# Patient Record
Sex: MALE | Race: WHITE | NOT HISPANIC OR LATINO | Employment: OTHER | URBAN - METROPOLITAN AREA
[De-identification: names, ages, dates, MRNs, and addresses within clinical notes are randomized per-mention and may not be internally consistent; named-entity substitution may affect disease eponyms.]

---

## 2018-01-03 ENCOUNTER — TELEPHONE (OUTPATIENT)
Dept: INTERNAL MEDICINE | Facility: CLINIC | Age: 58
End: 2018-01-03

## 2018-01-03 NOTE — TELEPHONE ENCOUNTER
Patient calls states he was a patient of yours back in 2013.  Then moved out of the area.  Has since returned and is asking if you will take him back as a patient.  Please advise.      TRC,  That we will see the patient.  Advised we need the records from his last provider prior to his visit.  Advised need a 60 minute New patient appointment.

## 2018-03-18 ENCOUNTER — OFFICE VISIT (OUTPATIENT)
Dept: URGENT CARE | Facility: URGENT CARE | Age: 58
End: 2018-03-18
Payer: COMMERCIAL

## 2018-03-18 VITALS
DIASTOLIC BLOOD PRESSURE: 92 MMHG | HEIGHT: 72 IN | BODY MASS INDEX: 34.4 KG/M2 | OXYGEN SATURATION: 97 % | SYSTOLIC BLOOD PRESSURE: 122 MMHG | HEART RATE: 97 BPM | WEIGHT: 254 LBS | RESPIRATION RATE: 20 BRPM | TEMPERATURE: 97.3 F

## 2018-03-18 DIAGNOSIS — M54.10 BACK PAIN WITH RIGHT-SIDED RADICULOPATHY: Primary | ICD-10-CM

## 2018-03-18 PROCEDURE — 96372 THER/PROPH/DIAG INJ SC/IM: CPT | Performed by: FAMILY MEDICINE

## 2018-03-18 PROCEDURE — 99213 OFFICE O/P EST LOW 20 MIN: CPT | Mod: 25 | Performed by: FAMILY MEDICINE

## 2018-03-18 RX ORDER — ETODOLAC 500 MG/1
500 TABLET, FILM COATED ORAL 2 TIMES DAILY
Qty: 20 TABLET | Refills: 0 | Status: SHIPPED | OUTPATIENT
Start: 2018-03-18 | End: 2018-04-20 | Stop reason: ALTCHOICE

## 2018-03-18 RX ORDER — SERTRALINE HYDROCHLORIDE 100 MG/1
1 TABLET, FILM COATED ORAL DAILY
Status: ON HOLD | COMMUNITY
Start: 2018-02-02 | End: 2020-05-21 | Stop reason: WASHOUT

## 2018-03-18 RX ORDER — INSULIN DETEMIR 100 [IU]/ML
55 INJECTION, SOLUTION SUBCUTANEOUS DAILY
Status: ON HOLD | COMMUNITY
Start: 2018-02-14 | End: 2020-05-21 | Stop reason: ALTCHOICE

## 2018-03-18 RX ORDER — IMIQUIMOD 12.5 MG/.25G
CREAM TOPICAL
Status: ON HOLD | COMMUNITY
Start: 2018-02-05 | End: 2020-05-21 | Stop reason: WASHOUT

## 2018-03-18 RX ORDER — ATORVASTATIN CALCIUM 40 MG/1
40 TABLET, FILM COATED ORAL DAILY
Status: ON HOLD | COMMUNITY
Start: 2017-12-27 | End: 2020-05-21 | Stop reason: WASHOUT

## 2018-03-18 RX ORDER — LOSARTAN POTASSIUM 50 MG/1
50 TABLET ORAL 2 TIMES DAILY
COMMUNITY
Start: 2018-02-26 | End: 2020-06-10 | Stop reason: SDUPTHER

## 2018-03-18 RX ORDER — KETOROLAC TROMETHAMINE 30 MG/ML
60 INJECTION, SOLUTION INTRAMUSCULAR; INTRAVENOUS ONCE
Status: COMPLETED | OUTPATIENT
Start: 2018-03-18 | End: 2018-03-18

## 2018-03-18 RX ADMIN — KETOROLAC TROMETHAMINE 60 MG: 30 INJECTION, SOLUTION INTRAMUSCULAR; INTRAVENOUS at 16:44

## 2018-03-18 ASSESSMENT — PAIN SCALES - GENERAL: PAINLEVEL: 3

## 2018-03-18 NOTE — PROGRESS NOTES
"Subjective         Del Mckeon is a 57 y.o. male who presents for back sprain.      HPI    Details related to the patient's current pain:    - Pain details: No trauma   - Location: Lumbar spine with radiation down right hip and leg   - Onset: 7 days   - Exacerbating symptoms: Movement    - Ameliorating symptoms: OTC medications. Rest.   - Severity: Moderate    The following have been reviewed and updated as appropriate in this visit:  Allergies   Allergen Reactions   • Penicillins      Current Outpatient Prescriptions   Medication Sig Dispense Refill   • atorvastatin (LIPITOR) 40 mg tablet Take 1 tablet by mouth daily.     • cholecalciferol, vitamin D3, (VITAMIN D3) 1,000 unit capsule Take 1 capsule every day by oral route for 30 days. 30 6   • diphenhydramine-acetaminophen (TYLENOL PM EXTRA STRENGTH)  mg tablet Take 2 tablets every day by oral route at bedtime.     • ibuprofen (ADVIL,MOTRIN) 200 mg tablet Take 2 tablets every 6 hours by oral route as needed.     • imiquimod (ALDARA) 5 % cream      • insulin syringe-needle U-100 1 mL 29 gauge x 1/2\" syringe  90    • LEVEMIR U-100 INSULIN 100 unit/mL injection Inject 40 Units under the skin daily.     • losartan (COZAAR) 50 mg tablet Take 1 tablet by mouth daily.     • metFORMIN (GLUCOPHAGE) 1,000 mg tablet  180 0   • sertraline (ZOLOFT) 100 mg tablet Take 1 tablet by mouth daily.       No current facility-administered medications for this visit.      Past Medical History:   Diagnosis Date   • Anemia    • Asthma    • Depressive disorder    • Heart murmur    • Hyperlipidemia    • Hypertension    • Obesity    • Obstructive sleep apnea    • Type 2 diabetes mellitus (CMS/HCC)      Past Surgical History:   Procedure Laterality Date   • CRYOTHERAPY      dermatology     Family History   Problem Relation Age of Onset   • Alzheimer's disease Father    • Diabetes type II Father    • Diabetes Sister      Social History     Social History   • Marital status:  " "    Spouse name: N/A   • Number of children: N/A   • Years of education: N/A     Social History Main Topics   • Smoking status: Never Smoker   • Smokeless tobacco: Never Used   • Alcohol use No   • Drug use: No   • Sexual activity: Defer     Other Topics Concern   • None     Social History Narrative   • None       Review of Systems  Constitutional: Negative for activity change, appetite change, chills, fatigue and fever.   Eyes: Negative for photophobia.   Respiratory: Negative for shortness of breath.    Cardiovascular: Negative for chest pain.   Gastrointestinal: Negative for abdominal pain.   Musculoskeletal: Positive for arthralgias and back pain  Skin: negative  Psychiatric/Behavioral: Negative for agitation.       Objective   /92 (BP Location: Left arm, Patient Position: Sitting, Cuff Size: Reg)   Pulse 97   Temp 36.3 °C (97.3 °F) (Temporal)   Resp 20   Ht 1.816 m (5' 11.5\")   Wt 115 kg (254 lb)   SpO2 97%   BMI 34.93 kg/m²     Physical Exam    Constitutional: Patient is oriented to person, place, and time. Patient appears well-developed and well-nourished.   HENT:   Head: Normocephalic and atraumatic.   Eyes: Conjunctivae and lids are normal.   Musculoskeletal: Normal alignment upon inspection without scoliosis or kyphosis. Non-tender to palpation. Flexion and extension both elicit pain. ROM is normal. Straight leg raise = positive at 30° on right. Peripheral pulses 2 +.   Neurological: He is alert and oriented to person, place, and time.   Skin: Skin is warm and dry.   Psychiatric: He has a normal mood and affect.      Assessment/Plan     Back pain   - Medical decision making:    - Advised to return to regular activities as able    - NSAIDS/Tylenol   - Will consider further imaging if symptoms are not self limiting    ELOISE OTTO MD         "

## 2018-03-21 ENCOUNTER — TELEPHONE (OUTPATIENT)
Dept: INTERNAL MEDICINE | Facility: CLINIC | Age: 58
End: 2018-03-21

## 2018-03-21 NOTE — TELEPHONE ENCOUNTER
Patient called stating that the etodolac and tylenol are not helping his pain and unsure how to proceed. Spoke with Dr. Goncalves and this is an urgent care patient not established. He is to return to his PCP or Urgent care for further evaluation. Patient notified, verbalized understanding

## 2018-04-05 ENCOUNTER — HOSPITAL ENCOUNTER (EMERGENCY)
Facility: HOSPITAL | Age: 58
Discharge: HOME | End: 2018-04-05
Payer: COMMERCIAL

## 2018-04-05 VITALS
RESPIRATION RATE: 20 BRPM | SYSTOLIC BLOOD PRESSURE: 188 MMHG | HEART RATE: 103 BPM | BODY MASS INDEX: 35 KG/M2 | DIASTOLIC BLOOD PRESSURE: 114 MMHG | WEIGHT: 250 LBS | HEIGHT: 71 IN | TEMPERATURE: 98.1 F | OXYGEN SATURATION: 94 %

## 2018-04-05 DIAGNOSIS — M54.16 LUMBAR RADICULOPATHY: Primary | ICD-10-CM

## 2018-04-05 PROCEDURE — 99283 EMERGENCY DEPT VISIT LOW MDM: CPT

## 2018-04-05 PROCEDURE — 6360000200 HC RX 636 W HCPCS (ALT 250 FOR IP): Performed by: PHYSICIAN ASSISTANT

## 2018-04-05 PROCEDURE — 96372 THER/PROPH/DIAG INJ SC/IM: CPT

## 2018-04-05 RX ORDER — TRAMADOL HYDROCHLORIDE 50 MG/1
50 TABLET ORAL EVERY 6 HOURS PRN
Qty: 20 TABLET | Refills: 0 | Status: SHIPPED | OUTPATIENT
Start: 2018-04-05 | End: 2018-04-15

## 2018-04-05 RX ORDER — TRAMADOL HYDROCHLORIDE 50 MG/1
50 TABLET ORAL EVERY 6 HOURS PRN
COMMUNITY
End: 2018-04-20

## 2018-04-05 RX ORDER — KETOROLAC TROMETHAMINE 30 MG/ML
60 INJECTION, SOLUTION INTRAMUSCULAR; INTRAVENOUS ONCE
Status: COMPLETED | OUTPATIENT
Start: 2018-04-05 | End: 2018-04-05

## 2018-04-05 RX ADMIN — KETOROLAC TROMETHAMINE 60 MG: 30 INJECTION, SOLUTION INTRAMUSCULAR at 10:45

## 2018-04-05 ASSESSMENT — PAIN DESCRIPTION - DESCRIPTORS: DESCRIPTORS: BURNING

## 2018-04-05 ASSESSMENT — ENCOUNTER SYMPTOMS
SORE THROAT: 0
ABDOMINAL PAIN: 0
BACK PAIN: 1
DYSURIA: 0
SHORTNESS OF BREATH: 0
HEADACHES: 0
FEVER: 0

## 2018-04-05 NOTE — ED PROVIDER NOTES
HPI:  Chief Complaint   Patient presents with   • Leg Pain     SIATIC PAIN THAT HAS BEEN INCREASING FOR 2 WEEKS,        This is a 57-year-old male who presents with low back pain and right leg pain that has been increasing for the last 2 weeks.  He is seeing 2 separate primary care providers at least 2 separate urgent care providers.  He says that he has been told by primary care that he may need an MRI but says he is having trouble reaching them regarding getting an appointment.  He has been taking Tylenol, ibuprofen, and tramadol.  The tramadol does help some but he still unable to sleep due to the pain.  He has not had any leg weakness.  No loss of bladder or bowel control.  He is scheduled to begin physical therapy on 11 April.            HISTORY:  Past Medical History:   Diagnosis Date   • Anemia    • Asthma    • Depressive disorder    • Heart murmur    • Hyperlipidemia    • Hypertension    • Obesity    • Obstructive sleep apnea    • Type 2 diabetes mellitus (CMS/HCC)        Past Surgical History:   Procedure Laterality Date   • CRYOTHERAPY      dermatology       Family History   Problem Relation Age of Onset   • Alzheimer's disease Father    • Diabetes type II Father    • Diabetes Sister        Social History   Substance Use Topics   • Smoking status: Never Smoker   • Smokeless tobacco: Never Used   • Alcohol use No       ROS:  Review of Systems   Constitutional: Negative for fever.   HENT: Negative for sore throat.    Eyes: Negative for visual disturbance.   Respiratory: Negative for shortness of breath.    Cardiovascular: Negative for chest pain.   Gastrointestinal: Negative for abdominal pain.   Genitourinary: Negative for dysuria.   Musculoskeletal: Positive for back pain.   Neurological: Negative for headaches.       PE:  ED Triage Vitals [04/05/18 1003]   Temp Heart Rate Resp BP SpO2   36.7 °C (98.1 °F) 103 20 (!) 188/114 94 %      Temp Source Heart Rate Source Patient Position BP Location FiO2 (%)    Oral -- -- -- --       Physical Exam   Constitutional: He is oriented to person, place, and time. He appears well-developed and well-nourished.   HENT:   Head: Normocephalic and atraumatic.   Eyes: EOM are normal.   Neck: Normal range of motion. Neck supple.   Cardiovascular: Normal rate and regular rhythm.    Pulmonary/Chest: Effort normal and breath sounds normal.   Musculoskeletal: Normal range of motion.   Neurological: He is alert and oriented to person, place, and time.   He is ambulating normally.  His leg strength is 5/5 bilaterally without any neurologic deficit.   Psychiatric: He has a normal mood and affect. His behavior is normal.   Nursing note and vitals reviewed.      ED LABS:  Labs Reviewed - No data to display      ED IMAGES:  No orders to display       ED PROCEDURES:  Procedures    ED COURSE:  ED Course        MDM:  MDM  Number of Diagnoses or Management Options  Lumbar radiculopathy:   Diagnosis management comments: This is a 57-year-old male who presents with a two-week history of increasing right leg pain which starts to his back and extends down in the L4 distribution.  He does not have any neurologic deficit in his leg strength is 5 out of 5 bilaterally.  He has not had any loss of bladder or bowel control.  Unfortunately he seen to urgent care providers into primary care providers for this and unfortunately the work up that he needs has not continued as he has discussed with his providers.  I would like him to call his true primary care provider today to set up an appointment for further evaluation and potential MRI.  He does have physical appointment therapy April 11 that he needs to attend.  I would like him to walk as much as he can as this will help keep the back muscles from spasming.  He may continue to take Tylenol and ibuprofen and I also did give him a refill of his tramadol.  He was treated with a shot of Toradol today and was not given anything stronger as he was driving himself.   Again I stressed to him that he needs to see one provider only for this so things do not get further confused and he needs to contact his primary care provider today regarding this.      Final diagnoses:   [M54.16] Lumbar radiculopathy        NASIMA Ledezma  04/05/18 1051

## 2018-04-05 NOTE — DISCHARGE INSTRUCTIONS
Continue to take Tylenol 650 mg 4 times daily  Take ibuprofen 800 mg 3 times daily  You may take your tramadol as needed up to 4 times daily  Keep your physical therapy appointment  Walk as much as you are able to keep your back muscles from spasming  Contact your primary care provider today for a follow-up appointment

## 2018-04-20 ENCOUNTER — OFFICE VISIT (OUTPATIENT)
Dept: CARDIOLOGY | Facility: CLINIC | Age: 58
End: 2018-04-20
Payer: COMMERCIAL

## 2018-04-20 VITALS
OXYGEN SATURATION: 97 % | WEIGHT: 250 LBS | HEART RATE: 86 BPM | BODY MASS INDEX: 35 KG/M2 | RESPIRATION RATE: 20 BRPM | DIASTOLIC BLOOD PRESSURE: 98 MMHG | SYSTOLIC BLOOD PRESSURE: 162 MMHG | HEIGHT: 71 IN

## 2018-04-20 DIAGNOSIS — E78.00 PURE HYPERCHOLESTEROLEMIA: ICD-10-CM

## 2018-04-20 DIAGNOSIS — I35.8 SYSTOLIC MURMUR OF AORTA: ICD-10-CM

## 2018-04-20 DIAGNOSIS — I10 BENIGN ESSENTIAL HYPERTENSION: ICD-10-CM

## 2018-04-20 DIAGNOSIS — E11.9 CONTROLLED TYPE 2 DIABETES MELLITUS WITHOUT COMPLICATION, WITHOUT LONG-TERM CURRENT USE OF INSULIN (CMS/HCC): Primary | ICD-10-CM

## 2018-04-20 PROBLEM — G47.33 OBSTRUCTIVE SLEEP APNEA SYNDROME: Status: ACTIVE | Noted: 2018-04-20

## 2018-04-20 PROBLEM — E78.5 HYPERLIPIDEMIA: Status: ACTIVE | Noted: 2018-04-20

## 2018-04-20 PROCEDURE — 99214 OFFICE O/P EST MOD 30 MIN: CPT | Performed by: NURSE PRACTITIONER

## 2018-04-20 RX ORDER — AMLODIPINE BESYLATE 5 MG/1
5 TABLET ORAL DAILY
Qty: 90 TABLET | Refills: 11 | Status: SHIPPED | OUTPATIENT
Start: 2018-04-20 | End: 2019-04-20 | Stop reason: WASHOUT

## 2018-04-20 RX ORDER — HYDROCODONE BITARTRATE AND ACETAMINOPHEN 5; 325 MG/1; MG/1
TABLET ORAL
COMMUNITY
Start: 2018-04-17 | End: 2020-05-21 | Stop reason: WASHOUT

## 2018-04-20 RX ORDER — LOSARTAN POTASSIUM 100 MG/1
100 TABLET ORAL DAILY
Qty: 30 TABLET | Refills: 11 | Status: SHIPPED | OUTPATIENT
Start: 2018-04-20 | End: 2019-04-26 | Stop reason: SDUPTHER

## 2018-04-20 RX ORDER — TIZANIDINE HYDROCHLORIDE 4 MG/1
CAPSULE, GELATIN COATED ORAL
Status: ON HOLD | COMMUNITY
Start: 2018-04-12 | End: 2020-05-21 | Stop reason: WASHOUT

## 2018-04-20 ASSESSMENT — PAIN SCALES - GENERAL: PAINLEVEL: 5

## 2018-04-20 ASSESSMENT — ENCOUNTER SYMPTOMS
SNORING: 1
DEPRESSION: 1
DIZZINESS: 1
DIAPHORESIS: 1
BACK PAIN: 1
NIGHT SWEATS: 1
HEMATURIA: 1

## 2018-04-20 NOTE — PATIENT INSTRUCTIONS
1. Increase losartan to 100 mg a day, take in am.  2.  Add amlodipine 5 mg a day, in am  3.  Take blood pressure one day mid morning, next day after lunch, next day after supper. Write down values and heart rate- send to clinic after about 3-4 weeks.  4.  Recommend going back on atorvastatin for elevated cholesterol and triglycerides  5.  Recommend going back on aspirin to every other day.   Patient Education     Hypertension  Hypertension is another name for high blood pressure. High blood pressure forces your heart to work harder to pump blood. This can cause problems over time.  There are two numbers in a blood pressure reading. There is a top number (systolic) over a bottom number (diastolic). It is best to have a blood pressure below 120/80. Healthy choices can help lower your blood pressure. You may need medicine to help lower your blood pressure if:  · Your blood pressure cannot be lowered with healthy choices.  · Your blood pressure is higher than 130/80.  Follow these instructions at home:  Eating and drinking  · If directed, follow the DASH eating plan. This diet includes:  ¨ Filling half of your plate at each meal with fruits and vegetables.  ¨ Filling one quarter of your plate at each meal with whole grains. Whole grains include whole wheat pasta, brown rice, and whole grain bread.  ¨ Eating or drinking low-fat dairy products, such as skim milk or low-fat yogurt.  ¨ Filling one quarter of your plate at each meal with low-fat (lean) proteins. Low-fat proteins include fish, skinless chicken, eggs, beans, and tofu.  ¨ Avoiding fatty meat, cured and processed meat, or chicken with skin.  ¨ Avoiding premade or processed food.  · Eat less than 1,500 mg of salt (sodium) a day.  · Limit alcohol use to no more than 1 drink a day for nonpregnant women and 2 drinks a day for men. One drink equals 12 oz of beer, 5 oz of wine, or 1½ oz of hard liquor.  Lifestyle  · Work with your doctor to stay at a healthy weight or  to lose weight. Ask your doctor what the best weight is for you.  · Get at least 30 minutes of exercise that causes your heart to beat faster (aerobic exercise) most days of the week. This may include walking, swimming, or biking.  · Get at least 30 minutes of exercise that strengthens your muscles (resistance exercise) at least 3 days a week. This may include lifting weights or pilates.  · Do not use any products that contain nicotine or tobacco. This includes cigarettes and e-cigarettes. If you need help quitting, ask your doctor.  · Check your blood pressure at home as told by your doctor.  · Keep all follow-up visits as told by your doctor. This is important.  Medicines  · Take over-the-counter and prescription medicines only as told by your doctor. Follow directions carefully.  · Do not skip doses of blood pressure medicine. The medicine does not work as well if you skip doses. Skipping doses also puts you at risk for problems.  · Ask your doctor about side effects or reactions to medicines that you should watch for.  Contact a doctor if:  · You think you are having a reaction to the medicine you are taking.  · You have headaches that keep coming back (recurring).  · You feel dizzy.  · You have swelling in your ankles.  · You have trouble with your vision.  Get help right away if:  · You get a very bad headache.  · You start to feel confused.  · You feel weak or numb.  · You feel faint.  · You get very bad pain in your:  ¨ Chest.  ¨ Belly (abdomen).  · You throw up (vomit) more than once.  · You have trouble breathing.  Summary  · Hypertension is another name for high blood pressure.  · Making healthy choices can help lower blood pressure. If your blood pressure cannot be controlled with healthy choices, you may need to take medicine.  This information is not intended to replace advice given to you by your health care provider. Make sure you discuss any questions you have with your health care  "provider.  Document Released: 06/05/2009 Document Revised: 11/15/2017 Document Reviewed: 11/15/2017  [employer] Interactive Patient Education © 2018 [employer]       Patient Education     DASH Eating Plan  DASH stands for \"Dietary Approaches to Stop Hypertension.\" The DASH eating plan is a healthy eating plan that has been shown to reduce high blood pressure (hypertension). It may also reduce your risk for type 2 diabetes, heart disease, and stroke. The DASH eating plan may also help with weight loss.  What are tips for following this plan?  General guidelines  · Avoid eating more than 2,300 mg (milligrams) of salt (sodium) a day. If you have hypertension, you may need to reduce your sodium intake to 1,500 mg a day.  · Limit alcohol intake to no more than 1 drink a day for nonpregnant women and 2 drinks a day for men. One drink equals 12 oz of beer, 5 oz of wine, or 1½ oz of hard liquor.  · Work with your health care provider to maintain a healthy body weight or to lose weight. Ask what an ideal weight is for you.  · Get at least 30 minutes of exercise that causes your heart to beat faster (aerobic exercise) most days of the week. Activities may include walking, swimming, or biking.  · Work with your health care provider or diet and nutrition specialist (dietitian) to adjust your eating plan to your individual calorie needs.  Reading food labels  · Check food labels for the amount of sodium per serving. Choose foods with less than 5 percent of the Daily Value of sodium. Generally, foods with less than 300 mg of sodium per serving fit into this eating plan.  · To find whole grains, look for the word \"whole\" as the first word in the ingredient list.  Shopping  · Buy products labeled as \"low-sodium\" or \"no salt added.\"  · Buy fresh foods. Avoid canned foods and premade or frozen meals.  Cooking  · Avoid adding salt when cooking. Use salt-free seasonings or herbs instead of table salt or sea salt. Check with your " health care provider or pharmacist before using salt substitutes.  · Do not duval foods. Cook foods using healthy methods such as baking, boiling, grilling, and broiling instead.  · Cook with heart-healthy oils, such as olive, canola, soybean, or sunflower oil.  Meal planning    · Eat a balanced diet that includes:  ¨ 5 or more servings of fruits and vegetables each day. At each meal, try to fill half of your plate with fruits and vegetables.  ¨ Up to 6-8 servings of whole grains each day.  ¨ Less than 6 oz of lean meat, poultry, or fish each day. A 3-oz serving of meat is about the same size as a deck of cards. One egg equals 1 oz.  ¨ 2 servings of low-fat dairy each day.  ¨ A serving of nuts, seeds, or beans 5 times each week.  ¨ Heart-healthy fats. Healthy fats called Omega-3 fatty acids are found in foods such as flaxseeds and coldwater fish, like sardines, salmon, and mackerel.  · Limit how much you eat of the following:  ¨ Canned or prepackaged foods.  ¨ Food that is high in trans fat, such as fried foods.  ¨ Food that is high in saturated fat, such as fatty meat.  ¨ Sweets, desserts, sugary drinks, and other foods with added sugar.  ¨ Full-fat dairy products.  · Do not salt foods before eating.  · Try to eat at least 2 vegetarian meals each week.  · Eat more home-cooked food and less restaurant, buffet, and fast food.  · When eating at a restaurant, ask that your food be prepared with less salt or no salt, if possible.  What foods are recommended?  The items listed may not be a complete list. Talk with your dietitian about what dietary choices are best for you.  Grains  Whole-grain or whole-wheat bread. Whole-grain or whole-wheat pasta. Brown rice. Oatmeal. Quinoa. Bulgur. Whole-grain and low-sodium cereals. Christy bread. Low-fat, low-sodium crackers. Whole-wheat flour tortillas.  Vegetables  Fresh or frozen vegetables (raw, steamed, roasted, or grilled). Low-sodium or reduced-sodium tomato and vegetable juice.  Low-sodium or reduced-sodium tomato sauce and tomato paste. Low-sodium or reduced-sodium canned vegetables.  Fruits  All fresh, dried, or frozen fruit. Canned fruit in natural juice (without added sugar).  Meat and other protein foods  Skinless chicken or turkey. Ground chicken or turkey. Pork with fat trimmed off. Fish and seafood. Egg whites. Dried beans, peas, or lentils. Unsalted nuts, nut butters, and seeds. Unsalted canned beans. Lean cuts of beef with fat trimmed off. Low-sodium, lean deli meat.  Dairy  Low-fat (1%) or fat-free (skim) milk. Fat-free, low-fat, or reduced-fat cheeses. Nonfat, low-sodium ricotta or cottage cheese. Low-fat or nonfat yogurt. Low-fat, low-sodium cheese.  Fats and oils  Soft margarine without trans fats. Vegetable oil. Low-fat, reduced-fat, or light mayonnaise and salad dressings (reduced-sodium). Canola, safflower, olive, soybean, and sunflower oils. Avocado.  Seasoning and other foods  Herbs. Spices. Seasoning mixes without salt. Unsalted popcorn and pretzels. Fat-free sweets.  What foods are not recommended?  The items listed may not be a complete list. Talk with your dietitian about what dietary choices are best for you.  Grains  Baked goods made with fat, such as croissants, muffins, or some breads. Dry pasta or rice meal packs.  Vegetables  Creamed or fried vegetables. Vegetables in a cheese sauce. Regular canned vegetables (not low-sodium or reduced-sodium). Regular canned tomato sauce and paste (not low-sodium or reduced-sodium). Regular tomato and vegetable juice (not low-sodium or reduced-sodium). Pickles. Olives.  Fruits  Canned fruit in a light or heavy syrup. Fried fruit. Fruit in cream or butter sauce.  Meat and other protein foods  Fatty cuts of meat. Ribs. Fried meat. [place]. Sausage. Bologna and other processed lunch meats. Salami. Fatback. Hotdogs. Bratwurst. Salted nuts and seeds. Canned beans with added salt. Canned or smoked fish. Whole eggs or egg yolks. Chicken  or turkey with skin.  Dairy  Whole or 2% milk, cream, and half-and-half. Whole or full-fat cream cheese. Whole-fat or sweetened yogurt. Full-fat cheese. Nondairy creamers. Whipped toppings. Processed cheese and cheese spreads.  Fats and oils  Butter. Stick margarine. Lard. Shortening. Ghee. [place] fat. Tropical oils, such as coconut, palm kernel, or palm oil.  Seasoning and other foods  Salted popcorn and pretzels. Onion salt, garlic salt, seasoned salt, table salt, and sea salt. Worcestershire sauce. Tartar sauce. Barbecue sauce. Teriyaki sauce. Soy sauce, including reduced-sodium. Steak sauce. Canned and packaged gravies. Fish sauce. Oyster sauce. Cocktail sauce. Horseradish that you find on the shelf. Ketchup. Mustard. Meat flavorings and tenderizers. Bouillon cubes. Hot sauce and Tabasco sauce. Premade or packaged marinades. Premade or packaged taco seasonings. Relishes. Regular salad dressings.  Where to find more information:  · National Heart, Lung, and Blood [place]: www.nhlbi.nih.gov  · American Heart Association: www.heart.org  Summary  · The DASH eating plan is a healthy eating plan that has been shown to reduce high blood pressure (hypertension). It may also reduce your risk for type 2 diabetes, heart disease, and stroke.  · With the DASH eating plan, you should limit salt (sodium) intake to 2,300 mg a day. If you have hypertension, you may need to reduce your sodium intake to 1,500 mg a day.  · When on the DASH eating plan, aim to eat more fresh fruits and vegetables, whole grains, lean proteins, low-fat dairy, and heart-healthy fats.  · Work with your health care provider or diet and nutrition specialist (dietitian) to adjust your eating plan to your individual calorie needs.  This information is not intended to replace advice given to you by your health care provider. Make sure you discuss any questions you have with your health care provider.  Document Released: 12/06/2012 Document Revised:  12/11/2017 Document Reviewed: 12/11/2017  [employer] Interactive Patient Education © 2017 [employer]

## 2018-04-20 NOTE — PROGRESS NOTES
"[address] [facility] [place] [address]                                         Cardiology Outpatient Follow-up Note    Subjective    Patient ID: Del Mckeon is a 57 y.o. male.    Chief Complaint: Valve Disorder (murmur); Hyperlipidemia; and Hypertension       .  This is a 57-year-old male who was last evaluated by Dr. Beckett in February 2017.  He is being followed for a heart murmur.  Review of the dictation shows that this is an aortic valve sclerotic murmur, recommending echocardiogram every 2-3 years.  He also has hypertension, diabetes, hypertriglyceridemia.    Patient comes in today for routine evaluation.  His blood pressure is uncontrolled at 162/98.  He states he checks his blood pressure may be 2-3 times a month.  And it is usually elevated.  His diabetes is uncontrolled, he struggles with a low carb diabetic diet.  He is not down about 20 pounds from his last appointment.  Patient stopped his atorvastatin proximally 4 weeks ago, he also stopped his baby aspirin.  He feels that he is taking too many medications.    He has been tested for sleep apnea in the past, back in 2013, he states the results were borderline.        Specialty Comments/Past Cardiac Hx:  CARDIAC HISTORY:  VALVULAR:  Murmur    RISK FACTORS:  Type 2 Diabetes  Hypertension  Dyslipidemia [Type:  Hyperlipidemia]      CARDIOVASCULAR PROCEDURES:    EKG (Sinus Rhythm, NONDIAGNOSTIC Q WAVES INFERIOR LEADS, NPT.) - 1/26/2016      Current Outpatient Prescriptions:   •  cholecalciferol, vitamin D3, (VITAMIN D3) 1,000 unit capsule, Take 1 capsule every day by oral route for 30 days., Disp: 30, Rfl: 6  •  HYDROcodone-acetaminophen (NORCO) 5-325 mg per tablet, , Disp: , Rfl:   •  ibuprofen (ADVIL,MOTRIN) 200 mg tablet, Take 2 tablets every 6 hours by oral route as needed., Disp: , Rfl:   •  insulin syringe-needle U-100 1 mL 29 gauge x 1/2\" syringe, , Disp: 90, Rfl:   •  LEVEMIR U-100 INSULIN 100 unit/mL injection, Inject 55 Units under the skin " daily.  , Disp: , Rfl:   •  losartan (COZAAR) 50 mg tablet, Take 1 tablet by mouth daily., Disp: , Rfl:   •  metFORMIN (GLUCOPHAGE) 1,000 mg tablet, Take twice daily, Disp: 180, Rfl: 0  •  sertraline (ZOLOFT) 100 mg tablet, Take 1 tablet by mouth daily., Disp: , Rfl:   •  TiZANidine (ZANAFLEX) 4 mg capsule, , Disp: , Rfl:   •  amLODIPine (NORVASC) 5 mg tablet, Take 1 tablet (5 mg total) by mouth daily., Disp: 90 tablet, Rfl: 11  •  atorvastatin (LIPITOR) 40 mg tablet, Take 1 tablet by mouth daily., Disp: , Rfl:   •  diphenhydramine-acetaminophen (TYLENOL PM EXTRA STRENGTH)  mg tablet, Take 2 tablets every day by oral route at bedtime., Disp: , Rfl:   •  imiquimod (ALDARA) 5 % cream, , Disp: , Rfl:   •  losartan (COZAAR) 100 mg tablet, Take 1 tablet (100 mg total) by mouth daily., Disp: 30 tablet, Rfl: 11    Review of Systems  Review of Systems   Constitution: Positive for diaphoresis and night sweats.   Cardiovascular: Positive for dyspnea on exertion. Negative for chest pain, irregular heartbeat, leg swelling, orthopnea, palpitations and paroxysmal nocturnal dyspnea.   Respiratory: Positive for snoring.    Musculoskeletal: Positive for back pain and joint pain.        Disc issues; sciatica, Right leg pain   Genitourinary: Positive for hematuria.   Neurological: Positive for dizziness.   Psychiatric/Behavioral: Positive for depression.       Objective     Vitals:    04/20/18 1252   BP: 162/98   Pulse: 86   Resp: 20   SpO2: 97%     Weight: 113 kg (250 lb)  Physical Exam   Constitutional: He is oriented to person, place, and time. He appears well-developed and well-nourished.   obese   HENT:   Head: Normocephalic.   Eyes: Conjunctivae are normal.   Neck: No JVD present.   Cardiovascular: Regular rhythm, S1 normal, S2 normal and normal heart sounds.    Pulmonary/Chest: Effort normal and breath sounds normal.   Abdominal: Soft. Bowel sounds are normal.   Musculoskeletal: Normal range of motion. He exhibits no  edema.   Neurological: He is alert and oriented to person, place, and time.   Skin: Skin is warm and dry.   Vitals reviewed.      Data Review:   Lab Results   Component Value Date     11/02/2017     01/20/2016    K 4.5 11/02/2017    K 4.1 01/20/2016    CL 99 11/02/2017     01/20/2016    CO2 22 11/02/2017    CO2 25 01/20/2016    BUN 16 11/02/2017    BUN 11 01/20/2016    CREATININE 0.7 11/02/2017    CREATININE 1.1 01/20/2016    GLUCOSE 238 11/02/2017    GLUCOSE 299 (H) 01/20/2016    CALCIUM 9.8 11/02/2017    CALCIUM 9.0 01/20/2016     Lab Results   Component Value Date    CHOL 205 11/02/2017    CHOL 241 (H) 01/20/2016    TRIG  11/02/2017      Comment:      Result >525    TRIG 677 (H) 01/20/2016    TRIG 677 (H) 01/20/2016    HDL 30 11/02/2017    HDL 30 (L) 01/20/2016    LDLDIRECT TNP 01/20/2016    LDLDIRECT TNP 01/20/2016       Lipid:   Lab Results   Component Value Date    CHOL 205 11/02/2017    CHOL 241 (H) 01/20/2016    HDL 30 11/02/2017    HDL 30 (L) 01/20/2016    TRIG  11/02/2017      Comment:      Result >525    TRIG 677 (H) 01/20/2016    TRIG 677 (H) 01/20/2016    LDLCALC  11/02/2017      Comment:      NR       Assessment/Plan   Diagnoses and all orders for this visit:    type 2 diabetes mellitus without complication, without long-term current use of insulin (CMS/HCC)    Benign essential hypertension  -     losartan (COZAAR) 100 mg tablet; Take 1 tablet (100 mg total) by mouth daily.  -     amLODIPine (NORVASC) 5 mg tablet; Take 1 tablet (5 mg total) by mouth daily.    Pure hypercholesterolemia    Systolic murmur of aorta  -     US Echo complete; Future    Mild aortic sclerosis (CMS/HCC)      Plan /Discussion  #1 hypertension-after discussion patient is in agreement to attempt medical  additional medical therapy to lower his blood pressure, we also talked about weight loss, exercise, and risk factor modification.  Mainly improved control of his diabetes.  We will increase his losartan to 100 mg  a day, and will add amlodipine.  He had been on a thiazide diuretic in the past, which may have been hydrochlorothiazide is concerned about frequent urination.  We did educate him on the most common side effect with amlodipine which is a lower extremity swelling.  He is to check his blood pressures routinely, send these values to the clinic, and I will review these for him.  Once his blood pressures are controlled, I told the patient would defer additional management to his family provider.  2.  Hypertriglyceridemia.  Reviewing triglycerides from 2016 they were 677, lipids from 2017 do not listed triglyceride.  His blood sugar at that time was 299.  We educated him on hypertriglyceridemia, and poor diabetic control.  Would recommend he resume his atorvastatin, and to follow-up with a lipid panel in 8-12 weeks.  Educated him on what normal triglycerides should be.  And is can follow-up with his family physician or if he so chooses he can be enrolled in the lipid clinic.  3.  Plan for echocardiogram next year.    Patient Instructions   1. Increase losartan to 100 mg a day, take in am.  2.  Add amlodipine 5 mg a day, in am  3.  Take blood pressure one day mid morning, next day after lunch, next day after supper. Write down values and heart rate- send to clinic after about 3-4 weeks.  4.  Recommend going back on atorvastatin for elevated cholesterol and triglycerides  5.  Recommend going back on aspirin to every other day.   Patient Education     Hypertension  Hypertension is another name for high blood pressure. High blood pressure forces your heart to work harder to pump blood. This can cause problems over time.  There are two numbers in a blood pressure reading. There is a top number (systolic) over a bottom number (diastolic). It is best to have a blood pressure below 120/80. Healthy choices can help lower your blood pressure. You may need medicine to help lower your blood pressure if:  · Your blood pressure cannot  be lowered with healthy choices.  · Your blood pressure is higher than 130/80.  Follow these instructions at home:  Eating and drinking  · If directed, follow the DASH eating plan. This diet includes:  ¨ Filling half of your plate at each meal with fruits and vegetables.  ¨ Filling one quarter of your plate at each meal with whole grains. Whole grains include whole wheat pasta, brown rice, and whole grain bread.  ¨ Eating or drinking low-fat dairy products, such as skim milk or low-fat yogurt.  ¨ Filling one quarter of your plate at each meal with low-fat (lean) proteins. Low-fat proteins include fish, skinless chicken, eggs, beans, and tofu.  ¨ Avoiding fatty meat, cured and processed meat, or chicken with skin.  ¨ Avoiding premade or processed food.  · Eat less than 1,500 mg of salt (sodium) a day.  · Limit alcohol use to no more than 1 drink a day for nonpregnant women and 2 drinks a day for men. One drink equals 12 oz of beer, 5 oz of wine, or 1½ oz of hard liquor.  Lifestyle  · Work with your doctor to stay at a healthy weight or to lose weight. Ask your doctor what the best weight is for you.  · Get at least 30 minutes of exercise that causes your heart to beat faster (aerobic exercise) most days of the week. This may include walking, swimming, or biking.  · Get at least 30 minutes of exercise that strengthens your muscles (resistance exercise) at least 3 days a week. This may include lifting weights or pilates.  · Do not use any products that contain nicotine or tobacco. This includes cigarettes and e-cigarettes. If you need help quitting, ask your doctor.  · Check your blood pressure at home as told by your doctor.  · Keep all follow-up visits as told by your doctor. This is important.  Medicines  · Take over-the-counter and prescription medicines only as told by your doctor. Follow directions carefully.  · Do not skip doses of blood pressure medicine. The medicine does not work as well if you skip doses.  "Skipping doses also puts you at risk for problems.  · Ask your doctor about side effects or reactions to medicines that you should watch for.  Contact a doctor if:  · You think you are having a reaction to the medicine you are taking.  · You have headaches that keep coming back (recurring).  · You feel dizzy.  · You have swelling in your ankles.  · You have trouble with your vision.  Get help right away if:  · You get a very bad headache.  · You start to feel confused.  · You feel weak or numb.  · You feel faint.  · You get very bad pain in your:  ¨ Chest.  ¨ Belly (abdomen).  · You throw up (vomit) more than once.  · You have trouble breathing.  Summary  · Hypertension is another name for high blood pressure.  · Making healthy choices can help lower blood pressure. If your blood pressure cannot be controlled with healthy choices, you may need to take medicine.  This information is not intended to replace advice given to you by your health care provider. Make sure you discuss any questions you have with your health care provider.  Document Released: 06/05/2009 Document Revised: 11/15/2017 Document Reviewed: 11/15/2017  [employer] Interactive Patient Education © 2018 [employer]vier Inc.       Patient Education     DASH Eating Plan  DASH stands for \"Dietary Approaches to Stop Hypertension.\" The DASH eating plan is a healthy eating plan that has been shown to reduce high blood pressure (hypertension). It may also reduce your risk for type 2 diabetes, heart disease, and stroke. The DASH eating plan may also help with weight loss.  What are tips for following this plan?  General guidelines  · Avoid eating more than 2,300 mg (milligrams) of salt (sodium) a day. If you have hypertension, you may need to reduce your sodium intake to 1,500 mg a day.  · Limit alcohol intake to no more than 1 drink a day for nonpregnant women and 2 drinks a day for men. One drink equals 12 oz of beer, 5 oz of wine, or 1½ oz of hard liquor.  · Work " "with your health care provider to maintain a healthy body weight or to lose weight. Ask what an ideal weight is for you.  · Get at least 30 minutes of exercise that causes your heart to beat faster (aerobic exercise) most days of the week. Activities may include walking, swimming, or biking.  · Work with your health care provider or diet and nutrition specialist (dietitian) to adjust your eating plan to your individual calorie needs.  Reading food labels  · Check food labels for the amount of sodium per serving. Choose foods with less than 5 percent of the Daily Value of sodium. Generally, foods with less than 300 mg of sodium per serving fit into this eating plan.  · To find whole grains, look for the word \"whole\" as the first word in the ingredient list.  Shopping  · Buy products labeled as \"low-sodium\" or \"no salt added.\"  · Buy fresh foods. Avoid canned foods and premade or frozen meals.  Cooking  · Avoid adding salt when cooking. Use salt-free seasonings or herbs instead of table salt or sea salt. Check with your health care provider or pharmacist before using salt substitutes.  · Do not duval foods. Cook foods using healthy methods such as baking, boiling, grilling, and broiling instead.  · Cook with heart-healthy oils, such as olive, canola, soybean, or sunflower oil.  Meal planning    · Eat a balanced diet that includes:  ¨ 5 or more servings of fruits and vegetables each day. At each meal, try to fill half of your plate with fruits and vegetables.  ¨ Up to 6-8 servings of whole grains each day.  ¨ Less than 6 oz of lean meat, poultry, or fish each day. A 3-oz serving of meat is about the same size as a deck of cards. One egg equals 1 oz.  ¨ 2 servings of low-fat dairy each day.  ¨ A serving of nuts, seeds, or beans 5 times each week.  ¨ Heart-healthy fats. Healthy fats called Omega-3 fatty acids are found in foods such as flaxseeds and coldwater fish, like sardines, salmon, and mackerel.  · Limit how much you " eat of the following:  ¨ Canned or prepackaged foods.  ¨ Food that is high in trans fat, such as fried foods.  ¨ Food that is high in saturated fat, such as fatty meat.  ¨ Sweets, desserts, sugary drinks, and other foods with added sugar.  ¨ Full-fat dairy products.  · Do not salt foods before eating.  · Try to eat at least 2 vegetarian meals each week.  · Eat more home-cooked food and less restaurant, buffet, and fast food.  · When eating at a restaurant, ask that your food be prepared with less salt or no salt, if possible.  What foods are recommended?  The items listed may not be a complete list. Talk with your dietitian about what dietary choices are best for you.  Grains  Whole-grain or whole-wheat bread. Whole-grain or whole-wheat pasta. Brown rice. Oatmeal. Quinoa. Bulgur. Whole-grain and low-sodium cereals. Christy bread. Low-fat, low-sodium crackers. Whole-wheat flour tortillas.  Vegetables  Fresh or frozen vegetables (raw, steamed, roasted, or grilled). Low-sodium or reduced-sodium tomato and vegetable juice. Low-sodium or reduced-sodium tomato sauce and tomato paste. Low-sodium or reduced-sodium canned vegetables.  Fruits  All fresh, dried, or frozen fruit. Canned fruit in natural juice (without added sugar).  Meat and other protein foods  Skinless chicken or turkey. Ground chicken or turkey. Pork with fat trimmed off. Fish and seafood. Egg whites. Dried beans, peas, or lentils. Unsalted nuts, nut butters, and seeds. Unsalted canned beans. Lean cuts of beef with fat trimmed off. Low-sodium, lean deli meat.  Dairy  Low-fat (1%) or fat-free (skim) milk. Fat-free, low-fat, or reduced-fat cheeses. Nonfat, low-sodium ricotta or cottage cheese. Low-fat or nonfat yogurt. Low-fat, low-sodium cheese.  Fats and oils  Soft margarine without trans fats. Vegetable oil. Low-fat, reduced-fat, or light mayonnaise and salad dressings (reduced-sodium). Canola, safflower, olive, soybean, and sunflower oils. Avocado.  Seasoning  and other foods  Herbs. Spices. Seasoning mixes without salt. Unsalted popcorn and pretzels. Fat-free sweets.  What foods are not recommended?  The items listed may not be a complete list. Talk with your dietitian about what dietary choices are best for you.  Grains  Baked goods made with fat, such as croissants, muffins, or some breads. Dry pasta or rice meal packs.  Vegetables  Creamed or fried vegetables. Vegetables in a cheese sauce. Regular canned vegetables (not low-sodium or reduced-sodium). Regular canned tomato sauce and paste (not low-sodium or reduced-sodium). Regular tomato and vegetable juice (not low-sodium or reduced-sodium). Pickles. Olives.  Fruits  Canned fruit in a light or heavy syrup. Fried fruit. Fruit in cream or butter sauce.  Meat and other protein foods  Fatty cuts of meat. Ribs. Fried meat. Santiago. Sausage. Bologna and other processed lunch meats. Salami. Fatback. Hotdogs. Bratwurst. Salted nuts and seeds. Canned beans with added salt. Canned or smoked fish. Whole eggs or egg yolks. Chicken or turkey with skin.  Dairy  Whole or 2% milk, cream, and half-and-half. Whole or full-fat cream cheese. Whole-fat or sweetened yogurt. Full-fat cheese. Nondairy creamers. Whipped toppings. Processed cheese and cheese spreads.  Fats and oils  Butter. Stick margarine. Lard. Shortening. Ghee. Santiago fat. Tropical oils, such as coconut, palm kernel, or palm oil.  Seasoning and other foods  Salted popcorn and pretzels. Onion salt, garlic salt, seasoned salt, table salt, and sea salt. Worcestershire sauce. Tartar sauce. Barbecue sauce. Teriyaki sauce. Soy sauce, including reduced-sodium. Steak sauce. Canned and packaged gravies. Fish sauce. Oyster sauce. Cocktail sauce. Horseradish that you find on the shelf. Ketchup. Mustard. Meat flavorings and tenderizers. Bouillon cubes. Hot sauce and Tabasco sauce. Premade or packaged marinades. Premade or packaged taco seasonings. Relishes. Regular salad  dressings.  Where to find more information:  · National Heart, Lung, and [employer] [place]: www.nhlbi.nih.gov  · American Heart Association: www.heart.org  Summary  · The DASH eating plan is a healthy eating plan that has been shown to reduce high blood pressure (hypertension). It may also reduce your risk for type 2 diabetes, heart disease, and stroke.  · With the DASH eating plan, you should limit salt (sodium) intake to 2,300 mg a day. If you have hypertension, you may need to reduce your sodium intake to 1,500 mg a day.  · When on the DASH eating plan, aim to eat more fresh fruits and vegetables, whole grains, lean proteins, low-fat dairy, and heart-healthy fats.  · Work with your health care provider or diet and nutrition specialist (dietitian) to adjust your eating plan to your individual calorie needs.  This information is not intended to replace advice given to you by your health care provider. Make sure you discuss any questions you have with your health care provider.  Document Released: 12/06/2012 Document Revised: 12/11/2017 Document Reviewed: 12/11/2017  [employer]vier Interactive Patient Education © 2017 Elsevier Inc.           No Follow-up on file.    The patient verbalized correct understanding and agreement to today's instructuctions and plan.  Patient voiced that questions have been addressed.    Electronically signed by: Gerri Perez CNP  4/21/2018  2:31 PM

## 2018-04-21 ASSESSMENT — ENCOUNTER SYMPTOMS
PND: 0
DYSPNEA ON EXERTION: 1
PALPITATIONS: 0
IRREGULAR HEARTBEAT: 0
ORTHOPNEA: 0

## 2018-04-26 ENCOUNTER — TELEPHONE (OUTPATIENT)
Dept: NEUROSURGERY | Facility: CLINIC | Age: 58
End: 2018-04-26

## 2018-05-07 NOTE — TELEPHONE ENCOUNTER
Referral records received with imaging and have been scanned to system.  Referral to NS Provider for review and scheduling recommendations.

## 2018-05-09 ENCOUNTER — TELEPHONE (OUTPATIENT)
Dept: NEUROSURGERY | Facility: CLINIC | Age: 58
End: 2018-05-09

## 2018-05-09 NOTE — TELEPHONE ENCOUNTER
We had a referral for this patient to be seen in our clinic.  I was able to open an appointment with Dr. Norris on 5/25/18 for this patient but when I called him, he stated that he already saw Dr. Haas and no longer needs to be seen.  I let him know to call us in the future if he needs anything further to which he stated appreciation.

## 2019-04-26 DIAGNOSIS — I10 BENIGN ESSENTIAL HYPERTENSION: ICD-10-CM

## 2019-04-30 DIAGNOSIS — I10 ESSENTIAL HYPERTENSION: Primary | ICD-10-CM

## 2019-04-30 DIAGNOSIS — E78.2 MIXED HYPERLIPIDEMIA: ICD-10-CM

## 2019-04-30 RX ORDER — LOSARTAN POTASSIUM 100 MG/1
TABLET ORAL
Qty: 30 TABLET | Refills: 0 | Status: ON HOLD | OUTPATIENT
Start: 2019-04-30 | End: 2020-05-21

## 2019-05-20 ENCOUNTER — LAB REQUISITION (OUTPATIENT)
Dept: LAB | Facility: HOSPITAL | Age: 59
End: 2019-05-20
Payer: COMMERCIAL

## 2019-05-20 DIAGNOSIS — R10.9 ABDOMINAL PAIN: ICD-10-CM

## 2019-05-20 LAB
ALBUMIN SERPL-MCNC: 4.4 G/DL (ref 3.5–5.3)
ALP SERPL-CCNC: 71 U/L (ref 45–115)
ALT SERPL-CCNC: <3 U/L (ref 0–52)
AMYLASE SERPL-CCNC: 31 U/L (ref 27–103)
ANION GAP SERPL CALC-SCNC: 16 MMOL/L (ref 3–11)
AST SERPL-CCNC: 40 U/L (ref 0–39)
BILIRUB SERPL-MCNC: 0.48 MG/DL (ref 0–1.4)
BUN SERPL-MCNC: 24 MG/DL (ref 7–25)
CALCIUM ALBUM COR SERPL-MCNC: 8.9 MG/DL (ref 8.6–10.3)
CALCIUM SERPL-MCNC: 9.2 MG/DL (ref 8.6–10.3)
CHLORIDE SERPL-SCNC: 96 MMOL/L (ref 98–107)
CO2 SERPL-SCNC: 15 MMOL/L (ref 21–32)
CREAT SERPL-MCNC: 1.24 MG/DL (ref 0.7–1.3)
CRP SERPL-MCNC: 5.1 MG/L
GFR SERPL CREATININE-BSD FRML MDRD: 64 ML/MIN/1.73M*2
GLUCOSE SERPL-MCNC: 281 MG/DL (ref 70–105)
LIPASE SERPL-CCNC: 95 U/L (ref 11–82)
POTASSIUM SERPL-SCNC: 4.5 MMOL/L (ref 3.5–5.1)
PROT SERPL-MCNC: 7.3 G/DL (ref 6–8.3)
SODIUM SERPL-SCNC: 127 MMOL/L (ref 135–145)

## 2019-05-20 PROCEDURE — 82150 ASSAY OF AMYLASE: CPT | Performed by: PHYSICIAN ASSISTANT

## 2019-05-20 PROCEDURE — 83690 ASSAY OF LIPASE: CPT | Performed by: PHYSICIAN ASSISTANT

## 2019-05-20 PROCEDURE — 80053 COMPREHEN METABOLIC PANEL: CPT | Performed by: PHYSICIAN ASSISTANT

## 2019-05-20 PROCEDURE — 86140 C-REACTIVE PROTEIN: CPT | Performed by: PHYSICIAN ASSISTANT

## 2019-08-15 ENCOUNTER — TELEPHONE (OUTPATIENT)
Dept: CARDIOLOGY | Facility: CLINIC | Age: 59
End: 2019-08-15

## 2019-08-15 NOTE — TELEPHONE ENCOUNTER
Patient called because he tried to donate blood the other day and they would not let him because he has a murmur documented on his history. He called inquiring about what his murmur and what the cause is from. Reviewed this with the patient and informed him that should not prevent him from giving blood but would review with Wesley. He states that if he could get it in writing so he can take it to the blood blank so he can donate he would appreciate that.      Will you please review to see if there is any reason to not donate? Thanks!

## 2019-08-21 NOTE — TELEPHONE ENCOUNTER
Pt was last seen in 4/2018.  Recommended echocardiogram in one year but has not been done. Order was placed.  In the past pt has an aortic sclerotic murmur and not stenosis.  Based on aortic sclerosis murmur without stenosis there would not be contraindication from cardiac standpoint.

## 2020-03-31 ENCOUNTER — TELEPHONE (OUTPATIENT)
Dept: CARDIOLOGY | Facility: CLINIC | Age: 60
End: 2020-03-31

## 2020-03-31 NOTE — TELEPHONE ENCOUNTER
Patient is called to reschedule appointment due to Corona virus pandemic.  He is offered telephone-billable or telemedicine.  He asks if it can just be postponed.  Will notify scheduling.

## 2020-05-12 ENCOUNTER — TELEPHONE (OUTPATIENT)
Dept: CARDIOLOGY | Facility: CLINIC | Age: 60
End: 2020-05-12

## 2020-05-12 NOTE — TELEPHONE ENCOUNTER
D[facility] - PT please call re: his BP he was feeling fine when he called me but wants Nurse to call him -[phone]

## 2020-05-12 NOTE — TELEPHONE ENCOUNTER
Patient is called.  He reports his blood pressure has been elevated recently.  208/104 and 188/101. Currently BP/HR = 201/104-79.  I have instructed him go to Urgent Care as this is too high.  Patient is notified, verbalizes understanding and agrees.

## 2020-05-21 ENCOUNTER — APPOINTMENT (OUTPATIENT)
Dept: CT IMAGING | Facility: HOSPITAL | Age: 60
End: 2020-05-21
Payer: COMMERCIAL

## 2020-05-21 ENCOUNTER — HOSPITAL ENCOUNTER (INPATIENT)
Facility: HOSPITAL | Age: 60
LOS: 6 days | Discharge: NURSING, REHAB OR LONG-TERM CARE | End: 2020-05-27
Attending: EMERGENCY MEDICINE | Admitting: INTERNAL MEDICINE
Payer: COMMERCIAL

## 2020-05-21 ENCOUNTER — APPOINTMENT (OUTPATIENT)
Dept: MRI IMAGING | Facility: HOSPITAL | Age: 60
End: 2020-05-21
Payer: COMMERCIAL

## 2020-05-21 DIAGNOSIS — I63.9 ISCHEMIC CEREBROVASCULAR ACCIDENT (CVA) (CMS/HCC): ICD-10-CM

## 2020-05-21 DIAGNOSIS — K59.00 CONSTIPATION, UNSPECIFIED CONSTIPATION TYPE: ICD-10-CM

## 2020-05-21 DIAGNOSIS — I10 BENIGN ESSENTIAL HYPERTENSION: ICD-10-CM

## 2020-05-21 DIAGNOSIS — Z79.4 TYPE 2 DIABETES MELLITUS WITHOUT COMPLICATION, WITH LONG-TERM CURRENT USE OF INSULIN (CMS/HCC): ICD-10-CM

## 2020-05-21 DIAGNOSIS — E11.9 TYPE 2 DIABETES MELLITUS WITHOUT COMPLICATION, WITH LONG-TERM CURRENT USE OF INSULIN (CMS/HCC): ICD-10-CM

## 2020-05-21 DIAGNOSIS — R53.1 ACUTE LEFT-SIDED WEAKNESS: Primary | ICD-10-CM

## 2020-05-21 PROBLEM — G47.33 OSA (OBSTRUCTIVE SLEEP APNEA): Status: ACTIVE | Noted: 2020-05-21

## 2020-05-21 PROBLEM — R31.29 MICROSCOPIC HEMATURIA: Status: ACTIVE | Noted: 2020-05-21

## 2020-05-21 PROBLEM — E83.42 HYPOMAGNESEMIA: Status: ACTIVE | Noted: 2020-05-21

## 2020-05-21 LAB
ALBUMIN SERPL-MCNC: 3.9 G/DL (ref 3.5–5.3)
ALP SERPL-CCNC: 55 U/L (ref 45–115)
ALT SERPL-CCNC: 17 U/L (ref 7–52)
ANION GAP SERPL CALC-SCNC: 12 MMOL/L (ref 3–11)
AST SERPL-CCNC: 18 U/L
BACTERIA #/AREA URNS AUTO: ABNORMAL /HPF
BASOPHILS # BLD AUTO: 0.1 10*3/UL
BASOPHILS NFR BLD AUTO: 1 % (ref 0–2)
BILIRUB SERPL-MCNC: 0.77 MG/DL (ref 0.2–1.4)
BILIRUB UR QL STRIP.AUTO: NEGATIVE
BUN SERPL-MCNC: 13 MG/DL (ref 7–25)
CALCIUM ALBUM COR SERPL-MCNC: 9 MG/DL (ref 8.6–10.3)
CALCIUM SERPL-MCNC: 8.9 MG/DL (ref 8.6–10.3)
CAOX CRY #/AREA URNS HPF: PRESENT /HPF
CHLORIDE SERPL-SCNC: 105 MMOL/L (ref 98–107)
CLARITY UR: ABNORMAL
CO2 SERPL-SCNC: 24 MMOL/L (ref 21–32)
COLOR UR: YELLOW
CREAT SERPL-MCNC: 0.7 MG/DL (ref 0.7–1.3)
EOSINOPHIL # BLD AUTO: 0.1 10*3/UL
EOSINOPHIL NFR BLD AUTO: 2 % (ref 0–3)
ERYTHROCYTE [DISTWIDTH] IN BLOOD BY AUTOMATED COUNT: 14.2 % (ref 11.5–15)
GFR SERPL CREATININE-BSD FRML MDRD: 103 ML/MIN/1.73M*2
GLUCOSE BLDC GLUCOMTR-MCNC: 95 MG/DL (ref 70–105)
GLUCOSE BLDC GLUCOMTR-MCNC: 99 MG/DL (ref 70–105)
GLUCOSE SERPL-MCNC: 117 MG/DL (ref 70–105)
GLUCOSE UR STRIP.AUTO-MCNC: NEGATIVE MG/DL
HCT VFR BLD AUTO: 42.1 % (ref 38–50)
HGB BLD-MCNC: 14.6 G/DL (ref 13.2–17.2)
HGB UR QL STRIP.AUTO: ABNORMAL
HYALINE CASTS #/AREA URNS AUTO: ABNORMAL /LPF
KETONES UR STRIP.AUTO-MCNC: 80 MG/DL
LEUKOCYTE ESTERASE UR QL STRIP: NEGATIVE
LYMPHOCYTES # BLD AUTO: 2.3 10*3/UL
LYMPHOCYTES NFR BLD AUTO: 29 % (ref 15–47)
MAGNESIUM SERPL-MCNC: 1.6 MG/DL (ref 1.8–2.4)
MCH RBC QN AUTO: 31.4 PG (ref 29–34)
MCHC RBC AUTO-ENTMCNC: 34.8 G/DL (ref 32–36)
MCV RBC AUTO: 90.4 FL (ref 82–97)
MONOCYTES # BLD AUTO: 0.5 10*3/UL
MONOCYTES NFR BLD AUTO: 6 % (ref 5–13)
NEUTROPHILS # BLD AUTO: 5.1 10*3/UL
NEUTROPHILS NFR BLD AUTO: 63 % (ref 46–70)
NITRITE UR QL STRIP.AUTO: NEGATIVE
PH UR STRIP.AUTO: 5 PH
PHOSPHATE SERPL-MCNC: 3.2 MG/DL (ref 2.5–4.9)
PLATELET # BLD AUTO: 205 10*3/UL (ref 130–350)
PMV BLD AUTO: 7.9 FL (ref 6.9–10.8)
POTASSIUM SERPL-SCNC: 3.5 MMOL/L (ref 3.5–5.1)
PROT SERPL-MCNC: 6.4 G/DL (ref 6–8.3)
PROT UR STRIP.AUTO-MCNC: >=500 MG/DL
RBC # BLD AUTO: 4.65 10*6/ΜL (ref 4.1–5.8)
RBC #/AREA URNS AUTO: ABNORMAL /HPF
SODIUM SERPL-SCNC: 141 MMOL/L (ref 135–145)
SP GR UR STRIP.AUTO: 1.02 (ref 1–1.03)
SQUAMOUS #/AREA URNS AUTO: NEGATIVE /HPF
TROPONIN I SERPL-MCNC: 15.5 PG/ML
UROBILINOGEN UR STRIP.AUTO-MCNC: 2 E.U./DL
WBC # BLD AUTO: 8.1 10*3/UL (ref 3.7–9.6)
WBC #/AREA URNS AUTO: ABNORMAL /HPF

## 2020-05-21 PROCEDURE — 70553 MRI BRAIN STEM W/O & W/DYE: CPT | Mod: MG

## 2020-05-21 PROCEDURE — 1110000100 HC ROOM PRIVATE W TELE

## 2020-05-21 PROCEDURE — 99285 EMERGENCY DEPT VISIT HI MDM: CPT

## 2020-05-21 PROCEDURE — 2580000300 HC RX 258: Performed by: EMERGENCY MEDICINE

## 2020-05-21 PROCEDURE — 82947 ASSAY GLUCOSE BLOOD QUANT: CPT | Mod: QW

## 2020-05-21 PROCEDURE — 70450 CT HEAD/BRAIN W/O DYE: CPT | Mod: MG

## 2020-05-21 PROCEDURE — 6370000100 HC RX 637 (ALT 250 FOR IP): Performed by: PSYCHIATRY & NEUROLOGY

## 2020-05-21 PROCEDURE — 99223 1ST HOSP IP/OBS HIGH 75: CPT | Mod: AI | Performed by: INTERNAL MEDICINE

## 2020-05-21 PROCEDURE — 99285 EMERGENCY DEPT VISIT HI MDM: CPT | Performed by: EMERGENCY MEDICINE

## 2020-05-21 PROCEDURE — 83735 ASSAY OF MAGNESIUM: CPT | Performed by: EMERGENCY MEDICINE

## 2020-05-21 PROCEDURE — 6360000200 HC RX 636 W HCPCS (ALT 250 FOR IP): Performed by: EMERGENCY MEDICINE

## 2020-05-21 PROCEDURE — 85025 COMPLETE CBC W/AUTO DIFF WBC: CPT | Performed by: EMERGENCY MEDICINE

## 2020-05-21 PROCEDURE — 36415 COLL VENOUS BLD VENIPUNCTURE: CPT | Performed by: EMERGENCY MEDICINE

## 2020-05-21 PROCEDURE — 84484 ASSAY OF TROPONIN QUANT: CPT | Performed by: EMERGENCY MEDICINE

## 2020-05-21 PROCEDURE — 6370000100 HC RX 637 (ALT 250 FOR IP): Performed by: INTERNAL MEDICINE

## 2020-05-21 PROCEDURE — 6360000200 HC RX 636 W HCPCS (ALT 250 FOR IP): Performed by: INTERNAL MEDICINE

## 2020-05-21 PROCEDURE — 2550000100 HC RX 255: Performed by: EMERGENCY MEDICINE

## 2020-05-21 PROCEDURE — 80053 COMPREHEN METABOLIC PANEL: CPT | Performed by: EMERGENCY MEDICINE

## 2020-05-21 PROCEDURE — 81001 URINALYSIS AUTO W/SCOPE: CPT | Performed by: EMERGENCY MEDICINE

## 2020-05-21 PROCEDURE — A9579 GAD-BASE MR CONTRAST NOS,1ML: HCPCS | Performed by: EMERGENCY MEDICINE

## 2020-05-21 PROCEDURE — (BLANK) HC ROOM PRIVATE

## 2020-05-21 PROCEDURE — 96374 THER/PROPH/DIAG INJ IV PUSH: CPT

## 2020-05-21 PROCEDURE — 93005 ELECTROCARDIOGRAM TRACING: CPT

## 2020-05-21 PROCEDURE — 2580000300 HC RX 258: Performed by: INTERNAL MEDICINE

## 2020-05-21 PROCEDURE — 84100 ASSAY OF PHOSPHORUS: CPT | Performed by: EMERGENCY MEDICINE

## 2020-05-21 RX ORDER — INSULIN ASPART 100 [IU]/ML
0-15 INJECTION, SOLUTION INTRAVENOUS; SUBCUTANEOUS
Status: DISCONTINUED | OUTPATIENT
Start: 2020-05-21 | End: 2020-05-27 | Stop reason: HOSPADM

## 2020-05-21 RX ORDER — MAGNESIUM SULFATE HEPTAHYDRATE 40 MG/ML
2 INJECTION, SOLUTION INTRAVENOUS ONCE
Status: COMPLETED | OUTPATIENT
Start: 2020-05-21 | End: 2020-05-21

## 2020-05-21 RX ORDER — LABETALOL HYDROCHLORIDE 5 MG/ML
20 INJECTION, SOLUTION INTRAVENOUS ONCE
Status: COMPLETED | OUTPATIENT
Start: 2020-05-21 | End: 2020-05-21

## 2020-05-21 RX ORDER — HUMAN INSULIN 100 [IU]/ML
36 INJECTION, SUSPENSION SUBCUTANEOUS DAILY
COMMUNITY
End: 2020-06-11 | Stop reason: SDUPTHER

## 2020-05-21 RX ORDER — LOSARTAN POTASSIUM 50 MG/1
50 TABLET ORAL DAILY
Status: DISCONTINUED | OUTPATIENT
Start: 2020-05-22 | End: 2020-05-23

## 2020-05-21 RX ORDER — OMEGA-3 FATTY ACIDS/FISH OIL 340-1000MG
1 CAPSULE ORAL DAILY
COMMUNITY
End: 2020-06-10 | Stop reason: ALTCHOICE

## 2020-05-21 RX ORDER — GLUCOSAMINE/CHONDR SU A SOD 167-133 MG
1000 CAPSULE ORAL
COMMUNITY
End: 2020-06-05 | Stop reason: HOSPADM

## 2020-05-21 RX ORDER — NAPROXEN SODIUM 220 MG/1
324 TABLET, FILM COATED ORAL ONCE
Status: COMPLETED | OUTPATIENT
Start: 2020-05-21 | End: 2020-05-21

## 2020-05-21 RX ORDER — CLOPIDOGREL BISULFATE 300 MG/1
300 TABLET, FILM COATED ORAL DAILY
Status: DISCONTINUED | OUTPATIENT
Start: 2020-05-21 | End: 2020-05-21

## 2020-05-21 RX ORDER — SODIUM CHLORIDE 9 MG/ML
100 INJECTION, SOLUTION INTRAVENOUS CONTINUOUS
Status: DISCONTINUED | OUTPATIENT
Start: 2020-05-21 | End: 2020-05-21

## 2020-05-21 RX ORDER — ROSUVASTATIN CALCIUM 10 MG/1
40 TABLET, COATED ORAL NIGHTLY
Status: DISCONTINUED | OUTPATIENT
Start: 2020-05-21 | End: 2020-05-27

## 2020-05-21 RX ORDER — CLOPIDOGREL BISULFATE 75 MG/1
75 TABLET ORAL DAILY
Status: DISCONTINUED | OUTPATIENT
Start: 2020-05-22 | End: 2020-05-27

## 2020-05-21 RX ORDER — ASPIRIN 81 MG/1
81 TABLET ORAL DAILY
Status: DISCONTINUED | OUTPATIENT
Start: 2020-05-22 | End: 2020-05-27

## 2020-05-21 RX ORDER — CLOPIDOGREL BISULFATE 300 MG/1
300 TABLET, FILM COATED ORAL ONCE
Status: COMPLETED | OUTPATIENT
Start: 2020-05-21 | End: 2020-05-21

## 2020-05-21 RX ORDER — NAPROXEN SODIUM 220 MG
220 TABLET ORAL EVERY EVENING
COMMUNITY
End: 2020-06-05 | Stop reason: HOSPADM

## 2020-05-21 RX ORDER — ADHESIVE BANDAGE
30 BANDAGE TOPICAL DAILY PRN
Status: DISCONTINUED | OUTPATIENT
Start: 2020-05-21 | End: 2020-05-27 | Stop reason: HOSPADM

## 2020-05-21 RX ORDER — HYDRALAZINE HYDROCHLORIDE 20 MG/ML
5 INJECTION INTRAMUSCULAR; INTRAVENOUS ONCE
Status: DISCONTINUED | OUTPATIENT
Start: 2020-05-21 | End: 2020-05-27 | Stop reason: HOSPADM

## 2020-05-21 RX ORDER — CHOLECALCIFEROL (VITAMIN D3) 25 MCG
1000 TABLET ORAL DAILY
Status: DISCONTINUED | OUTPATIENT
Start: 2020-05-21 | End: 2020-05-27

## 2020-05-21 RX ORDER — SODIUM CHLORIDE 9 MG/ML
50 INJECTION, SOLUTION INTRAVENOUS CONTINUOUS
Status: DISCONTINUED | OUTPATIENT
Start: 2020-05-21 | End: 2020-05-22

## 2020-05-21 RX ORDER — ONDANSETRON 4 MG/1
4 TABLET, FILM COATED ORAL EVERY 6 HOURS PRN
Status: DISCONTINUED | OUTPATIENT
Start: 2020-05-21 | End: 2020-05-27

## 2020-05-21 RX ORDER — ENOXAPARIN SODIUM 100 MG/ML
40 INJECTION SUBCUTANEOUS
Status: DISCONTINUED | OUTPATIENT
Start: 2020-05-21 | End: 2020-05-27

## 2020-05-21 RX ORDER — SERTRALINE HYDROCHLORIDE 100 MG/1
100 TABLET, FILM COATED ORAL DAILY
Status: DISCONTINUED | OUTPATIENT
Start: 2020-05-21 | End: 2020-05-21 | Stop reason: ALTCHOICE

## 2020-05-21 RX ADMIN — ENOXAPARIN SODIUM 40 MG: 40 INJECTION SUBCUTANEOUS at 20:02

## 2020-05-21 RX ADMIN — ROSUVASTATIN CALCIUM 40 MG: 10 TABLET, FILM COATED ORAL at 18:24

## 2020-05-21 RX ADMIN — LABETALOL HYDROCHLORIDE 20 MG: 5 INJECTION, SOLUTION INTRAVENOUS at 12:15

## 2020-05-21 RX ADMIN — MAGNESIUM SULFATE HEPTAHYDRATE 2 G: 40 INJECTION, SOLUTION INTRAVENOUS at 18:26

## 2020-05-21 RX ADMIN — GADOTERIDOL 15 ML: 279.3 INJECTION, SOLUTION INTRAVENOUS at 15:55

## 2020-05-21 RX ADMIN — SODIUM CHLORIDE 100 ML/HR: 9 INJECTION, SOLUTION INTRAVENOUS at 10:50

## 2020-05-21 RX ADMIN — CLOPIDOGREL BISULFATE 300 MG: 300 TABLET, FILM COATED ORAL at 15:25

## 2020-05-21 RX ADMIN — NAPROXEN SODIUM 324 MG: 220 TABLET, FILM COATED ORAL at 13:40

## 2020-05-21 RX ADMIN — SODIUM CHLORIDE 50 ML/HR: 9 INJECTION, SOLUTION INTRAVENOUS at 16:45

## 2020-05-21 NOTE — ED PROVIDER NOTES
"HPI:  Chief Complaint   Patient presents with   • Extremity Weakness     pt c/o 4 day history of generalized weakness along with \"worse\" weakness to his left side x4 days, states he woke up 4 days ago with the weakness     HPI  Patient is a 59 year old male presenting to the emergency department via EMS with complaints of generalized weakness for the past 4 days. During those 4 days he began to noticed that his weakness is most markedly on his left side. He has difficulty walking and doing daily activities such as changing clothes. Patient has a hard time lifting his left leg up to get it into his pant leg. His left arm is weaker than his right as well. Patient claims that he is \"in a mental fog.\" He has diabetes type II but has not been taking his glucose levels because he has cataracts and cannot seen well enough to use his monitor. Patient lives alone with no assistance.     HISTORY:  Past Medical History:   Diagnosis Date   • Anemia    • Asthma    • Depressive disorder    • Heart murmur    • Hyperlipidemia    • Hypertension    • Obesity    • Obstructive sleep apnea    • Type 2 diabetes mellitus (CMS/HCC) ([facility])        Past Surgical History:   Procedure Laterality Date   • CRYOTHERAPY      dermatology       Family History   Problem Relation Age of Onset   • Alzheimer's disease Father    • Diabetes type II Father    • Diabetes Sister        Social History     Tobacco Use   • Smoking status: Never Smoker   • Smokeless tobacco: Never Used   Substance Use Topics   • Alcohol use: No   • Drug use: Yes     Types: Hydrocodone     Comment: 3 times daily, low back/sciatica pain       ROS:  Constitutional: negative for fever, positive for weakness  Eyes: negative for eye pain, negative for vision changes.  ENT: negative for sore throat, negative for congestion, negative for ear pain.  Cardiovascular: negative for chest pain  Respiratory: negative for cough, negative for shortness of breath.  GI: negative for abdominal pain, " negative for nausea, negative for vomiting.  : negative for hematuria, negative for urine changes.  Musculoskeletal: negative for back pain.  Neuro: negative for headache, negative for weakness, positive for left sided weakness.  Hematology: negative for bleeding  Skin: negative for rash    PHYSICAL EXAM:  ED Triage Vitals   Temp Heart Rate Resp BP SpO2   05/21/20 0936 05/21/20 0936 05/21/20 0936 05/21/20 0938 05/21/20 0936   36.9 °C (98.4 °F) 81 16 (!) 185/104 99 %      Temp Source Heart Rate Source Patient Position BP Location FiO2 (%)   05/21/20 0936 -- 05/21/20 1542 -- --   Oral  Head of bed 30 degrees or higher       Nursing note and vitals reviewed.  Constitutional: appears well-developed.   HENT: Moist mucous membranes. TMs normal.  Head: Normocephalic and atraumatic.   Eyes: Conjunctiva normal. Pupils are equal, round, and reactive. EOMI.  Neck: Supple. No JVD.  Cardiovascular: Regular rate and rhythm. 2/6 systolic ejection murmur best heard in left lateral chest.   Pulmonary/Chest: No respiratory distress.  Clear to auscultation bilaterally.  Abdominal: Soft and nontender.  Normal bowel sounds. No rebound or guarding.  Back: Non-tender.  Musculoskeletal: No edema  Neurological: Alert. Cranial nerves 2-12 intact. Gross neuro intact with strength of 5/5 bilaterally to upper and lower extremities. Heal to shin test was normal. Left finger to nose showed slightly more ataxia on the left than the right. Slight left sided facial droop. Slight drift in left arm when held up compared to left.   Skin: Skin is warm and dry. No rash or lesions noted.  Psychiatric: Normal mood and affect.    MDM:     Patient is a 59 year old diabetic with a 4 day history of generalized weakness most markedly on the left side, and found to have a pontine infarct on MRI.  Patient with symptoms x4 days and was out of the window for lytics. Urine is clean with some hematuria but no signs of infection. Patient has no urinary symptoms.  Labetalol was given to control his elevated blood pressure. Head CT demonstrated a low density involving the right temporal and left posterior parietal lobes that could represent an acute infarct. Radiologist recommended an MRI. Neurologist Dr. Acuna was consulted and an MRI was ordered which demonstrated a pontine infarct.  His vital signs remained stable.  Patient symptoms very subtle, and patient with no pain and no problems breathing.  Further discussion with neurologist who reviewed the films and said that typically blood pressure should be kept high with these types of strokes however giving the Labetalol was fine. Neurologist agreed to follow along with patient's admission. His case was discussed with hospitalist who agreed to hospitalize patient for further work up.  Patient admitted without problems, with normal vital signs except for continued hypertension  Labs Reviewed   COMPREHENSIVE METABOLIC PANEL - Abnormal       Result Value    Sodium 141      Potassium 3.5      Chloride 105      CO2 24      Anion Gap 12 (*)     BUN 13      Creatinine 0.70      Glucose 117 (*)     Calcium 8.9      AST 18      ALT (SGPT) 17      Alkaline Phosphatase 55      Total Protein 6.4      Albumin 3.9      Total Bilirubin 0.77      eGFR 103      Corrected Calcium 9.0      Narrative:     Estimated GFR calculated using the 2009 CKD-EPI creatinine equation.   MAGNESIUM - Abnormal    Magnesium 1.6 (*)    URINALYSIS, MICROSCOPIC ONLY - Abnormal    RBC, Urine 50-75 (*)     WBC, Urine 5-9 (*)     Squamous Epithelial, Urine Negative      Bacteria, Urine None seen      Calcium Oxalate Crystals, Urine Present (*)     Hyaline Casts, Urine 30-49 (*)    URINALYSIS, DIPSTICK ONLY, FOR USE WITH MICROSCOPIC PANEL - Abnormal    Color, Urine Yellow      Clarity, Urine Slightly Cloudy (*)     Specific Gravity, Urine 1.016      Leukocytes, Urine Negative      Nitrite, Urine Negative      Protein, Urine >=500 (*)     Ketones, Urine 80  (*)      Urobilinogen, Urine 2.0 (*)     Bilirubin, Urine Negative      Blood, Urine Moderate (*)     Glucose, Urine Negative      pH, Urine 5.0     PHOSPHORUS - Normal    Phosphorus 3.2     HIGH SENSITIVE TROPONIN I - Normal    hsTnI 0 Hour 15.5     POCT GLUCOSE RESULTS ONLY - Normal    POC Glucose 99     CBC WITH AUTO DIFFERENTIAL    WBC 8.1      RBC 4.65      Hemoglobin 14.6      Hematocrit 42.1      MCV 90.4      MCH 31.4      MCHC 34.8      RDW 14.2      Platelets 205      MPV 7.9      Neutrophils% 63      Lymphocytes% 29      Monocytes% 6      Eosinophils% 2      Basophils% 1      Neutrophils Absolute 5.10      Lymphocytes Absolute 2.30      Monocytes Absolute 0.50      Eosinophils Absolute 0.10      Basophils Absolute 0.10     URINALYSIS WITH MICROSCOPIC    Narrative:     The following orders were created for panel order Urinalysis w/microscopic Urine, Clean Catch.  Procedure                               Abnormality         Status                     ---------                               -----------         ------                     Urinalysis, microscopic U...[[id number]]  Abnormal            Final result               Urinalysis, dipstick Urin...[[id number]]  Abnormal            Final result                 Please view results for these tests on the individual orders.       MR brain with and without contrast   Final Result   IMPRESSION:   1.   Small acute right inferior anterior pontine infarct.   2.  There are Small areas have abnormal signal intensity involving the left posterior periventricular white matter in the left posterior parietal white matter. Probably chronic ischemic change rather than acute acute infarcts.   3.  Areas of low density on the CT involving the right temporal lobe and left parietal lobe are normal and appear to have represented artifacts on the CT.            CT head without IV contrast   Final Result   IMPRESSION:   1.   Some subtle low density involving the right temporal and left  posterior parietal lobes. Could represent subtle infarct. Tumor considered unlikely but not excluded. Recommend MR for further evaluation.             ED Medication Administration from 05/21/2020 0922 to 05/21/2020 1633       Date/Time Order Dose Route Action Action by     05/21/2020 1050 normal saline infusion 100 mL/hr intravenous New Bag/New Syringe REGINA Drake     05/21/2020 1215 labetaloL injection 20 mg 20 mg intravenous Given Rojas, A     05/21/2020 1340 aspirin chewable tablet 324 mg 324 mg oral Given Rojas, A     05/21/2020 1515 clopidogreL (PLAVIX) tablet 300 mg 300 mg oral Not Given Rojas, A     05/21/2020 1525 clopidogreL (PLAVIX) tablet 300 mg 300 mg oral Given Rojas, A     05/21/2020 1555 gadoteridoL (PROHANCE) injection 15 mL 15 mL intravenous Given DANIEL Major          PROCEDURES:  ECG 12 lead    Date/Time: 5/21/2020 10:27 AM  Performed by: Wilmer Solano MD  Authorized by: Wilmer Solano MD     ECG reviewed by ED Physician in the absence of a cardiologist: yes    Comments:      Sinus rhythm, rate 78, normal intervals, axis upright and normal, Q waves in inferior lead III, flattened T waves in lateral V4-V6 and inferior leads of II, III, and AVF, no ST elevation or depression.         ED COURSE:       CLINICAL IMPRESSION:  Final diagnoses:   [R53.1] Acute left-sided weakness   [I10] Benign essential hypertension     By signing my name, I, Bushra Francisco, attest that this documentation has been prepared under the direction and in the presence of Dr. Solano, 5/21/2020, 10:36 AM.     I, Dr. Solano, personally performed the services described in this documentation as described by scribe in my presence and it is both accurate and complete.     A voice recognition program was used to aid in documentation of this record.  Sometimes words are not printed exactly as they were spoken.  While efforts were made to carefully edit and correct any inaccuracies, some areas may be present; please take these  into context.  Please contact the provider if areas are identified.       Wilmer Solano MD  05/21/20 0215

## 2020-05-21 NOTE — PLAN OF CARE

## 2020-05-21 NOTE — CONSULTATION
"Neurological consultation is at the request of: Dr. Solano    The history is obtained from the patient, who is a good historian, from Dr. Solano, and from review of the electronic chart.    Del Mckeon is an obese, diabetic, hypertensive, hypercholesterolemic 59 y.o. man with obstructive sleep apnea and heart murmur who presents with a 4-day history of  generalized weakness, left worse than right, that became somewhat worse today.  He is reported to have complained of associated \"mental fog,\" but to me he denies having had any thinking trouble or difficulty maintaining attention.  He reports poor sleep for the last 3 nights.  He denies headache.    Past Medical History:   Diagnosis Date   • Anemia    • Asthma    • Depressive disorder    • Heart murmur    • Hyperlipidemia    • Hypertension    • Obesity    • Obstructive sleep apnea    • Type 2 diabetes mellitus (CMS/[facility]) ([facility])       Past Surgical History:   Procedure Laterality Date   • CRYOTHERAPY      dermatology     Allergies   Allergen Reactions   • Penicillins      Tested as a child; showed positive      Social History     Tobacco Use   • Smoking status: Never Smoker   • Smokeless tobacco: Never Used   Substance Use Topics   • Alcohol use: No      Family History   Problem Relation Age of Onset   • Alzheimer's disease Father    • Diabetes type II Father    • Diabetes Sister       Review of Systems  Negative for: language dysfunction, memory impairment   Negative for: anosmia, change in vision, diplopia, facial numbness, hearing loss, dysarthria, or dysphagia  Negative for: numbness or tingling in the limbs or trunk  Negative for: Gait ataxia  Negative for fevers chills or unexplained weight change  The remainder of the complete review of systems is negative except as described above and in the HPI.     Objective:     Physical Exam:    Temp:  [36.9 °C (98.4 °F)] 36.9 °C (98.4 °F)  Heart Rate:  [67-81] 68  Resp:  [9-20] 20  BP: (168-199)/() " 192/102    Gen: This is a well developed, well nourished, middle-aged white man who appears older than his stated age.  He appears mildly anxious but is otherwise resting comfortably in bed.    Head: NCAT  Eyes: No proptosis   Neck: No meningismus  CV: regular pulses  Musculoskeletal: No joint swelling or deformity  Skin: No noticeable lesions or rashes  Neurologic Exam:  Mental Status: Alert, fully oriented, normal attention and concentration. Speech is articulate and clear. Language is fluent   CN:  2nd: Visual fields are full to confrontation testing. The pupils are 5 mm, equal, and reactive to light.  3rd, 4th, 6th: No ptosis.  The extraocular movements are without restriction or pathologic nystagmus.  5th: There is normal facial sensation to pin  7th: There is mild weakness of the left lower demi-face.  8th: Hearing is intact.   9th, 10th: No hoarseness. The palate rises symmetrically.  11th: shoulder shrug 5/5  12th: The tongue protrudes in the midline and is free of atrophy.   Motor: There is trace weakness of left triceps and finger extensors.  Otherwise strength is (5/5) in the proximal and distal arms and legs.  Reflexes: 2+ and symmetrical at the biceps, brachioradialis, patellae and achilles.   Sensation: Symmetrically intact to pin and vibration sense in the arms and legs.  Coordination: No dysmetria on finger nose finger testing.  Gait/station: Deferred    Data Review    Results from last 4 days   Lab Units 05/21/20  1054   WBC AUTO 10*3/uL 8.1   HEMOGLOBIN g/dL 14.6   HEMATOCRIT % 42.1   PLATELETS AUTO 10*3/uL 205     Results from last 4 days   Lab Units 05/21/20  1054   SODIUM mmol/L 141   POTASSIUM mmol/L 3.5   CHLORIDE mmol/L 105   CO2 mmol/L 24   BUN mg/dL 13   CREATININE mg/dL 0.70   CALCIUM mg/dL 8.9   TOTAL PROTEIN g/dL 6.4   BILIRUBIN TOTAL mg/dL 0.77   ALK PHOS U/L 55   ALT U/L 17   AST U/L 18   GLUCOSE mg/dL 117*     Lab Results   Component Value Date    CALCIUM 8.9 05/21/2020    PHOS 3.2  2020     Lab Results   Component Value Date    CHOL 205 2017     Lab Results   Component Value Date    HDL 30 2017     Lab Results   Component Value Date    LDLCALC  2017      Comment:      NR     Lab Results   Component Value Date    TRIG  2017      Comment:      Result >525     Imagin2020 head CT without contrast images reviewed.  Normal brain.  In retrospect, there is hypodensity in the central danial, but this is heavily obscured by artifact from the clinoid processes.    2020 MRI of brain with and without contrast images reviewed.  Subacute infarction involving basilar pontine perforators, right of midline.  Mild generalized atrophy is more than expected for age.  Very mild chronic white matter ischemic changes in the white matter of the centrum semiovale bilaterally.    Assessment:   Acute pontine stroke involving basilar artery (pontine (s), R paramidline).  ?  Plan:   1.  This patient is not a candidate for TPA or intravascular thrombectomy because he presents well outside of the therapeutic time window for either treatment.  The affected (small) arteries would be poor candidate for thrombectomy in any case.     2.  Plavix 300 mg x 1 now.  Agree with aspirin 324 mg  3.  Permissive hypertension.  Hold regular antihypertensives.  Treat SBP greater than 200 or DBP greater than 110 with as needed labetalol or hydralazine.  4.  CPAP ordered  5.  N.P.O. Basilar strokes confer high risk of aspiration.  Recommend SLP evaluation.  6.  Recommend PT, OT evaluations in am.    These findings and recommendations were discussed with the patient and with his daughter Saskia by telephone.

## 2020-05-21 NOTE — H&P
HOSPITALIST HISTORY AND PHYSICAL NOTE      Subjective   Chief complaint acute episode of left upper extremity weakness that began early Monday morning.    HPI:  59-year-old white male with hypertension, type 2 diabetes, hyperlipidemia, aortic sclerosis, cataracts with diminished visual acuity presents to the ER with acute episode of left upper extremity weakness and early Monday morning this week.  Patient awakened with acute left upper extremity weakness at approximately 2 AM on early Monday morning.  Patient complains of persistent weakness in the left upper extremity this week.  Patient has had difficulty walking and complains of poor balance with walking.  Patient denies any acute vision loss.  He denies any acute fall or syncopal episodes this week.    Patient has no previous history for TIA or CVA events.    Patient has hypertension and takes Cozaar daily.  Patient does not routinely monitor his blood pressure readings.  Patient denies any acute chest pain or chest pressure episodes.  He has no documented coronary artery disease.  He has no history for atrial fibrillation or atrial flutter.    Patient has type 2 diabetes takes Levemir insulin 55 units daily.  Patient does not monitor his glucometer readings due to decrease in visual acuity due to bilateral cataracts.  He denies any recent hypoglycemic episodes.      Prior to Admission medications    Medication Sig Start Date End Date Taking? Authorizing Provider   meclizine (ANTIVERT) 25 mg tablet Take 25 mg by mouth 3 (three) times a day as needed 2/12/20   Historical Provider, MD   losartan (COZAAR) 100 mg tablet TAKE 1 TABLET(100 MG) BY MOUTH DAILY 4/30/19   Greri Perez CNP   TiZANidine (ZANAFLEX) 4 mg capsule  4/12/18   Historical Provider, MD   atorvastatin (LIPITOR) 40 mg tablet Take 40 mg by mouth daily   12/27/17   Historical Provider, MD   imiquimod (ALDARA) 5 % cream  2/5/18   Historical Provider, MD   LEVEMIR U-100 INSULIN 100 unit/mL  "injection Inject 55 Units under the skin daily.   2/14/18   Historical Provider, MD   losartan (COZAAR) 50 mg tablet Take 1 tablet by mouth daily. 2/26/18   Historical Provider, MD   sertraline (ZOLOFT) 100 mg tablet Take 1 tablet by mouth daily. 2/2/18   Historical Provider, MD   metFORMIN (GLUCOPHAGE) 1,000 mg tablet Take twice daily 6/19/16   Conversion Provider   diphenhydramine-acetaminophen (TYLENOL PM EXTRA STRENGTH)  mg tablet Take 2 tablets every day by oral route at bedtime. 10/25/13   Conversion Provider   ibuprofen (ADVIL,MOTRIN) 200 mg tablet Take 2 tablets every 6 hours by oral route as needed. 6/21/13   Conversion Provider   cholecalciferol, vitamin D3, (VITAMIN D3) 1,000 unit capsule Take 1,000 Units by mouth daily   6/11/13   Jean Piña MD   insulin syringe-needle U-100 1 mL 29 gauge x 1/2\" syringe  3/23/12   Conversion Provider       Allergies   Allergen Reactions   • Penicillins      Tested as a child; showed positive       Surgical history reviewed   Past Surgical History:   Procedure Laterality Date   • CRYOTHERAPY      dermatology     Family history reviewed    One sister has diabetes.  One sister in good health.    Family History   Problem Relation Age of Onset   • Alzheimer's disease Father    • Diabetes type II Father    • Diabetes Sister        Past medical history reviewed    Patient has  aortic sclerosis.  Past Medical History:   Diagnosis Date   • Anemia    • Asthma    • Depressive disorder    • Heart murmur    • Hyperlipidemia    • Hypertension    • Obesity    • Obstructive sleep apnea    • Type 2 diabetes mellitus (CMS/HCC) (HCC)      Social history reviewed as below.    Patient lives alone in [place].    Patient worked as an .    Patient has 1 son who lives in [facility].    No tobacco use.  No alcohol use since 2010.    Social History     Socioeconomic History   • Marital status:      Spouse name: Not on file   • Number of children: Not " on file   • Years of education: Not on file   • Highest education level: Not on file   Occupational History   • Not on file   Social Needs   • Financial resource strain: Not on file   • Food insecurity     Worry: Not on file     Inability: Not on file   • Transportation needs     Medical: Not on file     Non-medical: Not on file   Tobacco Use   • Smoking status: Never Smoker   • Smokeless tobacco: Never Used   Substance and Sexual Activity   • Alcohol use: No   • Drug use: Yes     Types: Hydrocodone     Comment: 3 times daily, low back/sciatica pain   • Sexual activity: Defer   Lifestyle   • Physical activity     Days per week: Not on file     Minutes per session: Not on file   • Stress: Not on file   Relationships   • Social connections     Talks on phone: Not on file     Gets together: Not on file     Attends Latter day service: Not on file     Active member of club or organization: Not on file     Attends meetings of clubs or organizations: Not on file     Relationship status: Not on file   • Intimate partner violence     Fear of current or ex partner: Not on file     Emotionally abused: Not on file     Physically abused: Not on file     Forced sexual activity: Not on file   Other Topics Concern   • Not on file   Social History Narrative   • Not on file     Review of Systems  Constitutional no fever or chills  HEENT patient has decreased visual acuity due to bilateral cataracts.  Patient denies any acute focal vision loss this week.  Pulmonary history for COPD.  No acute or chronic purulent sputum production.  No acute dyspnea with daily activities  Cardiac patient denies any exertional chest pain or chest pressure episodes.  No history for MI.  GI no acute abdominal pain no acute GE reflux symptoms  Genitourinary no acute dysuria or flank pain  Musculoskeletal no history for inflammatory arthritis  Endocrine type 2 diabetes as listed in HPI  Vascular no acute claudication pain.  No history for PAD.  Oncological  no history for cancer.  Neuro no history for seizures.      Objective       BP (!) 192/95   Pulse 70   Temp 36.9 °C (98.4 °F) (Oral)   Resp 11   Wt 88.4 kg (194 lb 14.2 oz)   SpO2 98%   BMI 27.18 kg/m²       Physical Exam   White male    HEENT pupils are equal round and reactive light.  Extraocular movements are intact.  Throat is clear.    Neck supple with no acute adenopathy or bruits noted.  Lungs clear breath sounds in all lung fields.  No rales or wheezes noted.  Heart regular rate and rhythm systolic murmur is noted in the left sternal border.  No acute gallops present.  Abdomen bowel sounds present.  No distention.  Soft and nontender to palpation.  No palpable masses or bruits noted.   no flank tenderness noted.  Musculoskeletal no joint erythema or effusions noted.  Extremities peripheral pulses are present in the upper extremities and mildly decreased in lower extremities.  No calf tenderness is noted.  Skin no leg ulcerations noted  Neuro patient is oriented person, place, and time.  Patient has a left facial droop.  No acute slurred speech is noted.  Patient has mild decrease in motor function left upper extremity and left lower extremity.  No acute tremors are present.      I reviewed lab results as listed below  Labs Reviewed   COMPREHENSIVE METABOLIC PANEL - Abnormal       Result Value    Sodium 141      Potassium 3.5      Chloride 105      CO2 24      Anion Gap 12 (*)     BUN 13      Creatinine 0.70      Glucose 117 (*)     Calcium 8.9      AST 18      ALT (SGPT) 17      Alkaline Phosphatase 55      Total Protein 6.4      Albumin 3.9      Total Bilirubin 0.77      eGFR 103      Corrected Calcium 9.0      Narrative:     Estimated GFR calculated using the 2009 CKD-EPI creatinine equation.   MAGNESIUM - Abnormal    Magnesium 1.6 (*)    URINALYSIS, MICROSCOPIC ONLY - Abnormal    RBC, Urine 50-75 (*)     WBC, Urine 5-9 (*)     Squamous Epithelial, Urine Negative      Bacteria, Urine None seen       Calcium Oxalate Crystals, Urine Present (*)     Hyaline Casts, Urine 30-49 (*)    URINALYSIS, DIPSTICK ONLY, FOR USE WITH MICROSCOPIC PANEL - Abnormal    Color, Urine Yellow      Clarity, Urine Slightly Cloudy (*)     Specific Gravity, Urine 1.016      Leukocytes, Urine Negative      Nitrite, Urine Negative      Protein, Urine >=500 (*)     Ketones, Urine 80  (*)     Urobilinogen, Urine 2.0 (*)     Bilirubin, Urine Negative      Blood, Urine Moderate (*)     Glucose, Urine Negative      pH, Urine 5.0     PHOSPHORUS - Normal    Phosphorus 3.2     HIGH SENSITIVE TROPONIN I - Normal    hsTnI 0 Hour 15.5     POCT GLUCOSE RESULTS ONLY - Normal    POC Glucose 99     CBC WITH AUTO DIFFERENTIAL    WBC 8.1      RBC 4.65      Hemoglobin 14.6      Hematocrit 42.1      MCV 90.4      MCH 31.4      MCHC 34.8      RDW 14.2      Platelets 205      MPV 7.9      Neutrophils% 63      Lymphocytes% 29      Monocytes% 6      Eosinophils% 2      Basophils% 1      Neutrophils Absolute 5.10      Lymphocytes Absolute 2.30      Monocytes Absolute 0.50      Eosinophils Absolute 0.10      Basophils Absolute 0.10     URINALYSIS WITH MICROSCOPIC    Narrative:     The following orders were created for panel order Urinalysis w/microscopic Urine, Clean Catch.                  Procedure                               Abnormality         Status                                     ---------                               -----------         ------                                     Urinalysis, microscopic U...[[id number]]  Abnormal            Final result                               Urinalysis, dipstick Urin...[[id number]]  Abnormal            Final result                                                 Please view results for these tests on the individual orders.     CT brain scan shows low density areas in the right temporal and left posterior parietal lobes.  No acute hemorrhage noted.    MRI brain scan shows an acute right inferior anterior pontine  infarct.  There is white matter changes left posterior periventricular and left posterior parietal areas.  No acute mass or stroke is noted in the right temporal lobe/left parietal lobe.    Twelve-lead EKG shows a sinus rhythm with Q waves in lead III.  No acute ST elevation or depression noted.    I discussed the patient with Dr. Wilmer Ramos, attending ER physician.    I discussed the patient's diagnosis and treatment plan with Dr. Acuna, on-call neurologist.    ASSESSMENT and PLAN  1.  Acute ischemic stroke involving the right inferior anterior pontine area.  Patient has persistent left facial droop and left upper extremity/left lower extremity motor deficit.  No acute mass or hemorrhage noted on the MRI scan  2.  Essential hypertension.  Patient takes losartan at home.  3.  Hyperlipidemia.  Patient takes atorvastatin daily.  4.  Type 2 diabetes.  Patient has difficulty monitoring his glucometers due to decreased visual acuity secondary to bilateral cataracts.  5.  Microscopic hematuria.  Patient has 50-75 RBCs per high-powered field.  Calcium oxalate crystals identified.  No acute infection noted.  6.  Hypomagnesemia.  7.  Obstructive sleep apnea    Plan    Patient to be hospitalized to the neurological floor with telemetry monitoring.    I completed order set and insulin order set.    Patient received aspirin 324 mg x 1 in the emergency room.  Patient started on Plavix 300 mg p.o. x1 dose now followed by Plavix 75 mg p.o. daily starting tomorrow.    Start rosuvastatin 40 mg p.o. now/daily.    Losartan for hypertension.    Neuro checks every 4 hours.    Physical therapy and Occupational Therapy to evaluate and treat the patient.    Echocardiogram with bubble study tomorrow morning.    Fall precautions.    Care management consultation for Discharge planning assistance.    Check renal ultrasound in a.m. for further evaluation of microscopic hematuria.    Trial of CPAP at night.    I explained diagnoses,  treatment plan, and hospitalist service to the patient.        DVT PROPHYLAXIS: SCDs and Lovenox subcu daily    CODE STATUS :   Full code    AIMEE BELTRAN MD  4:31 PM, 5/21/2020.

## 2020-05-22 ENCOUNTER — APPOINTMENT (OUTPATIENT)
Dept: FLUOROSCOPY | Facility: HOSPITAL | Age: 60
End: 2020-05-22
Payer: COMMERCIAL

## 2020-05-22 ENCOUNTER — APPOINTMENT (OUTPATIENT)
Dept: CARDIOLOGY | Facility: HOSPITAL | Age: 60
End: 2020-05-22
Payer: COMMERCIAL

## 2020-05-22 ENCOUNTER — APPOINTMENT (OUTPATIENT)
Dept: ULTRASOUND IMAGING | Facility: HOSPITAL | Age: 60
End: 2020-05-22
Payer: COMMERCIAL

## 2020-05-22 LAB
BSA FOR ECHO PROCEDURE: 2.12 M2
CHOLEST SERPL-MCNC: 217 MG/DL (ref 0–199)
ECHO EF ESTIMATED: 52 %
EJECTION FRACTION: 51 %
EST. AVERAGE GLUCOSE BLD GHB EST-MCNC: 119.8 MG/DL
FASTING STATUS PATIENT QL REPORTED: YES
GLUCOSE BLDC GLUCOMTR-MCNC: 106 MG/DL (ref 70–105)
GLUCOSE BLDC GLUCOMTR-MCNC: 116 MG/DL (ref 70–105)
GLUCOSE BLDC GLUCOMTR-MCNC: 162 MG/DL (ref 70–105)
GLUCOSE BLDC GLUCOMTR-MCNC: 169 MG/DL (ref 70–105)
GLUCOSE BLDC GLUCOMTR-MCNC: 174 MG/DL (ref 70–105)
HBA1C MFR BLD: 5.8 % (ref 4–6)
HDLC SERPL-MCNC: 30 MG/DL
INTERVENTRICULAR SEPTUM: 0.9 CM (ref 0.6–1.1)
LDLC SERPL CALC-MCNC: 155 MG/DL (ref 20–99)
LEFT ATRIUM SIZE: 4.74 CM
LEFT INTERNAL DIMENSION IN SYSTOLE: 3.8 CM (ref 3.24–4.91)
LEFT VENTRICLE DIASTOLIC VOLUME: 149 CM3
LEFT VENTRICLE SYSTOLIC VOLUME: 73 CM3
LEFT VENTRICULAR INTERNAL DIMENSION IN DIASTOLE: 5.2 CM (ref 5.54–7.69)
LVAD-AP2: 42.8 CM2
POSTERIOR WALL: 1 CM (ref 0.6–1.1)
RA AREA: 20.1 CM2
RIGHT VENTRICULAR INTERNAL DIMENSION IN DIASTOLE: 5.1 CM
RV AP4 BASE: 4 CM
TRICUSPID ANNULAR PLANE SYSTOLIC EXCURSION: 3 CM
TRIGL SERPL-MCNC: 161 MG/DL
Z-SCORE OF LEFT VENTRICULAR DIMENSION IN END DIASTOLE: -2.27
Z-SCORE OF LEFT VENTRICULAR DIMENSION IN END SYSTOLE: -0.38

## 2020-05-22 PROCEDURE — 76770 US EXAM ABDO BACK WALL COMP: CPT

## 2020-05-22 PROCEDURE — 80061 LIPID PANEL: CPT | Performed by: INTERNAL MEDICINE

## 2020-05-22 PROCEDURE — 83036 HEMOGLOBIN GLYCOSYLATED A1C: CPT | Performed by: INTERNAL MEDICINE

## 2020-05-22 PROCEDURE — 97535 SELF CARE MNGMENT TRAINING: CPT

## 2020-05-22 PROCEDURE — 92610 EVALUATE SWALLOWING FUNCTION: CPT | Mod: GN

## 2020-05-22 PROCEDURE — 82947 ASSAY GLUCOSE BLOOD QUANT: CPT | Mod: QW

## 2020-05-22 PROCEDURE — 93321 DOPPLER ECHO F-UP/LMTD STD: CPT | Mod: 26 | Performed by: INTERNAL MEDICINE

## 2020-05-22 PROCEDURE — 1110000100 HC ROOM PRIVATE W TELE

## 2020-05-22 PROCEDURE — 97162 PT EVAL MOD COMPLEX 30 MIN: CPT | Mod: GP | Performed by: PHYSICAL THERAPIST

## 2020-05-22 PROCEDURE — 36415 COLL VENOUS BLD VENIPUNCTURE: CPT | Performed by: INTERNAL MEDICINE

## 2020-05-22 PROCEDURE — 6360000200 HC RX 636 W HCPCS (ALT 250 FOR IP): Performed by: INTERNAL MEDICINE

## 2020-05-22 PROCEDURE — 93308 TTE F-UP OR LMTD: CPT | Mod: 26 | Performed by: INTERNAL MEDICINE

## 2020-05-22 PROCEDURE — 2500000200 HC RX 250 WO HCPCS: Performed by: INTERNAL MEDICINE

## 2020-05-22 PROCEDURE — 99232 SBSQ HOSP IP/OBS MODERATE 35: CPT | Performed by: HOSPITALIST

## 2020-05-22 PROCEDURE — 97165 OT EVAL LOW COMPLEX 30 MIN: CPT

## 2020-05-22 PROCEDURE — 92611 MOTION FLUOROSCOPY/SWALLOW: CPT | Mod: GN | Performed by: SPEECH-LANGUAGE PATHOLOGIST

## 2020-05-22 PROCEDURE — 93325 DOPPLER ECHO COLOR FLOW MAPG: CPT | Mod: 26 | Performed by: INTERNAL MEDICINE

## 2020-05-22 PROCEDURE — 74230 X-RAY XM SWLNG FUNCJ C+: CPT

## 2020-05-22 PROCEDURE — 97112 NEUROMUSCULAR REEDUCATION: CPT | Mod: GP | Performed by: PHYSICAL THERAPIST

## 2020-05-22 PROCEDURE — 93308 TTE F-UP OR LMTD: CPT

## 2020-05-22 PROCEDURE — 2580000300 HC RX 258: Performed by: INTERNAL MEDICINE

## 2020-05-22 PROCEDURE — 6370000100 HC RX 637 (ALT 250 FOR IP): Performed by: INTERNAL MEDICINE

## 2020-05-22 RX ORDER — HYDRALAZINE HYDROCHLORIDE 20 MG/ML
10 INJECTION INTRAMUSCULAR; INTRAVENOUS EVERY 6 HOURS PRN
Status: DISCONTINUED | OUTPATIENT
Start: 2020-05-22 | End: 2020-05-27 | Stop reason: HOSPADM

## 2020-05-22 RX ORDER — SODIUM CHLORIDE 9 MG/ML
20 INJECTION INTRAMUSCULAR; INTRAVENOUS; SUBCUTANEOUS ONCE
Status: COMPLETED | OUTPATIENT
Start: 2020-05-22 | End: 2020-05-22

## 2020-05-22 RX ADMIN — SODIUM CHLORIDE 50 ML/HR: 9 INJECTION, SOLUTION INTRAVENOUS at 04:39

## 2020-05-22 RX ADMIN — ROSUVASTATIN CALCIUM 40 MG: 10 TABLET, FILM COATED ORAL at 20:33

## 2020-05-22 RX ADMIN — SODIUM CHLORIDE 9 ML: 9 INJECTION, SOLUTION INTRAMUSCULAR; INTRAVENOUS; SUBCUTANEOUS at 11:02

## 2020-05-22 RX ADMIN — INSULIN DETEMIR 25 UNITS: 100 INJECTION, SOLUTION SUBCUTANEOUS at 22:46

## 2020-05-22 RX ADMIN — ENOXAPARIN SODIUM 40 MG: 40 INJECTION SUBCUTANEOUS at 14:55

## 2020-05-22 NOTE — INTERDISCIPLINARY/THERAPY
Case Management Admission Note  [phone]    Living Situation: Alone in [place]  ADLs: IADL  Stairs: N  DME: N  Oxygen: N  -  Liters: N  -  Company: N  CPAP: N  Home Health: N  PCP: Dr. Esteves  Funding: [facility]  Support Person: Friend  Transportation at DC: Friend or [employer]  Discharge Needs/Barriers: DOP    Narrative: Pt admitted for CVA - MRI showed a right inferior anterior pontine infarct. Pt had video swallow study today which was negative for aspiration and pt was started on a regular diet. Pt experiencing some left sided weakness. Pt able to ambulate 6m w/min assist x 1 with OT and a FWW. CM following for PT/OT recommendations for discharge needs.    Dispo: DOP

## 2020-05-22 NOTE — INTERDISCIPLINARY/THERAPY
"  [address] [facility] [address]  [phone]    Speech Therapy Video Swallow Study  Patient Name: Del Mckeon  Referring Doctor: Dr. Derick Berry  Date of Service: 5/22/2020  Medicare Onset Date: Approximately 6 days ago  Medicare SOC Date: 5/22/2020  Medicare Prior Hospitalizations: N/A    This 59 y.o. male was referred for video swallowing evaluation.  Patient was seen at 1138 for 23 minutes.    Primary Medical Diagnosis: Acute left-sided weakness [R53.1]     Treatment Diagnosis: Mild oral and mild pharyngeal stage dysphagia    Reason for Referral: Patient was referred for an initial VFSS to assess the efficiency of his swallow function, rule out aspiration and make recommendations regarding safe dietary consistencies, effective compensatory strategies, and safe eating environment.    Subjective:  Patient is a 59-year-old male inpatient who was referred for VFSS to further evaluate swallow function after demonstrating inconsistent cough on thin liquids during clinical evaluation of swallow and failing 3 ounce water challenge.  Speech was 100% intelligible, although mild to moderately slow.  Patient reported that familiar listeners report that his speech sounds \"slurred.\"  Patient did note that he is unable to drink sequential drinks since his stroke.  When asked what happens when he tries, patient stated, \"I have to stop.\"    Past Medical History:   Diagnosis Date   • Anemia    • Asthma    • Depressive disorder    • Heart murmur    • Hyperlipidemia    • Hypertension    • Obesity    • Obstructive sleep apnea    • Type 2 diabetes mellitus (CMS/HCC) ([facility])      Pain:  Pain Assessment  Pain Score: 0 - No pain    Diet Prior to this Study: NPO    Objective:  General Information  Ordering Physician: [id number]  Radiologist: Dr. Jostin Figueroa  Date of Evaluation: 05/22/20  Type of Study: Initial VFSS  Diet Prior to this Study: NPO  Dysphagia Diagnosis: Mild oral stage dysphagia, Mild pharyngeal stage " dysphagia   Dentition: Per patient, missing molar x1 and missing wisdom tooth x1  Oral Mechanism Examination: Mild asymmetry of left side of mouth observed during smile.  Mild decreased ROM of tongue on left.  No change in sensation, per patient.     Patient was viewed laterally while seated upright at 90 degrees in examination chair.  Patient was administered thin barium, barium paste, cookie with barium paste, and barium tablet with water.  Trials were administered via spoon, cup, or straw in varying quantities.    When administered thin barium via spoon, pharyngeal swallow was initiated at the level of the valleculae.  Trace lingual and trace vallecular residue remained.  No penetration or aspiration was observed.    When administered thin barium via cup, trace premature spillage to the level of the valleculae was observed.  Pharyngeal swallow was initiated at the level of the posterior surface of the epiglottis.  Trace lingual, mild vallecular, and trace pyriform sinus residue remained.  No penetration or aspiration was observed.     When administered thin barium via straw (sequential drinks x2), pharyngeal swallow was initiated at the level of the pyriform sinuses.  Trace lingual, trace vallecular, and trace pyriform sinus residue remained.   No penetration or aspiration was observed.    When administered thin barium via straw (single drink), pharyngeal swallow was initiated at the level of the pyriform sinuses.  Mild oral, mild vallecular, and mild pyriform sinus residue remained.  Penetration or aspiration was observed.    When administered barium paste, pharyngeal swallow was initiated at the level of the valleculae.  Mild-to-moderate oral, trace vallecular, and trace pyriform sinus residue remained.  No penetration or aspiration was observed.    When administered cookie with barium paste, premature spillage to the level of the valleculae during mastication was observed.  Pharyngeal swallow was initiated at  the level of the posterior surface of the epiglottis.  Mild oral, mild vallecular, and trace pyriform sinus residue remained.  No penetration or aspiration was observed.    When administered barium tablet, patient demonstrated difficulty transitioning tablet from oral cavity to pharynx, necessitating sequential drinks of water.  Patient then demonstrated immediate cough.  Unable to comment further as fluoro was off when patient was eventually able to swallow tablet.    Assessment:  Patient presents with mild oral stage dysphagia characterized by mild premature spillage and mild decreased bolus formation.  Rare, trace premature spillage of thin consistency occurred with large bolus.  Premature spillage of cookie to the valleculae during mastication may be considered WFL.  Oral stage deficits contributed to oral residue.    Patient presents with mild pharyngeal stage dysphagia characterized by delayed initiation of the pharyngeal swallow and mild decreased base of tongue retraction.  Pharyngeal deficits contributed to pharyngeal residue.  Possible osteophyte at level of base of tongue may have contributed to vallecular residue with cookie.  Pseudodiverticulum appeared to contribute to vallecular and pyriform sinus residue with thin consistency when it drained after the swallow.    No penetration or aspiration was observed during this study, including sequential trials of thin via straw.  Cough was observed when patient took multiple drinks in a row in attempt to clear barium tablet from mouth.     Recommendations/Treatment  Recommendations: Continue dysphagia treatment  Solid Consistency: Regular  Liquid Consistency: Thin liquids  Liquid Administration Via: Cup  Medication Administration: Whole in liquid  Medication Administration Comment: Whole in liquid if tolerated, otherwise whole in puree.  Supervision: Independent  Compensatory Strategies: Slow rate, Small sips/bites, Double swallow, Out of bed, Upright 90  degrees, Aspiration precautions    Prognosis  General Prognosis: Good  Individuals Consulted  Consulted and Agree with Results and Recommendations: Patient    Plan and Education:   1. Regular - Small, single bites, intermittent extra swallow to clear residue  2. Thin liquids - Straws okay, single drinks, intermittent extra swallow to clear residue  3. Pills whole with liquid unless not tolerating (i.e., coughs), otherwise whole in puree  4. General safe intake behaviors listed under compensatory strategies above    Please refer to Dr. Jostin Figueroa's report for their radiological findings.     Thank you for allowing us to share in the care of this patient. If you have any questions, recommendations, or further concerns regarding this patient, please feel free to contact us at [phone].    Signed by: Emma Phelps CCC-SLP  5/22/2020  1:41 PM      * I have reviewed the plan of care and certify a continuing need for medically necessary services

## 2020-05-22 NOTE — PLAN OF CARE

## 2020-05-22 NOTE — MEDICATION HISTORY SPECIALIST NOTES
[facility] [id number]     CSN: [id number]  : [id number]    Information sources:  EPIC  Complete Dispense Report  Pharmacy    Allergies verified.    Patient provided all history over the phone. Patient verified medications and provided last doses. Verified with Complete Dispense Report.    New medications added:  All OTC added today  Novolin N    Discontinued medications:  Tizanidine  Atorvastatin  Norco  Aldara cream  Sertraline  levemir insulin  All of these were from 2018    [facility] will follow up with pharmacy to verify the dose on the insulin    Profile was checked for  medications.

## 2020-05-22 NOTE — NURSING END OF SHIFT
Nursing End of Shift Summary:    Patient: Del Mckeon  MRN: [id number]  : 1960, Age: 59 y.o.    Location: [address] [place] [address]    Nursing Goals  Clinical Goals for the Shift: monitor neuro status, telemetry, maintain safety    Narrative Summary of Progress Toward Clinical Goals:  Patient alert and oriented  Neuro status remains unchanged  No acute events overnight  SR on telemetry  Blood pressures are elevated, allowing for permissive hypertension  Patient refused to wear the CPAP during the night, said he tried it some yeas ago but they didn't work, only made him restless  Safety maintained     Barriers to Goals/Nursing Concerns:  No     New Patient or Family Concerns/Issues:  No     Shift Summary:      Significant Events & Communications to Providers (last 12 hours)      Last 5 Values     Row Name 20 0000                   Provider Notification    Reason for Communication  Evaluate patient has levemir 25units, BS 95 and patient is npo  -NO        Provider Name  AUGUSTO Chaparro  -NO          User Key  (r) = Recorded By, (t) = Taken By, (c) = Cosigned By    Initials Name    NO Claudia Garcia RN               Oxygen Usage (last 12 hours)      Last 5 Values     Row Name 20 2100 20 0500                Oxygen Weaning Trial by Nursing    Is Patient on Room Air OR on the Same Amount of O2 as at Home?  Yes  Yes                  Mobility (last 12 hours)      Last 5 Values     Row Name 20 2100                   Mobility    Activity  Bathroom privileges        Level of Assistance  Minimal assist, patient does 75% or more        Anti-Embolism Devices  Bilateral;AE calf pump        Anti-Embolism Intervention  On            Urethral Catheter    Active Urethral Catheter     None            Active Lines    Active Central venous catheter / Peripherally inserted central catheter / Implantable Port / Hemodialysis catheter / Midline Catheter     None              Infusing Medications   Medication Dose Last Rate   •  normal saline  50 mL/hr 50 mL/hr (05/22/20 0518)     PRN Medications   Medication Dose Last Dose   • ondansetron  4 mg     • magnesium hydroxide  30 mL       _________________________  Claudia Garcia RN  05/22/20 6:36 AM

## 2020-05-22 NOTE — INTERDISCIPLINARY/THERAPY
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 No   Additional Pertinent History HTN, DM2, HLD, aortic sclerosis, cataracts w/ diminished visual acuity   Family/Caregiver Present No   Precautions   Reinforced Precautions Yes   Other Precautions Fall Risk   Home Living   Type of Home Apartment   Home Layout One level   Home Access Elevator   Prior Function   Level of [place] Independent with homemaking with ambulation   Lived With Alone   Prior Function Comments Indep. no AD, reports history of several falls within the last 6 months.   Subjective Comments   Subjective Comments RN ok'd PT, pt agreeable. Gait belt donned for all mobility. Pt found in the bathroom amb. w/o staff assist. Pt left in recliner w/ call light in reach, needs met, and alarm on.    Cognition   Arousal/Alertness WFL   Cognition Comment Follows commands, one instance of emotional lability   Bed Mobility   Bed Mobility Not assessed   Transfers   Transfer Assessed   Transfer 1   Transfer From 1 Bed;Chair with arms   Transfer Type 1 To and from   Transfer to 1 Stand   Technique 1 Sit to stand;Stand to sit   Transfer Device 1 Front wheeled walker;Transfer belt   Level of Assistance 1 Contact guard assist x 1   Trials/Comments 1 Steady upon rise w/ FWW   Ambulation   Ambulation Assessed   Ambulation 1   Surface 1 Level surface;Smooth   Device 1 Front wheeled walker;Gait belt   Assistance 1 Min assist x 1   Quality of Gait 1 Slightly unsteady, no LOB. Some impulsivity when returning to the chair as pt neglects FWW.   Comments/Distance 1 60m   Stairs   Stairs No   Balance   Balance Impaired   Static Standing Balance   Static Standing-Balance Surface Firm   Static Standing-Balance Support No upper  extremity supported   Static Standing-Level of Assistance Mod assist x 1;Contact guard x 1   Static Standing-Comment/# of Minutes Narrow KYLE EC x20s w/ slightly increased sway, attempted tandem stance w/ LOB requiring ModAx1 for recovery.   Dynamic Standing Balance   Dynamic Standing-Balance Surface Firm   Dynamic Standing-Balance Support No upper extremity supported   Dynamic Standing-Level of Assistance Contact guard x 1   Dynamic Standing-Comments Marching in place, 360 turns B   RLE Assessment   RLE Assessment WFL   LLE Assessment   LLE Assessment WFL  (4/5 hip flxn, knee ext, knee flxn strength)   Light Touch   RLE Light Touch Grossly intact   LLE Light Touch Grossly intact   Other Activities   Other Activities Comments Pt educated on importance of having staff assist w/ use of FWW for safe mobility due to pt's high fall risk.    Activity Tolerance   Activity Tolerance Comments Tolerating fair   Assessment   Rehab Potential Good   Problem List Decreased strength;Decreased endurance;Impaired balance;Decreased mobility;Decreased safety awareness   Barriers to Discharge Decreased caregiver support   Assessment Comment Del requires assist of one person and use of FWW for safety. He demos decreased safety awareness as well as decreased balance w/ assist needed for balance recovery when assessing higher level balance. He will continue to benefit from skilled PT services to address the above impairments.   Recommendation   Recommendations for Therapy Continue skilled therapy;Able to tolerate 3 hours therapy   Equipment Recommended Front wheeled walker   DME Justification (Front Wheel Walker) Patient has a mobility limitation that considerably impairs the ability to participate in [facility]ADL in the home. The patient is able to safely use and the mobility deficit is resolved by the use of a front wheeled walker.   Plan   Treatment Interventions Bed mobility;Functional transfer training;Gait training;Balance  training;Endurance training;Therapeutic activities;Therapeutic exercises   PT Frequency 5-7x/wk   Plan Comment 1 assist: bed mobility, safety w/ transfers, 60m+ amb. w/ FWW, higher level balance, strengthening   Date POC Due 05/29/20

## 2020-05-22 NOTE — INTERDISCIPLINARY/THERAPY
05/22/20 0703   OT Last Visit   OT Received On 05/22/20   Visit Number 1   General   Chart Reviewed Yes   Therapy Treatment Diagnosis CVA   OT Treatment Duration (Min) 21 Minutes   Is this a Co-Treatment? No   Additional Pertinent History See chart   Family/Caregiver Present No   Precautions   Reinforced Precautions Yes   Other Precautions L demi, lines, fall   Home Living   Type of Home Apartment   Home Layout One level   Home Access Elevator   Bathroom Shower/Tub Tub/shower unit   Bathroom Toilet Standard   Bathroom Equipment None   Home Adaptive Equipment None   Home Living Comments Pt. lives in [place] alone. (I) mobility PTA   Prior Function   Level of [place] Independent with ADLs and functional transfers;Independent with homemaking with ambulation   Lived With Alone   Receives Help From Family   ADL Assistance Independent   IADL Assistance Independent   Driving No   Current License Yes   Mode of Transportation   (Dial A Ride)   Vocational Status Retired   Vocational Type    Prior Function Comments Pt. (I) ADL/IADL's PTA   Subjective Comments   Subjective Comments RN ok'd tx. Pt. agreeable   Pain Assessment   Pain Assessment No/denies pain   Cognition   Arousal/Alertness WFL   Cognition Comment Pt. able to follow commands, conversation appropriate. Emotionally labile throughout tx- multi. episodes throughout tx   Bed Mobility   Bed Mobility Assessed   Bed Mobility 1   Bed Mobility From 1 Supine   Bed Mobility Type 1 To and from   Bed Mobility to 1 Short sit   Level of Assistance 1 Standby assistance   Bed Mobility Comments 1 SBA, increased time   Transfers   Transfer Assessed   Transfer 1   Transfer From 1 Bed   Transfer Type 1 To and from   Transfer to 1 Stand   Technique 1 Sit to stand;Stand to sit   Transfer Device 1 Front wheeled walker;Transfer belt   Level of Assistance 1 Contact guard assist x 1   Trials/Comments 1 CGA, multi. attempts before able to complete on own    Ambulation   Ambulation Assessed   Ambulation 1   Surface 1 Level surface;Smooth   Device 1 Front wheeled walker;Gait belt   Assistance 1 Min assist x 1   Quality of Gait 1 slightly unsteady- no LOB   Comments/Distance 1 6m forward/backward   Balance   Balance Impaired   Dynamic Standing Balance   Dynamic Standing-Balance Surface Firm   Dynamic Standing-Balance Support Bilateral upper extremity supported   Dynamic Standing-Balance Lateral lean;Forward lean;Reaching for objects;Reaching across midline   Dynamic Standing-Level of Assistance Contact guard x 1   Dynamic Standing-Comments marching in place, heel-to-toe, mini squats   LE Dressing   LE Dressing Yes   Sock Level of Assistance Setup   LE Dressing Where Assessed Edge of bed   LE Dressing Comments (2/2)   RUE Assessment   RUE Assessment WFL   LUE Assessment   LUE Assessment WFL   Hand Function   Gross Grasp R Functional;L Functional   Coordination R Functional;L Functional   Light Touch   RUE Light Touch Grossly intact   LUE Light Touch Grossly intact   Perception   Inattention/Neglect Appears intact   Initiation Appears intact   Motor Planning Appears intact   Perseveration Not present   Vision-Basic Assessment   Visual History Cataracts   Assessment   Rehab Potential Good   Progress Progressing toward goals   Problem List Decreased ADL status;Decreased upper extremity strength;Decreased endurance;Decreased functional mobility;Decreased IADLs   Recommendations for Therapy Continue skilled therapy   Assessment Comment Pt. demo'd w/ deficits in both ADL's and fxl. mobility w/ slightly impaired strength and coordination throughout LUE s/p CVA. Decreased dyn. standing balance and overall fxl. strength- would cont. to benefit from skilled OT to maximize (I) and ensure Pt. safety at discharge   Recommendation   Equipment Issued Hand exercise ball   Plan   Plan Comment Dyn. stand. bal/mob, ADL's at sink   Treatment Interventions ADL retraining;Therapeutic  activity;Therapeutic exercise;Neuromuscular reeducation;Sensory integration;Visual perceptual retraining;UE strengthening/ROM;Endurance training;Cognitive skills development;Patient/family training;Equipment evaluation/education;Fine motor coordination activities;Compensatory technique education   OT Frequency 3-5x/wk   Date POC Due 05/29/20

## 2020-05-22 NOTE — INTERDISCIPLINARY/THERAPY
05/22/20 0926   Subjective Comments   Subjective Comments RN approved evaluation; reports pt failed bedside screen d/t slight facial droop.   General   Chart Reviewed Yes   Additional Pertinent History Ischemic cerebrovascular accident; hx of anemia, asthma, depressive disorder, heart murmur, HLD, HTN, obesity, DM2, obstructive sleep apnea   SLP Treatment Duration (Min) 16 Minutes   Family/Caregiver Present No   Prior Function Comments Pt reports no known hx of dysphagia, x2 hx of PNA (most recent in the 1980s), and baseline diet of regular/thins   Baseline Assessment   Temperature Spikes w/in 72h No   Respiratory Status Room air   Behavior/Cognition Alert;Cooperative;Pleasant mood   Dentition Adequate   Vision Functional for self-feeding   Patient Positioning Upright on EOB   Baseline Vocal Quality Normal   Oral/Motor   Labial ROM Moderate reduced left   Labial Symmetry WFL   Labial Strength WFL   Lingual ROM WFL   Lingual Symmetry WFL   Lingual Strength WFL   Velum WFL   Facial ROM Moderate reduced left   Facial Symmetry WFL   Facial Sensation WFL   Consistencies Assessed   Consistencies Assessed Yes   Ice Chips   Presentation Feeding assistance;Spoon   Oral WFL   Suction Required? No   Swab/Finger Swipe Required? No   Pharyngeal No signs or symptoms of aspiration   Thin Liquids   Presentation Self-fed;Cup;Straw   Oral WFL   Suction Required? No   Swab/Finger Swipe Required? No   Pharyngeal Cough - immediate  (2/7 (both were liquid washes with hard solids))   NDD1 Diet Texture   Presentation Self-fed;Spoon   Oral WFL   Suction Required? No   Swab/Finger Swipe Required? No   Pharyngeal No signs or symptoms of aspiration   NDD2 Diet Texture   Presentation Self-fed;Spoon   Oral WFL   Suction Required? No   Swab/Finger Swipe Required? No   Pharyngeal No signs or symptoms or aspiration   Regular Diet Texture   Presentation Self-fed   Oral WFL   Suction Required? No   Swab/Finger Swipe Required? No   Pharyngeal No  "signs or symptoms of aspiration   Assessment Comments   Assessment Comments Pt was alert and oriented x4, upright at the EOB, and agreeable to swallow evaluation. Pt reports no known hx of dysphagia, x2 hx of PNA (most recent \"some time in the 1980s\"), and baseline diet of regular/thins. Pt noted to have moderately decreased labial and facial ROM; all other components [facility]. Pt was presented trials of ice chips, thins via cup edge and straw, NDD1, NDD2-3, and hard solids. Immediate cough noted with 2/7 trials of thins via cup edge (those given as liquid wash) and 1/7 via straw. All other trials/consistencies were observed with no s/s of penetration/aspiration. Adequate mastication and oral clearance noted with solid trials. Pt was administered 3 ounce water test, a sensitive screener for aspiration requiring three ounces of water be consumed without stopping with no immediate or delayed s/s. Pt stopped midway through completion of test, demonstrating strong coughing episode. D/t location of infarct and high incidence of silent aspiration among these patients, recommend VFSS to further assess swallow function.   Patient Education   Patient Education Pt was educated regarding purpose of evaluation and corresponding results/recommendations (i.e., VFSS); pt provided verbal understanding and agreement.   Evaluation Tolerance   Evaluation Tolerance Patient tolerated evaluation well   Caregiver Made Aware Nurse;Provider   Plan   Plan Comments VFSS   Treatment Interventions Dysphagia Therapy   SLP Frequency 1-2x/wk   Date POC Due 05/29/20   Aspiration Risk   Risk for Aspiration Moderate   Follow Up Recommendations Dysphagia treatment   Diet Solids Recommendations NPO   Diet Liquids Recommendations NPO   Speech Therapy Prognosis   Prognosis Good   Prognosis Considerations Age;Previous level of function;Participation level     "

## 2020-05-22 NOTE — PROGRESS NOTES
[address] [address]  Daily Progress Note  Patient name: Del Mckeon  MRN: [id number]   LOS: 1 day     Subjective   Patient with no headache.  No visual changes.  He feels some improvement in left upper extremity strength.  Objective   Vitals:Temp:  [36.3 °C (97.4 °F)-36.9 °C (98.5 °F)] 36.9 °C (98.4 °F)  Heart Rate:  [67-79] 70  Resp:  [11-20] 18  BP: (162-197)/() 180/105  Physical Exam:  HEENT: Pupils reactive to light and accommodation, extraocular muscle movements intact, no scleral icterus, conjunctivae within normal limits  Neck: No nuchal rigidity  Lungs: Clear to auscultation bilaterally, no rales or rhonchi, good respiratory effort  Heart: Regular, non-tachycardic, normal S1 and S2  Abdomen: Soft with positive bowel sounds, nontender, no rebound or guarding  Genitourinary: Deferred  Extremities: No edema, no active signs of synovitis, 4/5 strength left upper extremity  Skin: No rash, no jaundice, moist  Neurologic: Alert, oriented x3, deep tendon reflexes intact, no sensory deficit    Review of Systems:  Per subjective  Admission on 05/21/2020   Component Date Value Ref Range Status   • WBC 05/21/2020 8.1  3.7 - 9.6 10*3/uL Final   • RBC 05/21/2020 4.65  4.10 - 5.80 10*6/µL Final   • Hemoglobin 05/21/2020 14.6  13.2 - 17.2 g/dL Final   • Hematocrit 05/21/2020 42.1  38.0 - 50.0 % Final   • MCV 05/21/2020 90.4  82.0 - 97.0 fL Final   • MCH 05/21/2020 31.4  29.0 - 34.0 pg Final   • MCHC 05/21/2020 34.8  32.0 - 36.0 g/dL Final   • RDW 05/21/2020 14.2  11.5 - 15.0 % Final   • Platelets 05/21/2020 205  130 - 350 10*3/uL Final   • MPV 05/21/2020 7.9  6.9 - 10.8 fL Final   • Neutrophils% 05/21/2020 63  46 - 70 % Final   • Lymphocytes% 05/21/2020 29  15 - 47 % Final   • Monocytes% 05/21/2020 6  5 - 13 % Final   • Eosinophils% 05/21/2020 2  0 - 3 % Final   • Basophils% 05/21/2020 1  0 - 2 % Final   • Neutrophils Absolute 05/21/2020 5.10  10*3/uL Final   •  Lymphocytes Absolute 05/21/2020 2.30  10*3/uL Final   • Monocytes Absolute 05/21/2020 0.50  10*3/uL Final   • Eosinophils Absolute 05/21/2020 0.10  10*3/uL Final   • Basophils Absolute 05/21/2020 0.10  10*3/uL Final   • Sodium 05/21/2020 141  135 - 145 mmol/L Final   • Potassium 05/21/2020 3.5  3.5 - 5.1 mmol/L Final   • Chloride 05/21/2020 105  98 - 107 mmol/L Final   • CO2 05/21/2020 24  21 - 32 mmol/L Final   • Anion Gap 05/21/2020 12* 3 - 11 mmol/L Final   • BUN 05/21/2020 13  7 - 25 mg/dL Final   • Creatinine 05/21/2020 0.70  0.70 - 1.30 mg/dL Final   • Glucose 05/21/2020 117* 70 - 105 mg/dL Final   • Calcium 05/21/2020 8.9  8.6 - 10.3 mg/dL Final   • AST 05/21/2020 18  <40 U/L Final   • ALT (SGPT) 05/21/2020 17  7 - 52 U/L Final   • Alkaline Phosphatase 05/21/2020 55  45 - 115 U/L Final   • Total Protein 05/21/2020 6.4  6.0 - 8.3 g/dL Final   • Albumin 05/21/2020 3.9  3.5 - 5.3 g/dL Final   • Total Bilirubin 05/21/2020 0.77  0.20 - 1.40 mg/dL Final   • eGFR 05/21/2020 103  >60 mL/min/1.73m*2 Final   • Corrected Calcium 05/21/2020 9.0  8.6 - 10.3 mg/dL Final   • Magnesium 05/21/2020 1.6* 1.8 - 2.4 mg/dL Final   • Phosphorus 05/21/2020 3.2  2.5 - 4.9 mg/dL Final   • hsTnI 0 Hour 05/21/2020 15.5  <=19.8 pg/mL Final   • RBC, Urine 05/21/2020 50-75* None seen, 0-2, Negative /HPF Final   • WBC, Urine 05/21/2020 5-9* 0 - 4 /HPF Final   • Squamous Epithelial, Urine 05/21/2020 Negative  None Seen-9 /HPF Final   • Bacteria, Urine 05/21/2020 None seen  None seen, Few /HPF Final   • Calcium Oxalate Crystals, Urine 05/21/2020 Present* None Seen /HPF Final   • Hyaline Casts, Urine 05/21/2020 30-49* 0 - 4 /LPF Final   • Color, Urine 05/21/2020 Yellow  Yellow Final   • Clarity, Urine 05/21/2020 Slightly Cloudy* Clear Final   • Specific Gravity, Urine 05/21/2020 1.016  1.003 - 1.030 Final   • Leukocytes, Urine 05/21/2020 Negative  Negative Final   • Nitrite, Urine 05/21/2020 Negative  Negative Final   • Protein, Urine  05/21/2020 >=500* Negative mg/dL Final   • Ketones, Urine 05/21/2020 80 * Negative mg/dL Final   • Urobilinogen, Urine 05/21/2020 2.0* <2.0 E.U./dL Final   • Bilirubin, Urine 05/21/2020 Negative  Negative Final   • Blood, Urine 05/21/2020 Moderate* Negative Final   • Glucose, Urine 05/21/2020 Negative  Negative mg/dL Final   • pH, Urine 05/21/2020 5.0  5.0 - 8.0 PH Final   • POC Glucose 05/21/2020 99  70 - 105 mg/dL Final   • POC Glucose 05/21/2020 95  70 - 105 mg/dL Final   • Hemoglobin A1C 05/22/2020 5.8  4.0 - 6.0 % Final   • Estimated Average Glucose 05/22/2020 119.8  mg/dL Final   • Triglycerides 05/22/2020 161* <=149 mg/dL Final   • Cholesterol 05/22/2020 217* 0 - 199 mg/dL Final   • HDL 05/22/2020 30* >=40 mg/dL Final   • LDL Calculated 05/22/2020 155* 20 - 99 mg/dL Final   • Fasting? 05/22/2020 Yes   Final   • POC Glucose 05/21/2020 106* 70 - 105 mg/dL Final   • POC Glucose 05/22/2020 116* 70 - 105 mg/dL Final   • LV Area-diastolic Apical Two Chamb* 05/22/2020 42.8  cm2 In process   • LV Diastolic Volume 05/22/2020 149  cm3 In process   • LV Systolic Volume 05/22/2020 73  cm3 In process   • IVS 05/22/2020 0.9  0.6 - 1.1 cm In process   • LVIDD 05/22/2020 5.2  5.54 - 7.69 cm In process   • LVIDS 05/22/2020 3.8  3.24 - 4.91 cm In process   • PW 05/22/2020 1.0  0.6 - 1.1 cm In process   • Biplane EF 05/22/2020 51  % In process   • LA size 05/22/2020 4.74  cm In process   • RVIDD 05/22/2020 5.1  cm In process   • RV base 05/22/2020 4.0  cm In process   • Tricuspid annular plane systolic e* 05/22/2020 3.0  cm In process   • RA area 05/22/2020 20.1  cm2 In process   • BSA 05/22/2020 2.12  m2 In process   • Echo EF Estimated 05/22/2020 50  % In process   • ZLVIDD 05/22/2020 -2.27   In process   • ZLVIDS 05/22/2020 -0.38   In process        Assessment/Plan   Acute pontine stroke, inferior/anterior  Hypertension, essential  Type 2 diabetes mellitus   Blood sugars reviewed  Obstructive sleep apnea history    Patient  doing well.  Patient will undergo speech assessment/video.  Continue with physical therapy, Occupational Therapy and speech therapy.  Patient tolerating antiplatelet therapy.  Based on therapy notes will determine if patient will require further rehab assistance versus home health with therapies.    Plan  Physical therapy  Occupational Therapy  Speech therapy  Disposition planning    Electronically signed by: Derick Berry MD  5/22/2020  12:53 PM

## 2020-05-23 LAB
GLUCOSE BLDC GLUCOMTR-MCNC: 189 MG/DL (ref 70–105)
GLUCOSE BLDC GLUCOMTR-MCNC: 199 MG/DL (ref 70–105)

## 2020-05-23 PROCEDURE — 99232 SBSQ HOSP IP/OBS MODERATE 35: CPT | Performed by: HOSPITALIST

## 2020-05-23 PROCEDURE — 82947 ASSAY GLUCOSE BLOOD QUANT: CPT | Mod: QW

## 2020-05-23 PROCEDURE — 6360000200 HC RX 636 W HCPCS (ALT 250 FOR IP): Performed by: INTERNAL MEDICINE

## 2020-05-23 PROCEDURE — (BLANK) HC ROOM PRIVATE

## 2020-05-23 PROCEDURE — 6370000100 HC RX 637 (ALT 250 FOR IP): Performed by: PSYCHIATRY & NEUROLOGY

## 2020-05-23 PROCEDURE — 6370000100 HC RX 637 (ALT 250 FOR IP): Performed by: INTERNAL MEDICINE

## 2020-05-23 PROCEDURE — 1110000100 HC ROOM PRIVATE W TELE

## 2020-05-23 PROCEDURE — 6360000200 HC RX 636 W HCPCS (ALT 250 FOR IP): Performed by: HOSPITALIST

## 2020-05-23 RX ORDER — LISINOPRIL 20 MG/1
20 TABLET ORAL DAILY
Status: DISCONTINUED | OUTPATIENT
Start: 2020-05-24 | End: 2020-05-27

## 2020-05-23 RX ADMIN — INSULIN DETEMIR 25 UNITS: 100 INJECTION, SOLUTION SUBCUTANEOUS at 21:24

## 2020-05-23 RX ADMIN — MELATONIN 1000 UNITS: at 08:41

## 2020-05-23 RX ADMIN — ENOXAPARIN SODIUM 40 MG: 40 INJECTION SUBCUTANEOUS at 15:22

## 2020-05-23 RX ADMIN — ROSUVASTATIN CALCIUM 40 MG: 10 TABLET, FILM COATED ORAL at 21:12

## 2020-05-23 RX ADMIN — INSULIN ASPART 1 UNITS: 100 INJECTION, SOLUTION INTRAVENOUS; SUBCUTANEOUS at 21:18

## 2020-05-23 RX ADMIN — ASPIRIN 81 MG: 81 TABLET ORAL at 08:41

## 2020-05-23 RX ADMIN — HYDRALAZINE HYDROCHLORIDE 10 MG: 20 INJECTION INTRAMUSCULAR; INTRAVENOUS at 04:33

## 2020-05-23 RX ADMIN — LOSARTAN POTASSIUM 50 MG: 50 TABLET ORAL at 08:41

## 2020-05-23 RX ADMIN — CLOPIDOGREL BISULFATE 75 MG: 75 TABLET ORAL at 08:41

## 2020-05-23 NOTE — PROGRESS NOTES
[address] [address]  Daily Progress Note  Patient name: Del Mckeon  MRN: [id number]   LOS: 2 days     Subjective   Patient resting comfortably.  No headache.  No new neurologic change.  Objective   Vitals:Temp:  [36.6 °C (97.8 °F)-37 °C (98.6 °F)] 36.8 °C (98.2 °F)  Heart Rate:  [] 103  Resp:  [15-18] 18  BP: (148-190)/() 148/80  Physical Exam:  HEENT: Pupils reactive to light and accommodation, extraocular muscle movements intact, no nystagmus  Neck: No nuchal rigidity  Lungs: Clear to auscultation bilaterally, no rales or rhonchi  Heart: Regular, non-tachycardic, normal S1 and S2  Abdomen: Soft with positive bowel sounds, nontender  Genitourinary: Deferred  Extremities: 4/5 strength left upper extremity, no edema, no active signs of synovitis, no joint effusion  Skin: No rash, no jaundice, moist  Neurologic: Alert, oriented x3, deep tendon reflexes intact, no sensory deficit    Review of Systems:  Negative  Admission on 05/21/2020   Component Date Value Ref Range Status   • WBC 05/21/2020 8.1  3.7 - 9.6 10*3/uL Final   • RBC 05/21/2020 4.65  4.10 - 5.80 10*6/µL Final   • Hemoglobin 05/21/2020 14.6  13.2 - 17.2 g/dL Final   • Hematocrit 05/21/2020 42.1  38.0 - 50.0 % Final   • MCV 05/21/2020 90.4  82.0 - 97.0 fL Final   • MCH 05/21/2020 31.4  29.0 - 34.0 pg Final   • MCHC 05/21/2020 34.8  32.0 - 36.0 g/dL Final   • RDW 05/21/2020 14.2  11.5 - 15.0 % Final   • Platelets 05/21/2020 205  130 - 350 10*3/uL Final   • MPV 05/21/2020 7.9  6.9 - 10.8 fL Final   • Neutrophils% 05/21/2020 63  46 - 70 % Final   • Lymphocytes% 05/21/2020 29  15 - 47 % Final   • Monocytes% 05/21/2020 6  5 - 13 % Final   • Eosinophils% 05/21/2020 2  0 - 3 % Final   • Basophils% 05/21/2020 1  0 - 2 % Final   • Neutrophils Absolute 05/21/2020 5.10  10*3/uL Final   • Lymphocytes Absolute 05/21/2020 2.30  10*3/uL Final   • Monocytes Absolute 05/21/2020 0.50  10*3/uL Final   •  Eosinophils Absolute 05/21/2020 0.10  10*3/uL Final   • Basophils Absolute 05/21/2020 0.10  10*3/uL Final   • Sodium 05/21/2020 141  135 - 145 mmol/L Final   • Potassium 05/21/2020 3.5  3.5 - 5.1 mmol/L Final   • Chloride 05/21/2020 105  98 - 107 mmol/L Final   • CO2 05/21/2020 24  21 - 32 mmol/L Final   • Anion Gap 05/21/2020 12* 3 - 11 mmol/L Final   • BUN 05/21/2020 13  7 - 25 mg/dL Final   • Creatinine 05/21/2020 0.70  0.70 - 1.30 mg/dL Final   • Glucose 05/21/2020 117* 70 - 105 mg/dL Final   • Calcium 05/21/2020 8.9  8.6 - 10.3 mg/dL Final   • AST 05/21/2020 18  <40 U/L Final   • ALT (SGPT) 05/21/2020 17  7 - 52 U/L Final   • Alkaline Phosphatase 05/21/2020 55  45 - 115 U/L Final   • Total Protein 05/21/2020 6.4  6.0 - 8.3 g/dL Final   • Albumin 05/21/2020 3.9  3.5 - 5.3 g/dL Final   • Total Bilirubin 05/21/2020 0.77  0.20 - 1.40 mg/dL Final   • eGFR 05/21/2020 103  >60 mL/min/1.73m*2 Final   • Corrected Calcium 05/21/2020 9.0  8.6 - 10.3 mg/dL Final   • Magnesium 05/21/2020 1.6* 1.8 - 2.4 mg/dL Final   • Phosphorus 05/21/2020 3.2  2.5 - 4.9 mg/dL Final   • hsTnI 0 Hour 05/21/2020 15.5  <=19.8 pg/mL Final   • RBC, Urine 05/21/2020 50-75* None seen, 0-2, Negative /HPF Final   • WBC, Urine 05/21/2020 5-9* 0 - 4 /HPF Final   • Squamous Epithelial, Urine 05/21/2020 Negative  None Seen-9 /HPF Final   • Bacteria, Urine 05/21/2020 None seen  None seen, Few /HPF Final   • Calcium Oxalate Crystals, Urine 05/21/2020 Present* None Seen /HPF Final   • Hyaline Casts, Urine 05/21/2020 30-49* 0 - 4 /LPF Final   • Color, Urine 05/21/2020 Yellow  Yellow Final   • Clarity, Urine 05/21/2020 Slightly Cloudy* Clear Final   • Specific Gravity, Urine 05/21/2020 1.016  1.003 - 1.030 Final   • Leukocytes, Urine 05/21/2020 Negative  Negative Final   • Nitrite, Urine 05/21/2020 Negative  Negative Final   • Protein, Urine 05/21/2020 >=500* Negative mg/dL Final   • Ketones, Urine 05/21/2020 80 * Negative mg/dL Final   • Urobilinogen, Urine  05/21/2020 2.0* <2.0 E.U./dL Final   • Bilirubin, Urine 05/21/2020 Negative  Negative Final   • Blood, Urine 05/21/2020 Moderate* Negative Final   • Glucose, Urine 05/21/2020 Negative  Negative mg/dL Final   • pH, Urine 05/21/2020 5.0  5.0 - 8.0 PH Final   • POC Glucose 05/21/2020 99  70 - 105 mg/dL Final   • POC Glucose 05/21/2020 95  70 - 105 mg/dL Final   • Hemoglobin A1C 05/22/2020 5.8  4.0 - 6.0 % Final   • Estimated Average Glucose 05/22/2020 119.8  mg/dL Final   • Triglycerides 05/22/2020 161* <=149 mg/dL Final   • Cholesterol 05/22/2020 217* 0 - 199 mg/dL Final   • HDL 05/22/2020 30* >=40 mg/dL Final   • LDL Calculated 05/22/2020 155* 20 - 99 mg/dL Final   • Fasting? 05/22/2020 Yes   Final   • POC Glucose 05/21/2020 106* 70 - 105 mg/dL Final   • POC Glucose 05/22/2020 116* 70 - 105 mg/dL Final   • LV Area-diastolic Apical Two Chamb* 05/22/2020 42.8  cm2 Final   • LV Diastolic Volume 05/22/2020 149  cm3 Final   • LV Systolic Volume 05/22/2020 73  cm3 Final   • IVS 05/22/2020 0.9  0.6 - 1.1 cm Final   • LVIDD 05/22/2020 5.2  5.54 - 7.69 cm Final   • LVIDS 05/22/2020 3.8  3.24 - 4.91 cm Final   • PW 05/22/2020 1.0  0.6 - 1.1 cm Final   • Biplane EF 05/22/2020 51  % Final   • LA size 05/22/2020 4.74  cm Final   • RVIDD 05/22/2020 5.1  cm Final   • RV base 05/22/2020 4.0  cm Final   • Tricuspid annular plane systolic e* 05/22/2020 3.0  cm Final   • RA area 05/22/2020 20.1  cm2 Final   • BSA 05/22/2020 2.12  m2 Final   • Echo EF Estimated 05/22/2020 52  % Final   • ZLVIDD 05/22/2020 -2.27   Final   • ZLVIDS 05/22/2020 -0.38   Final   • POC Glucose 05/22/2020 169* 70 - 105 mg/dL Final   • POC Glucose 05/22/2020 162* 70 - 105 mg/dL Final   • POC Glucose 05/22/2020 174* 70 - 105 mg/dL Final        Assessment/Plan   Acute pontine stroke, inferior/anterior  Hypertension, essential  Type 2 diabetes mellitus              Blood sugars reviewed  Obstructive sleep apnea history     (Patient doing well.  Patient will undergo  speech assessment/video.  Continue with physical therapy, Occupational Therapy and speech therapy.  Patient tolerating antiplatelet therapy.  Based on therapy notes will determine if patient will require further rehab assistance versus home health with therapies.) 5/22/20    Therapy notes reviewed.  Patient would be a good candidate for rehab assistance.  Discussed with him and also discussed with care management.     Plan  Physical therapy  Occupational Therapy  Speech therapy  Disposition planning, rehab assessment       Electronically signed by: Derick Berry MD  5/23/2020  10:15 AM

## 2020-05-23 NOTE — NURSING END OF SHIFT
Nursing End of Shift Summary:    Patient: Del Mckeon  MRN: [id number]  : 1960, Age: 59 y.o.    Location: [address] [place] [address]    Nursing Goals  Clinical Goals for the Shift: Monitor neuro status, maintain safety    Narrative Summary of Progress Toward Clinical Goals:  No significant changes in neuro status throughout day. Patient continues to be safe on unit.    Barriers to Goals/Nursing Concerns:  None at this time    New Patient or Family Concerns/Issues:  None at this time.    Shift Summary:      Significant Events & Communications to Providers (last 12 hours)      Last 5 Values     Row Name 20 0823                   Provider Notification    Reason for Communication  Critical result Hypertension  -CG        Provider Name  Dr. Berry  -ABDIRIZAK          User Key  (r) = Recorded By, (t) = Taken By, (c) = Cosigned By    Initials Name    ABDIRIZAK Sanderson RN               Oxygen Usage (last 12 hours)      Last 5 Values     Row Name 20 0900                   Oxygen Weaning Trial by Nursing    Is Patient on Room Air OR on the Same Amount of O2 as at Home?  Yes                   Mobility (last 12 hours)      Last 5 Values     Row Name 20 0900 20 1830                Mobility    Activity  Ambulate in room;Bathroom privileges  Ambulate in room;Bathroom privileges       Level of Assistance  Standby assist, set-up cues, supervision of patient - no hands on  Standby assist, set-up cues, supervision of patient - no hands on       Anti-Embolism Devices  Bilateral;AE calf pump  --       Anti-Embolism Intervention  Refused  --           Urethral Catheter    Active Urethral Catheter     None            Active Lines    Active Central venous catheter / Peripherally inserted central catheter / Implantable Port / Hemodialysis catheter / Midline Catheter     None              Infusing Medications   Medication Dose Last Rate     PRN Medications   Medication Dose Last Dose   • hydrALAZINE  10 mg     • ondansetron  4  mg     • magnesium hydroxide  30 mL       _________________________  Elo Sanderson RN  05/22/20 8:00 PM

## 2020-05-24 LAB
GLUCOSE BLDC GLUCOMTR-MCNC: 144 MG/DL (ref 70–105)
GLUCOSE BLDC GLUCOMTR-MCNC: 151 MG/DL (ref 70–105)
GLUCOSE BLDC GLUCOMTR-MCNC: 153 MG/DL (ref 70–105)
GLUCOSE BLDC GLUCOMTR-MCNC: 161 MG/DL (ref 70–105)
GLUCOSE BLDC GLUCOMTR-MCNC: 165 MG/DL (ref 70–105)
GLUCOSE BLDC GLUCOMTR-MCNC: 168 MG/DL (ref 70–105)

## 2020-05-24 PROCEDURE — 99232 SBSQ HOSP IP/OBS MODERATE 35: CPT | Performed by: HOSPITALIST

## 2020-05-24 PROCEDURE — 6360000200 HC RX 636 W HCPCS (ALT 250 FOR IP): Performed by: INTERNAL MEDICINE

## 2020-05-24 PROCEDURE — 6370000100 HC RX 637 (ALT 250 FOR IP): Performed by: INTERNAL MEDICINE

## 2020-05-24 PROCEDURE — 6370000100 HC RX 637 (ALT 250 FOR IP): Performed by: PSYCHIATRY & NEUROLOGY

## 2020-05-24 PROCEDURE — (BLANK) HC ROOM PRIVATE

## 2020-05-24 PROCEDURE — 82947 ASSAY GLUCOSE BLOOD QUANT: CPT | Mod: QW

## 2020-05-24 PROCEDURE — 1110000100 HC ROOM PRIVATE W TELE

## 2020-05-24 PROCEDURE — 97530 THERAPEUTIC ACTIVITIES: CPT | Mod: GP | Performed by: PHYSICAL THERAPIST

## 2020-05-24 PROCEDURE — 6370000100 HC RX 637 (ALT 250 FOR IP): Performed by: HOSPITALIST

## 2020-05-24 PROCEDURE — 97530 THERAPEUTIC ACTIVITIES: CPT | Mod: GO,CO

## 2020-05-24 RX ORDER — ACETAMINOPHEN 325 MG/1
650 TABLET ORAL ONCE
Status: COMPLETED | OUTPATIENT
Start: 2020-05-24 | End: 2020-05-24

## 2020-05-24 RX ADMIN — ROSUVASTATIN CALCIUM 40 MG: 10 TABLET, FILM COATED ORAL at 22:21

## 2020-05-24 RX ADMIN — ASPIRIN 81 MG: 81 TABLET ORAL at 09:22

## 2020-05-24 RX ADMIN — CLOPIDOGREL BISULFATE 75 MG: 75 TABLET ORAL at 09:22

## 2020-05-24 RX ADMIN — ACETAMINOPHEN 650 MG: 325 TABLET ORAL at 02:01

## 2020-05-24 RX ADMIN — LISINOPRIL 20 MG: 20 TABLET ORAL at 09:22

## 2020-05-24 RX ADMIN — MELATONIN 1000 UNITS: at 09:22

## 2020-05-24 RX ADMIN — INSULIN ASPART 1 UNITS: 100 INJECTION, SOLUTION INTRAVENOUS; SUBCUTANEOUS at 18:13

## 2020-05-24 RX ADMIN — INSULIN DETEMIR 25 UNITS: 100 INJECTION, SOLUTION SUBCUTANEOUS at 22:21

## 2020-05-24 RX ADMIN — ENOXAPARIN SODIUM 40 MG: 40 INJECTION SUBCUTANEOUS at 13:09

## 2020-05-24 RX ADMIN — INSULIN ASPART 1 UNITS: 100 INJECTION, SOLUTION INTRAVENOUS; SUBCUTANEOUS at 13:05

## 2020-05-24 NOTE — NURSING END OF SHIFT
Nursing End of Shift Summary:    Patient: Del Mckeon  MRN: [id number]  : 1960, Age: 59 y.o.    Location: [address] [place] [address]    Nursing Goals  Clinical Goals for the Shift: neurochecks, VS, safety    Narrative Summary of Progress Toward Clinical Goals:  Pt neuro status remains unchanged from start of shift. Pt is now c/o sinus pressure. PRN saline spray given for congestion. Pt ambulated several times today around the pod with assistance.     Barriers to Goals/Nursing Concerns:  Yes- pending rehab placement    New Patient or Family Concerns/Issues:  no    Shift Summary:      Significant Events & Communications to Providers (last 12 hours)      Last 5 Values    No documentation.              Oxygen Usage (last 12 hours)      Last 5 Values    No documentation.              Mobility (last 12 hours)      Last 5 Values     Row Name 20 0930 20 1234 20 1549             Mobility    Activity  --  Ambulate in carias  Ambulate in carias;Ambulate in room      Level of Assistance  --  Standby assist, set-up cues, supervision of patient - no hands on  Contact guard assist, steadying assist      Distance Ambulated (meters)  --  --  -- around '10s pod, down carias, around '00s pod & back.      Anti-Embolism Intervention  Refused  --  --          Urethral Catheter    Active Urethral Catheter     None            Active Lines    Active Central venous catheter / Peripherally inserted central catheter / Implantable Port / Hemodialysis catheter / Midline Catheter     None              Infusing Medications   Medication Dose Last Rate     PRN Medications   Medication Dose Last Dose   • sodium chloride  2 spray     • hydrALAZINE  10 mg 10 mg at 20 0433   • ondansetron  4 mg     • magnesium hydroxide  30 mL       _________________________  Juliette Bennett RN  20 6:11 PM

## 2020-05-24 NOTE — NURSING END OF SHIFT
"Nursing End of Shift Summary:    Patient: Del Mckeon  MRN: [id number]  : 1960, Age: 59 y.o.    Location: [address] [place] [address]    Nursing Goals  Clinical Goals for the Shift: monitor neuros and signs of stroke, safety and comfort    Narrative Summary of Progress Toward Clinical Goals:  Patient maintained a calm shift. Said he is having headache around 0130 but denies chest pain. On call CNP notified. Tylenol ordered and given with good effect as patient states \"my headache is gone\" in the morning. Neuro status= no change from baseline.    Barriers to Goals/Nursing Concerns:  No    New Patient or Family Concerns/Issues:  No    Shift Summary:      Significant Events & Communications to Providers (last 12 hours)      Last 5 Values     Row Name 20 014                   Provider Notification    Reason for Communication  Evaluate headache  -UO        Provider Name  Krysta BERNARD          User Key  (r) = Recorded By, (t) = Taken By, (c) = Cosigned By    Initials Name    BLAKE Cheng RN               Oxygen Usage (last 12 hours)      Last 5 Values     Row Name 20 2100                   Oxygen Weaning Trial by Nursing    Is Patient on Room Air OR on the Same Amount of O2 as at Home?  Yes                   Mobility (last 12 hours)      Last 5 Values     Row Name 20 2100                   Mobility    Activity  Bathroom privileges        Level of Assistance  Contact guard assist, steadying assist        Distance Ambulated (meters)  6 Meters        Anti-Embolism Devices  Bilateral;AE calf pump        Anti-Embolism Intervention  Refused            Urethral Catheter    Active Urethral Catheter     None            Active Lines    Active Central venous catheter / Peripherally inserted central catheter / Implantable Port / Hemodialysis catheter / Midline Catheter     None              Infusing Medications   Medication Dose Last Rate     PRN Medications   Medication Dose Last Dose   • sodium chloride  2 spray   "   • hydrALAZINE  10 mg 10 mg at 05/23/20 0433   • ondansetron  4 mg     • magnesium hydroxide  30 mL       _________________________  Jose Cheng RN  05/24/20 6:45 AM

## 2020-05-24 NOTE — PROGRESS NOTES
[address] [address]  Daily Progress Note  Patient name: Del Mckeon  MRN: [id number]   LOS: 3 days     Subjective   Patient resting comfortably.  No change in vision.  No headache.  No neurologic symptoms.  No cardiopulmonary complaint.  Objective   Vitals:Temp:  [36.4 °C (97.6 °F)-37.1 °C (98.7 °F)] 36.4 °C (97.6 °F)  Heart Rate:  [] 76  Resp:  [16-18] 18  BP: (148-194)/() 158/109  Physical Exam:  HEENT: Pupils reactive to light and accommodation, extraocular muscle movements intact, no nystagmus, slight left facial droop  Neck: No neck vein distention, no nuchal rigidity  Lungs: Clear to auscultation bilaterally, good respiratory effort  Heart: Regular, non-tachycardic, no S3  Abdomen: Soft with positive bowel sounds, nontender  Genitourinary: Deferred  Extremities: 4/5 strength left upper extremity, no edema, no joint effusion  Skin: No rash, no jaundice, moist  Neurologic: Alert, oriented x3, deep tendon reflexes intact, no sensory deficit    Review of Systems:  Negative  Admission on 05/21/2020   Component Date Value Ref Range Status   • WBC 05/21/2020 8.1  3.7 - 9.6 10*3/uL Final   • RBC 05/21/2020 4.65  4.10 - 5.80 10*6/µL Final   • Hemoglobin 05/21/2020 14.6  13.2 - 17.2 g/dL Final   • Hematocrit 05/21/2020 42.1  38.0 - 50.0 % Final   • MCV 05/21/2020 90.4  82.0 - 97.0 fL Final   • MCH 05/21/2020 31.4  29.0 - 34.0 pg Final   • MCHC 05/21/2020 34.8  32.0 - 36.0 g/dL Final   • RDW 05/21/2020 14.2  11.5 - 15.0 % Final   • Platelets 05/21/2020 205  130 - 350 10*3/uL Final   • MPV 05/21/2020 7.9  6.9 - 10.8 fL Final   • Neutrophils% 05/21/2020 63  46 - 70 % Final   • Lymphocytes% 05/21/2020 29  15 - 47 % Final   • Monocytes% 05/21/2020 6  5 - 13 % Final   • Eosinophils% 05/21/2020 2  0 - 3 % Final   • Basophils% 05/21/2020 1  0 - 2 % Final   • Neutrophils Absolute 05/21/2020 5.10  10*3/uL Final   • Lymphocytes Absolute 05/21/2020 2.30  10*3/uL Final    • Monocytes Absolute 05/21/2020 0.50  10*3/uL Final   • Eosinophils Absolute 05/21/2020 0.10  10*3/uL Final   • Basophils Absolute 05/21/2020 0.10  10*3/uL Final   • Sodium 05/21/2020 141  135 - 145 mmol/L Final   • Potassium 05/21/2020 3.5  3.5 - 5.1 mmol/L Final   • Chloride 05/21/2020 105  98 - 107 mmol/L Final   • CO2 05/21/2020 24  21 - 32 mmol/L Final   • Anion Gap 05/21/2020 12* 3 - 11 mmol/L Final   • BUN 05/21/2020 13  7 - 25 mg/dL Final   • Creatinine 05/21/2020 0.70  0.70 - 1.30 mg/dL Final   • Glucose 05/21/2020 117* 70 - 105 mg/dL Final   • Calcium 05/21/2020 8.9  8.6 - 10.3 mg/dL Final   • AST 05/21/2020 18  <40 U/L Final   • ALT (SGPT) 05/21/2020 17  7 - 52 U/L Final   • Alkaline Phosphatase 05/21/2020 55  45 - 115 U/L Final   • Total Protein 05/21/2020 6.4  6.0 - 8.3 g/dL Final   • Albumin 05/21/2020 3.9  3.5 - 5.3 g/dL Final   • Total Bilirubin 05/21/2020 0.77  0.20 - 1.40 mg/dL Final   • eGFR 05/21/2020 103  >60 mL/min/1.73m*2 Final   • Corrected Calcium 05/21/2020 9.0  8.6 - 10.3 mg/dL Final   • Magnesium 05/21/2020 1.6* 1.8 - 2.4 mg/dL Final   • Phosphorus 05/21/2020 3.2  2.5 - 4.9 mg/dL Final   • hsTnI 0 Hour 05/21/2020 15.5  <=19.8 pg/mL Final   • RBC, Urine 05/21/2020 50-75* None seen, 0-2, Negative /HPF Final   • WBC, Urine 05/21/2020 5-9* 0 - 4 /HPF Final   • Squamous Epithelial, Urine 05/21/2020 Negative  None Seen-9 /HPF Final   • Bacteria, Urine 05/21/2020 None seen  None seen, Few /HPF Final   • Calcium Oxalate Crystals, Urine 05/21/2020 Present* None Seen /HPF Final   • Hyaline Casts, Urine 05/21/2020 30-49* 0 - 4 /LPF Final   • Color, Urine 05/21/2020 Yellow  Yellow Final   • Clarity, Urine 05/21/2020 Slightly Cloudy* Clear Final   • Specific Gravity, Urine 05/21/2020 1.016  1.003 - 1.030 Final   • Leukocytes, Urine 05/21/2020 Negative  Negative Final   • Nitrite, Urine 05/21/2020 Negative  Negative Final   • Protein, Urine 05/21/2020 >=500* Negative mg/dL Final   • Ketones, Urine  05/21/2020 80 * Negative mg/dL Final   • Urobilinogen, Urine 05/21/2020 2.0* <2.0 E.U./dL Final   • Bilirubin, Urine 05/21/2020 Negative  Negative Final   • Blood, Urine 05/21/2020 Moderate* Negative Final   • Glucose, Urine 05/21/2020 Negative  Negative mg/dL Final   • pH, Urine 05/21/2020 5.0  5.0 - 8.0 PH Final   • POC Glucose 05/21/2020 99  70 - 105 mg/dL Final   • POC Glucose 05/21/2020 95  70 - 105 mg/dL Final   • Hemoglobin A1C 05/22/2020 5.8  4.0 - 6.0 % Final   • Estimated Average Glucose 05/22/2020 119.8  mg/dL Final   • Triglycerides 05/22/2020 161* <=149 mg/dL Final   • Cholesterol 05/22/2020 217* 0 - 199 mg/dL Final   • HDL 05/22/2020 30* >=40 mg/dL Final   • LDL Calculated 05/22/2020 155* 20 - 99 mg/dL Final   • Fasting? 05/22/2020 Yes   Final   • POC Glucose 05/21/2020 106* 70 - 105 mg/dL Final   • POC Glucose 05/22/2020 116* 70 - 105 mg/dL Final   • LV Area-diastolic Apical Two Chamb* 05/22/2020 42.8  cm2 Final   • LV Diastolic Volume 05/22/2020 149  cm3 Final   • LV Systolic Volume 05/22/2020 73  cm3 Final   • IVS 05/22/2020 0.9  0.6 - 1.1 cm Final   • LVIDD 05/22/2020 5.2  5.54 - 7.69 cm Final   • LVIDS 05/22/2020 3.8  3.24 - 4.91 cm Final   • PW 05/22/2020 1.0  0.6 - 1.1 cm Final   • Biplane EF 05/22/2020 51  % Final   • LA size 05/22/2020 4.74  cm Final   • RVIDD 05/22/2020 5.1  cm Final   • RV base 05/22/2020 4.0  cm Final   • Tricuspid annular plane systolic e* 05/22/2020 3.0  cm Final   • RA area 05/22/2020 20.1  cm2 Final   • BSA 05/22/2020 2.12  m2 Final   • Echo EF Estimated 05/22/2020 52  % Final   • ZLVIDD 05/22/2020 -2.27   Final   • ZLVIDS 05/22/2020 -0.38   Final   • POC Glucose 05/22/2020 169* 70 - 105 mg/dL Final   • POC Glucose 05/22/2020 162* 70 - 105 mg/dL Final   • POC Glucose 05/22/2020 174* 70 - 105 mg/dL Final   • POC Glucose 05/23/2020 199* 70 - 105 mg/dL Final   • POC Glucose 05/23/2020 189* 70 - 105 mg/dL Final   • POC Glucose 05/23/2020 168* 70 - 105 mg/dL Final   • POC  Glucose 05/24/2020 165* 70 - 105 mg/dL Final   • POC Glucose 05/24/2020 161* 70 - 105 mg/dL Final        Assessment/Plan   Acute pontine stroke, inferior/anterior  Hypertension, essential  Type 2 diabetes mellitus              Blood sugars reviewed  Obstructive sleep apnea history     (Patient doing well.  Patient will undergo speech assessment/video.  Continue with physical therapy, Occupational Therapy and speech therapy.  Patient tolerating antiplatelet therapy.  Based on therapy notes will determine if patient will require further rehab assistance versus home health with therapies.) 5/22/20     (Therapy notes reviewed.  Patient would be a good candidate for rehab assistance.  Discussed with him and also discussed with care management.) 5/23/20    Continue with current treatment plan.  Rehab evaluation.  Diovan discontinued yesterday secondary to patient's intolerance and lisinopril will be initiated today.  Continue to monitor blood pressure.     Plan  Physical therapy  Occupational Therapy  Speech therapy  Disposition planning, rehab assessment    Electronically signed by: Derick Berry MD  5/24/2020  8:49 AM

## 2020-05-24 NOTE — INTERDISCIPLINARY/THERAPY
05/24/20 1323   PT Last Visit   PT Received On 05/24/20   Pain Assessment   Pain Assessment No/denies pain   General   Chart Reviewed Yes   Therapy Treatment Diagnosis CVA w/ L weakness   PT Treatment Duration (Min) 16 Minutes   Is this a Co-Treatment? No   Additional Pertinent History HTN, DM2, cataracts   Family/Caregiver Present No   Precautions   Reinforced Precautions Yes   Other Precautions Fall risk   Subjective Comments   Subjective Comments RN ok'd PT,pt agreeable. Gait belt donned for all mobility. Pt left resting at EOB w/ call light in reach, needs met, alarm on   Bed Mobility   Bed Mobility Not assessed   Transfers   Transfer Assessed   Transfer 1   Transfer From 1 Bed;Sit   Transfer Type 1 To and from   Transfer to 1 Stand   Technique 1 Sit to stand;Stand to sit   Transfer Device 1 Transfer belt;Front wheeled walker   Level of Assistance 1 Standby assistance   Trials/Comments 1 VCs for safe UE placement   Ambulation   Ambulation Assessed   Ambulation 1   Surface 1 Level surface;Smooth   Device 1 Front wheeled walker;Gait belt   Assistance 1 Contact guard assist x 1   Quality of Gait 1 Slowed pace, mildly unsteady but no LOB.    Comments/Distance 1 100m   Balance   Balance Impaired   Static Standing Balance   Static Standing-Balance Surface Firm   Static Standing-Balance Support Right upper extremity supported   Static Standing-Level of Assistance Contact guard x 1   Static Standing-Comment/# of Minutes Mod tandem stance B x10s   Dynamic Standing Balance   Dynamic Standing-Balance Surface Firm   Dynamic Standing-Balance Support No upper extremity supported   Dynamic Standing-Level of Assistance Contact guard x 1   Dynamic Standing-Comments Marching in place x10 [place]   Neuromuscular Re-education/Vestibular Rehabilitation   Neuromuscular Re-education 20m amb w/ M/L and V/H head turns, CGA. Increased veering noted w/ M/L turns.   Activity Tolerance   Activity Tolerance Comments Tolerating fair    Assessment   Rehab Potential Good   Progress Progressing toward goals   Problem List Decreased endurance;Impaired balance;Decreased mobility;Decreased safety awareness   Barriers to Discharge Decreased caregiver support   Assessment Comment Del able to progress amb. distance today. He continues to demonstrate some decreased safety awareness w/ neglect of FWW. Higher level balance challenges continue to be difficult.   Recommendation   Recommendations for Therapy Continue skilled therapy   Equipment Recommended Front wheeled walker   DME Justification (Front Wheel Walker) Patient has a mobility limitation that considerably impairs the ability to participate in [facility] in the home. The patient is able to safely use and the mobility deficit is resolved by the use of a front wheeled walker.   Plan   Treatment Interventions Bed mobility;Functional transfer training;Gait training;Balance training;Endurance training;Therapeutic activities;Therapeutic exercises   PT Frequency 5-7x/wk   Plan Comment 1 assist: bed mobility, safety w/ transfers, 100m+ amb, higher level balance, LLE strengthening   Date POC Due 05/29/20

## 2020-05-24 NOTE — PLAN OF CARE
Problem: Knowledge Deficit  Goal: Patient/family/caregiver demonstrates understanding of disease process, treatment plan, medications, and discharge instructions  Description: INTERVENTIONS:   1. Complete learning assessment and assess knowledge base  2. Provide teaching at level of understanding   3. Provide teaching via preferred learning methods  Outcome: Progressing     Problem: Potential for Compromised Skin Integrity  Goal: Skin Integrity is Maintained or Improved  Description: INTERVENTIONS:  1. Assess and monitor skin integrity  2. Collaborate with interdisciplinary team and initiate plans and interventions as needed  3. Alternate a full bath with partial baths for elderly   4. Monitor patient's hygiene practices   5. Collaborate with wound, ostomy, and continence nurse  Outcome: Progressing  Goal: Nutritional status is improving  Description: INTERVENTIONS:  1. Monitor and assess patient for malnutrition (ex- brittle hair, bruises, dry skin, pale skin and conjunctiva, muscle wasting, smooth red tongue, and disorientation)  2. Monitor patient's weight and dietary intake as ordered or per policy  3. Determine patient's food preferences and provide high-protein, high-caloric foods as appropriate  4. Assist patient with eating   5. Allow adequate time for meals   6. Encourage patient to take dietary supplement as ordered   7. Collaborate with dietitian  8. Include patient/family/caregiver in decisions related to nutrition  Outcome: Progressing  Goal: MOBILITY IS MAINTAINED OR IMPROVED  Description: INTERVENTIONS  1. Collaborate with interdisciplinary team and initiate plan and interventions as ordered (PT/OT)  2. Encourage ambulation  3. Up to chair for meals  4. Monitor for signs of deconditioning  Outcome: Progressing     Problem: Urinary Incontinence  Goal: Perineal skin integrity is maintained or improved  Description: INTERVENTIONS:  1. Assess genitourinary system, perineal skin, labs (urinalysis), and  history of incontinence to include past management, aggravating, and alleviating factors  2. Collaborate with interdisciplinary team including wound, ostomy, and continence nurse and initiate plans and interventions as needed  4. Consider urine containment device  5. Apply skin protectant   6. Develop skin care regimen  7. Provide privacy when changing patient's incontinence device to maintain their dignity  Outcome: Progressing     Problem: Safety Adult - Fall  Goal: Free from fall injury  Description: INTERVENTIONS:    Inpatient - Please reference Cares/Safety Flowsheet under [place] Fall Risk for interventions.  Pediatrics - Please reference Peds Daily Cares/Safety Flowsheet under [place] Pediatric Fall Assessment Fall Bundle for interventions  LD/OB - Please reference OB Shift Screening Flowsheet under OB Fall Risk for interventions.  Outcome: Progressing     Problem: Pain - Adult  Goal: Verbalizes/displays adequate comfort level or baseline comfort level  Description: INTERVENTIONS:  1. Encourage patient to monitor pain and request interventions  2. Assess pain using the appropriate pain scale  3. Administer analgesics based on type and severity of pain and evaluate response  4. Educate/Implement non-pharmacological measures as appropriate and evaluate response  5. Consider cultural, developmental and social influences on pain and pain management  6. Notify Provider if interventions unsuccessful or patient reports new pain  Outcome: Progressing     Problem: Infection - Adult  Goal: Absence of infection during hospitalization  Description: INTERVENTIONS:  1. Assess and monitor for signs and symptoms of infection  2. Monitor lab/diagnostic results  3. Monitor all insertion sites/wounds/incisions  4. Monitor secretions for changes in amount and color  5. Administer medications as ordered  6. Educate and encourage patient and family to use good hand hygiene technique  7. Identify and educate in appropriate isolation  precautions for identified infection/condition  Outcome: Progressing     Problem: Safety Adult  Goal: Patient will remain safe during hospitalization  Description: INTERVENTIONS    1. Assess patient for fall risk and implement interventions if needed  2. Use safe transport techniques  3. Assess patient using the appropriate Parveen skin assessment scale  4. Assess patient for risk of aspiration  5. Assess patient for risk of elopement  6. Assess patient for risk of suicide  Outcome: Progressing     Problem: Daily Care  Goal: Daily care needs are met  Description: INTERVENTIONS:   1. Assess and monitor skin integrity  2. Identify patients at risk for skin breakdown on admission and per policy  3. Assess and monitor ability to perform self care and identify potential discharge needs  4. Assess skin integrity/risk for skin breakdown  5. Assist patient with activities of daily living as needed  6. Encourage independent activity per ability   7. Provide mouth care   8. Include patient/family/caregiver in decisions related to daily care   Outcome: Progressing     Problem: Discharge Barriers  Goal: Patient's discharge needs are met  Description: INTERVENTIONS:  1. Assess patient for self-management skills  2. Encourage participation in management  3. Identify potential discharge barriers on admission and throughout hospital stay  4. Involve patient/family/caregiver in discharge planning process  5. Collaborate with case management/ for discharge needs  Outcome: Progressing     Problem: Neurosensory - Adult  Goal: Achieves stable or improved neurological status  Description: INTERVENTIONS  1. Assess for and report changes in neurological status  2. Initiate measures to prevent increased intracranial pressure  3. Maintain blood pressure and fluid volume within ordered parameters to optimize cerebral perfusion and minimize risk of hemorrhage  4. Monitor temperature, glucose, and sodium. Initiate appropriate  interventions as ordered  Outcome: Progressing  Goal: Achieves maximal functionality and self care  Description: INTERVENTIONS  1. Monitor swallowing and airway patency with patient fatigue and changes in neurological status  2. Encourage and assist patient to increase activity and self care with guidance from PT/OT  3. Encourage visually impaired, hearing impaired and aphasic patients to use assistive/communication devices  Outcome: Progressing     Problem: Cardiovascular - Adult  Goal: Maintains optimal cardiac output and hemodynamic stability  Description: INTERVENTIONS:  1. Monitor vital signs and rhythm  2. Monitor for hypotension and other signs of decreased cardiac output  3. Administer and titrate ordered vasoactive medications to optimize hemodynamic stability  4. Monitor for fluid overload/dehydration, weight gain, shortness of breath and activity intolerance  5. Monitor arterial and/or venous puncture sites for bleeding and/or hematoma  6. Assess quality of pulses, capillary refill, edema, sensation, skin color and temperature  7. Assess for signs of decreased coronary artery perfusion - ex. angina  Outcome: Progressing  Goal: Absence of cardiac dysrhythmias or at baseline  Description: INTERVENTIONS:  1. Continuous cardiac monitoring, monitor vital signs, obtain 12 lead EKG if indicated  2. Administer antiarrhythmic and heart rate control medications as ordered  3. Initiate emergency measures for life threatening arrhythmias  4. Monitor electrolytes and administer replacement therapy as ordered  Outcome: Progressing     Problem: Respiratory - Adult  Goal: Achieves optimal ventilation and oxygenation  Description: INTERVENTIONS:  1. Assess for changes in respiratory status  2. Assess for changes in mentation and behavior  3. Position to facilitate oxygenation and minimize respiratory effort  4. Oxygen supplementation based on oxygen saturation or ABGs  5. Assess patient's ability to cough effectively  6.  Encourage broncho-pulmonary hygiene including cough, deep breathe  7. Assess the need for suctioning   8. Assess and instruct to report SOB or any respiratory difficulty  9. Respiratory Therapy support as indicated, including medications and treatment.  Outcome: Progressing  Goal: Achieves optimal ventilation and oxygenation with noninvasive CPAP/BiLEVEL support  Description: INTERVENTIONS:  1. Provide education to patient/family about rationale and expected outcomes associated with therapy  2. Position patient to facilitate optimal ventilation/oxygenation status and minimize respiratory effort  3. Position patient to reduce aspiration risk, elevate head of bed at least 35 degrees or higher, as applicable  4. Assess effectiveness of therapy on ventilation/oxygenation status based on oxygen saturation and/or arterial blood gases, as indicated  5. Assess patient for changes in respiratory and physiological status  6. Auscultate breath sounds and assess chest excursion, as indicated  7. Assess patient for changes in mentation and behavior  8. Routinely monitor equipment for proper performance and settings  9. Assess and monitor skin condition, in relationship to the respiratory interface  10. Assure equipment alarm volume is adequate for the patient's environment  11. Immediately respond to equipment alarm, to assess patient and/or cause for alarm  12. Follow universal infection control/hospital policy(ies)/standards  Outcome: Progressing     Problem: Gastrointestinal - Adult  Goal: Maintains or returns to baseline digestive function  Description: INTERVENTIONS:  1. Assess bowel function  2. Ensure adequate hydration  3. Administer ordered medications as needed  4. Encourage mobilization and activity  5. Nutrition consult as indicated  6. Assess hydration and nutritional status  7. Assess characteristics and frequency of stool  8. Monitor for metabolic panel imbalances  9. Assess for treatment effectiveness  Outcome:  Progressing     Problem: Metabolic/Fluid and Electrolytes - Adult  Goal: Maintain Optimal Renal Function and Hemodynamic Stability  Description: INTERVENTIONS:  1. Monitor labs and assess for signs and symptoms of volume excess or deficit  2. Monitor intake, output and patient weight  3. Monitor urine specific gravity, serum osmolarity and serum sodium as indicated or ordered  4. Monitor response to interventions for patient's volume status, including labs, urine output, blood pressure (other measures as available)  5. Encourage oral intake as appropriate  6. Instruct patient on fluid and nutrition restrictions as appropriate  Outcome: Progressing  Goal: Glucose maintained within prescribed range  Description: INTERVENTIONS:  1. Monitor blood glucose as ordered  2. Assess for signs and symptoms of hyperglycemia and hypoglycemia  3. Administer ordered medications to maintain glucose within target range  4. Assess barriers to adequate nutritional intake and initiate nutrition consult as needed  5. Assess baseline knowledge and provide education as indicated  6. Monitor exercise as may reduce the requirements for insulin  Outcome: Progressing     Problem: Skin/Tissue Integrity - Adult  Goal: Skin integrity remains intact  Description: INTERVENTIONS  1. Assess and document risk factors for pressure ulcer development  2. Assess and document skin integrity  3. Monitor for areas of redness and/or skin breakdown  4. Initiate pressure ulcer prevention measures as indicated  Outcome: Progressing     Problem: Hematologic - Adult  Goal: Maintains hematologic stability  Description: INTERVENTIONS  1. Assess for signs and symptoms of bleeding or hemorrhage  2. Monitor labs  3. Administer supportive blood products/factors as ordered and appropriate  4. Administer medications as ordered  5. Initiate bleeding precautions as indicated  6. Educate patient/family to report signs/symptoms of bleeding  Outcome: Progressing     Problem:  Musculoskeletal - Adult  Goal: Return mobility to safest level of function  Description: INTERVENTIONS:  1. Assess patient stability and activity tolerance for standing, transferring and ambulating w/ or w/o assistive devices  2. Assist with transfers and ambulation using safe practices  3. Ensure adequate protection for wounds/incisions during mobilization  4. Obtain PT/OT and other consults as needed  5. Apply Continuous Passive Motion per order to increase flexion toward goal  6. Instruct patient/family in ordered activity level  Outcome: Progressing  Goal: Return ADL status to a safe level of function  Description: INTERVENTIONS:  1. Assess patient's ADL deficits and provide assistive devices as needed  2. Obtain PT/OT consults as needed  3. Assist and instruct patient to increase activity and self care  Outcome: Progressing     Problem: Dressings Lower Extremities  Goal: LTG - Patient will utilize adaptive techniques/equipment to dress lower body  Description: Setup A utilizing AE  Outcome: Progressing     Problem: Toileting  Goal: LTG - Patient will complete daily toileting tasks  Description: Mod (I) utilizing AD  Outcome: Progressing     Problem: Mobility  Goal: LTG - Patient will ambulate household distance  Description: SBA w/ FWW  Outcome: Progressing     Problem: TRANSFERS  Goal: STG - Patient will transfer to and from sit to stand  Description: SBA w/ FWW  Outcome: Progressing     Problem: Dysphagia  Goal: LTG-Patient will tolerate the safest and least restrictive diet without overt signs and/or symptoms of aspiration  Outcome: Progressing  Goal: STG - Patient will tolerate recommended food and/or liquid consistencies without clinical signs and/or symptoms of aspirations  Outcome: Progressing  Goal: STG - Patient will participate in instrumental assessment of swallowing as appropriate  Outcome: Progressing

## 2020-05-25 LAB
GLUCOSE BLDC GLUCOMTR-MCNC: 137 MG/DL (ref 70–105)
GLUCOSE BLDC GLUCOMTR-MCNC: 140 MG/DL (ref 70–105)
GLUCOSE BLDC GLUCOMTR-MCNC: 147 MG/DL (ref 70–105)
GLUCOSE BLDC GLUCOMTR-MCNC: 153 MG/DL (ref 70–105)
GLUCOSE BLDC GLUCOMTR-MCNC: 156 MG/DL (ref 70–105)

## 2020-05-25 PROCEDURE — 6360000200 HC RX 636 W HCPCS (ALT 250 FOR IP): Performed by: INTERNAL MEDICINE

## 2020-05-25 PROCEDURE — (BLANK) HC ROOM PRIVATE

## 2020-05-25 PROCEDURE — 99232 SBSQ HOSP IP/OBS MODERATE 35: CPT | Performed by: HOSPITALIST

## 2020-05-25 PROCEDURE — 6370000100 HC RX 637 (ALT 250 FOR IP): Performed by: PSYCHIATRY & NEUROLOGY

## 2020-05-25 PROCEDURE — 1110000100 HC ROOM PRIVATE W TELE

## 2020-05-25 PROCEDURE — 2590000100 HC RX 259: Performed by: HOSPITALIST

## 2020-05-25 PROCEDURE — 6370000100 HC RX 637 (ALT 250 FOR IP): Performed by: HOSPITALIST

## 2020-05-25 PROCEDURE — 82947 ASSAY GLUCOSE BLOOD QUANT: CPT | Mod: QW

## 2020-05-25 PROCEDURE — 6370000100 HC RX 637 (ALT 250 FOR IP): Performed by: INTERNAL MEDICINE

## 2020-05-25 RX ORDER — ACETAMINOPHEN 500 MG
500 TABLET ORAL EVERY 6 HOURS PRN
Status: DISCONTINUED | OUTPATIENT
Start: 2020-05-25 | End: 2020-05-27

## 2020-05-25 RX ADMIN — MELATONIN 1000 UNITS: at 07:49

## 2020-05-25 RX ADMIN — PSYLLIUM HUSK 1 PACKET: 3.4 POWDER ORAL at 09:30

## 2020-05-25 RX ADMIN — ROSUVASTATIN CALCIUM 40 MG: 10 TABLET, FILM COATED ORAL at 21:15

## 2020-05-25 RX ADMIN — CLOPIDOGREL BISULFATE 75 MG: 75 TABLET ORAL at 07:50

## 2020-05-25 RX ADMIN — ASPIRIN 81 MG: 81 TABLET ORAL at 07:48

## 2020-05-25 RX ADMIN — ENOXAPARIN SODIUM 40 MG: 40 INJECTION SUBCUTANEOUS at 13:02

## 2020-05-25 RX ADMIN — Medication 500 MG: at 13:02

## 2020-05-25 RX ADMIN — INSULIN ASPART 1 UNITS: 100 INJECTION, SOLUTION INTRAVENOUS; SUBCUTANEOUS at 07:44

## 2020-05-25 RX ADMIN — INSULIN DETEMIR 25 UNITS: 100 INJECTION, SOLUTION SUBCUTANEOUS at 22:04

## 2020-05-25 RX ADMIN — LISINOPRIL 20 MG: 20 TABLET ORAL at 07:51

## 2020-05-25 NOTE — PLAN OF CARE
Problem: Knowledge Deficit  Goal: Patient/family/caregiver demonstrates understanding of disease process, treatment plan, medications, and discharge instructions  Description: INTERVENTIONS:   1. Complete learning assessment and assess knowledge base  2. Provide teaching at level of understanding   3. Provide teaching via preferred learning methods  Outcome: Progressing  Flowsheets (Taken 5/24/2020 2356)  Patient/family/caregiver demonstrates understanding of disease process, treatment plan, medications, and discharge instructions:   Complete learning assessment and assess knowledge base   Provide teaching via preferred learning methods   Provide teaching at level of understanding     Problem: Potential for Compromised Skin Integrity  Goal: Skin Integrity is Maintained or Improved  Description: INTERVENTIONS:  1. Assess and monitor skin integrity  2. Collaborate with interdisciplinary team and initiate plans and interventions as needed  3. Alternate a full bath with partial baths for elderly   4. Monitor patient's hygiene practices   5. Collaborate with wound, ostomy, and continence nurse  Outcome: Progressing  Flowsheets (Taken 5/24/2020 2356)  Skin integrity is maintained or improved:   Assess and monitor skin integrity   Alternate a full bath with partial baths for elderly   Collaborate with wound, ostomy, and continence nurse   Collaborate with interdisciplinary team and initiate plans and interventions as needed   Monitor patient's hygiene practices  Goal: Nutritional status is improving  Description: INTERVENTIONS:  1. Monitor and assess patient for malnutrition (ex- brittle hair, bruises, dry skin, pale skin and conjunctiva, muscle wasting, smooth red tongue, and disorientation)  2. Monitor patient's weight and dietary intake as ordered or per policy  3. Determine patient's food preferences and provide high-protein, high-caloric foods as appropriate  4. Assist patient with eating   5. Allow adequate time  for meals   6. Encourage patient to take dietary supplement as ordered   7. Collaborate with dietitian  8. Include patient/family/caregiver in decisions related to nutrition  Outcome: Progressing  Flowsheets (Taken 5/24/2020 2356)  Nutritional status is improving:   Monitor and assess patient for malnutrition (ex- brittle hair, bruises, dry skin, pale skin and conjunctiva, muscle wasting, smooth red tongue, and disorientation)   Monitor patient's weight and dietary intake as ordered or per policy   Determine patient's food preferences and provide high-protein, high-caloric foods as appropriate   Assist patient with eating   Allow adequate time for meals   Encourage patient to take dietary supplement as ordered   Collaborate with dietitian   Include patient/family/caregiver in decisions related to nutrition  Goal: MOBILITY IS MAINTAINED OR IMPROVED  Description: INTERVENTIONS  1. Collaborate with interdisciplinary team and initiate plan and interventions as ordered (PT/OT)  2. Encourage ambulation  3. Up to chair for meals  4. Monitor for signs of deconditioning  Outcome: Progressing  Flowsheets (Taken 5/24/2020 2356)  Mobility is Maintained or Improved:   Monitor for signs of deconditioning   Up to chair for meals   Encourage ambulation   Collaborate with interdisciplinary team and initiate plan and interventions as ordered (PT/OT)     Problem: Urinary Incontinence  Goal: Perineal skin integrity is maintained or improved  Description: INTERVENTIONS:  1. Assess genitourinary system, perineal skin, labs (urinalysis), and history of incontinence to include past management, aggravating, and alleviating factors  2. Collaborate with interdisciplinary team including wound, ostomy, and continence nurse and initiate plans and interventions as needed  4. Consider urine containment device  5. Apply skin protectant   6. Develop skin care regimen  7. Provide privacy when changing patient's incontinence device to maintain their  dignity  Outcome: Progressing  Flowsheets (Taken 5/24/2020 3150)  Perineal skin integrity is maintained or improved:   Provide privacy when changing patient's incontinence device to maintain their dignity   Develop skin care regimen   Apply skin protectant   Consider urine containment device   Collaborate with interdisciplinary team including wound, ostomy, and continence nurse and initiate plans and interventions as needed   Assess genitourinary system, perineal skin, labs (urinalysis), and history of incontinence to include past management, aggravating, and alleviating factors     Problem: Safety Adult - Fall  Goal: Free from fall injury  Description: INTERVENTIONS:    Inpatient - Please reference Cares/Safety Flowsheet under [place] Fall Risk for interventions.  Pediatrics - Please reference Peds Daily Cares/Safety Flowsheet under [place] Pediatric Fall Assessment Fall Bundle for interventions  LD/OB - Please reference OB Shift Screening Flowsheet under OB Fall Risk for interventions.  Outcome: Progressing     Problem: Pain - Adult  Goal: Verbalizes/displays adequate comfort level or baseline comfort level  Description: INTERVENTIONS:  1. Encourage patient to monitor pain and request interventions  2. Assess pain using the appropriate pain scale  3. Administer analgesics based on type and severity of pain and evaluate response  4. Educate/Implement non-pharmacological measures as appropriate and evaluate response  5. Consider cultural, developmental and social influences on pain and pain management  6. Notify Provider if interventions unsuccessful or patient reports new pain  Outcome: Progressing  Flowsheets (Taken 5/24/2020 9987)  Verbalizes/displays adequate comfort level or baseline comfort level:   Encourage patient to monitor pain and request interventions   Assess pain using the appropriate pain scale   Administer analgesics based on type and severity of pain and evaluate response   Educate/Implement  non-pharmacological measures as appropriate and evaluate response   Consider cultural, developmental and social influences on pain and pain management   Notify Provider if interventions unsuccessful or patient reports new pain     Problem: Infection - Adult  Goal: Absence of infection during hospitalization  Description: INTERVENTIONS:  1. Assess and monitor for signs and symptoms of infection  2. Monitor lab/diagnostic results  3. Monitor all insertion sites/wounds/incisions  4. Monitor secretions for changes in amount and color  5. Administer medications as ordered  6. Educate and encourage patient and family to use good hand hygiene technique  7. Identify and educate in appropriate isolation precautions for identified infection/condition  Outcome: Progressing  Flowsheets (Taken 5/24/2020 6127)  Absence of infection during hospitalization:   Assess and monitor for signs and symptoms of infection   Monitor lab/diagnostic results   Monitor secretions for changes in amount and colo   Monitor all insertion sites/wounds/incisions   Administer medications as ordered   Educate and encourage patient and family to use good hand hygiene technique   Identify and educate in appropriate isolation precautions for identified infection/condition     Problem: Safety Adult  Goal: Patient will remain safe during hospitalization  Description: INTERVENTIONS    1. Assess patient for fall risk and implement interventions if needed  2. Use safe transport techniques  3. Assess patient using the appropriate Parveen skin assessment scale  4. Assess patient for risk of aspiration  5. Assess patient for risk of elopement  6. Assess patient for risk of suicide  Outcome: Progressing  Flowsheets (Taken 5/24/2020 8575)  Patient will remain safe durning hospitalization:   Assess patient for Fall Risk   Assess Patient for Aspirations   Use safe transport   Assess Patient for Risk of Elopement   Assess Patient using the appropriate Parveen scale    Assess Patient for Risk of Suicide     Problem: Daily Care  Goal: Daily care needs are met  Description: INTERVENTIONS:   1. Assess and monitor skin integrity  2. Identify patients at risk for skin breakdown on admission and per policy  3. Assess and monitor ability to perform self care and identify potential discharge needs  4. Assess skin integrity/risk for skin breakdown  5. Assist patient with activities of daily living as needed  6. Encourage independent activity per ability   7. Provide mouth care   8. Include patient/family/caregiver in decisions related to daily care   Outcome: Progressing  Flowsheets (Taken 5/24/2020 2356)  Daily care needs are met:   Assess and monitor skin integrity   Identify patients at risk for skin breakdown on admission and per policy   Assess and monitor ability to perform self care and identify potential discharge needs   Assess skin integrity/risk for skin breakdown   Assist patient with activities of daily living as needed   Encourage independent activity per ability   Include patient/family/caregiver in decisions related to daily care   Provide mouth care     Problem: Discharge Barriers  Goal: Patient's discharge needs are met  Description: INTERVENTIONS:  1. Assess patient for self-management skills  2. Encourage participation in management  3. Identify potential discharge barriers on admission and throughout hospital stay  4. Involve patient/family/caregiver in discharge planning process  5. Collaborate with case management/ for discharge needs  Outcome: Progressing  Flowsheets (Taken 5/24/2020 2356)  Patient discharge needs are met:   Involve patient/family/caregiver in discharge planning process   Collaborate with case management/ for discharge needs   Identify potential discharge barriers on admission and throughout hospital stay   Encourage participation in management   Assess patient for self-management skills     Problem: Neurosensory -  Adult  Goal: Achieves stable or improved neurological status  Description: INTERVENTIONS  1. Assess for and report changes in neurological status  2. Initiate measures to prevent increased intracranial pressure  3. Maintain blood pressure and fluid volume within ordered parameters to optimize cerebral perfusion and minimize risk of hemorrhage  4. Monitor temperature, glucose, and sodium. Initiate appropriate interventions as ordered  Outcome: Progressing  Flowsheets (Taken 5/24/2020 2356)  Achieves stable or improved neurological status:   Assess for and report changes in neurological status   Initiate measures to prevent increased intracranial pressure   Maintain blood pressure and fluid volume within ordered parameters to optimize cerebral perfusion and minimize risk of hemorrhage   Monitor temperature, glucose, and sodium. Initiate appropriate interventions as ordered  Goal: Achieves maximal functionality and self care  Description: INTERVENTIONS  1. Monitor swallowing and airway patency with patient fatigue and changes in neurological status  2. Encourage and assist patient to increase activity and self care with guidance from PT/OT  3. Encourage visually impaired, hearing impaired and aphasic patients to use assistive/communication devices  Outcome: Progressing  Flowsheets (Taken 5/24/2020 2356)  Achieves maximal functionality and self care:   Encourage visually impaired, hearing impaired and aphasic patients to use assistive/communication devices   Encourage and assist patient to increase activity and self care with guidance from PT/OT   Monitor swallowing and airway patency with patient fatigue and changes in neurological status     Problem: Cardiovascular - Adult  Goal: Maintains optimal cardiac output and hemodynamic stability  Description: INTERVENTIONS:  1. Monitor vital signs and rhythm  2. Monitor for hypotension and other signs of decreased cardiac output  3. Administer and titrate ordered vasoactive  medications to optimize hemodynamic stability  4. Monitor for fluid overload/dehydration, weight gain, shortness of breath and activity intolerance  5. Monitor arterial and/or venous puncture sites for bleeding and/or hematoma  6. Assess quality of pulses, capillary refill, edema, sensation, skin color and temperature  7. Assess for signs of decreased coronary artery perfusion - ex. angina  Outcome: Progressing  Flowsheets (Taken 5/24/2020 9684)  Maintain optimal cardiac output and hemodynamic stability:   Monitor vital signs and rhythm   Monitor for hypotension and other signs of decreased cardiac output   Administer and titrate ordered vasoactive medications to optimize hemodynamic stability   Monitor for fluid overload/dehydration, weight gain, shortness of breath and activity intolerance   Monitor arterial and/or venous puncture sites for bleeding and/or hematoma   Assess quality of pulses, capillary refill, edema, sensation, skin color and temperature   Assess for signs of decreased coronary artery perfusion - ex. angina  Goal: Absence of cardiac dysrhythmias or at baseline  Description: INTERVENTIONS:  1. Continuous cardiac monitoring, monitor vital signs, obtain 12 lead EKG if indicated  2. Administer antiarrhythmic and heart rate control medications as ordered  3. Initiate emergency measures for life threatening arrhythmias  4. Monitor electrolytes and administer replacement therapy as ordered  Outcome: Progressing  Flowsheets (Taken 5/24/2020 7048)  Absence of cardiac dysrhythmias or at baseline:   Monitor electrolytes and administer replacement therapy as ordered   Initiate emergency measures for life threatening arrhythmias   Administer antiarrhythmic and heart rate control medications as ordered   Continuous cardiac monitoring, monitor vital signs, obtain 12 lead EKG if indicated     Problem: Respiratory - Adult  Goal: Achieves optimal ventilation and oxygenation  Description: INTERVENTIONS:  1. Assess  for changes in respiratory status  2. Assess for changes in mentation and behavior  3. Position to facilitate oxygenation and minimize respiratory effort  4. Oxygen supplementation based on oxygen saturation or ABGs  5. Assess patient's ability to cough effectively  6. Encourage broncho-pulmonary hygiene including cough, deep breathe  7. Assess the need for suctioning   8. Assess and instruct to report SOB or any respiratory difficulty  9. Respiratory Therapy support as indicated, including medications and treatment.  Outcome: Progressing  Flowsheets (Taken 5/24/2020 6636)  Achieves optimal ventilation and oxygenation:   Respiratory Therapy support as indicated, including medications and treatment   Assess and instruct to report shortness of breath or any respiratory difficulty   Assess the need for suctioning   Encourage broncho-pulmonary hygiene including cough, deep breathe   Assess patient's ability to cough effectively   Oxygen supplementation based on oxygen saturation or arterial blood gases   Position to facilitate oxygenation and minimize respiratory effort   Assess for changes in mentation and behavior   Assess for changes in respiratory status  Goal: Achieves optimal ventilation and oxygenation with noninvasive CPAP/BiLEVEL support  Description: INTERVENTIONS:  1. Provide education to patient/family about rationale and expected outcomes associated with therapy  2. Position patient to facilitate optimal ventilation/oxygenation status and minimize respiratory effort  3. Position patient to reduce aspiration risk, elevate head of bed at least 35 degrees or higher, as applicable  4. Assess effectiveness of therapy on ventilation/oxygenation status based on oxygen saturation and/or arterial blood gases, as indicated  5. Assess patient for changes in respiratory and physiological status  6. Auscultate breath sounds and assess chest excursion, as indicated  7. Assess patient for changes in mentation and  behavior  8. Routinely monitor equipment for proper performance and settings  9. Assess and monitor skin condition, in relationship to the respiratory interface  10. Assure equipment alarm volume is adequate for the patient's environment  11. Immediately respond to equipment alarm, to assess patient and/or cause for alarm  12. Follow universal infection control/hospital policy(ies)/standards  Outcome: Progressing  Flowsheets (Taken 5/24/2020 0610)  Achieves Optimal Oxygenation with Noninvasive CPAP/BiLEVEL Support:   Provide education to patient/family about rationale and expected outcomes associated with therapy   Position patient to facilitate optimal ventilation/oxygenation status and minimize respiratory effort   Assess effectiveness of therapy on ventilation/oxygenation status based on oxygen saturation and/or arterial blood gases, as indicated   Position patient to reduce aspiration risk, elevate head of bed at least 35 degrees or higher, as applicable   Assess patient for changes in respiratory and physiological status   Assess patient for changes in mentation and behavior   Auscultate breath sounds and assess chest excursion, as indicated   Routinely monitor equipment for proper performance and settings   Assess and monitor skin condition, in relationship to the respiratory interface   Assure equipment alarm volume is adequate for the patient's environment   Immediately repsond to equipment alarm, to assess patient and/or cause for alarm   Follow universal infection control/hospital policy(ies)/standards     Problem: Gastrointestinal - Adult  Goal: Maintains or returns to baseline digestive function  Description: INTERVENTIONS:  1. Assess bowel function  2. Ensure adequate hydration  3. Administer ordered medications as needed  4. Encourage mobilization and activity  5. Nutrition consult as indicated  6. Assess hydration and nutritional status  7. Assess characteristics and frequency of stool  8. Monitor for  metabolic panel imbalances  9. Assess for treatment effectiveness  Outcome: Progressing  Flowsheets (Taken 5/24/2020 2356)  Maintains or returns to baseline bowel function:   Assess characteristics and frequency of stool   Assess for treatment effectiveness   Nutrition consult as indicated   Assess bowel function   Administer ordered medications as needed   Ensure adequate hydration   Encourage mobilization and activity   Assess hydration and nutritional status   Monitor for metabolic panel imbalances     Problem: Metabolic/Fluid and Electrolytes - Adult  Goal: Maintain Optimal Renal Function and Hemodynamic Stability  Description: INTERVENTIONS:  1. Monitor labs and assess for signs and symptoms of volume excess or deficit  2. Monitor intake, output and patient weight  3. Monitor urine specific gravity, serum osmolarity and serum sodium as indicated or ordered  4. Monitor response to interventions for patient's volume status, including labs, urine output, blood pressure (other measures as available)  5. Encourage oral intake as appropriate  6. Instruct patient on fluid and nutrition restrictions as appropriate  Outcome: Progressing  Flowsheets (Taken 5/24/2020 2356)  Maintain optimal renal function and [facility] stability:   Instruct patient on fluid and nutrition restrictions as appropriate   Encourage oral intake as appropriate   Monitor response to interventions for patient's volume status, including labs, urine output, blood pressure (other measures as available)   Monitor urine specific gravity, serum osmolarity and serum sodium as indicated or ordered   Monitor intake, output and patient weight   Monitor labs and assess for signs and symptoms of volume excess or deficit  Goal: Glucose maintained within prescribed range  Description: INTERVENTIONS:  1. Monitor blood glucose as ordered  2. Assess for signs and symptoms of hyperglycemia and hypoglycemia  3. Administer ordered medications to maintain glucose  within target range  4. Assess barriers to adequate nutritional intake and initiate nutrition consult as needed  5. Assess baseline knowledge and provide education as indicated  6. Monitor exercise as may reduce the requirements for insulin  Outcome: Progressing  Flowsheets (Taken 5/24/2020 2356)  Glucose maintained within prescribed range:   Monitor exercise as may reduce the requirements for insulin   Assess baseline knowledge and provide education as indicated   Assess barriers to adequate nutritional intake and initiate nutrition consult as needed   Assess for signs and symptoms of hyperglycemia and hypoglycemia   Monitor blood glucose as ordered   Administer ordered medications to maintain glucose within target range     Problem: Skin/Tissue Integrity - Adult  Goal: Skin integrity remains intact  Description: INTERVENTIONS  1. Assess and document risk factors for pressure ulcer development  2. Assess and document skin integrity  3. Monitor for areas of redness and/or skin breakdown  4. Initiate pressure ulcer prevention measures as indicated  Outcome: Progressing  Flowsheets (Taken 5/24/2020 2356)  Skin integrity remains intact:   Initiate pressure ulcer prevention measures as indicated   Monitor for areas of redness and/or skin breakdown   Assess and document risk factors for pressure ulcer development   Assess and document skin integrity     Problem: Hematologic - Adult  Goal: Maintains hematologic stability  Description: INTERVENTIONS  1. Assess for signs and symptoms of bleeding or hemorrhage  2. Monitor labs  3. Administer supportive blood products/factors as ordered and appropriate  4. Administer medications as ordered  5. Initiate bleeding precautions as indicated  6. Educate patient/family to report signs/symptoms of bleeding  Outcome: Progressing  Flowsheets (Taken 5/24/2020 2356)  Maintains hematologic stability:   Initiate bleeding precautions as indicated   Administer supportive blood  products/factors as ordered and appropriate   Assess for signs and symptoms of bleeding or hemorrhage   Monitor labs   [place] medications as ordered   Educate patient/family to report signs/symptoms of bleeding     Problem: Musculoskeletal - Adult  Goal: Return mobility to safest level of function  Description: INTERVENTIONS:  1. Assess patient stability and activity tolerance for standing, transferring and ambulating w/ or w/o assistive devices  2. Assist with transfers and ambulation using safe practices  3. Ensure adequate protection for wounds/incisions during mobilization  4. Obtain PT/OT and other consults as needed  5. Apply Continuous Passive Motion per order to increase flexion toward goal  6. Instruct patient/family in ordered activity level  Outcome: Progressing  Flowsheets (Taken 5/24/2020 9870)  Return mobility to safest level of function:   Apply Continuous Passive Motion per order to increase flexion toward goal   Instruct patient/family in ordered activity level   Obtain PT/OT and other consults as needed   Ensure adequate protection for wounds/incisions during mobilization   Assist with transfers and ambulation using safe practices   Assess patient stability and activity tolerance for standing, transferring and ambulating with or without assistive devices  Goal: Return ADL status to a safe level of function  Description: INTERVENTIONS:  1. Assess patient's ADL deficits and provide assistive devices as needed  2. Obtain PT/OT consults as needed  3. Assist and instruct patient to increase activity and self care  Outcome: Progressing  Flowsheets (Taken 5/24/2020 1105)  Return activities of daily living status to a safe level of function:   Assist and instruct patient to increase activity and self care   Assess patient's activities of daily living deficits and provide assistive devices as needed   Obtain PT/OT consults as needed     Problem: Dressings Lower Extremities  Goal: LTG - Patient will  utilize adaptive techniques/equipment to dress lower body  Description: Setup A utilizing AE  Outcome: Progressing     Problem: Toileting  Goal: LTG - Patient will complete daily toileting tasks  Description: Mod (I) utilizing AD  Outcome: Progressing     Problem: Mobility  Goal: LTG - Patient will ambulate household distance  Description: SBA w/ FWW  Outcome: Progressing     Problem: TRANSFERS  Goal: STG - Patient will transfer to and from sit to stand  Description: SBA w/ FWW  Outcome: Progressing     Problem: Dysphagia  Goal: LTG-Patient will tolerate the safest and least restrictive diet without overt signs and/or symptoms of aspiration  Outcome: Progressing  Goal: STG - Patient will tolerate recommended food and/or liquid consistencies without clinical signs and/or symptoms of aspirations  Outcome: Progressing  Goal: STG - Patient will participate in instrumental assessment of swallowing as appropriate  Outcome: Progressing

## 2020-05-25 NOTE — INTERDISCIPLINARY/THERAPY
Case Management Progress Note  [phone]    Narrative: Rehab cons entered et discussed with rehab screener.  Pt appear appropriate for op therapy per their review.  Discussed with therapy who reports pt should have someone with him at home. Visited pt who reports he has no one at home. He is agreeable to HH et order cued. Plan for nursing to cont ambulating pt frequently today et reeval for dc home with ABDI Sim    Diagnosis: stroke    Plan of Care: above    Discussed Discharge Needs/Topics: above

## 2020-05-25 NOTE — PLAN OF CARE
Problem: Knowledge Deficit  Goal: Patient/family/caregiver demonstrates understanding of disease process, treatment plan, medications, and discharge instructions  Description: INTERVENTIONS:   1. Complete learning assessment and assess knowledge base  2. Provide teaching at level of understanding   3. Provide teaching via preferred learning methods  Outcome: Progressing     Problem: Potential for Compromised Skin Integrity  Goal: Skin Integrity is Maintained or Improved  Description: INTERVENTIONS:  1. Assess and monitor skin integrity  2. Collaborate with interdisciplinary team and initiate plans and interventions as needed  3. Alternate a full bath with partial baths for elderly   4. Monitor patient's hygiene practices   5. Collaborate with wound, ostomy, and continence nurse  Outcome: Progressing  Goal: Nutritional status is improving  Description: INTERVENTIONS:  1. Monitor and assess patient for malnutrition (ex- brittle hair, bruises, dry skin, pale skin and conjunctiva, muscle wasting, smooth red tongue, and disorientation)  2. Monitor patient's weight and dietary intake as ordered or per policy  3. Determine patient's food preferences and provide high-protein, high-caloric foods as appropriate  4. Assist patient with eating   5. Allow adequate time for meals   6. Encourage patient to take dietary supplement as ordered   7. Collaborate with dietitian  8. Include patient/family/caregiver in decisions related to nutrition  Outcome: Progressing  Goal: MOBILITY IS MAINTAINED OR IMPROVED  Description: INTERVENTIONS  1. Collaborate with interdisciplinary team and initiate plan and interventions as ordered (PT/OT)  2. Encourage ambulation  3. Up to chair for meals  4. Monitor for signs of deconditioning  Outcome: Progressing     Problem: Safety Adult - Fall  Goal: Free from fall injury  Description: INTERVENTIONS:    Inpatient - Please reference Cares/Safety Flowsheet under [place] Fall Risk for  interventions.  Pediatrics - Please reference Peds Daily Cares/Safety Flowsheet under [place] Pediatric Fall Assessment Fall Bundle for interventions  LD/OB - Please reference OB Shift Screening Flowsheet under OB Fall Risk for interventions.  Outcome: Progressing     Problem: Safety Adult  Goal: Patient will remain safe during hospitalization  Description: INTERVENTIONS    1. Assess patient for fall risk and implement interventions if needed  2. Use safe transport techniques  3. Assess patient using the appropriate Parveen skin assessment scale  4. Assess patient for risk of aspiration  5. Assess patient for risk of elopement  6. Assess patient for risk of suicide  Outcome: Progressing     Problem: Daily Care  Goal: Daily care needs are met  Description: INTERVENTIONS:   1. Assess and monitor skin integrity  2. Identify patients at risk for skin breakdown on admission and per policy  3. Assess and monitor ability to perform self care and identify potential discharge needs  4. Assess skin integrity/risk for skin breakdown  5. Assist patient with activities of daily living as needed  6. Encourage independent activity per ability   7. Provide mouth care   8. Include patient/family/caregiver in decisions related to daily care   Outcome: Progressing     Problem: Respiratory - Adult  Goal: Achieves optimal ventilation and oxygenation  Description: INTERVENTIONS:  1. Assess for changes in respiratory status  2. Assess for changes in mentation and behavior  3. Position to facilitate oxygenation and minimize respiratory effort  4. Oxygen supplementation based on oxygen saturation or ABGs  5. Assess patient's ability to cough effectively  6. Encourage broncho-pulmonary hygiene including cough, deep breathe  7. Assess the need for suctioning   8. Assess and instruct to report SOB or any respiratory difficulty  9. Respiratory Therapy support as indicated, including medications and treatment.  Outcome: Progressing  Goal:  Achieves optimal ventilation and oxygenation with noninvasive CPAP/BiLEVEL support  Description: INTERVENTIONS:  1. Provide education to patient/family about rationale and expected outcomes associated with therapy  2. Position patient to facilitate optimal ventilation/oxygenation status and minimize respiratory effort  3. Position patient to reduce aspiration risk, elevate head of bed at least 35 degrees or higher, as applicable  4. Assess effectiveness of therapy on ventilation/oxygenation status based on oxygen saturation and/or arterial blood gases, as indicated  5. Assess patient for changes in respiratory and physiological status  6. Auscultate breath sounds and assess chest excursion, as indicated  7. Assess patient for changes in mentation and behavior  8. Routinely monitor equipment for proper performance and settings  9. Assess and monitor skin condition, in relationship to the respiratory interface  10. Assure equipment alarm volume is adequate for the patient's environment  11. Immediately respond to equipment alarm, to assess patient and/or cause for alarm  12. Follow universal infection control/hospital policy(ies)/standards  Outcome: Progressing     Problem: Skin/Tissue Integrity - Adult  Goal: Skin integrity remains intact  Description: INTERVENTIONS  1. Assess and document risk factors for pressure ulcer development  2. Assess and document skin integrity  3. Monitor for areas of redness and/or skin breakdown  4. Initiate pressure ulcer prevention measures as indicated  Outcome: Progressing

## 2020-05-25 NOTE — NURSING END OF SHIFT
Nursing End of Shift Summary:    Patient: Del Mckeon  MRN: [id number]  : 1960, Age: 59 y.o.    Location: [address] [place] [address]    Nursing Goals  Clinical Goals for the Shift: Maintain safety; monitor neuros; monitor changes in status    Narrative Summary of Progress Toward Clinical Goals:  Pt stable.  Neuros stable  No changes noted.   Will continue to monitor.     Barriers to Goals/Nursing Concerns:      New Patient or Family Concerns/Issues:      Shift Summary:      Significant Events & Communications to Providers (last 12 hours)      Last 5 Values    No documentation.              Oxygen Usage (last 12 hours)      Last 5 Values    No documentation.              Mobility (last 12 hours)      Last 5 Values     Row Name 20 8940                   Mobility    Activity  Bedrest;Bathroom privileges;Turn;Sleeping        Level of Assistance  Modified independent, requires aide device or extra time        Anti-Embolism Devices  Bilateral;AE calf pump        Anti-Embolism Intervention  Refused            Urethral Catheter    Active Urethral Catheter     None            Active Lines    Active Central venous catheter / Peripherally inserted central catheter / Implantable Port / Hemodialysis catheter / Midline Catheter     None              Infusing Medications   Medication Dose Last Rate     PRN Medications   Medication Dose Last Dose   • sodium chloride  2 spray     • hydrALAZINE  10 mg 10 mg at 20 0433   • ondansetron  4 mg     • magnesium hydroxide  30 mL       _________________________  Arline Lozano RN  20 6:13 AM

## 2020-05-25 NOTE — PROGRESS NOTES
[address] [address]  Daily Progress Note  Patient name: eDl Mckeon  MRN: [id number]   LOS: 4 days     Subjective   Patient resting comfortably.  Patient feels emotional at times.  No new neurologic complaint.  No cardiopulmonary complaint.  Objective   Vitals:Temp:  [36.2 °C (97.2 °F)-36.9 °C (98.4 °F)] 36.7 °C (98.1 °F)  Heart Rate:  [73-97] 97  Resp:  [16-18] 18  BP: (146-183)/() 146/100  Physical Exam:  HEENT: Pupils reactive to light and accommodation, extraocular muscle movements intact, no nystagmus, slight left facial droop  Neck: No neck vein distention, no nuchal rigidity  Lungs: Clear to auscultation bilaterally, good respiratory effort, no rales or rhonchi  Heart: Regular, non-tachycardic, normal S1 and S2, no S3  Abdomen: Soft with positive bowel sounds, nontender  Genitourinary: Deferred  Extremities: 4/5 strength left upper extremity, no edema, no joint effusion  Skin: No rash, no jaundice, moist  Neurologic: Alert, oriented x3, deep tendon reflexes intact, no sensorimotor deficit    Review of Systems:  Per subjective  Admission on 05/21/2020   Component Date Value Ref Range Status   • WBC 05/21/2020 8.1  3.7 - 9.6 10*3/uL Final   • RBC 05/21/2020 4.65  4.10 - 5.80 10*6/µL Final   • Hemoglobin 05/21/2020 14.6  13.2 - 17.2 g/dL Final   • Hematocrit 05/21/2020 42.1  38.0 - 50.0 % Final   • MCV 05/21/2020 90.4  82.0 - 97.0 fL Final   • MCH 05/21/2020 31.4  29.0 - 34.0 pg Final   • MCHC 05/21/2020 34.8  32.0 - 36.0 g/dL Final   • RDW 05/21/2020 14.2  11.5 - 15.0 % Final   • Platelets 05/21/2020 205  130 - 350 10*3/uL Final   • MPV 05/21/2020 7.9  6.9 - 10.8 fL Final   • Neutrophils% 05/21/2020 63  46 - 70 % Final   • Lymphocytes% 05/21/2020 29  15 - 47 % Final   • Monocytes% 05/21/2020 6  5 - 13 % Final   • Eosinophils% 05/21/2020 2  0 - 3 % Final   • Basophils% 05/21/2020 1  0 - 2 % Final   • Neutrophils Absolute 05/21/2020 5.10  10*3/uL Final   •  Lymphocytes Absolute 05/21/2020 2.30  10*3/uL Final   • Monocytes Absolute 05/21/2020 0.50  10*3/uL Final   • Eosinophils Absolute 05/21/2020 0.10  10*3/uL Final   • Basophils Absolute 05/21/2020 0.10  10*3/uL Final   • Sodium 05/21/2020 141  135 - 145 mmol/L Final   • Potassium 05/21/2020 3.5  3.5 - 5.1 mmol/L Final   • Chloride 05/21/2020 105  98 - 107 mmol/L Final   • CO2 05/21/2020 24  21 - 32 mmol/L Final   • Anion Gap 05/21/2020 12* 3 - 11 mmol/L Final   • BUN 05/21/2020 13  7 - 25 mg/dL Final   • Creatinine 05/21/2020 0.70  0.70 - 1.30 mg/dL Final   • Glucose 05/21/2020 117* 70 - 105 mg/dL Final   • Calcium 05/21/2020 8.9  8.6 - 10.3 mg/dL Final   • AST 05/21/2020 18  <40 U/L Final   • ALT (SGPT) 05/21/2020 17  7 - 52 U/L Final   • Alkaline Phosphatase 05/21/2020 55  45 - 115 U/L Final   • Total Protein 05/21/2020 6.4  6.0 - 8.3 g/dL Final   • Albumin 05/21/2020 3.9  3.5 - 5.3 g/dL Final   • Total Bilirubin 05/21/2020 0.77  0.20 - 1.40 mg/dL Final   • eGFR 05/21/2020 103  >60 mL/min/1.73m*2 Final   • Corrected Calcium 05/21/2020 9.0  8.6 - 10.3 mg/dL Final   • Magnesium 05/21/2020 1.6* 1.8 - 2.4 mg/dL Final   • Phosphorus 05/21/2020 3.2  2.5 - 4.9 mg/dL Final   • hsTnI 0 Hour 05/21/2020 15.5  <=19.8 pg/mL Final   • RBC, Urine 05/21/2020 50-75* None seen, 0-2, Negative /HPF Final   • WBC, Urine 05/21/2020 5-9* 0 - 4 /HPF Final   • Squamous Epithelial, Urine 05/21/2020 Negative  None Seen-9 /HPF Final   • Bacteria, Urine 05/21/2020 None seen  None seen, Few /HPF Final   • Calcium Oxalate Crystals, Urine 05/21/2020 Present* None Seen /HPF Final   • Hyaline Casts, Urine 05/21/2020 30-49* 0 - 4 /LPF Final   • Color, Urine 05/21/2020 Yellow  Yellow Final   • Clarity, Urine 05/21/2020 Slightly Cloudy* Clear Final   • Specific Gravity, Urine 05/21/2020 1.016  1.003 - 1.030 Final   • Leukocytes, Urine 05/21/2020 Negative  Negative Final   • Nitrite, Urine 05/21/2020 Negative  Negative Final   • Protein, Urine  05/21/2020 >=500* Negative mg/dL Final   • Ketones, Urine 05/21/2020 80 * Negative mg/dL Final   • Urobilinogen, Urine 05/21/2020 2.0* <2.0 E.U./dL Final   • Bilirubin, Urine 05/21/2020 Negative  Negative Final   • Blood, Urine 05/21/2020 Moderate* Negative Final   • Glucose, Urine 05/21/2020 Negative  Negative mg/dL Final   • pH, Urine 05/21/2020 5.0  5.0 - 8.0 PH Final   • POC Glucose 05/21/2020 99  70 - 105 mg/dL Final   • POC Glucose 05/21/2020 95  70 - 105 mg/dL Final   • Hemoglobin A1C 05/22/2020 5.8  4.0 - 6.0 % Final   • Estimated Average Glucose 05/22/2020 119.8  mg/dL Final   • Triglycerides 05/22/2020 161* <=149 mg/dL Final   • Cholesterol 05/22/2020 217* 0 - 199 mg/dL Final   • HDL 05/22/2020 30* >=40 mg/dL Final   • LDL Calculated 05/22/2020 155* 20 - 99 mg/dL Final   • Fasting? 05/22/2020 Yes   Final   • POC Glucose 05/21/2020 106* 70 - 105 mg/dL Final   • POC Glucose 05/22/2020 116* 70 - 105 mg/dL Final   • LV Area-diastolic Apical Two Chamb* 05/22/2020 42.8  cm2 Final   • LV Diastolic Volume 05/22/2020 149  cm3 Final   • LV Systolic Volume 05/22/2020 73  cm3 Final   • IVS 05/22/2020 0.9  0.6 - 1.1 cm Final   • LVIDD 05/22/2020 5.2  5.54 - 7.69 cm Final   • LVIDS 05/22/2020 3.8  3.24 - 4.91 cm Final   • PW 05/22/2020 1.0  0.6 - 1.1 cm Final   • Biplane EF 05/22/2020 51  % Final   • LA size 05/22/2020 4.74  cm Final   • RVIDD 05/22/2020 5.1  cm Final   • RV base 05/22/2020 4.0  cm Final   • Tricuspid annular plane systolic e* 05/22/2020 3.0  cm Final   • RA area 05/22/2020 20.1  cm2 Final   • BSA 05/22/2020 2.12  m2 Final   • Echo EF Estimated 05/22/2020 52  % Final   • ZLVIDD 05/22/2020 -2.27   Final   • ZLVIDS 05/22/2020 -0.38   Final   • POC Glucose 05/22/2020 169* 70 - 105 mg/dL Final   • POC Glucose 05/22/2020 162* 70 - 105 mg/dL Final   • POC Glucose 05/22/2020 174* 70 - 105 mg/dL Final   • POC Glucose 05/23/2020 199* 70 - 105 mg/dL Final   • POC Glucose 05/23/2020 189* 70 - 105 mg/dL Final   •  POC Glucose 05/23/2020 168* 70 - 105 mg/dL Final   • POC Glucose 05/24/2020 165* 70 - 105 mg/dL Final   • POC Glucose 05/24/2020 161* 70 - 105 mg/dL Final   • POC Glucose 05/24/2020 151* 70 - 105 mg/dL Final   • POC Glucose 05/24/2020 153* 70 - 105 mg/dL Final   • POC Glucose 05/24/2020 144* 70 - 105 mg/dL Final   • POC Glucose 05/24/2020 156* 70 - 105 mg/dL Final   • POC Glucose 05/25/2020 153* 70 - 105 mg/dL Final        Assessment/Plan   Acute pontine stroke, inferior/anterior  Hypertension, essential  Type 2 diabetes mellitus              Blood sugars reviewed  Obstructive sleep apnea history     (Patient doing well.  Patient will undergo speech assessment/video.  Continue with physical therapy, Occupational Therapy and speech therapy.  Patient tolerating antiplatelet therapy.  Based on therapy notes will determine if patient will require further rehab assistance versus home health with therapies.) 5/22/20     (Therapy notes reviewed.  Patient would be a good candidate for rehab assistance.  Discussed with him and also discussed with care management.) 5/23/20     (Continue with current treatment plan.  Rehab evaluation.  Diovan discontinued yesterday secondary to patient's intolerance and lisinopril will be initiated today.  Continue to monitor blood pressure.) 5/24/20    Patient with good therapy session yesterday.  Patient would be a good rehab candidate.  We will follow-up.  Blood pressures reviewed.  Patient tolerating lisinopril.     Plan  Physical therapy  Occupational Therapy  Speech therapy  Disposition planning, rehab assessment    Electronically signed by: Derick Berry MD  5/25/2020  10:02 AM

## 2020-05-26 ENCOUNTER — APPOINTMENT (OUTPATIENT)
Dept: PHYSICAL THERAPY | Facility: HOSPITAL | Age: 60
End: 2020-05-26
Payer: COMMERCIAL

## 2020-05-26 LAB
GLUCOSE BLDC GLUCOMTR-MCNC: 134 MG/DL (ref 70–105)
GLUCOSE BLDC GLUCOMTR-MCNC: 143 MG/DL (ref 70–105)
GLUCOSE BLDC GLUCOMTR-MCNC: 148 MG/DL (ref 70–105)
GLUCOSE BLDC GLUCOMTR-MCNC: 157 MG/DL (ref 70–105)

## 2020-05-26 PROCEDURE — 92526 ORAL FUNCTION THERAPY: CPT | Mod: GN | Performed by: SPEECH-LANGUAGE PATHOLOGIST

## 2020-05-26 PROCEDURE — (BLANK) HC ROOM PRIVATE

## 2020-05-26 PROCEDURE — 82947 ASSAY GLUCOSE BLOOD QUANT: CPT | Mod: QW

## 2020-05-26 PROCEDURE — 6370000100 HC RX 637 (ALT 250 FOR IP): Performed by: INTERNAL MEDICINE

## 2020-05-26 PROCEDURE — 6360000200 HC RX 636 W HCPCS (ALT 250 FOR IP): Performed by: INTERNAL MEDICINE

## 2020-05-26 PROCEDURE — 2590000100 HC RX 259: Performed by: HOSPITALIST

## 2020-05-26 PROCEDURE — 6370000100 HC RX 637 (ALT 250 FOR IP): Performed by: PSYCHIATRY & NEUROLOGY

## 2020-05-26 PROCEDURE — 97112 NEUROMUSCULAR REEDUCATION: CPT | Mod: GO

## 2020-05-26 PROCEDURE — 6370000100 HC RX 637 (ALT 250 FOR IP): Performed by: HOSPITALIST

## 2020-05-26 PROCEDURE — 99232 SBSQ HOSP IP/OBS MODERATE 35: CPT | Performed by: HOSPITALIST

## 2020-05-26 PROCEDURE — 97535 SELF CARE MNGMENT TRAINING: CPT | Mod: GO

## 2020-05-26 PROCEDURE — 97116 GAIT TRAINING THERAPY: CPT | Mod: GP,CQ

## 2020-05-26 PROCEDURE — 97112 NEUROMUSCULAR REEDUCATION: CPT | Mod: GP,CQ

## 2020-05-26 RX ADMIN — ROSUVASTATIN CALCIUM 40 MG: 10 TABLET, FILM COATED ORAL at 20:45

## 2020-05-26 RX ADMIN — ENOXAPARIN SODIUM 40 MG: 40 INJECTION SUBCUTANEOUS at 13:00

## 2020-05-26 RX ADMIN — PSYLLIUM HUSK 1 PACKET: 3.4 POWDER ORAL at 10:00

## 2020-05-26 RX ADMIN — INSULIN DETEMIR 25 UNITS: 100 INJECTION, SOLUTION SUBCUTANEOUS at 21:00

## 2020-05-26 RX ADMIN — ASPIRIN 81 MG: 81 TABLET ORAL at 10:00

## 2020-05-26 RX ADMIN — CLOPIDOGREL BISULFATE 75 MG: 75 TABLET ORAL at 10:00

## 2020-05-26 RX ADMIN — LISINOPRIL 20 MG: 20 TABLET ORAL at 10:00

## 2020-05-26 RX ADMIN — MELATONIN 1000 UNITS: at 10:00

## 2020-05-26 NOTE — INTERDISCIPLINARY/THERAPY
05/26/20 1504   Subjective Comments   Subjective Comments RN approved tx.  Patient agreeable.   General   SLP Treatment Duration (Min) 35 Minutes   Family/Caregiver Present No   Daily Treatment   Session Activities Dysphagia   Oral/Motor   Labial ROM Moderate reduced left   Facial ROM Moderate reduced left   Dysphagia   Behavior/Cognition Alert;Cooperative   Activity Tolerance   Activity Tolerance Patient tolerated treatment well   Caregiver Made Aware Nurse   Assessment Comments   Assessment Comments Patient reports occasional coughing with p.o. intake though no other concerns.   Patient with continued left facial droop and weakness though patient reports he doesn't really notice it.  Educated patient regarding oral motor exercises for improved facial range of motion and activation of facial muscles.  Patient agreeable to complete OME program, will provide patient with handout for completion and further training for completing on his own.  Recommend continue current diet with continued safe swallow strategies recommended from [facility].   Plan   Plan Comments reg/thins, OME program   Treatment Interventions Dysphagia Therapy

## 2020-05-26 NOTE — INTERDISCIPLINARY/THERAPY
05/26/20 1029   General   Chart Reviewed Yes   Therapy Treatment Diagnosis CVA w/ L demi   OT Treatment Duration (Min) 16 Minutes   Is this a Co-Treatment? No   Family/Caregiver Present No   Precautions   Reinforced Precautions Yes   Other Precautions L demi, fall risk   Subjective Comments   Subjective Comments RN ok'd tx. Pt agreeable. Start/end session with pt seated at bedside chair. Alarm on, call light at side.   Pain Assessment   Pain Assessment No/denies pain   Cognition   Arousal/Alertness WFL   Cognition Comment appropriate to task and conversation.   Transfer 1   Trials/Comments 1 SBA sit <> stand at chair and toilet. Needed cues to square up to transfer surface prior to sitting.   Ambulation 1   Comments/Distance 1 CGA in-room distances with FWW. Mildly unsteady.   Dynamic Standing Balance   Dynamic Standing-Comments CGA reaching outside KYLE.   Eating   Eating Comments no concerns   Grooming   Grooming Comments CGA standing sinkside.   Bathing   Bathing Comments CGA for dry shower transfer assessment. Recommend shower chair at home for fall prevention with bathing. Also recommend having caregiver present.   LE Dressing   LE Dressing Comments SBA for sock management at bedside chair.   Toileting   Toileting Comments CGA ambulate to bathroom. SBA for transfer and clothing management in standing.   Light Housekeeping   Light Housekeeping Recommend assist for housekeeping and meal prep d/t impaired balance and coordination. Pt states he plans to have groceries delivered to his home.   Additional Activities   Additional Activities Comments Educated pt on LUE coordination activities including in hand manipulation of items such as pen and small ball.   Activity Tolerance   Activity Tolerance Comments tolerates session well   Assessment   Rehab Potential Good   Progress Progressing toward goals   Recommendations for Therapy Continue skilled therapy   Assessment Comment Recommend Ax1 for safety with functional  mobility and ADLs. Pt is at risk for falling d/t deficits in balance and coordination. Would benefit from continued training for safety with basic ADLs as well as higher level tasks including IADLs. Will continue OT services.   Plan   Plan Comment functional coordination/balance for safety with ADLs, IADLs   OT Frequency 3-5x/wk

## 2020-05-26 NOTE — PROGRESS NOTES
[address] [address]  Daily Progress Note  Patient name: Del Mckeon  MRN: [id number]   LOS: 5 days     Subjective   Patient resting comfortably.  No new neurologic symptoms.  No headache, no visual changes.  No dizziness.  Objective   Vitals:Temp:  [36.6 °C (97.8 °F)-37.3 °C (99.1 °F)] 36.7 °C (98.1 °F)  Heart Rate:  [67-87] 77  Resp:  [12-16] 16  BP: (133-160)/(84-96) 156/94  Physical Exam:  HEENT: Pupils reactive to light and accommodation, no nystagmus  Neck: No nuchal rigidity  Lungs: Clear to auscultation bilaterally, no rales or rhonchi  Heart: Regular, non-tachycardic, normal S1 and S2  Abdomen: Soft with positive bowel sounds, nontender, no hepatosplenomegaly  Genitourinary: Deferred  Extremities: No edema, 4/5 strength left upper extremity  Skin: No rash, moist, no jaundice  Neurologic: Cranial nerves intact, slight left facial droop, no sensory deficit    Review of Systems:  Negative  Admission on 05/21/2020   Component Date Value Ref Range Status   • WBC 05/21/2020 8.1  3.7 - 9.6 10*3/uL Final   • RBC 05/21/2020 4.65  4.10 - 5.80 10*6/µL Final   • Hemoglobin 05/21/2020 14.6  13.2 - 17.2 g/dL Final   • Hematocrit 05/21/2020 42.1  38.0 - 50.0 % Final   • MCV 05/21/2020 90.4  82.0 - 97.0 fL Final   • MCH 05/21/2020 31.4  29.0 - 34.0 pg Final   • MCHC 05/21/2020 34.8  32.0 - 36.0 g/dL Final   • RDW 05/21/2020 14.2  11.5 - 15.0 % Final   • Platelets 05/21/2020 205  130 - 350 10*3/uL Final   • MPV 05/21/2020 7.9  6.9 - 10.8 fL Final   • Neutrophils% 05/21/2020 63  46 - 70 % Final   • Lymphocytes% 05/21/2020 29  15 - 47 % Final   • Monocytes% 05/21/2020 6  5 - 13 % Final   • Eosinophils% 05/21/2020 2  0 - 3 % Final   • Basophils% 05/21/2020 1  0 - 2 % Final   • Neutrophils Absolute 05/21/2020 5.10  10*3/uL Final   • Lymphocytes Absolute 05/21/2020 2.30  10*3/uL Final   • Monocytes Absolute 05/21/2020 0.50  10*3/uL Final   • Eosinophils Absolute 05/21/2020 0.10   10*3/uL Final   • Basophils Absolute 05/21/2020 0.10  10*3/uL Final   • Sodium 05/21/2020 141  135 - 145 mmol/L Final   • Potassium 05/21/2020 3.5  3.5 - 5.1 mmol/L Final   • Chloride 05/21/2020 105  98 - 107 mmol/L Final   • CO2 05/21/2020 24  21 - 32 mmol/L Final   • Anion Gap 05/21/2020 12* 3 - 11 mmol/L Final   • BUN 05/21/2020 13  7 - 25 mg/dL Final   • Creatinine 05/21/2020 0.70  0.70 - 1.30 mg/dL Final   • Glucose 05/21/2020 117* 70 - 105 mg/dL Final   • Calcium 05/21/2020 8.9  8.6 - 10.3 mg/dL Final   • AST 05/21/2020 18  <40 U/L Final   • ALT (SGPT) 05/21/2020 17  7 - 52 U/L Final   • Alkaline Phosphatase 05/21/2020 55  45 - 115 U/L Final   • Total Protein 05/21/2020 6.4  6.0 - 8.3 g/dL Final   • Albumin 05/21/2020 3.9  3.5 - 5.3 g/dL Final   • Total Bilirubin 05/21/2020 0.77  0.20 - 1.40 mg/dL Final   • eGFR 05/21/2020 103  >60 mL/min/1.73m*2 Final   • Corrected Calcium 05/21/2020 9.0  8.6 - 10.3 mg/dL Final   • Magnesium 05/21/2020 1.6* 1.8 - 2.4 mg/dL Final   • Phosphorus 05/21/2020 3.2  2.5 - 4.9 mg/dL Final   • hsTnI 0 Hour 05/21/2020 15.5  <=19.8 pg/mL Final   • RBC, Urine 05/21/2020 50-75* None seen, 0-2, Negative /HPF Final   • WBC, Urine 05/21/2020 5-9* 0 - 4 /HPF Final   • Squamous Epithelial, Urine 05/21/2020 Negative  None Seen-9 /HPF Final   • Bacteria, Urine 05/21/2020 None seen  None seen, Few /HPF Final   • Calcium Oxalate Crystals, Urine 05/21/2020 Present* None Seen /HPF Final   • Hyaline Casts, Urine 05/21/2020 30-49* 0 - 4 /LPF Final   • Color, Urine 05/21/2020 Yellow  Yellow Final   • Clarity, Urine 05/21/2020 Slightly Cloudy* Clear Final   • Specific Gravity, Urine 05/21/2020 1.016  1.003 - 1.030 Final   • Leukocytes, Urine 05/21/2020 Negative  Negative Final   • Nitrite, Urine 05/21/2020 Negative  Negative Final   • Protein, Urine 05/21/2020 >=500* Negative mg/dL Final   • Ketones, Urine 05/21/2020 80 * Negative mg/dL Final   • Urobilinogen, Urine 05/21/2020 2.0* <2.0 E.U./dL Final    • Bilirubin, Urine 05/21/2020 Negative  Negative Final   • Blood, Urine 05/21/2020 Moderate* Negative Final   • Glucose, Urine 05/21/2020 Negative  Negative mg/dL Final   • pH, Urine 05/21/2020 5.0  5.0 - 8.0 PH Final   • POC Glucose 05/21/2020 99  70 - 105 mg/dL Final   • POC Glucose 05/21/2020 95  70 - 105 mg/dL Final   • Hemoglobin A1C 05/22/2020 5.8  4.0 - 6.0 % Final   • Estimated Average Glucose 05/22/2020 119.8  mg/dL Final   • Triglycerides 05/22/2020 161* <=149 mg/dL Final   • Cholesterol 05/22/2020 217* 0 - 199 mg/dL Final   • HDL 05/22/2020 30* >=40 mg/dL Final   • LDL Calculated 05/22/2020 155* 20 - 99 mg/dL Final   • Fasting? 05/22/2020 Yes   Final   • POC Glucose 05/21/2020 106* 70 - 105 mg/dL Final   • POC Glucose 05/22/2020 116* 70 - 105 mg/dL Final   • LV Area-diastolic Apical Two Chamb* 05/22/2020 42.8  cm2 Final   • LV Diastolic Volume 05/22/2020 149  cm3 Final   • LV Systolic Volume 05/22/2020 73  cm3 Final   • IVS 05/22/2020 0.9  0.6 - 1.1 cm Final   • LVIDD 05/22/2020 5.2  5.54 - 7.69 cm Final   • LVIDS 05/22/2020 3.8  3.24 - 4.91 cm Final   • PW 05/22/2020 1.0  0.6 - 1.1 cm Final   • Biplane EF 05/22/2020 51  % Final   • LA size 05/22/2020 4.74  cm Final   • RVIDD 05/22/2020 5.1  cm Final   • RV base 05/22/2020 4.0  cm Final   • Tricuspid annular plane systolic e* 05/22/2020 3.0  cm Final   • RA area 05/22/2020 20.1  cm2 Final   • BSA 05/22/2020 2.12  m2 Final   • Echo EF Estimated 05/22/2020 52  % Final   • ZLVIDD 05/22/2020 -2.27   Final   • ZLVIDS 05/22/2020 -0.38   Final   • POC Glucose 05/22/2020 169* 70 - 105 mg/dL Final   • POC Glucose 05/22/2020 162* 70 - 105 mg/dL Final   • POC Glucose 05/22/2020 174* 70 - 105 mg/dL Final   • POC Glucose 05/23/2020 199* 70 - 105 mg/dL Final   • POC Glucose 05/23/2020 189* 70 - 105 mg/dL Final   • POC Glucose 05/23/2020 168* 70 - 105 mg/dL Final   • POC Glucose 05/24/2020 165* 70 - 105 mg/dL Final   • POC Glucose 05/24/2020 161* 70 - 105 mg/dL Final    • POC Glucose 05/24/2020 151* 70 - 105 mg/dL Final   • POC Glucose 05/24/2020 153* 70 - 105 mg/dL Final   • POC Glucose 05/24/2020 144* 70 - 105 mg/dL Final   • POC Glucose 05/24/2020 156* 70 - 105 mg/dL Final   • POC Glucose 05/25/2020 153* 70 - 105 mg/dL Final   • POC Glucose 05/25/2020 137* 70 - 105 mg/dL Final   • POC Glucose 05/25/2020 140* 70 - 105 mg/dL Final   • POC Glucose 05/25/2020 147* 70 - 105 mg/dL Final   • POC Glucose 05/26/2020 143* 70 - 105 mg/dL Final   • POC Glucose 05/26/2020 148* 70 - 105 mg/dL Final        Assessment/Plan   Acute pontine stroke, inferior/anterior  Hypertension, essential  Type 2 diabetes mellitus              Blood sugars reviewed  Obstructive sleep apnea history  Cataract history     (Patient doing well.  Patient will undergo speech assessment/video.  Continue with physical therapy, Occupational Therapy and speech therapy.  Patient tolerating antiplatelet therapy.  Based on therapy notes will determine if patient will require further rehab assistance versus home health with therapies.) 5/22/20     (Therapy notes reviewed.  Patient would be a good candidate for rehab assistance.  Discussed with him and also discussed with care management.) 5/23/20     (Continue with current treatment plan.  Rehab evaluation.  Diovan discontinued yesterday secondary to patient's intolerance and lisinopril will be initiated today.  Continue to monitor blood pressure.) 5/24/20     (Patient with good therapy session yesterday.  Patient would be a good rehab candidate.  We will follow-up.  Blood pressures reviewed.  Patient tolerating lisinopril.) 5/25/20    Good discussion with patient and his sister, Saskia, [phone].  She is a physician in [place].  She hopes to come soon to visit her brother.  He is showing some improvement with physical therapy and Occupational Therapy.  Concern regarding his higher functioning however he may continue to do well and only require home with  recommendations for physical therapy.  We will continue to monitor and see how patient does today with physical and Occupational Therapy.  Patient does not wish to utilize the CPAP.  He states losing 70 pounds since his diagnosis of having sleep apnea.  He has been intolerant of the CPAP machine.  He is okay with following up with primary care as outpatient for further discussion if he should undergo repeat sleep study.  Patient does not have a CPAP machine at home any longer.  Patient would like to have referral to internal medicine at the time of his discharge.  Patient states that he was scheduled to follow-up with his ophthalmologist Dr. Colby for his cataracts.  Possibly being considered for cataract surgery.  This can be rescheduled to determine when patient could undergo cataract surgery post pontine stroke event and timing for being off antiplatelet therapy.    Plan  Physical therapy  Occupational Therapy  Speech therapy  Disposition planning, rehab has signed off  Internal medicine, outpatient referral  Continue with lisinopril at current dose, permissive hypertension    Electronically signed by: Derick Berry MD  5/26/2020  1:53 PM

## 2020-05-26 NOTE — NURSING END OF SHIFT
Nursing End of Shift Summary:    Patient: Del Mckeon  MRN: [id number]  : 1960, Age: 59 y.o.    Location: [address] [place] [address]    Nursing Goals  Clinical Goals for the Shift: improve mobility, neurochecks    Narrative Summary of Progress Toward Clinical Goals:  Pt walked several times around whole floor with staff assist. Pt neurochecks remain WNL. Pt improving daily.     Barriers to Goals/Nursing Concerns:  Yes- rehab vs home health    New Patient or Family Concerns/Issues:  no    Shift Summary:      Significant Events & Communications to Providers (last 12 hours)      Last 5 Values    No documentation.              Oxygen Usage (last 12 hours)      Last 5 Values    No documentation.              Mobility (last 12 hours)      Last 5 Values     Row Name 20 0900 20 1330 20 1800             Mobility    Activity  --  Ambulate in carias  Ambulate in carias      Level of Assistance  --  Standby assist, set-up cues, supervision of patient - no hands on  Standby assist, set-up cues, supervision of patient - no hands on      Distance Ambulated (meters)  --  100 Meters  100 Meters      Anti-Embolism Intervention  Refused  --  --          Urethral Catheter    Active Urethral Catheter     None            Active Lines    Active Central venous catheter / Peripherally inserted central catheter / Implantable Port / Hemodialysis catheter / Midline Catheter     None              Infusing Medications   Medication Dose Last Rate     PRN Medications   Medication Dose Last Dose   • acetaminophen  500 mg 500 mg at 20 1302   • sodium chloride  2 spray     • hydrALAZINE  10 mg 10 mg at 20 0433   • ondansetron  4 mg     • magnesium hydroxide  30 mL       _________________________  Juliette Bennett RN  20 6:19 PM

## 2020-05-26 NOTE — INTERDISCIPLINARY/THERAPY
Case Management Progress Note  [phone]    Narrative: CM reviewed plan of care and discussed with pt, PT/OT, and [facility]. Pt initially tells provider he wants to go to inpatient rehab, then during discussion tells CM he would rather go home with HH. CM discussed this with PT/OT who recommend inpatient rehab. Tammi at [facility] will continue to follow pt and review notes from today for possible admission. Hopeful for admission/transfer to [facility].    Diagnosis: CVA    Plan of Care: PT/OT    Discussed Discharge Needs/Topics: As noted above

## 2020-05-26 NOTE — NURSING END OF SHIFT
Nursing End of Shift Summary:    Patient: Del Mckeon  MRN: [id number]  : 1960, Age: 59 y.o.    Location: [address] [place] [address]    Nursing Goals  Clinical Goals for the Shift: Maintain safety; monitor changes     Narrative Summary of Progress Toward Clinical Goals:  Pt remained safe.  No changes noted.  Will continue to monitor.     Barriers to Goals/Nursing Concerns:  Home health once discharged. Getting patient up and walking.     New Patient or Family Concerns/Issues:      Shift Summary:      Significant Events & Communications to Providers (last 12 hours)      Last 5 Values    No documentation.              Oxygen Usage (last 12 hours)      Last 5 Values    No documentation.              Mobility (last 12 hours)      Last 5 Values     Row Name 20 1800 20 1810                Mobility    Activity  Ambulate in carias  Bedrest;Bathroom privileges;Turn;Sleeping       Level of Assistance  Standby assist, set-up cues, supervision of patient - no hands on  Standby assist, set-up cues, supervision of patient - no hands on       Distance Ambulated (meters)  100 Meters  --       Anti-Embolism Devices  --  Bilateral;AE calf pump       Anti-Embolism Intervention  --  Refused           Urethral Catheter    Active Urethral Catheter     None            Active Lines    Active Central venous catheter / Peripherally inserted central catheter / Implantable Port / Hemodialysis catheter / Midline Catheter     None              Infusing Medications   Medication Dose Last Rate     PRN Medications   Medication Dose Last Dose   • acetaminophen  500 mg 500 mg at 20 1302   • sodium chloride  2 spray     • hydrALAZINE  10 mg 10 mg at 20 0433   • ondansetron  4 mg     • magnesium hydroxide  30 mL       _________________________  Arline Lozano RN  20 5:45 AM

## 2020-05-26 NOTE — FAX COVER SHEET
Fax Transmission  ----------------------------------------------------------------------------------------------------------------------  Facility Name:  [facility] - Care Management Dept.  Mailing address:  [address] [place] [address], Zip:  [place] [address]  Tax ID:   [id number]  NPI:    [id number]      Attention: ITALO Mcgill stay review      Comments: [facility] has become [facility]: Find out more at [email or web address]        Auth/Cert Number:   [id number]        Please call with any questions.        Thanks,         Vicki Melo RN, Case Management- Utilization Review  [address]   [place] [phone]  p:  [phone]    f: [phone]    e: [email or web address]    This facsimile message is CONFIDENTIAL and may contain -privileged information and/or Protected Health Information (PHI) as defined in the federal Health Insurance Portability and Accountability Act, as amended.  This facsimile is  intended ONLY for the use of the individual or company named.  If the reader is NOT the intended recipient, or the employee or agent responsible to deliver it to the intended recipient, you are hereby notified that any dissemination, distribution, or copying of this communication is prohibited.  If you have received this communication in error, please immediately notify us by telephone so that we may arrange for the return of the original message.

## 2020-05-26 NOTE — INTERDISCIPLINARY/THERAPY
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 No   Additional Pertinent History HTN, DM2, HLD, aortic sclerosis, cataracts w/ diminished visual acuity    Family/Caregiver Present No   Precautions   Reinforced Precautions Yes   Other Precautions fall   Subjective Comments   Subjective Comments RN okayed visit. Pt found in bed and agreed to therapy. Pt was left in the chair with call light in reach and chair alarm on.    Bed Mobility   Bed Mobility Assessed   Bed Mobility 1   Bed Mobility From 1 Supine   Bed Mobility Type 1 To   Bed Mobility to 1 Short sit   Level of Assistance 1 Standby assistance   Bed Mobility Comments 1 Increased time but no assist needed. Decreased trunk control noted in sitting but required only SBA.    Transfers   Transfer Assessed   Transfer 1   Transfer From 1 Bed   Transfer Type 1 To and from   Transfer to 1 Stand   Technique 1 Sit to stand;Stand to sit   Transfer Device 1 Front wheeled walker;Transfer belt   Level of Assistance 1 Standby assistance;Verbal cueing;Increased time   Trials/Comments 1 Pt made 2 attempts to stand from the bed without using his hands to push up despite verbal cues. He was able to stand partway but lost his balance posteriorly each time sitting back down on the bed with decreased control.  He was able to stand on the third attempt without help when he used B hands to push up.    Ambulation   Ambulation Assessed   Ambulation 1   Surface 1 Level surface;Smooth   Device 1 Front wheeled walker;Gait belt   Assistance 1 Increased time;Standby assistance   Quality of Gait 1 Pt ambulated with a slow pace with a slighlty antalgic gait due to right hip pain.    Comments/Distance 1 100m   Balance   Balance Impaired   Static  Standing Balance   Static Standing-Balance Surface Firm   Static Standing-Balance Support No upper extremity supported   Static Standing-Level of Assistance Standby assistance;Contact guard x 1;Min assist x 1   Static Standing-Comment/# of Minutes Pt performed standing balance tests. He stood with feet together 30 sec with eyes open, with eyes closed he lost his balance almost immediately requiring assist to remain upright. He was unable to attain tandem stance despite multiple attempts and use of the walker for UE support.  He was able to  a modified tamdem stance for 30 sec with his eyes open and with eyes closed he lost his balance in about 4 sec.    Dynamic Standing Balance   Dynamic Standing-Balance Surface Firm   Dynamic Standing-Balance Support Bilateral upper extremity supported;No upper extremity supported   Dynamic Standing-Balance   (marches in place and heel raises. )   Dynamic Standing-Level of Assistance Contact guard x 1;Min assist x 1   Dynamic Standing-Comments Pt able to perform heel raises and marches with SBA using the FWW for upper extremity support. Without the support he was very unsteady with posterior instability requiring CGA to min A for balnace.    Activity Tolerance   Activity Tolerance Comments Tolerated activity well with on fatigue.    Assessment   Progress Progressing toward goals   Problem List Decreased strength;Impaired balance;Decreased mobility;Decreased coordination;Impaired judgement;Decreased safety awareness;Impaired vision   Assessment Comment Pt tolerated activity well but does still need one assist with mobility due to decreased balance and coordination and decreased safety awareness. He is able to walk 100m with use of the walker but has a decreased gait speed due to decreased balance and right hip pain.    Plan   Treatment Interventions Functional transfer training;Gait training;Balance training;Therapeutic activities;Therapeutic exercises;Neuromuscular  re-education   PT Frequency 5-7x/wk   Plan Comment 1 assist: bed mobility, safety w/ transfers, 100m+ amb, higher level balance, LLE strengthening

## 2020-05-27 ENCOUNTER — APPOINTMENT (OUTPATIENT)
Dept: SPEECH THERAPY | Facility: HOSPITAL | Age: 60
End: 2020-05-27
Payer: COMMERCIAL

## 2020-05-27 ENCOUNTER — HOSPITAL ENCOUNTER (INPATIENT)
Facility: HOSPITAL | Age: 60
LOS: 9 days | Discharge: HOME HEALTH | DRG: 057 | End: 2020-06-05
Attending: PHYSICAL MEDICINE & REHABILITATION | Admitting: PHYSICAL MEDICINE & REHABILITATION
Payer: COMMERCIAL

## 2020-05-27 VITALS
HEIGHT: 71 IN | TEMPERATURE: 98.3 F | BODY MASS INDEX: 27.72 KG/M2 | DIASTOLIC BLOOD PRESSURE: 88 MMHG | RESPIRATION RATE: 18 BRPM | SYSTOLIC BLOOD PRESSURE: 152 MMHG | HEART RATE: 78 BPM | OXYGEN SATURATION: 96 % | WEIGHT: 198 LBS

## 2020-05-27 DIAGNOSIS — Z79.4 TYPE 2 DIABETES MELLITUS WITHOUT COMPLICATION, WITH LONG-TERM CURRENT USE OF INSULIN (CMS/HCC): ICD-10-CM

## 2020-05-27 DIAGNOSIS — I10 BENIGN ESSENTIAL HYPERTENSION: ICD-10-CM

## 2020-05-27 DIAGNOSIS — I63.019 CEREBROVASCULAR ACCIDENT (CVA) DUE TO THROMBOSIS OF VERTEBRAL ARTERY, UNSPECIFIED BLOOD VESSEL LATERALITY (CMS/HCC): Primary | ICD-10-CM

## 2020-05-27 DIAGNOSIS — G47.33 OSA (OBSTRUCTIVE SLEEP APNEA): ICD-10-CM

## 2020-05-27 DIAGNOSIS — R20.0 NUMBNESS AND TINGLING IN RIGHT HAND: ICD-10-CM

## 2020-05-27 DIAGNOSIS — E11.9 CONTROLLED TYPE 2 DIABETES MELLITUS WITHOUT COMPLICATION, UNSPECIFIED WHETHER LONG TERM INSULIN USE (CMS/HCC): ICD-10-CM

## 2020-05-27 DIAGNOSIS — E11.9 TYPE 2 DIABETES MELLITUS WITHOUT COMPLICATION, WITH LONG-TERM CURRENT USE OF INSULIN (CMS/HCC): ICD-10-CM

## 2020-05-27 DIAGNOSIS — E78.2 MIXED HYPERLIPIDEMIA: ICD-10-CM

## 2020-05-27 DIAGNOSIS — I63.9 CVA (CEREBRAL VASCULAR ACCIDENT) (CMS/HCC): ICD-10-CM

## 2020-05-27 DIAGNOSIS — R20.2 NUMBNESS AND TINGLING IN RIGHT HAND: ICD-10-CM

## 2020-05-27 DIAGNOSIS — E83.42 HYPOMAGNESEMIA: ICD-10-CM

## 2020-05-27 LAB
GLUCOSE BLDC GLUCOMTR-MCNC: 142 MG/DL (ref 70–105)
GLUCOSE BLDC GLUCOMTR-MCNC: 148 MG/DL (ref 70–105)
GLUCOSE BLDC GLUCOMTR-MCNC: 170 MG/DL (ref 70–105)
GLUCOSE BLDC GLUCOMTR-MCNC: 272 MG/DL (ref 70–105)

## 2020-05-27 PROCEDURE — 97535 SELF CARE MNGMENT TRAINING: CPT

## 2020-05-27 PROCEDURE — 6370000100 HC RX 637 (ALT 250 FOR IP): Performed by: HOSPITALIST

## 2020-05-27 PROCEDURE — 2590000100 HC RX 259: Performed by: HOSPITALIST

## 2020-05-27 PROCEDURE — 92526 ORAL FUNCTION THERAPY: CPT | Mod: GN | Performed by: SPEECH-LANGUAGE PATHOLOGIST

## 2020-05-27 PROCEDURE — 2500000200 HC RX 250 WO HCPCS: Performed by: PHYSICAL MEDICINE & REHABILITATION

## 2020-05-27 PROCEDURE — 6370000100 HC RX 637 (ALT 250 FOR IP): Performed by: INTERNAL MEDICINE

## 2020-05-27 PROCEDURE — (BLANK) HC ROOM PRIVATE

## 2020-05-27 PROCEDURE — 97530 THERAPEUTIC ACTIVITIES: CPT | Mod: GP | Performed by: PHYSICAL THERAPIST

## 2020-05-27 PROCEDURE — 6370000100 HC RX 637 (ALT 250 FOR IP): Performed by: PHYSICAL MEDICINE & REHABILITATION

## 2020-05-27 PROCEDURE — 6360000200 HC RX 636 W HCPCS (ALT 250 FOR IP): Performed by: PHYSICAL MEDICINE & REHABILITATION

## 2020-05-27 PROCEDURE — 6370000100 HC RX 637 (ALT 250 FOR IP): Performed by: PSYCHIATRY & NEUROLOGY

## 2020-05-27 PROCEDURE — 82947 ASSAY GLUCOSE BLOOD QUANT: CPT | Mod: QW

## 2020-05-27 PROCEDURE — 97116 GAIT TRAINING THERAPY: CPT | Mod: GP | Performed by: PHYSICAL THERAPIST

## 2020-05-27 PROCEDURE — 99222 1ST HOSP IP/OBS MODERATE 55: CPT | Performed by: PHYSICAL MEDICINE & REHABILITATION

## 2020-05-27 PROCEDURE — 97112 NEUROMUSCULAR REEDUCATION: CPT | Mod: GP | Performed by: PHYSICAL THERAPIST

## 2020-05-27 PROCEDURE — 99080 SPECIAL REPORTS OR FORMS: CPT | Performed by: PHYSICAL THERAPIST

## 2020-05-27 PROCEDURE — 99239 HOSP IP/OBS DSCHRG MGMT >30: CPT | Performed by: HOSPITALIST

## 2020-05-27 RX ORDER — CHOLECALCIFEROL (VITAMIN D3) 25 MCG
1000 TABLET ORAL DAILY
Status: CANCELLED | OUTPATIENT
Start: 2020-05-28

## 2020-05-27 RX ORDER — ROSUVASTATIN CALCIUM 20 MG/1
40 TABLET, COATED ORAL NIGHTLY
Status: DISCONTINUED | OUTPATIENT
Start: 2020-05-27 | End: 2020-06-05 | Stop reason: HOSPADM

## 2020-05-27 RX ORDER — ROSUVASTATIN CALCIUM 10 MG/1
40 TABLET, COATED ORAL NIGHTLY
Status: CANCELLED | OUTPATIENT
Start: 2020-05-27

## 2020-05-27 RX ORDER — ROSUVASTATIN CALCIUM 40 MG/1
40 TABLET, COATED ORAL NIGHTLY
Qty: 30 TABLET | Refills: 11 | OUTPATIENT
Start: 2020-05-27 | End: 2021-05-27

## 2020-05-27 RX ORDER — ONDANSETRON 4 MG/1
4 TABLET, FILM COATED ORAL EVERY 6 HOURS PRN
Status: DISCONTINUED | OUTPATIENT
Start: 2020-05-27 | End: 2020-06-05 | Stop reason: HOSPADM

## 2020-05-27 RX ORDER — ENOXAPARIN SODIUM 100 MG/ML
40 INJECTION SUBCUTANEOUS
Status: DISCONTINUED | OUTPATIENT
Start: 2020-05-27 | End: 2020-06-05 | Stop reason: HOSPADM

## 2020-05-27 RX ORDER — ASPIRIN 81 MG/1
81 TABLET ORAL DAILY
Status: DISCONTINUED | OUTPATIENT
Start: 2020-05-28 | End: 2020-06-05 | Stop reason: HOSPADM

## 2020-05-27 RX ORDER — LISINOPRIL 20 MG/1
20 TABLET ORAL DAILY
Status: CANCELLED | OUTPATIENT
Start: 2020-05-28

## 2020-05-27 RX ORDER — ASPIRIN 81 MG/1
81 TABLET ORAL DAILY
Qty: 30 TABLET | Refills: 11 | OUTPATIENT
Start: 2020-05-28 | End: 2021-05-28

## 2020-05-27 RX ORDER — ACETAMINOPHEN 500 MG
500 TABLET ORAL EVERY 6 HOURS PRN
Status: DISCONTINUED | OUTPATIENT
Start: 2020-05-27 | End: 2020-06-05 | Stop reason: HOSPADM

## 2020-05-27 RX ORDER — CLOPIDOGREL BISULFATE 75 MG/1
75 TABLET ORAL DAILY
Status: DISCONTINUED | OUTPATIENT
Start: 2020-05-28 | End: 2020-06-05 | Stop reason: HOSPADM

## 2020-05-27 RX ORDER — CLOPIDOGREL BISULFATE 75 MG/1
75 TABLET ORAL DAILY
Qty: 30 TABLET | Refills: 11 | OUTPATIENT
Start: 2020-05-28 | End: 2021-05-28

## 2020-05-27 RX ORDER — ONDANSETRON 4 MG/1
4 TABLET, FILM COATED ORAL EVERY 6 HOURS PRN
Status: CANCELLED | OUTPATIENT
Start: 2020-05-27

## 2020-05-27 RX ORDER — LISINOPRIL 20 MG/1
20 TABLET ORAL DAILY
Status: DISCONTINUED | OUTPATIENT
Start: 2020-05-28 | End: 2020-06-02

## 2020-05-27 RX ORDER — CHOLECALCIFEROL (VITAMIN D3) 25 MCG
1000 TABLET ORAL DAILY
Status: DISCONTINUED | OUTPATIENT
Start: 2020-05-28 | End: 2020-06-05 | Stop reason: HOSPADM

## 2020-05-27 RX ORDER — ACETAMINOPHEN 500 MG
500 TABLET ORAL EVERY 6 HOURS PRN
Status: CANCELLED | OUTPATIENT
Start: 2020-05-27

## 2020-05-27 RX ORDER — ENOXAPARIN SODIUM 100 MG/ML
40 INJECTION SUBCUTANEOUS
Status: CANCELLED | OUTPATIENT
Start: 2020-05-27

## 2020-05-27 RX ORDER — LISINOPRIL 20 MG/1
20 TABLET ORAL DAILY
Qty: 30 TABLET | Refills: 11 | OUTPATIENT
Start: 2020-05-28 | End: 2021-05-28

## 2020-05-27 RX ORDER — ASPIRIN 81 MG/1
81 TABLET ORAL DAILY
Status: CANCELLED | OUTPATIENT
Start: 2020-05-28

## 2020-05-27 RX ORDER — CLOPIDOGREL BISULFATE 75 MG/1
75 TABLET ORAL DAILY
Status: CANCELLED | OUTPATIENT
Start: 2020-05-28

## 2020-05-27 RX ADMIN — MELATONIN 1000 UNITS: at 09:55

## 2020-05-27 RX ADMIN — ENOXAPARIN SODIUM 40 MG: 40 INJECTION SUBCUTANEOUS at 16:28

## 2020-05-27 RX ADMIN — LISINOPRIL 20 MG: 20 TABLET ORAL at 09:56

## 2020-05-27 RX ADMIN — ROSUVASTATIN CALCIUM 40 MG: 20 TABLET, FILM COATED ORAL at 20:29

## 2020-05-27 RX ADMIN — DIPHENHYDRAMINE HYDROCHLORIDE 25 MG: 25 CAPSULE ORAL at 20:29

## 2020-05-27 RX ADMIN — INSULIN DETEMIR 25 UNITS: 100 INJECTION, SOLUTION SUBCUTANEOUS at 21:06

## 2020-05-27 RX ADMIN — INSULIN ASPART 1 UNITS: 100 INJECTION, SOLUTION INTRAVENOUS; SUBCUTANEOUS at 12:40

## 2020-05-27 RX ADMIN — PSYLLIUM HUSK 1 PACKET: 3.4 POWDER ORAL at 09:56

## 2020-05-27 RX ADMIN — ASPIRIN 81 MG: 81 TABLET ORAL at 09:55

## 2020-05-27 RX ADMIN — CLOPIDOGREL BISULFATE 75 MG: 75 TABLET ORAL at 09:55

## 2020-05-27 ASSESSMENT — ACTIVITIES OF DAILY LIVING (ADL): ADEQUATE_TO_COMPLETE_ADL: YES

## 2020-05-27 NOTE — DISCHARGE SUMMARY
"[address] [place] [address], [place] [address]  Discharge Summary    Patient name: Del Mckeon  MRN: [id number]    Admission Date: 5/21/2020       Discharge Date: 5/27/2020    Admitting Provider: Froilan Farias MD  Discharge Provider: Jeremiah Barragan MD  Primary Care Physician at Discharge: Karri Esteves [phone]     Discharge Disposition  Rehab hospital  Code Status at Discharge: Full Code    Outpatient Follow-Up  Future Appointments   Date Time Provider Department Center   6/25/2020  1:30 PM [facility]        Discharge Diagnosis  Principal Problem:    Ischemic cerebrovascular accident (CVA) (CMS/[facility]) ([facility])  Active Problems:    Benign essential hypertension    Hyperlipidemia    Type 2 diabetes mellitus without complication, with long-term current use of insulin (CMS/[facility]) ([facility])    Microscopic hematuria    Hypomagnesemia    NIK (obstructive sleep apnea)         Discharge medication list      CONTINUE taking these medications      Instructions   insulin syringe-needle U-100 1 mL 29 gauge x 1/2\" syringe         ASK your doctor about these medications      Instructions   Aleve 220 mg tablet  Generic drug:  naproxen sodium      Fish Oil 340-1,000 mg capsule  Generic drug:  omega-3 fatty acids-fish oil      ibuprofen 200 mg tablet  Commonly known as:  ADVIL,MOTRIN      losartan 50 mg tablet  Commonly known as:  COZAAR  Ask about: Which instructions should I use?      niacin 500 mg tablet      NovoLIN N Flexpen 100 unit/mL insulin pen pen  Generic drug:  insulin NPH isoph U-100 human      Tylenol PM Extra Strength  mg tablet  Generic drug:  diphenhydrAMINE-acetaminophen      Vitamin D3 25 mcg (1,000 unit) capsule  Generic drug:  cholecalciferol (vitamin D3)             Hospital Course  Patient admitted to [facility] with acute pontine stroke and placed on aspirin, Plavix, and statin.  Patient with sinus rhythm on EKG and telemetry and otherwise negative neuroimaging studies. " " Patient with slowly improving persistent left-sided weakness.  Patient receiving PT OT speech therapy.  Diabetes and blood pressure optimized.  Patient seen by neurology.  Patient now stable and going to inpatient rehab for further restorative care.    Physical Exam at Discharge   Discharge Condition: good  /88 (BP Location: Right arm, Patient Position: Head of bed 30 degrees or higher, Cuff Size: Regular Adult)   Pulse 78   Temp 36.8 °C (98.3 °F) (Oral)   Resp 18   Ht 1.803 m (5' 11\")   Wt 89.8 kg (198 lb)   SpO2 96%   BMI 27.62 kg/m²   Weight: 89.8 kg (198 lb)  HEENT:  PERRLA. Extra ocular movements are intact. Oral pharynx clear without erythema of exudate.   Neck:  Supple. Nontender.  No palpable lymphadenopathy, JVD, or goiter.  Lungs: Clear to auscultation bilaterally without adventitious sounds appreciated  Heart: Regular rate and rhythm with normal S1, S2  Abdomen: Soft.  Nontender.  Normal active bowel sounds.  No hepatosplenomegaly or other masses appreciated.  Genitourinary: Normal external genitalia  Extremities: No clubbing, cyanosis, or edema.  Skin: Normal skin turgor  Neurologic: Alert oriented ×3.  CN II through XII intact.  5 out of 5 motor strength throughout upper and lower extremities.      Time spent coordinating discharge: 35 min    Electronically signed by: Jeremiah Barragan Jr., MD  5/27/2020  1:04 PM    "

## 2020-05-27 NOTE — H&P
Rehab History and Physical and Post-Admission Physician Evaluation    05/27/203:24 PM      CC: CVA    HISTORY OF PRESENT ILLNESS:  59-year-old male admitted to [facility] 5/21/2020 after approximately 4 days of left-sided weakness, gait instability. MRI demonstrated acute right pontine CVA, and aspirin, Plavix, statin were started. Sinus rhythm on EKG/telemetry.  His acute stay was noted to have hematuria for which a renal bladder ultrasound was obtained and was negative for any evidence of bleeding or other abnormality.  VFSS was performed and negative for aspiration.  Left-sided weakness, gait ataxia, and disequilibrium persist and are his primary functional deficits.        Prior level of function:  Prev fully independent with all activities of daily living and mobility.    Current level of function:  Balance is a major problem per PT notes.  Min assist for bed mobility and transfers.  Ambulates 100 m wheeled walker but very slow and unsteady at a min assist level.    The following have been reviewed and updated as appropriate for this encounter:         Past Medical History:   Diagnosis Date   • Anemia    • Asthma    • Depressive disorder    • Heart murmur    • Hyperlipidemia    • Hypertension    • Obesity    • Obstructive sleep apnea    • Type 2 diabetes mellitus (CMS/HCC) ([facility])        Past Surgical History:   Procedure Laterality Date   • CRYOTHERAPY      dermatology       Family History   Problem Relation Age of Onset   • Alzheimer's disease Father    • Diabetes type II Father    • Diabetes Sister        Family History reviewed and non-contributory to this admission.    Social History     Socioeconomic History   • Marital status:      Spouse name: Not on file   • Number of children: Not on file   • Years of education: Not on file   • Highest education level: Not on file   Occupational History   • Not on file   Social Needs   • Financial resource strain: Not on file   • Food insecurity      Worry: Not on file     Inability: Not on file   • Transportation needs     Medical: Not on file     Non-medical: Not on file   Tobacco Use   • Smoking status: Never Smoker   • Smokeless tobacco: Never Used   Substance and Sexual Activity   • Alcohol use: No   • Drug use: Yes     Types: Hydrocodone     Comment: 3 times daily, low back/sciatica pain   • Sexual activity: Defer   Lifestyle   • Physical activity     Days per week: Not on file     Minutes per session: Not on file   • Stress: Not on file   Relationships   • Social connections     Talks on phone: Not on file     Gets together: Not on file     Attends [employer] service: Not on file     Active member of club or organization: Not on file     Attends meetings of clubs or organizations: Not on file     Relationship status: Not on file   • Intimate partner violence     Fear of current or ex partner: Not on file     Emotionally abused: Not on file     Physically abused: Not on file     Forced sexual activity: Not on file   Other Topics Concern   • Not on file   Social History Narrative   • Not on file       Social History reviewed.  No pertinent changes or additions.    Allergies   Allergen Reactions   • Penicillins      Tested as a child; showed positive           Current Facility-Administered Medications   Medication Dose Route Frequency Provider Last Rate Last Dose   • enoxaparin (LOVENOX) syringe 40 mg  40 mg subcutaneous Daily at 1600 Derick Small, DO       • ondansetron (ZOFRAN) tablet 4 mg  4 mg oral q6h PRN Derick Small DO       • rosuvastatin (CRESTOR) tablet 40 mg  40 mg oral Nightly Derick Small DO       • insulin detemir U-100 (LEVEMIR) injection 25 Units  25 Units subcutaneous Insulin: Nightly Derick Small DO       • acetaminophen (TYLENOL) tablet 500 mg  500 mg oral q6h PRN Derick Small DO       • [START ON 5/28/2020] aspirin EC tablet 81 mg  81 mg oral Daily Derick Small DO       • [START ON 5/28/2020] cholecalciferol (vitamin  "D3) 1,000 unit (25mcg) tab/cap 1,000 Units  1,000 Units oral Daily Derick Small DO       • [START ON 5/28/2020] clopidogreL (PLAVIX) tablet 75 mg  75 mg oral Daily Derick Small DO       • [START ON 5/28/2020] lisinopriL (PRINIVIL,ZESTRIL) tablet 20 mg  20 mg oral Daily Derick Small DO       • [START ON 5/28/2020] psyllium (METAMUCIL SUGAR FREE) 1 packet  1 packet oral Daily Derick Small DO       • sodium chloride (OCEAN) 0.65 % nasal spray 2 spray  2 spray Each Nostril PRN Derick Small DO           Prior to Admission medications    Medication Sig Start Date End Date Taking? Authorizing Provider   naproxen sodium (Aleve) 220 mg tablet Take 220 mg by mouth every evening    Historical Provider, MD   insulin NPH isoph U-100 human (NovoLIN N Flexpen) 100 unit/mL insulin pen pen Inject 36 Units under the skin daily    Historical Provider, MD   omega-3 fatty acids-fish oil (Fish Oil) 340-1,000 mg capsule Take 1 capsule by mouth daily    Historical Provider, MD   niacin 500 mg tablet Take 1,000 mg by mouth daily with breakfast    Historical Provider, MD   losartan (COZAAR) 50 mg tablet Take 50 mg by mouth 2 (two) times a day   2/26/18   Historical Provider, MD   diphenhydramine-acetaminophen (TYLENOL PM EXTRA STRENGTH)  mg tablet Take by mouth nightly as needed   10/25/13   Conversion Provider   ibuprofen (ADVIL,MOTRIN) 200 mg tablet Take 200-400 mg by mouth every 6 (six) hours as needed   6/21/13   Conversion Provider   cholecalciferol, vitamin D3, (VITAMIN D3) 1,000 unit capsule Take 1,000 Units by mouth daily   6/11/13   Jean Piña MD   insulin syringe-needle U-100 1 mL 29 gauge x 1/2\" syringe  3/23/12   Conversion Provider         REVIEW OF SYSTEMS:  Except as noted elsewhere, 10 systems reviewed and otherwise negative:  Negative for chills, fever, unexpected weight change, epistaxis, sore throat, voice change, vision changes, cough, shortness of breath, central chest pain/pressure, " abdominal pain, diarrhea, vomiting, cold intolerance, heat intolerance, dysuria, frequency, hematuria, rash/new lesions, dizziness, seizures, speech difficulty, light-headedness, headaches, arthralgias, gait problem, joint swelling, myalgias, neck pain, easy/excessive bruising/bleeding, mood/sleep disturbance.       PHYSICAL EXAM:  Temp:  [36.7 °C (98 °F)-36.8 °C (98.3 °F)] 36.8 °C (98.3 °F)  Heart Rate:  [78-87] 78  Resp:  [16-18] 18  BP: (126-153)/() 152/88  No intake/output data recorded.  GENERAL: Alert, conversant, in no acute distress.  PSYCH: normal expressive affect.  HEENT: Normocephalic. EOMI, sclerae anicteric. Oropharynx moist and normal. Halitosis.  NECK: Trachea is midline.  LUNGS:  Unlabored.  CV: Regular. Distal BUE pulses intact.  ABDOMEN:  Nondistended.  EXTREMITIES:  No clubbing, cyanosis, gross deformity.  NEURO:  Alert and oriented. DTRs hyporeflexic throughout. Speech intelligible, coherent, and normal. Slight hypophnia at times.  SKIN:  Warm and dry, normal turgor.          CBC with Platelet:    Lab Results   Component Value Date    WBC 8.1 05/21/2020    HGB 14.6 05/21/2020    HCT 42.1 05/21/2020     05/21/2020     Renal Panel:   Lab Results   Component Value Date     05/21/2020    K 3.5 05/21/2020     05/21/2020    CO2 24 05/21/2020    BUN 13 05/21/2020    CREATININE 0.70 05/21/2020    GLUCOSE 117 (H) 05/21/2020    CALCIUM 8.9 05/21/2020     Magnesium:   Lab Results   Component Value Date    MG 1.6 (L) 05/21/2020     Coags: No results found for: PT, APTT, INR  C-Reactive Protein Screen:   Lab Results   Component Value Date    CRP 5.1 05/20/2019     A1c:   Lab Results   Component Value Date    HGBA1C 5.8 05/22/2020      Blood (Aerobic and Anaerobic):  No results found for: BLOODCX  Wound: No results found for: WOUNDCX    No results found.      Assessment/Plan   ASSESSMENT AND PLAN:    - Right pontine CVA. ASA, plavix, statin for 2' ppx. PT/OT/SLP/TR.  - Gait ataxia.  Fall precautions.  - Benign essential hypertension. Lisinopril.  - Hyperlipidemia. Statin.  - DM2. Diet, accuchecks, levemir.  - Microscopic hematuria. Neg US kidneys/bladder.  - Hypomagnesemia.  - NIK. Did not use CPAP in acute hospital.  - DVT ppx. Lovenox.  - Code Status:  Full Code      REHABILITATION PLAN AND MER:    The Post Assessment Physician Evaluation (MER) found the current functional status to be comparable with the Pre-admission Screening. The Patient is a good candidate for acute inpatient rehabilitation. Nothing since the Pre-admission screen has changed that determination.     The patient has shown the ability to tolerate and benefit from 3 hours of therapy daily and is being admitted to a comprehensive acute inpatient rehabilitation program consisting of at least 3 hours of combined physical, occupational and speech therapies.    Begin intensive Physical Therapy for a minimum of 1.5 hours a day, at least 5 out of 7 days per week to address bed mobility, transfers, ambulation, strengthening, balance, and endurance.     Begin intensive Occupational Therapy for a minimum of 1.5 hours a day, at least 5 out of 7 days per week to address ADL (feeding, grooming, bathing, UE and LE dressing, toileting); instrumental ADLs; cognitive function; safety; energy conservations; community reintegration; and adaptive equipment as needed.    Cognitive function - Begin ST evaluation and management as needed at least for a minimum of 3-5 out of 7 days a week for: higher level-cognitive function, potential impaired communication/language skills,dysphagia, compensatory strategies as indicated.    The patient will also require 24-hour skilled rehabilitation nursing for bowel and bladder management, skin care for decubitus ulcer prevention, pain management, ongoing medication administration.      The patient may benefit from a psychology consult for depression/anxiety/adjustment disorder/cognitive/behavioral issues.      The patient will also require close supervision of a rehabilitation physician for medical management of complications/comorbidities.    The patient's prognosis for significant practical improvement within a reasonable period of time appears good and the estimated length of stay is   7  days and is expected to return home with family support and continued rehabilitation with outpatient/home health therapy.     Given the patient's complex neurologic/orthopedic/medical condition and the risk of further medical complications including: DVT, PE, skin breakdown, pneumonia due to decreased mobility, CVA, hemorrhagic conversion of current CVA, MI, cardiac arrhythmias due to CVA and HTN, hypo/hyperglycemia due to diabetes,  electrolyte imbalance.     For these ongoing medical issues, rehabilitation services could not be safely provided at a lower level of care such as a skilled nursing facility or nursing home.    Rehabilitation Prognosis: Good          A voice recognition program was used to aid in documentation of this record. Sometimes words are not presented exactly as they were spoken.  While efforts were made to carefully edit and correct any inaccuracies, some errors may be present.  Please take this into context.  Please contact the provider if errors are identified.      Derick Small, DO

## 2020-05-27 NOTE — INTERDISCIPLINARY/THERAPY
05/27/20 1115   Pain Assessment   Pain Assessment No/denies pain   Subjective Comments   Subjective Comments RN approved tx.   General   Family/Caregiver Present No   Daily Treatment   Session Activities Dysphagia   Dysphagia   Current Diet/Liquid Consistency Regular;Thin liquids   Thin Liquids   Presentation Straw;Self-fed   Oral WFL   Pharyngeal No signs or symptoms of aspiration   Aspiration Risk   Risk for Aspiration Mild   Diet Solids Recommendations Regular   Diet Liquids Recommendations Thin liquids   Compensatory Swallowing Strategies Upright as possible for all oral intake (HOB at least 45 degrees);Small bites/sips;Eat/feed slowly   Activity Tolerance   Activity Tolerance Patient tolerated treatment well   Assessment Comments   Assessment Comments Patient was seen at bedside after having a walk.  Patient reports he continues to occasionally have coughing with thin liquids.  Assessed tolerance of thin liquids via straw, patient without overt s/s aspiration during session.  Educated patient regarding safe swallow strategies and consistent use for safety, he verbalized understanding.  Patient was provided with oral motor program handout and educated regarding its use as well as provided with demonstration for completing.  Patient verbalized understanding.   Plan   Plan Comments reg/thins, OME   Treatment Interventions Dysphagia Therapy

## 2020-05-27 NOTE — NURSING END OF SHIFT
Nursing End of Shift Summary:    Patient: Del Mckeon  MRN: [id number]  : 1960, Age: 59 y.o.    Location: [address] [place] [address]    Nursing Goals  Clinical Goals for the Shift: Maintain safety; monitor neuros; monitor for comfort    Narrative Summary of Progress Toward Clinical Goals:  Pt remained safe.  Neuros stable.  Pt comfortable.  Will continue to monitor.     Barriers to Goals/Nursing Concerns:  Potential for home health.     New Patient or Family Concerns/Issues:    Shift Summary:      Significant Events & Communications to Providers (last 12 hours)      Last 5 Values    No documentation.              Oxygen Usage (last 12 hours)      Last 5 Values    No documentation.              Mobility (last 12 hours)      Last 5 Values     Row Name 20                   Mobility    Activity  Bedrest;Bathroom privileges;Ambulate in room;Turn;Sleeping        Level of Assistance  Standby assist, set-up cues, supervision of patient - no hands on        Anti-Embolism Devices  Bilateral;AE calf pump        Anti-Embolism Intervention  Refused            Urethral Catheter    Active Urethral Catheter     None            Active Lines    Active Central venous catheter / Peripherally inserted central catheter / Implantable Port / Hemodialysis catheter / Midline Catheter     None              Infusing Medications   Medication Dose Last Rate     PRN Medications   Medication Dose Last Dose   • acetaminophen  500 mg 500 mg at 20 1302   • sodium chloride  2 spray     • hydrALAZINE  10 mg 10 mg at 20 0433   • ondansetron  4 mg     • magnesium hydroxide  30 mL       _________________________  Arline Lozano RN  20 4:40 AM

## 2020-05-27 NOTE — INTERDISCIPLINARY/THERAPY
Case Management Progress Note  [phone]    Narrative: CM reviewed plan of care with Haylee at [facility] and [place] with PT. Pt worked with PT this morning and had 3 LOB and is reportedly high fall risk and not safe to discharge home per PT. This information was relayed to Haylee at [facility] - Haylee states she will start the authorization with insurance to see if they will cover inpatient rehab. CM following.    Diagnosis: CVA    Plan of Care: PT/OT, insurance auth at [facility]    Discussed Discharge Needs/Topics: As noted above

## 2020-05-27 NOTE — REHAB PRE-SCREEN
[facility]  Preadmission Screening      Screening Completion Method: Review of Medical Record and Telephone  Referring Facility:   Reason for Consult: Del Mckeon is a 59 y.o. male whose primary indication for inpatient rehabilitation is:  Stroke:  01.1  Left Body Involvement (Right Brain)    Provider who ordered Consult: Dr. Derick Berry  Rehab Physician: Dr. Small  PCP: Karri Esteves MD    Surgery Information:   Did the patient have surgery? No    IMPRESSION: Pt admitted to  on 5/21/2020 with left sided weakness. Pt was found to have a small acute right inferior anterior pontine infarct. Pt is participating in therapies for functional ADL deficits. Pt requires continued rehabilitation with medical supervision to achieve the highest functional level with minimal complications.    RECOMMENDATIONS / PLAN: Admit to [facility]  Goals for admission: Pt will achieve the highest possible level of functioning with least restrictive AD prior to discharge.   Likelihood of reaching these goals: good.   Patient stated goals:Return home  Therapies required to achieve goals: Physical Therapy, Occupational Therapy, Speech Therapy and Recreation Therapy  Frequency and duration of therapies expect to be 3 hrs/day, 5 days/week.    Barriers to achieving goals: No caregiver support, Limited insight into deficits, Decreased endurance, Upper extremity weakness and Lower extremity weakness  Evaluation for risk of clinical complications: DVT, respiratory complications, increased pain, infection, falls, abnormal vital signs and abnormal labs  Expected length of stay: 1 week(s)    When medically stable, anticipated disposition:  home. The potential to achieve that is  good.    Expected level of improvement: Anticipate Pt may require an AD for ambulation and or locomotion either independently or with assist, cognition and swallow will improve, and overall function with ADL's will improve.   Anticipate  "Pt’s pain will be managed, safety maintained, skin healing, bowel and bladder functions at baseline, free from infection, and hemodynamically stable.      CURRENT HOSPITALIZATION:  Reason for admission to the hospital:   Chief Complaint   Patient presents with   • Extremity Weakness     pt c/o 4 day history of generalized weakness along with \"worse\" weakness to his left side x4 days, states he woke up 4 days ago with the weakness     Active Hospital Problems: Principal Problem:    Ischemic cerebrovascular accident (CVA) (CMS/[facility]) ([facility])  Active Problems:    Benign essential hypertension    Hyperlipidemia    Type 2 diabetes mellitus without complication, with long-term current use of insulin (CMS/[facility]) ([facility])    Microscopic hematuria    Hypomagnesemia    NIK (obstructive sleep apnea)    Code Status: Full Code  Allergies: Penicillins  Diet: Regular, diabetic carb counting  Weight Bearing Status: as tolerated  Precautions:Bed Alarm, Fall, Personal Alarm and VTE Precautions  Treatment Team: Physical Therapy, Occupational Therapy, Speech/Language Therapy and Recreation Therapy  Recent Labs:    Results for MOSES GALLOWAY (MRN [id number]) as of 5/27/2020 12:09   Ref. Range 5/21/2020 10:54   Sodium Latest Ref Range: 135 - 145 mmol/L 141   Potassium Latest Ref Range: 3.5 - 5.1 mmol/L 3.5   Chloride Latest Ref Range: 98 - 107 mmol/L 105   CO2 Latest Ref Range: 21 - 32 mmol/L 24   Anion Gap Latest Ref Range: 3 - 11 mmol/L 12 (H)   BUN Latest Ref Range: 7 - 25 mg/dL 13   Creatinine, Ser Latest Ref Range: 0.70 - 1.30 mg/dL 0.70   eGFR Latest Ref Range: >60 mL/min/1.73m*2 103   Glucose Latest Ref Range: 70 - 105 mg/dL 117 (H)   Calcium Latest Ref Range: 8.6 - 10.3 mg/dL 8.9   Phosphorus Latest Ref Range: 2.5 - 4.9 mg/dL 3.2   Magnesium Latest Ref Range: 1.8 - 2.4 mg/dL 1.6 (L)   Alkaline Phosphatase Latest Ref Range: 45 - 115 U/L 55   Albumin Latest Ref Range: 3.5 - 5.3 g/dL 3.9   Total Protein Latest Ref Range: 6.0 - 8.3 g/dL 6.4 "   AST Latest Ref Range: <40 U/L 18   ALT (SGPT) Latest Ref Range: 7 - 52 U/L 17   Total Bilirubin Latest Ref Range: 0.20 - 1.40 mg/dL 0.77   Corrected Calcium Latest Ref Range: 8.6 - 10.3 mg/dL 9.0   Results for MOSES GALLOWAY (MRN [id number]) as of 5/27/2020 12:09   Ref. Range 5/21/2020 10:54   WBC Latest Ref Range: 3.7 - 9.6 10*3/uL 8.1   RBC Latest Ref Range: 4.10 - 5.80 10*6/µL 4.65   Hemoglobin Latest Ref Range: 13.2 - 17.2 g/dL 14.6   Hematocrit Latest Ref Range: 38.0 - 50.0 % 42.1   MCV Latest Ref Range: 82.0 - 97.0 fL 90.4   MCH Latest Ref Range: 29.0 - 34.0 pg 31.4   MCHC Latest Ref Range: 32.0 - 36.0 g/dL 34.8   RDW Latest Ref Range: 11.5 - 15.0 % 14.2   MPV Latest Ref Range: 6.9 - 10.8 fL 7.9   Platelets Latest Ref Range: 130 - 350 10*3/uL 205     HISTORY:    Past Medical History:  Past Medical History:   Diagnosis Date   • Anemia    • Asthma    • Depressive disorder    • Heart murmur    • Hyperlipidemia    • Hypertension    • Obesity    • Obstructive sleep apnea    • Type 2 diabetes mellitus (CMS/HCC) ([facility])        Past Surgical History:  Past Surgical History:   Procedure Laterality Date   • CRYOTHERAPY      dermatology       Social History:   Social History     Occupational History   • Not on file   Tobacco Use   • Smoking status: Never Smoker   • Smokeless tobacco: Never Used   Substance and Sexual Activity   • Alcohol use: No   • Drug use: Yes     Types: Hydrocodone     Comment: 3 times daily, low back/sciatica pain   • Sexual activity: Defer   Social History Narrative   • Not on file       Preferred Language: English  :  No    Latter day/Cultural Practices:  Unknown    Prior Level of Function:   Prior Function  Level of Brooke: Independent with homemaking with ambulation  Lived With: Alone  Receives Help From: Family  ADL Assistance: Independent  IADL Assistance: Independent  Driving: No  Current License: Yes  Mode of Transportation: (Dial A Ride)  Vocational Status:  Retired  Vocational Type:   Prior Function Comments: Indep. no AD, reports history of several falls within the last 6 months.           Current functional status:   Level of Assistance 1 Min assist x 1   Bed Mobility Comments 1 Incr.time, effort, not able to effectively push up trunk w/LUE, using rail needs Jessica to sit up.   Transfers   Transfer Assessed   Transfer 1   Technique 1 Sit to stand   Transfer Device 1 Front wheeled walker  (feels FWW is in his way w/fwd wt.shift, pushes it away.)   Level of Assistance 1 Standby assistance;Min assist x 1   Trials/Comments 1 [id number]. Demo's retro loss of balance, x2, tipping back onto bed, unable to self-correct. Braces feet against bed; discussed that this will not be an option from his floor-mattress at home. Discussed having stable object to reach for by bed. Cued to square off fww w/seat when preparing to sit.   Ambulation 1   Device 1 Front wheeled walker;Gait belt   Assistance 1 Standby assistance;Contact guard assist x 1   Quality of Gait 1 Walks slowly, w/narrow base of support, short strides bilat. w/FWW.    Comments/Distance 1 100m, mostly w/FWW. Attmpt short walk, 5m without FWW, but very slow/unsteady, requests to keep fww for end of walk. Pt.states even w/FWW, he feels much safer walking along wall, for incr.support, if needed.   Stairs   Stairs No   Balance   Balance Impaired   Standing   Standing-Exercise Comments Performs standing wt.shifts, very ltd/guarded, R>L, w/1 episode of retro loss of balance, unable to self-correct, tipping back onto bed w/CGA. Performs mini-squats, mini-marches, close SBA.       Comorbid conditions that will impact course of rehabilitation:   See above    Insurance/Payor:   Payor: BLUE [employer] FEDERAL  / Plan: [facility] FEDERAL / Product Type: *No Product type* /     Reported Home Environment:  Home Living  Type of Home: Apartment  Home Layout: One level  Home Access: Elevator  Bathroom Shower/Tub:  Tub/shower unit  Bathroom Toilet: Standard  Bathroom Equipment: None  Home Adaptive Equipment: None  Home Living Comments: Pt. lives in [place] alone. (I) mobility PTA      Wounds/Active Lines:  Active Wounds     None                 Anticipated Discharge Needs:  Equipment  Follow Up Appointments  Home Health  Outpatient Therapies  Transportation    Rehab Clinician: KERON LOCO RN  5/27/2020  12:41 PM      Physician Disclosure:  As the rehabilitation physician, I have reviewed and concur with the findings of the preadmission screening. Del Mckeon is a good candidate for IRF admission..

## 2020-05-27 NOTE — INTERDISCIPLINARY/THERAPY
5/27/20-Patient to be transported to  Rehab at 2:30 today with our MH [facility] is . Please take patient to admissions lobby in regular w/c with no pole at 2:30

## 2020-05-27 NOTE — FAX COVER SHEET
Fax Transmission  ----------------------------------------------------------------------------------------------------------------------  Facility Name:  [facility] - Care Management Dept.  Mailing address:  [address] [place] [address], Zip:  [place] [address]  Tax ID:   [id number]  NPI:    [id number]      Attention: UR    Updated information from CM and therapy    Comments: [facility] has become [facility]: Find out more at [email or web address]        Auth/Cert Number:   [id number]        Please call with any questions.        Thanks,         Vicki Melo RN, Case Management- Utilization Review  [address] [place] [address]  p:  [phone]    f: [phone]    e: [email or web address]    This facsimile message is CONFIDENTIAL and may contain -privileged information and/or Protected Health Information (PHI) as defined in the federal Health Insurance Portability and Accountability Act, as amended.  This facsimile is  intended ONLY for the use of the individual or company named.  If the reader is NOT the intended recipient, or the employee or agent responsible to deliver it to the intended recipient, you are hereby notified that any dissemination, distribution, or copying of this communication is prohibited.  If you have received this communication in error, please immediately notify us by telephone so that we may arrange for the return of the original message.

## 2020-05-27 NOTE — INTERDISCIPLINARY/THERAPY
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 No   Additional Pertinent History Pt.notes onset R hand weakness 3wk pta, H/o HTN, DM2, HLD, aortic sclerosis, cataracts w/ diminished visual acuity.    Family/Caregiver Present No   Precautions   Reinforced Precautions Yes   Other Precautions fall, alarm   Subjective Comments   Subjective Comments RN OKs PT. Pt.just back to bed, tired after showering w/asst. Pt.reports recent onset R hand weakness, 3wk pta, 'can't turn key in ignition.\" States balance is main problem, also notes word-finding prob, and difficulty swallowing liquids at times, \"chewing and food OK.\" Ends session in chair, alarm on, needs in reach.   Cognition   Arousal/Alertness WFL  (labile. Teaful discussing status. Fears ALS, \"runs in my fam)   Cognition Comment shows good insight as to deficits, admits to unsteadiness.   Bed Mobility   Bed Mobility Assessed   Bed Mobility 1   Level of Assistance 1 Min assist x 1   Bed Mobility Comments 1 Incr.time, effort, not able to effectively push up trunk w/LUE, using rail needs Jessica to sit up.   Transfers   Transfer Assessed   Transfer 1   Technique 1 Sit to stand   Transfer Device 1 Front wheeled walker  (feels FWW is in his way w/fwd wt.shift, pushes it away.)   Level of Assistance 1 Standby assistance;Min assist x 1   Trials/Comments 1 [id number]. Demo's retro loss of balance, x2, tipping back onto bed, unable to self-correct. Braces feet against bed; discussed that this will not be an option from his floor-mattress at home. Discussed having stable object to reach for by bed. Cued to square off fww w/seat when preparing to sit.   Ambulation 1   Device 1 Front wheeled walker;Gait belt   Assistance 1 Standby " "assistance;Contact guard assist x 1   Quality of Gait 1 Walks slowly, w/narrow base of support, short strides bilat. w/FWW.    Comments/Distance 1 100m, mostly w/FWW. Attmpt short walk, 5m without FWW, but very slow/unsteady, requests to keep fww for end of walk. Pt.states even w/FWW, he feels much safer walking along wall, for incr.support, if needed.   Stairs   Stairs No   Balance   Balance Impaired   Standing   Standing-Exercise Comments Performs standing wt.shifts, very ltd/guarded, R>L, w/1 episode of retro loss of balance, unable to self-correct, tipping back onto bed w/CGA. Performs mini-squats, mini-marches, close SBA.   Therapeutic Activities   Therapeutic Activities Movements mildly ataxic, L>R. Coord.delayed w/YAZ L hand/foot.    Other Activities   Other Activities Comments Discussed pt.'s cont.high fall risk, reviewed need for staff w/all mobility. Spoke w/CM re status.   Neuromuscular Re-education/Vestibular Rehabilitation   Vestibular Rehabilitation Reports fall on 2/12/20, w/spnning sensation, and 3 episodes over following wk. Had ear fullness, and \"was told I have water on the ear.\" Episodes resolved.   Activity Tolerance   Activity Tolerance Comments Tachy at rest, ; up to 116 after walk; sa02 WFL.   Assessment   Rehab Potential Good   Progress Progressing toward goals   Problem List Impaired balance;Decreased mobility;Decreased coordination   Barriers to Discharge Decreased caregiver support   Assessment Comment Pt.continues to require 1-asst and use of FWW, for balance/safety. Shows mult.episodes of loss of balance (w/sit>stands, wt.shifts), for which he is unable to self-correct. Remains high fall risk, w/slow gait speed, decreased coordination, mild ataxia.   Recommendation   Recommendations for Therapy Able to tolerate 3 hours therapy;Continue skilled therapy   Equipment Recommended Front wheeled walker   Plan   Treatment Interventions Balance training;Gait training;Neuromuscular " re-education   PT Frequency 5-7x/wk;1-2x/day   Plan Comment 1-asst. bed mobility, safety w/ transfers, 100m+ amb, higher level balance, LLE strengthening. Floor transfers (his mattress is on floor).

## 2020-05-27 NOTE — INTERDISCIPLINARY/THERAPY
Case Management Discharge Note  [phone]    Discharge Disposition: [facility]    Transportation: [facility] Wheelchair [place]    Specialty Referrals: None    Support System Notified: Yes, per pt.

## 2020-05-27 NOTE — INTERDISCIPLINARY/THERAPY
([phone]):Ladi  Received transportation request from:[facility]    Verified with CM the following:     Patient name/room #: Mike Noriega Norman  Discharge date: 05/27/20  Suggested mode: PHT  Discharge disposition (address-no PO box): [facility]  Suggested  time (Is there a particular time that facility needs if applicable): between 2 and 3  Ambulatory ( will not assist): Y  Wheelchair (size):    standard                                        Able to sit up for transport (consider hrs traveled) with good trunk control? Y  Escort required? (Escort requirements-greater than 2-hr drive; confusion/behavior; assistance to bathroom) Reason? N   Confirmed person at home if not ambulatory or needs assistance (name, phone #): rehab  DME (02/walker): N  NEW O2 set up?          N     02 rate:  Precautions: Y/N   Type: standard  (Standard, Contact, Droplet, Airborne - COVID is “special droplet/contact”)  Does patient have keys to get into home? NA                           Called and spoke with TANJA Lobato and he stated he would take patient today at 2:30 to  Rehab via our  w/c van.     Notes:  Called floor nurse and CM to inform of discharge time. Added note under Treatment Team Sticky Notes. Completed “Ticket to Ride” form and scanned to CM - CM/DCA to print and provide to floor staff.     Updated Transportation tracker.

## 2020-05-27 NOTE — NURSING END OF SHIFT
Nursing End of Shift Summary:    Patient: Del Mckeon  MRN: [id number]  : 1960, Age: 59 y.o.    Location: [address] [place] [address]    Nursing Goals  Clinical Goals for the Shift: Promote Safety and comfort    Narrative Summary of Progress Toward Clinical Goals:  Pt remained safe and comfortable. Did not complain of any pain. Pt ambulated several times around the floor.     Barriers to Goals/Nursing Concerns:  No    New Patient or Family Concerns/Issues:  No    Shift Summary:      Significant Events & Communications to Providers (last 12 hours)      Last 5 Values    No documentation.              Oxygen Usage (last 12 hours)      Last 5 Values    No documentation.              Mobility (last 12 hours)      Last 5 Values     Row Name 20 1000 20 1400                Mobility    Activity  Ambulate in carias  Ambulate in carias       Level of Assistance  Standby assist, set-up cues, supervision of patient - no hands on  Standby assist, set-up cues, supervision of patient - no hands on       Distance Ambulated (meters)  100 Meters  200 Meters       Anti-Embolism Devices  Bilateral;AE calf pump  --       Anti-Embolism Intervention  Refused  --           Urethral Catheter    Active Urethral Catheter     None            Active Lines    Active Central venous catheter / Peripherally inserted central catheter / Implantable Port / Hemodialysis catheter / Midline Catheter     None              Infusing Medications   Medication Dose Last Rate     PRN Medications   Medication Dose Last Dose   • acetaminophen  500 mg 500 mg at 20 1302   • sodium chloride  2 spray     • hydrALAZINE  10 mg 10 mg at 20 0433   • ondansetron  4 mg     • magnesium hydroxide  30 mL       _________________________  Yojana Jacob RN  20 6:18 PM

## 2020-05-27 NOTE — FAX COVER SHEET
Fax Transmission  ----------------------------------------------------------------------------------------------------------------------  Facility Name:  [facility] - Care Management Dept.  Mailing address:  [address] [place] [address], Zip:  [place] [address]  Tax ID:   [id number]  NPI:    [id number]      Attention: UR  Discharge summary    Comments: [facility] has become [facility]: Find out more at [email or web address]        Auth/Cert Number:   [id number]        Please call with any questions.        Thanks,         Vicki Melo RN, Case Management- Utilization Review  [address] [place] [address]  p:  [phone]    f: [phone]    e: [email or web address]    This facsimile message is CONFIDENTIAL and may contain -privileged information and/or Protected Health Information (PHI) as defined in the federal Health Insurance Portability and Accountability Act, as amended.  This facsimile is  intended ONLY for the use of the individual or company named.  If the reader is NOT the intended recipient, or the employee or agent responsible to deliver it to the intended recipient, you are hereby notified that any dissemination, distribution, or copying of this communication is prohibited.  If you have received this communication in error, please immediately notify us by telephone so that we may arrange for the return of the original message.

## 2020-05-27 NOTE — PLAN OF CARE
Problem: Pain - Adult  Description: Pt denies discomfort at this time  Goal: Verbalizes/displays adequate comfort level or baseline comfort level  Description: INTERVENTIONS:  1. Encourage patient to monitor pain and request interventions  2. Assess pain using the appropriate pain scale  3. Administer analgesics based on type and severity of pain and evaluate response  4. Educate/Implement non-pharmacological measures as appropriate and evaluate response  5. Consider cultural, developmental and social influences on pain and pain management  6. Notify Provider if interventions unsuccessful or patient reports new pain  Outcome: Progressing  Flowsheets (Taken 5/27/2020 1526)  Verbalizes/displays adequate comfort level or baseline comfort level:   Assess pain using the appropriate pain scale   Encourage patient to monitor pain and request interventions   Administer analgesics based on type and severity of pain and evaluate response     Problem: Infection - Adult  Goal: Absence of infection during hospitalization  Description: INTERVENTIONS:  1. Assess and monitor for signs and symptoms of infection  2. Monitor lab/diagnostic results  3. Monitor all insertion sites/wounds/incisions  4. Monitor secretions for changes in amount and color  5. Administer medications as ordered  6. Educate and encourage patient and family to use good hand hygiene technique  7. Identify and educate in appropriate isolation precautions for identified infection/condition  Outcome: Progressing  Flowsheets (Taken 5/27/2020 1526)  Absence of infection during hospitalization:   Monitor lab/diagnostic results   Assess and monitor for signs and symptoms of infection     Problem: Safety Adult - Fall  Goal: Free from fall injury  Description: INTERVENTIONS:    Inpatient - Please reference Cares/Safety Flowsheet under [place] Fall Risk for interventions.  Pediatrics - Please reference Peds Daily Cares/Safety Flowsheet under [place] Pediatric Fall  Assessment Fall Bundle for interventions  LD/OB - Please reference OB Shift Screening Flowsheet under OB Fall Risk for interventions.  Outcome: Progressing  Note: Pt alert and oriented.  Left arm slight weak

## 2020-05-28 ENCOUNTER — HOSPITAL ENCOUNTER (OUTPATIENT)
Dept: ULTRASOUND IMAGING | Facility: HOSPITAL | Age: 60
Discharge: HOME | DRG: 057 | End: 2020-05-28
Payer: COMMERCIAL

## 2020-05-28 LAB
ALBUMIN SERPL-MCNC: 3.8 G/DL (ref 3.5–5.3)
ALP SERPL-CCNC: 61 U/L (ref 45–115)
ALT SERPL-CCNC: 24 U/L (ref 7–52)
ANION GAP SERPL CALC-SCNC: 7 MMOL/L (ref 3–11)
AST SERPL-CCNC: 16 U/L
BASOPHILS # BLD AUTO: 0 10*3/UL
BASOPHILS NFR BLD AUTO: 1 % (ref 0–2)
BILIRUB SERPL-MCNC: 0.54 MG/DL (ref 0.2–1.4)
BUN SERPL-MCNC: 19 MG/DL (ref 7–25)
CALCIUM ALBUM COR SERPL-MCNC: 9.2 MG/DL (ref 8.6–10.3)
CALCIUM SERPL-MCNC: 9 MG/DL (ref 8.6–10.3)
CHLORIDE SERPL-SCNC: 107 MMOL/L (ref 98–107)
CO2 SERPL-SCNC: 27 MMOL/L (ref 21–32)
CREAT SERPL-MCNC: 0.74 MG/DL (ref 0.7–1.3)
EOSINOPHIL # BLD AUTO: 0.1 10*3/UL
EOSINOPHIL NFR BLD AUTO: 2 % (ref 0–3)
ERYTHROCYTE [DISTWIDTH] IN BLOOD BY AUTOMATED COUNT: 13.7 % (ref 11.5–15)
GFR SERPL CREATININE-BSD FRML MDRD: 101 ML/MIN/1.73M*2
GLUCOSE BLDC GLUCOMTR-MCNC: 134 MG/DL (ref 70–105)
GLUCOSE BLDC GLUCOMTR-MCNC: 154 MG/DL (ref 70–105)
GLUCOSE BLDC GLUCOMTR-MCNC: 171 MG/DL (ref 70–105)
GLUCOSE BLDC GLUCOMTR-MCNC: 182 MG/DL (ref 70–105)
GLUCOSE SERPL-MCNC: 173 MG/DL (ref 70–105)
HCT VFR BLD AUTO: 42.1 % (ref 38–50)
HGB BLD-MCNC: 14.7 G/DL (ref 13.2–17.2)
LYMPHOCYTES # BLD AUTO: 2.1 10*3/UL
LYMPHOCYTES NFR BLD AUTO: 31 % (ref 15–47)
MCH RBC QN AUTO: 32.1 PG (ref 29–34)
MCHC RBC AUTO-ENTMCNC: 34.8 G/DL (ref 32–36)
MCV RBC AUTO: 92.2 FL (ref 82–97)
MONOCYTES # BLD AUTO: 0.5 10*3/UL
MONOCYTES NFR BLD AUTO: 7 % (ref 5–13)
NEUTROPHILS # BLD AUTO: 4.1 10*3/UL
NEUTROPHILS NFR BLD AUTO: 59 % (ref 46–70)
PLATELET # BLD AUTO: 188 10*3/UL (ref 130–350)
PMV BLD AUTO: 8.9 FL (ref 6.9–10.8)
POTASSIUM SERPL-SCNC: 3.7 MMOL/L (ref 3.5–5.1)
PROT SERPL-MCNC: 6.4 G/DL (ref 6–8.3)
RBC # BLD AUTO: 4.56 10*6/ΜL (ref 4.1–5.8)
SODIUM SERPL-SCNC: 141 MMOL/L (ref 135–145)
WBC # BLD AUTO: 6.9 10*3/UL (ref 3.7–9.6)

## 2020-05-28 PROCEDURE — 6370000100 HC RX 637 (ALT 250 FOR IP): Performed by: PHYSICAL MEDICINE & REHABILITATION

## 2020-05-28 PROCEDURE — 93880 EXTRACRANIAL BILAT STUDY: CPT

## 2020-05-28 PROCEDURE — 2590000100 HC RX 259: Performed by: PHYSICAL MEDICINE & REHABILITATION

## 2020-05-28 PROCEDURE — 99222 1ST HOSP IP/OBS MODERATE 55: CPT | Performed by: INTERNAL MEDICINE

## 2020-05-28 PROCEDURE — 97162 PT EVAL MOD COMPLEX 30 MIN: CPT | Mod: GP | Performed by: PHYSICAL THERAPIST

## 2020-05-28 PROCEDURE — 36415 COLL VENOUS BLD VENIPUNCTURE: CPT | Performed by: PHYSICAL MEDICINE & REHABILITATION

## 2020-05-28 PROCEDURE — 82947 ASSAY GLUCOSE BLOOD QUANT: CPT | Mod: QW

## 2020-05-28 PROCEDURE — 97166 OT EVAL MOD COMPLEX 45 MIN: CPT | Mod: GO | Performed by: OCCUPATIONAL THERAPIST

## 2020-05-28 PROCEDURE — 6360000200 HC RX 636 W HCPCS (ALT 250 FOR IP): Performed by: PHYSICAL MEDICINE & REHABILITATION

## 2020-05-28 PROCEDURE — 97530 THERAPEUTIC ACTIVITIES: CPT | Mod: GO | Performed by: OCCUPATIONAL THERAPIST

## 2020-05-28 PROCEDURE — 6370000100 HC RX 637 (ALT 250 FOR IP): Performed by: INTERNAL MEDICINE

## 2020-05-28 PROCEDURE — 99232 SBSQ HOSP IP/OBS MODERATE 35: CPT | Performed by: PHYSICAL MEDICINE & REHABILITATION

## 2020-05-28 PROCEDURE — 97530 THERAPEUTIC ACTIVITIES: CPT | Mod: GP | Performed by: PHYSICAL THERAPIST

## 2020-05-28 PROCEDURE — 97116 GAIT TRAINING THERAPY: CPT | Mod: GP | Performed by: PHYSICAL THERAPIST

## 2020-05-28 PROCEDURE — 80053 COMPREHEN METABOLIC PANEL: CPT | Performed by: PHYSICAL MEDICINE & REHABILITATION

## 2020-05-28 PROCEDURE — 92610 EVALUATE SWALLOWING FUNCTION: CPT | Mod: GN

## 2020-05-28 PROCEDURE — 97535 SELF CARE MNGMENT TRAINING: CPT | Mod: GO | Performed by: OCCUPATIONAL THERAPIST

## 2020-05-28 PROCEDURE — 85025 COMPLETE CBC W/AUTO DIFF WBC: CPT | Performed by: PHYSICAL MEDICINE & REHABILITATION

## 2020-05-28 PROCEDURE — (BLANK) HC ROOM PRIVATE

## 2020-05-28 RX ADMIN — ASPIRIN 81 MG: 81 TABLET ORAL at 08:09

## 2020-05-28 RX ADMIN — PSYLLIUM HUSK 1 PACKET: 3.4 POWDER ORAL at 08:08

## 2020-05-28 RX ADMIN — Medication 500 MG: at 20:56

## 2020-05-28 RX ADMIN — INSULIN DETEMIR 10 UNITS: 100 INJECTION, SOLUTION SUBCUTANEOUS at 08:11

## 2020-05-28 RX ADMIN — LISINOPRIL 20 MG: 20 TABLET ORAL at 08:09

## 2020-05-28 RX ADMIN — MELATONIN 1000 UNITS: at 08:09

## 2020-05-28 RX ADMIN — CLOPIDOGREL BISULFATE 75 MG: 75 TABLET ORAL at 08:09

## 2020-05-28 RX ADMIN — ENOXAPARIN SODIUM 40 MG: 40 INJECTION SUBCUTANEOUS at 16:55

## 2020-05-28 RX ADMIN — ROSUVASTATIN CALCIUM 40 MG: 20 TABLET, FILM COATED ORAL at 20:56

## 2020-05-28 NOTE — INTERDISCIPLINARY/THERAPY
"INPATIENT SPEECH THERAPY BEDSIDE SWALLOW STUDY NOTE    Patient Name: Del Mckeon  Age: 59 y.o.  Gender: male             05/28/20 0935   Time Calculation   Start Time 0935   Stop Time 1005   Time Calculation (min) 30 min   Subjective Comments   Subjective Comments Pt agreeable to ST evaluation. When asked about drinking liquids, pt stated that he \"coughs when I drink too much.\" RN and pt reported that pt tolerated 4 pills simultaneously with water without any coughing. In regards to speech, pt reported being 95% back to normal. With language (i.e. forming the right words to say) the pt reported being 90% back to normal stating, \"I'm searching for words more often than is normal for me. \" Pt reported having word finding difficulties s \"a couple times a day.\" Pt reported that occasional word finding does not bother him and he does not think that he has any speech or language deficits that impact his ability to communicate. Pt reported having minimal changes with thinking and memory stating that he was 97% back to normal.     General   Additional Pertinent History Dx: CVA-MRI \"small acute, right inferior anterior pontine infarct.\" Hx: HTN, DM II, aortic sclerosis, cataracts with diminished visual acuity    Family/Caregiver Present No   Home Living Comments Pt reported that he lives in alone in [place] in an apartment and that he is a retired .    Prior Function Comments Pt denied hx of swallowing difficulties and avoiding foods due to difficulties chewing or swallowing. Pt eats foods consistent with regular diet (i.e. rivera, ham, hamburger, turkey). Pt reported to have pneumonia 2x in the 1980s. Pt reported managing his own medications (i.e. pills from bottles) and finances (e.g. pays bills, writes checks) and driving and cooking independently.    Baseline Assessment   Temperature Spikes w/in 72h No   Respiratory Status Room air   Behavior/Cognition Alert;Cooperative;Pleasant mood "   Dentition Adequate   Vision Functional for self-feeding   Patient Positioning Upright on EOB   Baseline Vocal Quality Normal   Volitional Cough Weak  (reflexive cough: strong)   Volitional Swallow WFL   Pain Assessment   Pain Assessment No/denies pain   Oral/Motor   Labial ROM WFL   Labial Symmetry WFL   Labial Strength WFL   Labial Sensation WFL  (per pt)   Lingual ROM Mild reduced left   Lingual Symmetry WFL   Lingual Strength WFL   Mandible WFL   Facial ROM Mild reduced left   Facial Symmetry WFL   Facial Sensation WFL  (per pt)   Consistencies Assessed   Consistencies Assessed Yes   Thin Liquids   Presentation Straw;Self-fed  (10 trials (single drinks))   Oral WFL   Suction Required? No   Swab/Finger Swipe Required? No   Pharyngeal Cough - delayed;Cough - immediate  (2/10: DC, IC )   NDD1 Diet Texture   Presentation Self-fed;Spoon  (5 trials)   Oral WFL   Suction Required? No   Swab/Finger Swipe Required? No   Pharyngeal No signs or symptoms of aspiration   NDD3 Diet Texture   Presentation Self-fed;Spoon  (5 trials)   Oral WFL   Suction Required? No   Swab/Finger Swipe Required? No   Pharyngeal No signs or symptoms of aspiration   Regular Diet Texture   Presentation Self-fed  (2 trials)   Oral WFL   Suction Required? No   Swab/Finger Swipe Required? No   Pharyngeal No signs or symptoms of aspiration   Assessment Comments   Assessment Comments SPEECH/LANGUAGE SCREEN: Pt was 100% intelligible during spontaneous conversation; when asked about his slow speech, pt reported that he usually talks slow. No language deficits were observed in in conversation during informational interview.     SWALLOW: Pt was alert and oriented x4. Oral mechanism revealed moderate, right facial weakness and mild, right lingual ROM. Pt demonstrated efficient and adequate mastication with all solids. Pt spontaneously demonstrated safe intake strategies (i.e. eat/drink slowly, single drinks of thin liquids). Delayed and immediate cough  observed with thin liquid wash via straw after trials of regular solids. Coughs suspected to be related to clearance of pharyngeal residue; SLP educated pt about initiating double swallow for adequate clearance of residue when eating solids. Pt tolerated subsequent drinks of thin liquids. Pt demonstrated no overt signs or symptoms of penetration or aspiration with NDD1, NDD3, and regular solids. SLP recommends continuation of regular/thin liquid diet with swallow precautions of single drinks, small bites, intermittent double swallow, sitting upright during meals, and remain upright 30 minutes after meals. ST to follow for diet tolerance monitoring.    Patient Education   Patient Education SLP educated pt about the purpose of the clinical evaluation of swallow and discussed the results of the VFSS (i.e. no aspiration observed with liquids or food; however, residue was observed in throat). Pt verbalized understanding. SLP educated pt about applying double swallow during meals to clear residue in throat. Pt verbalized understanding.    Evaluation Tolerance   Evaluation Tolerance Patient tolerated evaluation well   Plan   Plan Comments MoCA   Aspiration Risk   Risk for Aspiration Mild   Follow Up Recommendations Dysphagia treatment   Diet Solids Recommendations Regular   Diet Liquids Recommendations Thin liquids   Compensatory Swallowing Strategies Upright at 90 degrees for all oral intake;Remain upright for 20-30 minutes after meals;Small bites/sips;Eat/feed slowly;Other (Comment)  (double swallow intermittently during meals)   Medication Recommendations Whole;With liquid   Recommendations   Follow Up Treatments Diet tolerance monitoring;Patient/family education       _________________  Addie Fitzgerald CF-SLP  05/28/20 12:19 PM

## 2020-05-28 NOTE — INTERDISCIPLINARY/THERAPY
Physical Therapy  Treatment Note (PT)    Patient Name: Del Mckeon  Age: 59 y.o.  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)   Subjective Comments   Subjective Comments Pt agreed to PT   Bed Mobility 1   Bed Mobility From 1 Supine   Bed Mobility Type 1 To and from   Bed Mobility to 1 Short sit   Level of Assistance 1 Standby assistance   Transfer 1   Transfer From 1 Bed   Transfer Type 1 To and from   Transfer to 1 Stand   Technique 1 Stand pivot   Transfer Device 1 Front wheeled walker   Level of Assistance 1 Contact guard assist x 1   Ambulation 1   Surface 1 Level surface   Device 1 Front wheeled walker   Assistance 1 Contact guard assist x 1   Quality of Gait 1 slow, narrow KYLE   Comments/Distance 1 50 m x 2   Therapeutic Activities   Therapeutic Activities sit to stand x 5 reps         _________________  JAIDA MONTES, PT  05/28/20 11:14 AM

## 2020-05-28 NOTE — PLAN OF CARE
Problem: Pain - Adult  Description: Pt denies discomfort at this time  Goal: Verbalizes/displays adequate comfort level or baseline comfort level  Description: INTERVENTIONS:  1. Encourage patient to monitor pain and request interventions  2. Assess pain using the appropriate pain scale  3. Administer analgesics based on type and severity of pain and evaluate response  4. Educate/Implement non-pharmacological measures as appropriate and evaluate response  5. Consider cultural, developmental and social influences on pain and pain management  6. Notify Provider if interventions unsuccessful or patient reports new pain  Outcome: Progressing  Flowsheets (Taken 5/28/2020 0109)  Verbalizes/displays adequate comfort level or baseline comfort level:   Encourage patient to monitor pain and request interventions   Assess pain using the appropriate pain scale     Problem: Infection - Adult  Goal: Absence of infection during hospitalization  Description: INTERVENTIONS:  1. Assess and monitor for signs and symptoms of infection  2. Monitor lab/diagnostic results  3. Monitor all insertion sites/wounds/incisions  4. Monitor secretions for changes in amount and color  5. Administer medications as ordered  6. Educate and encourage patient and family to use good hand hygiene technique  7. Identify and educate in appropriate isolation precautions for identified infection/condition  Outcome: Progressing  Flowsheets (Taken 5/28/2020 0109)  Absence of infection during hospitalization:   Assess and monitor for signs and symptoms of infection   Monitor lab/diagnostic results     Problem: Safety Adult - Fall  Goal: Free from fall injury  Description: INTERVENTIONS:    Inpatient - Please reference Cares/Safety Flowsheet under Whitten Fall Risk for interventions.  Pediatrics - Please reference Peds Daily Cares/Safety Flowsheet under [place] Pediatric Fall Assessment Fall Bundle for interventions  LD/OB - Please reference OB Shift Screening  Flowsheet under OB Fall Risk for interventions.  Outcome: Progressing     Problem: Knowledge Deficit  Goal: Patient/family/caregiver demonstrates understanding of disease process, treatment plan, medications, and discharge instructions  Description: INTERVENTIONS:   1. Complete learning assessment and assess knowledge base  2. Provide teaching at level of understanding   3. Provide teaching via preferred learning methods  Outcome: Progressing  Flowsheets (Taken 5/28/2020 0109)  Patient/family/caregiver demonstrates understanding of disease process, treatment plan, medications, and discharge instructions:   Complete learning assessment and assess knowledge base   Provide teaching via preferred learning methods   Provide teaching at level of understanding     Problem: Potential for Compromised Skin Integrity  Goal: Skin Integrity is Maintained or Improved  Description: INTERVENTIONS:  1. Assess and monitor skin integrity  2. Collaborate with interdisciplinary team and initiate plans and interventions as needed  3. Alternate a full bath with partial baths for elderly   4. Monitor patient's hygiene practices   5. Collaborate with wound, ostomy, and continence nurse  Outcome: Progressing  Flowsheets (Taken 5/28/2020 0109)  Skin integrity is maintained or improved: Assess and monitor skin integrity  Goal: Nutritional status is improving  Description: INTERVENTIONS:  1. Monitor and assess patient for malnutrition (ex- brittle hair, bruises, dry skin, pale skin and conjunctiva, muscle wasting, smooth red tongue, and disorientation)  2. Monitor patient's weight and dietary intake as ordered or per policy  3. Determine patient's food preferences and provide high-protein, high-caloric foods as appropriate  4. Assist patient with eating   5. Allow adequate time for meals   6. Encourage patient to take dietary supplement as ordered   7. Collaborate with dietitian  8. Include patient/family/caregiver in decisions related to  nutrition  Outcome: Progressing  Flowsheets (Taken 5/28/2020 0109)  Nutritional status is improving:   Monitor and assess patient for malnutrition (ex- brittle hair, bruises, dry skin, pale skin and conjunctiva, muscle wasting, smooth red tongue, and disorientation)   Monitor patient's weight and dietary intake as ordered or per policy  Goal: MOBILITY IS MAINTAINED OR IMPROVED  Description: INTERVENTIONS  1. Collaborate with interdisciplinary team and initiate plan and interventions as ordered (PT/OT)  2. Encourage ambulation  3. Up to chair for meals  4. Monitor for signs of deconditioning  Outcome: Progressing  Flowsheets (Taken 5/28/2020 0109)  Mobility is Maintained or Improved: Monitor for signs of deconditioning     Problem: Urinary Incontinence  Goal: Perineal skin integrity is maintained or improved  Description: INTERVENTIONS:  1. Assess genitourinary system, perineal skin, labs (urinalysis), and history of incontinence to include past management, aggravating, and alleviating factors  2. Collaborate with interdisciplinary team including wound, ostomy, and continence nurse and initiate plans and interventions as needed  4. Consider urine containment device  5. Apply skin protectant   6. Develop skin care regimen  7. Provide privacy when changing patient's incontinence device to maintain their dignity  Outcome: Adequate for Discharge     Problem: Safety Adult - Fall  Goal: Free from fall injury  Description: INTERVENTIONS:    Inpatient - Please reference Cares/Safety Flowsheet under Whitten Fall Risk for interventions.  Pediatrics - Please reference Peds Daily Cares/Safety Flowsheet under [place] Pediatric Fall Assessment Fall Bundle for interventions  LD/OB - Please reference OB Shift Screening Flowsheet under OB Fall Risk for interventions.  Outcome: Progressing  Note: Impulsive with poor safety awareness, attempts to self-transfer.  Alarms in use.  Note left for  inquiring about D/C of SCDs due to  high fall risk.      See updated Inpt Rehab Nursing Plan of Care Statement.

## 2020-05-28 NOTE — CONSULTATION
Medical Consult      Referring Physician:    Dr. Small      No chief complaint on file.  Diabetes mellitus  CVA      HPI:  The patient is a 59-year-old gentleman admitted to [facility] on May 21 after having left-sided weakness and gait instability for approximately 4 days  He underwent an MRI that showed evidence of an acute right pontine CVA  He was subsequently initiated on Plavix aspirin and a statin    No obvious source was identified he had a negative echo but I do not see evidence of a carotid    Patient did have some brief hematuria but this resolved ultrasound was negative    He is now transferred to the rehab hospital for ongoing care and recovery    Currently he says he is feeling well he denies any significant pain  Does note ongoing left-sided weakness and is motivated to get better  Past Medical History:   Diagnosis Date   • Anemia    • Asthma    • Depressive disorder    • Heart murmur    • Hyperlipidemia    • Hypertension    • Obesity    • Obstructive sleep apnea    • Type 2 diabetes mellitus (CMS/HCC) ([facility])         Past Surgical History:   Procedure Laterality Date   • CRYOTHERAPY      dermatology         Family History   Problem Relation Age of Onset   • Alzheimer's disease Father    • Diabetes type II Father    • Diabetes Sister          Social History     Socioeconomic History   • Marital status:      Spouse name: Not on file   • Number of children: Not on file   • Years of education: Not on file   • Highest education level: Not on file   Occupational History   • Not on file   Social Needs   • Financial resource strain: Not on file   • Food insecurity     Worry: Not on file     Inability: Not on file   • Transportation needs     Medical: Not on file     Non-medical: Not on file   Tobacco Use   • Smoking status: Never Smoker   • Smokeless tobacco: Never Used   Substance and Sexual Activity   • Alcohol use: No   • Drug use: Yes     Types: Hydrocodone     Comment: 3 times daily, low  "back/sciatica pain   • Sexual activity: Defer   Lifestyle   • Physical activity     Days per week: Not on file     Minutes per session: Not on file   • Stress: Not on file   Relationships   • Social connections     Talks on phone: Not on file     Gets together: Not on file     Attends Christian service: Not on file     Active member of club or organization: Not on file     Attends meetings of clubs or organizations: Not on file     Relationship status: Not on file   • Intimate partner violence     Fear of current or ex partner: Not on file     Emotionally abused: Not on file     Physically abused: Not on file     Forced sexual activity: Not on file   Other Topics Concern   • Not on file   Social History Narrative   • Not on file          Prior to Admission medications    Medication Sig Start Date End Date Taking? Authorizing Provider   naproxen sodium (Aleve) 220 mg tablet Take 220 mg by mouth every evening   Yes Historical Provider, MD   insulin NPH isoph U-100 human (NovoLIN N Flexpen) 100 unit/mL insulin pen pen Inject 36 Units under the skin daily   Yes Historical Provider, MD   omega-3 fatty acids-fish oil (Fish Oil) 340-1,000 mg capsule Take 1 capsule by mouth daily   Yes Historical Provider, MD   niacin 500 mg tablet Take 1,000 mg by mouth daily with breakfast   Yes Historical Provider, MD   losartan (COZAAR) 50 mg tablet Take 50 mg by mouth 2 (two) times a day   2/26/18  Yes Historical Provider, MD   diphenhydramine-acetaminophen (TYLENOL PM EXTRA STRENGTH)  mg tablet Take by mouth nightly as needed   10/25/13  Yes Conversion Provider   ibuprofen (ADVIL,MOTRIN) 200 mg tablet Take 200-400 mg by mouth every 6 (six) hours as needed   6/21/13  Yes Conversion Provider   cholecalciferol, vitamin D3, (VITAMIN D3) 1,000 unit capsule Take 1,000 Units by mouth daily   6/11/13  Yes Jean Piña MD   insulin syringe-needle U-100 1 mL 29 gauge x 1/2\" syringe  3/23/12  Yes Conversion Provider           Current " "Facility-Administered Medications:   •  insulin detemir U-100 (LEVEMIR) injection 15 Units, 15 Units, subcutaneous, Insulin: Nightly, INOCENCIO Bernard MD  •  insulin detemir U-100 (LEVEMIR) injection 10 Units, 10 Units, subcutaneous, q AM, INOCENCIO Bernard MD  •  enoxaparin (LOVENOX) syringe 40 mg, 40 mg, subcutaneous, Daily at 1600, Derick Small DO, 40 mg at 05/27/20 1628  •  ondansetron (ZOFRAN) tablet 4 mg, 4 mg, oral, q6h PRN, Derick Small DO  •  rosuvastatin (CRESTOR) tablet 40 mg, 40 mg, oral, Nightly, Derick Small DO, 40 mg at 05/27/20 2029  •  acetaminophen (TYLENOL) tablet 500 mg, 500 mg, oral, q6h PRN, Derick Small DO  •  aspirin EC tablet 81 mg, 81 mg, oral, Daily, Derick Small DO  •  cholecalciferol (vitamin D3) 1,000 unit (25mcg) tab/cap 1,000 Units, 1,000 Units, oral, Daily, Derick Small DO  •  clopidogreL (PLAVIX) tablet 75 mg, 75 mg, oral, Daily, Derick Small DO  •  lisinopriL (PRINIVIL,ZESTRIL) tablet 20 mg, 20 mg, oral, Daily, Derick Small DO  •  psyllium (METAMUCIL SUGAR FREE) 1 packet, 1 packet, oral, Daily, Derick Small DO  •  sodium chloride (OCEAN) 0.65 % nasal spray 2 spray, 2 spray, Each Nostril, PRN, Derick Small DO  •  diphenhydrAMINE (BENADRYL) 25 mg, acetaminophen (TYLENOL) 500 mg for TYLENOL PM EXTRA STRENGTH, , oral, Nightly PRN, Derick Small DO, 25 mg at 05/27/20 2029      Allergies   Allergen Reactions   • Penicillins      Tested as a child; showed positive         Review of systems:   No significant fatigue  No fevers or chills  No nausea  No vomiting  No shortness of breath  No cough  No chest pain  No palpitations  No abdominal pain  No bowel problems  No urinary problems  No dizziness or lightheadedness  Sleeps reasonably well at night  Appetite good        /88 (BP Location: Right arm, Patient Position: Sitting, Cuff Size: Regular Adult)   Pulse 94   Temp 36.5 °C (97.7 °F) (Oral)   Resp 18   Ht 1.8 m (5' 10.87\")   Wt 90.1 kg (198 " lb 10.2 oz)   SpO2 95%   BMI 27.81 kg/m²     Physical Exam    General: Alert pleasant no acute distress, cooperative    HEENT exam: No scleral icterus pharynx is clear and moist neck is supple trachea midline no thyroid nodules or masses no lymphadenopathy tongue and gums are normal nose and ear pain within normal limits    Lungs: Clear bilaterally no wheezes rhonchi or rales              Normal respiratory effort    Cardiovascular exam: S1-S2, regular rate and rhythm                                      No murmurs gallops or rubs                                    Pulses are full and symmetrical    Abdominal exam: Soft, nontender                               No guarding or rebound tenderness                               normal active bowel sounds                                No hepatosplenomegaly    Extremities: Warm bilaterally                      No edema on left                      No edema on right                        Skin exam: No significant rashes or ulcerations    Neurological exam: Alert and oriented ×3                                  Cranial nerves II through XII are intact                                  Motor mild left-sided weakness                                  Sensation grossly intact            LABS  CBC: No results found for: WBC, RBC, HGB, HCT, PLT  CMP: No results found for: NA, K, CL, CO2, GLUCOSE, CREATININE, CALCIUM, ALBUMIN, ALKPHOS, BILITOT, ALT, AST, BUN, ANIONGAP, BCR, GLOB  Coagulation: No results found for: PT, INR, APTT          Active Problems:    CVA (cerebral vascular accident) (CMS/HCC) ([facility])          ASSESSMENT and PLAN  Status post CVA: Patient had have a pontine CVA however I do not see any evidence of carotid evaluation I am going to order a carotid ultrasound  Echocardiogram was  Consider a Holter monitor looking for evidence of paroxysmal atrial fibrillation as a possible etiology    Diabetes mellitus: Patient has been scheduled on Levemir at night I am going  to try and adjust his schedule as it would make more sense to have him get his Levemir in the morning so I am going to initiate Levemir in the morning and cut down on his evening dose     Essential hypertension: Blood pressure overall improved and not wish to be overly aggressive in the poststroke timeframe we will continue to monitor and not make any adjustments today    DVT prophylaxis: Continue with Lovenox    Dyslipidemia: On Crestor    Impulsivity: I see no reason for SCDs he is already on Lovenox for DVT prophylaxis and the SCDs put him at risk for falling if he tries to get out of bed quickly        INOCENCIO Bernard MD  5:54 AM, 5/28/2020.

## 2020-05-28 NOTE — INTERDISCIPLINARY/THERAPY
"Occupational Therapy  INITIAL EVALUATION (OT)    Patient Name: Del Mckeon  Age: 59 y.o.  Gender: male    ----------------------------------------------------------------------------------------------------------------------    Patient stated goal for Occupational Therapy: \"to ride my bicycle again\"    ----------------------------------------------------------------------------------------------------------------------       05/28/20 0700   Time Calculation   Start Time 0700   Stop Time 0815   Time Calculation (min) 75 min   OT Last Visit   OT Received On 05/28/20   General   Chart Reviewed Yes   Therapy Treatment Diagnosis CVA   OT Treatment Duration (Min) 75 Minutes   Is this a Co-Treatment? No   Additional Pertinent History DM. HTN.   Family/Caregiver Present No   Precautions   Reinforced Precautions Yes   Other Precautions L side weakness. Fall risk.   Home Living   Type of Home Apartment   Home Layout One level   Home Access Elevator   Bathroom Shower/Tub Tub/shower unit   Bathroom Toilet Standard   Home Adaptive Equipment None   Prior Function   Level of [place] Independent with ADLs and functional transfers;Independent with homemaking with ambulation   Lived With Alone   ADL Assistance Independent   IADL Assistance Independent   Driving No  (d/t cataracts)   Vocational Status Retired   Vocational Type    Prior Function Comments Pt lives alone and was (I) in ADLs, IADLs and fxal mobility w/o use of AE.   Subjective Comments   Subjective Comments Pt agreeable to OT session; reported 0 pain. Discussed OT goals and POC, as well as energy conservation and safety awareness.   Fish Fall Risk   History of Falling 25   Secondary Diagnosis 15   Ambulatory Aids 15   Intravenous Therapy/Heparin/Saline Lock 0   Gait/Transferring 10   Mental Status 15   Whitten Fall Risk Score 80   Required Fall Bundle  Yes   High Fall Risk Bundle  Yes   Cognition   Arousal/Alertness WFL   Cognition Comment Pt " A&O, appropriate in conversation and able to follow directions w/occasional cues for sequencing/safety; pt did demo occasional emotional lability.   Bed Mobility   Bed Mobility Assessed   Bed Mobility 1   Bed Mobility From 1 Supine   Bed Mobility Type 1 To   Bed Mobility to 1 Short sit   Level of Assistance 1 Contact guard assist x 1   Bed Mobility Comments 1 Flat HOB and w/use of HR; increased time and effort but did not require physical assist.   Transfers   Transfer Assessed   Transfer 1   Transfer From 1 Sit   Transfer Type 1 To and from   Transfer to 1 Stand   Technique 1 Sit to stand;Stand pivot;Stand to sit   Transfer Device 1 Front wheeled walker;Grab bar;Transfer belt   Level of Assistance 1 Contact guard assist x 1   Trials/Comments 1 Completed from bed>w/c, w/c<>toilet, w/c<>shower chair and w/c<>stand w/CGA and occasional cues for safety. Pt was left seated in w/c w/alarm on, call light and needs w/in reach and RN present.   Ambulation   Ambulation Assessed   Ambulation 1   Surface 1 Level surface;Smooth   Device 1 Front wheeled walker;Gait belt   Assistance 1 Contact guard assist x 1   Quality of Gait 1 Mild instability but 0 major LOB.   Comments/Distance 1 1m   QI Functional Abilities and Goals: Mobility   Toilet Transfer: Admission Performance 04   Toilet Transfer: Discharge Goal 06   Car Transfer: Admission Perfomance 88   Car Transfer: Discharge Goal 04   Dynamic Standing Balance   Dynamic Standing-Comments CGA for safety in standing during ADLs.   Eating   Eating Level of Assistance Setup   Eating Where Assessed Wheelchair   Grooming   Grooming Level of Assistance Setup   Grooming Comments S/U A while seated.   Bathing   Bathing Adaptive Equipment   (Shower chair, GB, HHSH)   Bathing Patient Completed Right arm;Right upper leg;Right lower leg;Left arm;Left upper leg;Left lower leg;Chest;Abdomen;Latoya area;Buttocks   Bathing Level of Assistance Contact guard   Bathing Where Assessed Shower   Bathing  Comments 10/10 parts w/CGA when leaning over to wash feet and for safety in standing.   UE Dressing   UE Dressing Level of Assistance Setup   UE Dressing Where Assessed Wheelchair   UE Dressing Comments 4/4 to jose pullover shirt.   LE Dressing   LE Dressing Yes   Pants Level of Assistance Minimum assistance   Sock Level of Assistance Setup   Adult Briefs Level of Assistance Contact guard   LE Dressing Where Assessed Wheelchair   LE Dressing Comments 5/6 to jose underwear and pants w/min A to thread into pants and CGA for safety in standing; 2/2 socks.   Toileting   Toileting Bladder Incontinence No   Toileting Bowel Incontinence No   Toileting Level of Assistance Contact guard   Where Assessed Toilet   Toileting Comments 3/3 w/CGA for safety in standing.   QI Functional Abilities and Goals: Self-Care   Eating: Admission Perfomance 05   Eating: Discharge Goal 06   Oral Hygiene: Admission Performance 05   Oral Hygiene: Discharge Goal 06   Toileting Hygiene: Admission Performance 04   Toileting Hygiene: Discharge Goal 06   Shower/ Bathe Self: Admission Performance 04   Shower/ Bathe Self: Discharge Goal 06   Upper Body Dressing: Admission Perfomance 05   Upper Body Dressing: Dishcharge Goal 06   Lower Body Dressing: Admission Performance 04   Lower Body Dressing: Discharge Goal 06   Putting on/ Taking off Footwear: Admission Performance 05   Putting on/ Taking off Footwear: Discharge Goal 06   RUE Assessment   RUE Assessment WFL  (5/5 strength)   LUE Assessment   LUE Assessment WFL  (4+/5 strength)   Hand Function   Gross Grasp R Functional;L Functional   Coordination R Functional;L Functional   Light Touch   RUE Light Touch Grossly intact   LUE Light Touch Grossly intact   Perception   Inattention/Neglect Appears intact   Initiation Appears intact   Motor Planning Appears intact   Perseveration Not present   Vision-Basic Assessment   Visual History Cataracts   Activity Tolerance   Activity Tolerance Comments Pt  tolerated session well; on RA.   Assessment   Rehab Potential Good   Progress   (Established POC)   Problem List Decreased ADL status;Decreased upper extremity strength;Decreased safe judgment during ADL;Decreased endurance;Decreased fine motor control;Decreased functional mobility;Decreased IADLs   Recommendations for Therapy Continue skilled therapy   Assessment Comment Pt will benefit in continued skilled OT services in order to increase strength, activity tolerance, safety awareness and fxal (I) in ADLs, IADLs and fxal xfers/mobility to promote safe return to PLOF.   Plan   Plan Comment ADLs; finish eval   Treatment Interventions ADL retraining;Therapeutic activity;Therapeutic exercise;Neuromuscular reeducation;UE strengthening/ROM;Endurance training;Cognitive skills development;Patient/family training;Equipment evaluation/education;Fine motor coordination activities;Compensatory technique education   OT Frequency 5-7x/wk;1-2x/day   Date POC Due 06/09/20         _________________  JACK MONROE, OTR  05/28/20 12:17 PM

## 2020-05-28 NOTE — PLAN OF CARE
Problem: Pain - Adult  Description: Pt denies discomfort at this time  Goal: Verbalizes/displays adequate comfort level or baseline comfort level  Description: INTERVENTIONS:  1. Encourage patient to monitor pain and request interventions  2. Assess pain using the appropriate pain scale  3. Administer analgesics based on type and severity of pain and evaluate response  4. Educate/Implement non-pharmacological measures as appropriate and evaluate response  5. Consider cultural, developmental and social influences on pain and pain management  6. Notify Provider if interventions unsuccessful or patient reports new pain  Outcome: Progressing     Problem: Infection - Adult  Goal: Absence of infection during hospitalization  Description: INTERVENTIONS:  1. Assess and monitor for signs and symptoms of infection  2. Monitor lab/diagnostic results  3. Monitor all insertion sites/wounds/incisions  4. Monitor secretions for changes in amount and color  5. Administer medications as ordered  6. Educate and encourage patient and family to use good hand hygiene technique  7. Identify and educate in appropriate isolation precautions for identified infection/condition  Outcome: Progressing     Problem: Safety Adult - Fall  Goal: Free from fall injury  Description: INTERVENTIONS:    Inpatient - Please reference Cares/Safety Flowsheet under [place] Fall Risk for interventions.  Pediatrics - Please reference Peds Daily Cares/Safety Flowsheet under [place] Pediatric Fall Assessment Fall Bundle for interventions  LD/OB - Please reference OB Shift Screening Flowsheet under OB Fall Risk for interventions.  Outcome: Progressing     Problem: Discharge Planning  Goal: Discharge to home or other facility with appropriate resources  Description: INTERVENTIONS:  1. Identify and discuss barriers to discharge with patient and caregiver.  2. Arrange for needed discharge resources and transportation as appropriate.  3. Identify discharge learning  needs (meds, wound care, etc).  4. Arrange for interpreters to assist at discharge as needed.  5. Refer to  for coordinating discharge planning if the patient needs post-hospital services based on physician order or complex needs related to functional status, cognitive ability or social support system.  Outcome: Progressing     Problem: Knowledge Deficit  Goal: Patient/family/caregiver demonstrates understanding of disease process, treatment plan, medications, and discharge instructions  Description: INTERVENTIONS:   1. Complete learning assessment and assess knowledge base  2. Provide teaching at level of understanding   3. Provide teaching via preferred learning methods  Outcome: Progressing     Problem: Potential for Compromised Skin Integrity  Goal: Skin Integrity is Maintained or Improved  Description: INTERVENTIONS:  1. Assess and monitor skin integrity  2. Collaborate with interdisciplinary team and initiate plans and interventions as needed  3. Alternate a full bath with partial baths for elderly   4. Monitor patient's hygiene practices   5. Collaborate with wound, ostomy, and continence nurse  Outcome: Progressing  Goal: Nutritional status is improving  Description: INTERVENTIONS:  1. Monitor and assess patient for malnutrition (ex- brittle hair, bruises, dry skin, pale skin and conjunctiva, muscle wasting, smooth red tongue, and disorientation)  2. Monitor patient's weight and dietary intake as ordered or per policy  3. Determine patient's food preferences and provide high-protein, high-caloric foods as appropriate  4. Assist patient with eating   5. Allow adequate time for meals   6. Encourage patient to take dietary supplement as ordered   7. Collaborate with dietitian  8. Include patient/family/caregiver in decisions related to nutrition  Outcome: Progressing  Goal: MOBILITY IS MAINTAINED OR IMPROVED  Description: INTERVENTIONS  1. Collaborate with interdisciplinary team and initiate plan  and interventions as ordered (PT/OT)  2. Encourage ambulation  3. Up to chair for meals  4. Monitor for signs of deconditioning  Outcome: Progressing     Problem: Safety Adult - Fall  Goal: Free from fall injury  Description: INTERVENTIONS:    Inpatient - Please reference Cares/Safety Flowsheet under [place] Fall Risk for interventions.  Pediatrics - Please reference Peds Daily Cares/Safety Flowsheet under [place] Pediatric Fall Assessment Fall Bundle for interventions  LD/OB - Please reference OB Shift Screening Flowsheet under OB Fall Risk for interventions.  Outcome: Progressing     Problem: Mobility  Goal: STG - Patient will ambulate  Description: 50 m with fww and sba.  Outcome: Progressing  Goal: LTG - Patient will ascend and descend stairs  Description: 12 with sba  Outcome: Progressing  Goal: LTG - Patient will ambulate household distance  Description: Independently with or without fww.  Outcome: Progressing

## 2020-05-28 NOTE — NURSING END OF SHIFT
Nursing End of Shift Summary:    Patient: Del Mckeon  MRN: [id number]  : 1960, Age: 59 y.o.    Location: [place] [address]    Nursing Goals  Clinical Goals for the Shift: Pt will demonstrate appropriate call light use and remain free of self-transfer attempts. Post void residualsl will be monitored, pt will remain free of intermittent cathing.     Narrative Summary of Progress Toward Clinical Goals:  Pt set off bed alarm in attempt to self-transfer to restroom overnight.  Reinforce call light use.  Post void residuals monitored this shift, last PVR <100cc.      Barriers to Goals/Nursing Concerns:  High fall risk, note left for provider inquiring about discontinuation of SCDs due to pt impulsivity.       New Patient or Family Concerns/Issues:  No.     Shift Summary:      Significant Events & Communications to Providers (last 12 hours)      Last 5 Values    No documentation.              Oxygen Usage (last 12 hours)      Last 5 Values     Row Name 20 0040                   Oxygen Weaning Trial by Nursing    Is Patient on Room Air OR on the Same Amount of O2 as at Home?  Yes                   Mobility (last 12 hours)      Last 5 Values     Row Name 20 0100                   Mobility    Level of Assistance  Contact guard assist, steadying assist        Anti-Embolism Devices  Bilateral;AE calf pump        Anti-Embolism Intervention  -- off for safety,impulsive,attempts to self-transfer            Urethral Catheter    Active Urethral Catheter     None            Active Lines    Active Central venous catheter / Peripherally inserted central catheter / Implantable Port / Hemodialysis catheter / Midline Catheter     None              Infusing Medications   Medication Dose Last Rate     PRN Medications   Medication Dose Last Dose   • ondansetron  4 mg     • acetaminophen  500 mg     • sodium chloride  2 spray     • acetaminophen/diphenhydramine (TYLENOL PM EXTRA STRENTH) combination tablet   25 mg at 20  2029     _________________________  Corinne J Ader, RN  05/28/20 4:50 AM

## 2020-05-28 NOTE — INTERDISCIPLINARY/THERAPY
Physical Therapy  INITIAL EVALUATION (PT)    Patient Name: Del Mckeon  Age: 59 y.o.  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   Bed Mobility 1   Bed Mobility From 1 Supine   Bed Mobility Type 1 To and from   Bed Mobility to 1 Short sit   Level of Assistance 1 Standby assistance   Transfer 1   Transfer From 1 Wheelchair   Transfer Type 1 To and from   Transfer to 1 Bed   Technique 1 Stand pivot   Level of Assistance 1 Contact guard assist x 1   Ambulation 1   Surface 1 Level surface   Device 1 Front wheeled walker   Assistance 1 Contact guard assist x 1   Quality of Gait 1 slow, narrow KYLE   Comments/Distance 1 60 m x 2   Ambulation 2   Surface 2 Level surface   Device 2 Schafer rail   Assistance 2 Contact guard assist x 1   Quality of Gait 2 slow steps, more difficulty when using L hand   Comments/Distance 2 16 m x 3 reps   Ambulation 3   Surface 3 Outdoors;Uneven   Device 3 Front wheeled walker   Assistance 3 Contact guard assist x 1   Comments/Distance 3 40 m   Stairs 1   Rails 1 Bilateral   Device 1 Gait belt   Assistance 1 Contact guard assist x 1;Verbal cueing   Comment/# Steps 1 4   QI Functional Abilities and Goals: Mobility   Roll Left and Right: Admission Performance 04   Roll Left and Right: Discharge Goal 06   Sit to Lying: Admission Performance 04   Sit to  Lying: Discharge Goal 06   Lying to Sitting on the Edge of Bed: Admission Performance 04   Lying to Sitting on the Edge of Bed: Discharge Goal 06   Sit to Stand: Admission Performance 04   Sit to Stand: Discharge Goal 06   Chair/bed to Chair Transfer: Admission Performance 04   Chair/bed to Chair Transfer: Discharge Goal 06   Toilet Transfer: Admission Performance 04   Toilet Transfer: Discharge Goal 06   Car Transfer: Admission Perfomance 88   Car Transfer: Discharge Goal 04   Does the patient walk? 2   Walk 10 Feet: Admission Perfomance 04   Walk 10 Feet: Dischage Goal 06   Walk 50 Feet with two turns: Admission Perfomance 04   Walk 50 feet with two turns: Discharge Goal 06   Walk 150 Feet: Admission Perfomance 04   Walk 150 Feet: Discharge Goal 06   Walking 10 Feet on Uneven Surfaces: Admission Performance 03   Walk 10 Feet on Uneven Surfaces: Discharge Goal 04   1 Step (Curb): Admission Performance 04   1 Step (Curb): Discharge Goal 06   4 Steps: Admission Performance 04   4 Steps: Discharge Goal 06   12 Steps: Admission Performance 88   12 Steps: Discharge Goal 04   Picking Up Object: Admission Performance 04   Picking Up Object: Discharge Goal 06   Static Standing Balance   Static Standing-Balance Surface Firm   Static Standing-Balance Support No upper extremity supported   Static Standing-Level of Assistance Standby assistance   Dynamic Standing Balance   Dynamic Standing-Balance Surface Firm   Dynamic Standing-Balance Support No upper extremity supported   Dynamic Standing-Level of Assistance Contact guard x 1   Strength RLE   R Hip Flexion 4+/5   R Hip ABduction 4+/5   R Knee Extension 5/5   R Ankle Dorsiflexion 5/5   Strength LLE   L Hip Flexion 3+/5   L Hip ABduction 3+/5   L Knee Extension 4/5   L Ankle Dorsiflexion 4/5   Tests   Functional Tests Bernard balance assessment 33/56   Assessment   Assessment Comment Pt will benefit from continued skilled PT to work toward his previous indep level.   Plan    Treatment Interventions Bed mobility;Functional transfer training;Gait training;Stair training;Balance training;Endurance training;Therapeutic activities;Therapeutic exercises   PT Frequency 5-7x/wk;1-2x/day         _________________  JAIDA MONTES, PT  05/28/20 9:42 AM

## 2020-05-28 NOTE — PROGRESS NOTES
Physical Medicine and Rehabilitation  Daily Progress Note       LOS: 1 day        CC: CVA    Subjective     No dizziness, vision changes, nausea.  Participating and progressing well with therapies.      Review of Systems  Except as noted above:  No headaches, vision changes, or hearing changes.  No URI symptoms.  No dysphagia or odynophagia.  No shortness of breath, coughing, wheezing.  No chest pain. No nausea/ vomiting, abdominal discomfort.  No urinary or bowel changes.  Review of systems otherwise is negative.       Objective           Vital signs in last 24 hours:  Temp:  [36.4 °C (97.5 °F)-36.5 °C (97.7 °F)] 36.4 °C (97.5 °F)  Heart Rate:  [78-94] 78  Resp:  [18-20] 20  BP: (146-159)/(88-93) 146/93    Intake/Output last 3 shifts:  I/O last 3 completed shifts:  In: 910 [P.O.:910]  Out: 1050 [Urine:1050]  Intake/Output this shift:  I/O this shift:  In: 770 [P.O.:770]  Out: 450 [Urine:450]    Physical Exam  GEN:  Alert. No acute distress.  PSYCH: Normal affect.  HEENT: Normocephalic, atraumatic. EOMI, sclerae anicteric.  Mucous membranes moist.  NECK: Normal ROM. Trachea midline.  PULM:  Unlabored respiratory effort.  CV: Distal pulses intact.  ABD:  Nondistended.  EXTREM:  No clubbing, cyanosis, gross deformity.  NEURO:   Grossly unchanged. Speech is intelligible and coherent.  SKIN:  Warm and dry, normal turgor.        Results from last 4 days   Lab Units 05/28/20  0605   WBC AUTO 10*3/uL 6.9   HEMOGLOBIN g/dL 14.7   HEMATOCRIT % 42.1   PLATELETS AUTO 10*3/uL 188       Results from last 4 days   Lab Units 05/28/20  0605   SODIUM mmol/L 141   POTASSIUM mmol/L 3.7   CHLORIDE mmol/L 107   CO2 mmol/L 27   BUN mg/dL 19   CREATININE mg/dL 0.74   CALCIUM mg/dL 9.0   TOTAL PROTEIN g/dL 6.4   BILIRUBIN TOTAL mg/dL 0.54   ALK PHOS U/L 61   ALT U/L 24   AST U/L 16   GLUCOSE mg/dL 173*       DIET:       Dietary Orders   (From admission, onward)             Start     Ordered    05/27/20 1451  Diet  Regular; Diabetic; Carb Counting (mealtime insulin)  Diet effective now     Comments:  Pills whole with water if tolerating (i.e., no coughing), otherwise whole in puree.   Question Answer Comment   Nutrition Therapy Protocol (Dietitian May Adjust Diet and Nourishments) Yes    Diet type Regular    Diet Restrictions Diabetic    Diabetic Restrictions Carb Counting (mealtime insulin)        05/27/20 1450                  Scheduled Meds:  insulin detemir U-100, 15 Units, subcutaneous, Insulin: Nightly  insulin detemir U-100, 10 Units, subcutaneous, q AM  enoxaparin, 40 mg, subcutaneous, Daily at 1600  rosuvastatin, 40 mg, oral, Nightly  aspirin, 81 mg, oral, Daily  cholecalciferol (vitamin D3), 1,000 Units, oral, Daily  clopidogreL, 75 mg, oral, Daily  lisinopriL, 20 mg, oral, Daily  psyllium, 1 packet, oral, Daily      Continuous Infusions:     PRN Meds:  ondansetron  •  acetaminophen  •  sodium chloride      Active Problems:    CVA (cerebral vascular accident) (CMS/HCC) ([facility])        Assessment/Plan     Assessment/Plan:    59-year-old male admitted to [facility] 5/21/2020 after approximately 4 days of left-sided weakness, gait instability. MRI demonstrated acute right pontine CVA, and aspirin, Plavix, statin were started. Sinus rhythm on EKG/telemetry.  His acute stay was noted to have hematuria for which a renal bladder ultrasound was obtained and was negative for any evidence of bleeding or other abnormality.  VFSS was performed and negative for aspiration.  Left-sided weakness, gait ataxia, and disequilibrium are his primary functional deficits.    - Right pontine CVA. ASA, plavix, statin for 2' ppx. PT/OT/SLP/TR.  - Gait ataxia. Fall precautions.  - Essential hypertension. Lisinopril.  - Hyperlipidemia. Statin.  - DM2. Diet, accuchecks, levemir.  - Microscopic hematuria. Neg US kidneys/bladder.  - Hypomagnesemia.  - NIK.  - DVT ppx. Lovenox.  - Code Status:  Full Code           Derick GRAY Geovanny, DO  5:05 PM,  5/28/2020.      A voice recognition program was used to aid in documentation of this record. Sometimes words are not presented exactly as they were spoken.  While efforts were made to carefully edit and correct any inaccuracies, some errors may be present.  Please take this into context.  Please contact the provider if errors are identified.

## 2020-05-29 LAB — GLUCOSE BLDC GLUCOMTR-MCNC: 156 MG/DL (ref 70–105)

## 2020-05-29 PROCEDURE — (BLANK) HC ROOM PRIVATE

## 2020-05-29 PROCEDURE — 97530 THERAPEUTIC ACTIVITIES: CPT | Mod: GO | Performed by: OCCUPATIONAL THERAPIST

## 2020-05-29 PROCEDURE — 97535 SELF CARE MNGMENT TRAINING: CPT | Mod: GO | Performed by: OCCUPATIONAL THERAPIST

## 2020-05-29 PROCEDURE — 97110 THERAPEUTIC EXERCISES: CPT | Mod: GP | Performed by: PHYSICAL THERAPIST

## 2020-05-29 PROCEDURE — 82947 ASSAY GLUCOSE BLOOD QUANT: CPT | Mod: QW

## 2020-05-29 PROCEDURE — 99233 SBSQ HOSP IP/OBS HIGH 50: CPT | Performed by: INTERNAL MEDICINE

## 2020-05-29 PROCEDURE — 6370000100 HC RX 637 (ALT 250 FOR IP): Performed by: PHYSICAL MEDICINE & REHABILITATION

## 2020-05-29 PROCEDURE — 99232 SBSQ HOSP IP/OBS MODERATE 35: CPT | Performed by: PHYSICAL MEDICINE & REHABILITATION

## 2020-05-29 PROCEDURE — 6360000200 HC RX 636 W HCPCS (ALT 250 FOR IP): Performed by: PHYSICAL MEDICINE & REHABILITATION

## 2020-05-29 PROCEDURE — 2590000100 HC RX 259: Performed by: PHYSICAL MEDICINE & REHABILITATION

## 2020-05-29 PROCEDURE — 6370000100 HC RX 637 (ALT 250 FOR IP): Performed by: INTERNAL MEDICINE

## 2020-05-29 PROCEDURE — 97530 THERAPEUTIC ACTIVITIES: CPT | Mod: GP | Performed by: PHYSICAL THERAPIST

## 2020-05-29 PROCEDURE — 97116 GAIT TRAINING THERAPY: CPT | Mod: GP | Performed by: PHYSICAL THERAPIST

## 2020-05-29 RX ORDER — ACETAMINOPHEN 500 MG
1000 TABLET ORAL NIGHTLY
Status: DISCONTINUED | OUTPATIENT
Start: 2020-05-29 | End: 2020-06-05 | Stop reason: HOSPADM

## 2020-05-29 RX ORDER — SENNOSIDES 8.6 MG/1
17.2 TABLET ORAL NIGHTLY PRN
Status: DISCONTINUED | OUTPATIENT
Start: 2020-05-29 | End: 2020-06-05 | Stop reason: HOSPADM

## 2020-05-29 RX ORDER — DIPHENHYDRAMINE HCL 25 MG
25 CAPSULE ORAL NIGHTLY PRN
Status: DISCONTINUED | OUTPATIENT
Start: 2020-05-29 | End: 2020-06-05 | Stop reason: HOSPADM

## 2020-05-29 RX ORDER — POLYETHYLENE GLYCOL (3350) 17 G/17G
17 POWDER, FOR SOLUTION ORAL DAILY
Status: DISCONTINUED | OUTPATIENT
Start: 2020-05-29 | End: 2020-06-05 | Stop reason: HOSPADM

## 2020-05-29 RX ORDER — TALC
6 POWDER (GRAM) TOPICAL NIGHTLY
Status: DISCONTINUED | OUTPATIENT
Start: 2020-05-29 | End: 2020-06-01

## 2020-05-29 RX ORDER — ADHESIVE BANDAGE
30 BANDAGE TOPICAL DAILY PRN
Status: DISCONTINUED | OUTPATIENT
Start: 2020-05-29 | End: 2020-06-05 | Stop reason: HOSPADM

## 2020-05-29 RX ADMIN — Medication 1000 MG: at 20:14

## 2020-05-29 RX ADMIN — CLOPIDOGREL BISULFATE 75 MG: 75 TABLET ORAL at 07:58

## 2020-05-29 RX ADMIN — POLYETHYLENE GLYCOL 3350 17 G: 17 POWDER, FOR SOLUTION ORAL at 20:13

## 2020-05-29 RX ADMIN — MELATONIN 1000 UNITS: at 07:57

## 2020-05-29 RX ADMIN — MELATONIN 6 MG: at 20:15

## 2020-05-29 RX ADMIN — ENOXAPARIN SODIUM 40 MG: 40 INJECTION SUBCUTANEOUS at 15:44

## 2020-05-29 RX ADMIN — INSULIN DETEMIR 10 UNITS: 100 INJECTION, SOLUTION SUBCUTANEOUS at 08:00

## 2020-05-29 RX ADMIN — PSYLLIUM HUSK 1 PACKET: 3.4 POWDER ORAL at 07:55

## 2020-05-29 RX ADMIN — LISINOPRIL 20 MG: 20 TABLET ORAL at 07:57

## 2020-05-29 RX ADMIN — ASPIRIN 81 MG: 81 TABLET ORAL at 07:57

## 2020-05-29 RX ADMIN — ROSUVASTATIN CALCIUM 40 MG: 20 TABLET, FILM COATED ORAL at 20:14

## 2020-05-29 RX ADMIN — Medication 500 MG: at 15:44

## 2020-05-29 NOTE — PROGRESS NOTES
Physical Medicine and Rehabilitation  Daily Progress Note       LOS: 2 days         CC: CVA    Subjective     Feeling well.  Doing well with therapy.  No issues.      Review of Systems  Except as noted above:  No headaches, vision changes, or hearing changes.  No URI symptoms.  No dysphagia or odynophagia.  No shortness of breath, coughing, wheezing.  No chest pain. No nausea/ vomiting, abdominal discomfort.  No urinary or bowel changes.  Review of systems otherwise is negative.       Objective           Vital signs in last 24 hours:  Temp:  [36.4 °C (97.5 °F)-36.5 °C (97.7 °F)] 36.5 °C (97.7 °F)  Heart Rate:  [69-79] 69  Resp:  [18] 18  BP: (130-151)/(89-95) 151/95    Intake/Output last 3 shifts:  I/O last 3 completed shifts:  In: 2405 [P.O.:2405]  Out: 2500 [Urine:2500]  Intake/Output this shift:  No intake/output data recorded.    Physical Exam  GEN:  Alert. No acute distress.  PSYCH: Normal affect.  HEENT: Normocephalic, atraumatic. EOMI, sclerae anicteric.  Mucous membranes moist.  NECK: Normal ROM. Trachea midline.  PULM:  Unlabored respiratory effort.  CV: Distal pulses intact.  ABD:  Nondistended.  EXTREM:  No clubbing, cyanosis, gross deformity.  NEURO:   Grossly unchanged. Speech is intelligible and coherent.  SKIN:  Warm and dry, normal turgor.        Results from last 4 days   Lab Units 05/28/20  0605   WBC AUTO 10*3/uL 6.9   HEMOGLOBIN g/dL 14.7   HEMATOCRIT % 42.1   PLATELETS AUTO 10*3/uL 188       Results from last 4 days   Lab Units 05/28/20  0605   SODIUM mmol/L 141   POTASSIUM mmol/L 3.7   CHLORIDE mmol/L 107   CO2 mmol/L 27   BUN mg/dL 19   CREATININE mg/dL 0.74   CALCIUM mg/dL 9.0   TOTAL PROTEIN g/dL 6.4   BILIRUBIN TOTAL mg/dL 0.54   ALK PHOS U/L 61   ALT U/L 24   AST U/L 16   GLUCOSE mg/dL 173*       DIET:       Dietary Orders   (From admission, onward)             Start     Ordered    05/27/20 1451  Diet Regular; Diabetic; Carb Counting (mealtime insulin)  Diet effective  now     Comments:  Pills whole with water if tolerating (i.e., no coughing), otherwise whole in puree.   Question Answer Comment   Nutrition Therapy Protocol (Dietitian May Adjust Diet and Nourishments) Yes    Diet type Regular    Diet Restrictions Diabetic    Diabetic Restrictions Carb Counting (mealtime insulin)        05/27/20 1450                  Scheduled Meds:  insulin detemir U-100, 15 Units, subcutaneous, Insulin: Nightly  insulin detemir U-100, 10 Units, subcutaneous, q AM  enoxaparin, 40 mg, subcutaneous, Daily at 1600  rosuvastatin, 40 mg, oral, Nightly  aspirin, 81 mg, oral, Daily  cholecalciferol (vitamin D3), 1,000 Units, oral, Daily  clopidogreL, 75 mg, oral, Daily  lisinopriL, 20 mg, oral, Daily  psyllium, 1 packet, oral, Daily      Continuous Infusions:     PRN Meds:  ondansetron  •  acetaminophen  •  sodium chloride      Active Problems:    CVA (cerebral vascular accident) (CMS/HCC) ([facility])        Assessment/Plan     Assessment/Plan:    59-year-old male admitted to [facility] 5/21/2020 after approximately 4 days of left-sided weakness, gait instability. MRI demonstrated acute right pontine CVA, and aspirin, Plavix, statin were started. Sinus rhythm on EKG/telemetry.  His acute stay was noted to have hematuria for which a renal bladder ultrasound was obtained and was negative for any evidence of bleeding or other abnormality.  VFSS was performed and negative for aspiration.  Left-sided weakness, gait ataxia, and disequilibrium are his primary functional deficits.    - Right pontine CVA. ASA, plavix, statin for 2' ppx.  Excellent progress with PT/OT/TR.  - Gait ataxia. Fall precautions.  - Essential hypertension. Lisinopril.  - Hyperlipidemia. Statin.  - DM2. Diet, accuchecks, levemir.  - Microscopic hematuria. Neg US kidneys/bladder.  - Hypomagnesemia.  - NIK.  - DVT ppx. Lovenox.  - Code Status:  Full Code         Derick Small, DO  12:56 PM, 5/29/2020.      A voice recognition program was  used to aid in documentation of this record. Sometimes words are not presented exactly as they were spoken.  While efforts were made to carefully edit and correct any inaccuracies, some errors may be present.  Please take this into context.  Please contact the provider if errors are identified.

## 2020-05-29 NOTE — PROGRESS NOTES
Daily Progress Note       LOS: 2 days     Subjective   Patient is a 59-year-old male who is here to inpatient rehab after having 4 days of left-sided weakness and gait instability.  He had an acute right pontine CVA.  He is on aspirin, Plavix and a statin for this.  He feels like he is doing well and is working with our therapist here at rehab.  He does have hypertension.  This is not well controlled as of yet.  He has diabetes mellitus.  Dr. Bernard did start making some changes with having his Levemir changed to the morning.  Patient feels like he is doing well already and is working well with our therapist.  His only complaint to me is that he is not sleeping well.  He says at home he takes melatonin 6 mg daily along with the Tylenol PM.  He also feels like he is having a little bit of constipation issue.  We talked about adding MiraLAX to his regime along with PRN treatment.  He thought that might be helpful.        ROS  No headaches, vision changes, or hearing changes.  No URI symptoms.  No dysphagia or odynophagia.  No shortness of breath, coughing, wheezing or PND.  No chest pain, palpitations, or anginal symptoms.  No nausea/ vomiting, heartburn or abdominal discomfort.  No urinary changes.  Review of systems otherwise is negative.      Vital signs in last 24 hours:  Temp:  [36.4 °C (97.5 °F)-36.5 °C (97.7 °F)] 36.5 °C (97.7 °F)  Heart Rate:  [69-79] 69  Resp:  [18] 18  BP: (130-151)/(89-95) 151/95    Intake/Output last 3 shifts:  I/O last 3 completed shifts:  In: 2405 [P.O.:2405]  Out: 2500 [Urine:2500]  Intake/Output this shift:  I/O this shift:  In: 850 [P.O.:850]  Out: 550 [Urine:550]    Physical Exam  GENERAL: Patient is alert and oriented, in no acute distress.  HEENT: Normocephalic, atraumatic. Sclerae anicteric.  No tenderness to palpation of sinuses.    NECK: No cervical lymphadenopathy.  Trachea is midline.  LUNGS:  Clear to auscultation bilaterally, both anterior and posterior.  CARDIOVASCULAR EXAM:  Regular rate and rhythm without murmur, rub, or gallop.    ABDOMEN:  Soft. Positive bowel sounds in all four quadrants.  No rebound. No guarding.   Nontender, nondistended.  EXTREMITIES:  No clubbing, cyanosis, or edema.    Medications:    Current Facility-Administered Medications:   •  insulin detemir U-100 (LEVEMIR) injection 15 Units, 15 Units, subcutaneous, Insulin: Nightly, INOCENCIO Bernard MD, 15 Units at 05/28/20 2200  •  insulin detemir U-100 (LEVEMIR) injection 10 Units, 10 Units, subcutaneous, q AM, INOCENCIO Bernard MD, 10 Units at 05/29/20 0800  •  enoxaparin (LOVENOX) syringe 40 mg, 40 mg, subcutaneous, Daily at 1600, Derick Small DO, 40 mg at 05/29/20 1544  •  ondansetron (ZOFRAN) tablet 4 mg, 4 mg, oral, q6h PRN, Derick Small DO  •  rosuvastatin (CRESTOR) tablet 40 mg, 40 mg, oral, Nightly, Derick Small DO, 40 mg at 05/28/20 2056  •  acetaminophen (TYLENOL) tablet 500 mg, 500 mg, oral, q6h PRN, Derick Small DO, 500 mg at 05/29/20 1544  •  aspirin EC tablet 81 mg, 81 mg, oral, Daily, Derick Small DO, 81 mg at 05/29/20 0757  •  cholecalciferol (vitamin D3) 1,000 unit (25mcg) tab/cap 1,000 Units, 1,000 Units, oral, Daily, Derick Small DO, 1,000 Units at 05/29/20 0757  •  clopidogreL (PLAVIX) tablet 75 mg, 75 mg, oral, Daily, Derick Small DO, 75 mg at 05/29/20 0758  •  lisinopriL (PRINIVIL,ZESTRIL) tablet 20 mg, 20 mg, oral, Daily, Derick Small DO, 20 mg at 05/29/20 0757  •  psyllium (METAMUCIL SUGAR FREE) 1 packet, 1 packet, oral, Daily, Derick Small DO, 1 packet at 05/29/20 0755  •  sodium chloride (OCEAN) 0.65 % nasal spray 2 spray, 2 spray, Each Nostril, PRN, Derick Small, DO    Labs:  CBC:   Lab Results   Component Value Date    WBC 6.9 05/28/2020    RBC 4.56 05/28/2020    HGB 14.7 05/28/2020    HCT 42.1 05/28/2020     05/28/2020     CMP:   Lab Results   Component Value Date     05/28/2020    K 3.7 05/28/2020     05/28/2020    CO2 27 05/28/2020     GLUCOSE 173 (H) 05/28/2020    CREATININE 0.74 05/28/2020    CALCIUM 9.0 05/28/2020    ALBUMIN 3.8 05/28/2020    ALKPHOS 61 05/28/2020    BILITOT 0.54 05/28/2020    ALT 24 05/28/2020    AST 16 05/28/2020    BUN 19 05/28/2020    ANIONGAP 7 05/28/2020     Coagulation: No results found for: PT, INR, APTT              Active Problems:    CVA (cerebral vascular accident) (CMS/HCC) ([facility])        Assessment and Plan:  1.  Status post right pontine CVA.  Patient's on aspirin, statin and Plavix.  He is working well with the therapist here at inpatient rehab.  He does have a carotid ultrasound pending.  He is working with our therapist here at inpatient rehab.  2.  Hypertension.  Patient is on lisinopril 20 mg daily.  We are giving some permissive hypertension because of his recent CVA.  We will monitor this so and most likely go up on his medicines.  At home he is actually on Cozaar 50 mg twice daily.  I am not sure why he is not on that and on lisinopril instead.  I will check with him tomorrow.  3.  Diabetes mellitus.  Dr. Bernard just made some adjustments in his Levemir.  He changed his morning Levemir to 15 units in evening to 10.  We will follow his blood sugars and make adjustments if needed.  4.  Hyperlipidemia.  Patient is on Crestor and doing well.  5.  Constipation.  I am going to add MiraLAX to his regime.  I will also add some as needed treatment.  6.  Microscopic hematuria.  Patient has some issues previously for this.  He has had an ultrasound which was negative.  7.  Hypomagnesia.  I will check a magnesium level with his lab work.  8.  Obstructive sleep apnea.  I cannot find why he is not on CPAP machine.  He is on oxygen at nighttime.  I am going to check with patient tomorrow to find out if he has any reasons for not using this.  If not then we need to get him started on this.  9.  Vitamin D deficiency.  I will check a vitamin D level.  10.  DVT prophylaxis.  Patient is on Lovenox.  11.  Insomnia.  Patient  takes melatonin and a Tylenol PM at nighttime.  He says the reason he takes a Tylenol PM is because the Tylenol does help with his pain.  I am going to schedule him on 6 mg of melatonin and Tylenol thousand milligrams.  I will add the Benadryl as needed.  I am concerned because of his age and being male that he might have some urinary retention with this.    12.  Constipation.  I am going to add MiraLAX.  I am also adding PRN treatment.  13.  Total time spent today has been 40 minutes with more than 50% time spent face-to-face in counseling and coordination of care of patient as outlined in the assessment plan.                  Sarah Harris MD  5:19 PM, 5/29/2020.      A voice recognition program was used to aid in documentation of this record. Sometimes words are not presented exactly as they were spoken.  While efforts were made to carefully edit and correct any inaccuracies, some errors may be present.  Please take this into context.  Please contact the provider if errors are identified.

## 2020-05-29 NOTE — PLAN OF CARE
Problem: Pain - Adult  Description: Pt denies discomfort at this time  Goal: Verbalizes/displays adequate comfort level or baseline comfort level  Description: INTERVENTIONS:  1. Encourage patient to monitor pain and request interventions  2. Assess pain using the appropriate pain scale  3. Administer analgesics based on type and severity of pain and evaluate response  4. Educate/Implement non-pharmacological measures as appropriate and evaluate response  5. Consider cultural, developmental and social influences on pain and pain management  6. Notify Provider if interventions unsuccessful or patient reports new pain  Outcome: Progressing

## 2020-05-29 NOTE — PLAN OF CARE
Problem: Pain - Adult  Description: Pt denies discomfort at this time  Goal: Verbalizes/displays adequate comfort level or baseline comfort level  Description: INTERVENTIONS:  1. Encourage patient to monitor pain and request interventions  2. Assess pain using the appropriate pain scale  3. Administer analgesics based on type and severity of pain and evaluate response  4. Educate/Implement non-pharmacological measures as appropriate and evaluate response  5. Consider cultural, developmental and social influences on pain and pain management  6. Notify Provider if interventions unsuccessful or patient reports new pain  Outcome: Progressing  Flowsheets (Taken 5/28/2020 0109 by Corinne J Ader, RN)  Verbalizes/displays adequate comfort level or baseline comfort level:   Encourage patient to monitor pain and request interventions   Assess pain using the appropriate pain scale     Problem: Infection - Adult  Goal: Absence of infection during hospitalization  Description: INTERVENTIONS:  1. Assess and monitor for signs and symptoms of infection  2. Monitor lab/diagnostic results  3. Monitor all insertion sites/wounds/incisions  4. Monitor secretions for changes in amount and color  5. Administer medications as ordered  6. Educate and encourage patient and family to use good hand hygiene technique  7. Identify and educate in appropriate isolation precautions for identified infection/condition  Outcome: Progressing  Flowsheets (Taken 5/28/2020 0109 by Corinne J Ader, RN)  Absence of infection during hospitalization:   Assess and monitor for signs and symptoms of infection   Monitor lab/diagnostic results     Problem: Safety Adult - Fall  Goal: Free from fall injury  Description: INTERVENTIONS:    Inpatient - Please reference Cares/Safety Flowsheet under [place] Fall Risk for interventions.  Pediatrics - Please reference Peds Daily Cares/Safety Flowsheet under [place] Pediatric Fall Assessment Fall Bundle for  interventions  LD/OB - Please reference OB Shift Screening Flowsheet under OB Fall Risk for interventions.  Outcome: Progressing     Problem: Knowledge Deficit  Goal: Patient/family/caregiver demonstrates understanding of disease process, treatment plan, medications, and discharge instructions  Description: INTERVENTIONS:   1. Complete learning assessment and assess knowledge base  2. Provide teaching at level of understanding   3. Provide teaching via preferred learning methods  Outcome: Progressing  Flowsheets (Taken 5/28/2020 0109 by Corinne J Ader, RN)  Patient/family/caregiver demonstrates understanding of disease process, treatment plan, medications, and discharge instructions:   Complete learning assessment and assess knowledge base   Provide teaching via preferred learning methods   Provide teaching at level of understanding     Problem: Potential for Compromised Skin Integrity  Goal: Skin Integrity is Maintained or Improved  Description: INTERVENTIONS:  1. Assess and monitor skin integrity  2. Collaborate with interdisciplinary team and initiate plans and interventions as needed  3. Alternate a full bath with partial baths for elderly   4. Monitor patient's hygiene practices   5. Collaborate with wound, ostomy, and continence nurse  Outcome: Progressing  Flowsheets (Taken 5/28/2020 0109 by Corinne J Ader, RN)  Skin integrity is maintained or improved: Assess and monitor skin integrity  Goal: Nutritional status is improving  Description: INTERVENTIONS:  1. Monitor and assess patient for malnutrition (ex- brittle hair, bruises, dry skin, pale skin and conjunctiva, muscle wasting, smooth red tongue, and disorientation)  2. Monitor patient's weight and dietary intake as ordered or per policy  3. Determine patient's food preferences and provide high-protein, high-caloric foods as appropriate  4. Assist patient with eating   5. Allow adequate time for meals   6. Encourage patient to take dietary supplement as  ordered   7. Collaborate with dietitian  8. Include patient/family/caregiver in decisions related to nutrition  Outcome: Progressing  Flowsheets (Taken 5/28/2020 0109 by Corinne J Ader, RN)  Nutritional status is improving:   Monitor and assess patient for malnutrition (ex- brittle hair, bruises, dry skin, pale skin and conjunctiva, muscle wasting, smooth red tongue, and disorientation)   Monitor patient's weight and dietary intake as ordered or per policy  Goal: MOBILITY IS MAINTAINED OR IMPROVED  Description: INTERVENTIONS  1. Collaborate with interdisciplinary team and initiate plan and interventions as ordered (PT/OT)  2. Encourage ambulation  3. Up to chair for meals  4. Monitor for signs of deconditioning  Outcome: Progressing  Flowsheets (Taken 5/28/2020 0109 by Corinne J Ader, RN)  Mobility is Maintained or Improved: Monitor for signs of deconditioning     Problem: Safety Adult - Fall  Goal: Free from fall injury  Description: INTERVENTIONS:    Inpatient - Please reference Cares/Safety Flowsheet under [place] Fall Risk for interventions.  Pediatrics - Please reference Peds Daily Cares/Safety Flowsheet under [place] Pediatric Fall Assessment Fall Bundle for interventions  LD/OB - Please reference OB Shift Screening Flowsheet under OB Fall Risk for interventions.  Outcome: Progressing

## 2020-05-29 NOTE — NURSING END OF SHIFT
Nursing End of Shift Summary:    Patient: Del Mckeon  MRN: [id number]  : 1960, Age: 59 y.o.    Location: [place] [address]    Nursing Goals  Clinical Goals for the Shift: Pt will demonstrate appropriate call light use and remain free of self-transfer attempts. Post void residualsl will be monitored, pt will remain free of intermittent cathing.     Narrative Summary of Progress Toward Clinical Goals:  Comfort and safety was maintained for this patient.    Barriers to Goals/Nursing Concerns:  N    New Patient or Family Concerns/Issues:  N    Shift Summary:      Significant Events & Communications to Providers (last 12 hours)      Last 5 Values    No documentation.              Oxygen Usage (last 12 hours)      Last 5 Values    No documentation.              Mobility (last 12 hours)      Last 5 Values     Row Name 20 0700                   Mobility    Anti-Embolism Devices  Bilateral;AE foot pump        Anti-Embolism Intervention  Refused            Urethral Catheter    Active Urethral Catheter     None            Active Lines    Active Central venous catheter / Peripherally inserted central catheter / Implantable Port / Hemodialysis catheter / Midline Catheter     None              Infusing Medications   Medication Dose Last Rate     PRN Medications   Medication Dose Last Dose   • ondansetron  4 mg     • acetaminophen  500 mg 500 mg at 20   • sodium chloride  2 spray       _________________________  Elsa Brown RN  20 2:05 PM

## 2020-05-29 NOTE — PLAN OF CARE
Problem: Pain - Adult  Description: Pt denies discomfort at this time  Goal: Verbalizes/displays adequate comfort level or baseline comfort level  Description: INTERVENTIONS:  1. Encourage patient to monitor pain and request interventions  2. Assess pain using the appropriate pain scale  3. Administer analgesics based on type and severity of pain and evaluate response  4. Educate/Implement non-pharmacological measures as appropriate and evaluate response  5. Consider cultural, developmental and social influences on pain and pain management  6. Notify Provider if interventions unsuccessful or patient reports new pain  Outcome: Progressing     Problem: Infection - Adult  Goal: Absence of infection during hospitalization  Description: INTERVENTIONS:  1. Assess and monitor for signs and symptoms of infection  2. Monitor lab/diagnostic results  3. Monitor all insertion sites/wounds/incisions  4. Monitor secretions for changes in amount and color  5. Administer medications as ordered  6. Educate and encourage patient and family to use good hand hygiene technique  7. Identify and educate in appropriate isolation precautions for identified infection/condition  Outcome: Progressing     Problem: Safety Adult - Fall  Goal: Free from fall injury  Description: INTERVENTIONS:    Inpatient - Please reference Cares/Safety Flowsheet under [place] Fall Risk for interventions.  Pediatrics - Please reference Peds Daily Cares/Safety Flowsheet under [place] Pediatric Fall Assessment Fall Bundle for interventions  LD/OB - Please reference OB Shift Screening Flowsheet under OB Fall Risk for interventions.  Outcome: Progressing     Problem: Discharge Planning  Goal: Discharge to home or other facility with appropriate resources  Description: INTERVENTIONS:  1. Identify and discuss barriers to discharge with patient and caregiver.  2. Arrange for needed discharge resources and transportation as appropriate.  3. Identify discharge learning  needs (meds, wound care, etc).  4. Arrange for interpreters to assist at discharge as needed.  5. Refer to  for coordinating discharge planning if the patient needs post-hospital services based on physician order or complex needs related to functional status, cognitive ability or social support system.  Outcome: Progressing     Problem: Knowledge Deficit  Goal: Patient/family/caregiver demonstrates understanding of disease process, treatment plan, medications, and discharge instructions  Description: INTERVENTIONS:   1. Complete learning assessment and assess knowledge base  2. Provide teaching at level of understanding   3. Provide teaching via preferred learning methods  Outcome: Progressing     Problem: Potential for Compromised Skin Integrity  Goal: Skin Integrity is Maintained or Improved  Description: INTERVENTIONS:  1. Assess and monitor skin integrity  2. Collaborate with interdisciplinary team and initiate plans and interventions as needed  3. Alternate a full bath with partial baths for elderly   4. Monitor patient's hygiene practices   5. Collaborate with wound, ostomy, and continence nurse  Outcome: Progressing  Goal: Nutritional status is improving  Description: INTERVENTIONS:  1. Monitor and assess patient for malnutrition (ex- brittle hair, bruises, dry skin, pale skin and conjunctiva, muscle wasting, smooth red tongue, and disorientation)  2. Monitor patient's weight and dietary intake as ordered or per policy  3. Determine patient's food preferences and provide high-protein, high-caloric foods as appropriate  4. Assist patient with eating   5. Allow adequate time for meals   6. Encourage patient to take dietary supplement as ordered   7. Collaborate with dietitian  8. Include patient/family/caregiver in decisions related to nutrition  Outcome: Progressing  Goal: MOBILITY IS MAINTAINED OR IMPROVED  Description: INTERVENTIONS  1. Collaborate with interdisciplinary team and initiate plan  and interventions as ordered (PT/OT)  2. Encourage ambulation  3. Up to chair for meals  4. Monitor for signs of deconditioning  Outcome: Progressing     Problem: Safety Adult - Fall  Goal: Free from fall injury  Description: INTERVENTIONS:    Inpatient - Please reference Cares/Safety Flowsheet under [place] Fall Risk for interventions.  Pediatrics - Please reference Peds Daily Cares/Safety Flowsheet under [place] Pediatric Fall Assessment Fall Bundle for interventions  LD/OB - Please reference OB Shift Screening Flowsheet under OB Fall Risk for interventions.  Outcome: Progressing

## 2020-05-29 NOTE — PLAN OF CARE
Rehabilitation Individualized Plan of Care        Patient Name:  Del Mckeon       Medical Record Number: [id number]   YOB: 1960  Sex: Male          Room/Bed:  11/A111-01    Primary Impairment Group: Stroke:  01.4  No paresis  Admitting Diagnosis: CVA (cerebral vascular accident) (CMS/[facility]) ([facility]) [I63.9]   Admit Date/Time:  5/27/2020  3:05 PM     Physician Summary    Del Mckeon will undergo inpatient rehabilitation to manage complex medical and rehabilitation needs related to pontine CVA, gait ataxia, DM2, hypertension.    The patient will receive interdisciplinary care, including daily physician management for the following:  Monitoring for medication side effects, management of BP and DM2.    Active Problems:    CVA (cerebral vascular accident) (CMS/[facility]) ([facility])      Past Medical History:   Diagnosis Date   • Anemia    • Asthma    • Depressive disorder    • Heart murmur    • Hyperlipidemia    • Hypertension    • Obesity    • Obstructive sleep apnea    • Type 2 diabetes mellitus (CMS/[facility]) ([facility])        Past Surgical History:   Procedure Laterality Date   • CRYOTHERAPY      dermatology       The patient will benefit from continued services by:   Physical Therapy, Occupational Therapy and Recreation Therapy    Frequency and duration of therapies expect to be 3 hrs/day, 5 days/week.     Rehab nursing is required to help manage the patient’s complex nursing needs related to their documented medical conditions.     Prognosis: Good.  When medically stable, anticipated disposition:  home.       Treatment Goals:   Multi-Disciplinary Problems     Active Problems     Problem: Pain - Adult  Start Date: 05/27/20    Problem Details:  Pt denies discomfort at this time      Goal Start Date End Date    Verbalizes/displays adequate comfort level or baseline comfort level 05/27/20 --    Goal Details:  INTERVENTIONS:  1. Encourage patient to monitor pain and request interventions  2. Assess pain using the  appropriate pain scale  3. Administer analgesics based on type and severity of pain and evaluate response  4. Educate/Implement non-pharmacological measures as appropriate and evaluate response  5. Consider cultural, developmental and social influences on pain and pain management  6. Notify Provider if interventions unsuccessful or patient reports new pain            Problem: Infection - Adult  Start Date: 05/27/20    Goal Start Date End Date    Absence of infection during hospitalization 05/27/20 --    Goal Details:  INTERVENTIONS:  1. Assess and monitor for signs and symptoms of infection  2. Monitor lab/diagnostic results  3. Monitor all insertion sites/wounds/incisions  4. Monitor secretions for changes in amount and color  5. Administer medications as ordered  6. Educate and encourage patient and family to use good hand hygiene technique  7. Identify and educate in appropriate isolation precautions for identified infection/condition            Problem: Safety Adult - Fall  Start Date: 05/27/20    Goal Start Date End Date    Free from fall injury 05/27/20 --    Goal Details:  INTERVENTIONS:    Inpatient - Please reference Cares/Safety Flowsheet under Whitten Fall Risk for interventions.  Pediatrics - Please reference Peds Daily Cares/Safety Flowsheet under [place] Pediatric Fall Assessment Fall Bundle for interventions  LD/OB - Please reference OB Shift Screening Flowsheet under OB Fall Risk for interventions.            Problem: Discharge Planning  Start Date: 05/27/20    Goal Start Date End Date    Discharge to home or other facility with appropriate resources 05/27/20 --    Goal Details:  INTERVENTIONS:  1. Identify and discuss barriers to discharge with patient and caregiver.  2. Arrange for needed discharge resources and transportation as appropriate.  3. Identify discharge learning needs (meds, wound care, etc).  4. Arrange for interpreters to assist at discharge as needed.  5. Refer to  for  coordinating discharge planning if the patient needs post-hospital services based on physician order or complex needs related to functional status, cognitive ability or social support system.            Problem: Knowledge Deficit  Start Date: 05/27/20    Goal Start Date End Date    Patient/family/caregiver demonstrates understanding of disease process, treatment plan, medications, and discharge instructions 05/27/20 --    Goal Details:  INTERVENTIONS:   1. Complete learning assessment and assess knowledge base  2. Provide teaching at level of understanding   3. Provide teaching via preferred learning methods            Problem: Potential for Compromised Skin Integrity  Start Date: 05/27/20    Goal Start Date End Date    Skin Integrity is Maintained or Improved 05/27/20 --    Goal Details:  INTERVENTIONS:  1. Assess and monitor skin integrity  2. Collaborate with interdisciplinary team and initiate plans and interventions as needed  3. Alternate a full bath with partial baths for elderly   4. Monitor patient's hygiene practices   5. Collaborate with wound, ostomy, and continence nurse      Goal Start Date End Date    Nutritional status is improving 05/27/20 --    Goal Details:  INTERVENTIONS:  1. Monitor and assess patient for malnutrition (ex- brittle hair, bruises, dry skin, pale skin and conjunctiva, muscle wasting, smooth red tongue, and disorientation)  2. Monitor patient's weight and dietary intake as ordered or per policy  3. Determine patient's food preferences and provide high-protein, high-caloric foods as appropriate  4. Assist patient with eating   5. Allow adequate time for meals   6. Encourage patient to take dietary supplement as ordered   7. Collaborate with dietitian  8. Include patient/family/caregiver in decisions related to nutrition      Goal Start Date End Date    MOBILITY IS MAINTAINED OR IMPROVED 05/27/20 --    Goal Details:  INTERVENTIONS  1. Collaborate with interdisciplinary team and  initiate plan and interventions as ordered (PT/OT)  2. Encourage ambulation  3. Up to chair for meals  4. Monitor for signs of deconditioning            Problem: Safety Adult - Fall  Start Date: 05/27/20    Goal Start Date End Date    Free from fall injury 05/27/20 --    Goal Details:  INTERVENTIONS:    Inpatient - Please reference Cares/Safety Flowsheet under Whitten Fall Risk for interventions.  Pediatrics - Please reference Peds Daily Cares/Safety Flowsheet under [place] Pediatric Fall Assessment Fall Bundle for interventions  LD/OB - Please reference OB Shift Screening Flowsheet under OB Fall Risk for interventions.            Problem: Inpt Rehab Nursing Plan of Care Statement  Start Date: 05/28/20    Problem Details:  Rehab nursing care plan focus includes:   Bowel: Continent, last BM 5/26  Meds: SANDOVAL Metamucil. No PRNs.    Bladder: Retention, PVRs 30-150cc.    Meds: None. Pain: Denies.  PRN Tylenol available.   Skin: Intake. Parveen Scale 19.    Nutrition: DB diet.  Consumed % of meals in the last 24-hours.   DB: Glucs ACHS: 142-272 in the last 24-hours.  SANDOVAL Levemir 25u at HS and 10u QAM (new order 5/28).    Weights: Rehab admit wt 90.1kg on 5/27.   Safety: Whitten Fall Risk Score 80, indicating a high fall risk.  Alarms in use.  Impulsive with poor safety awareness, attempts to self-transfer.  Therefore SCDs D/C'd for safety.                     Problem: PT Treatment Plan  Start Date: 05/28/20    Problem Details:  PT services for  75 minutes per day, 5 times per week, with additional therapy services, as needed, based on patient progress for a total of 10 days during the admission.  Pt will be seen for functional transfer training, bed mobility training, gait/stair training, balance training, endurance training, therapeutic activity, therapeutic exercise, neuromuscular re-education, equipment evaluation/education, manual therapy, vestibular treatment and pain education in order to progress towards safe and  functional discharge home.                Problem: Mobility  Start Date: 05/28/20    Goal Start Date End Date    STG - Patient will ambulate 05/28/20 --    Goal Details:  50 m with fww and sba.      Goal Start Date End Date    LTG - Patient will ascend and descend stairs 05/28/20 --    Goal Details:  12 with sba      Goal Start Date End Date    LTG - Patient will ambulate household distance 05/28/20 --    Goal Details:  Independently with or without fww.            Problem: TRANSFERS  Start Date: 05/28/20    Problem Details:  Independently with or without fww      Goal Start Date End Date    STG - Patient to transfer to and from sit to supine 05/28/20 --    Goal Details:  independently      Goal Start Date End Date    STG - Patient will transfer from bed to chair 05/28/20 --    Goal Details:  Independently with or without fww            Problem: PT Misc  Start Date: 05/28/20    Problem Details:  Patient able to follow HEP      Goal Start Date End Date    STG - Misc 1 05/28/20 --    Goal Details:  Pt able to follow HEP independently.            Problem: Balance  Start Date: 05/28/20    Goal Start Date End Date    STG - Maintains static standing balance without upper extremity support 05/28/20 --    Goal Start Date End Date    STG-Patient will demonstrate improved GARCIA score 05/28/20 --    Goal Details:  Increase by 7-10 points            Problem: ST Treatment Plan  Start Date: 05/28/20    Problem Details:  ST services for 30 minutes per day, 5 times per week, with additional therapy services, as needed, based on patient progress for a total of 10 days during the admission.            Problem: SLP Misc  Start Date: 05/28/20    Problem Details:  Cognitive-linguistic evaluation            Problem: Dysphagia  Start Date: 05/28/20    Problem Details:  Pt will tolerate regular/thin liquid diet with no overt signs or symptoms of penetration or aspiration.             Problem: OT TREATMENT PLAN  Start Date: 05/28/20     Problem Details:  OT services for 75 minutes per day, 5 times per week, with additional therapy services, as needed, based on patient progress for a total of 10 days during the admission.  Patient will be seen for neuro re-ed, ADL training, transfer training, functional mobility, endurance training, upper extremity strengthening, adaptive equipment evaluation and training and family/patient training, as well as therapeutic recreational activities in order to progress towards safe and functional discharge home.                 Problem: Balance  Start Date: 05/28/20    Goal Start Date End Date    STG - Maintains dynamic standing balance with upper extremity support 05/28/20 --    Goal Details:  For 5+ mins during ADLs/fxal act w/SBA and 0 LOB            Problem: Bathing  Start Date: 05/28/20    Goal Start Date End Date    STG - Patient will complete bathing 05/28/20 --    Goal Details:  W/mod (I), AE/DME as needed and good safety awareness            Problem: Dressings Lower Extremities  Start Date: 05/28/20    Goal Start Date End Date    STG - Patient to complete lower body dressing 05/28/20 --    Goal Details:  W/mod (I) and AE as needed            Problem: Instrumental Activities of Daily Living  Start Date: 05/28/20    Goal Start Date End Date    LTG - Patient will complete simple meal preparation activities 05/28/20 --    Goal Details:  W/SUP and good safety awareness            Problem: Toileting  Start Date: 05/28/20    Goal Start Date End Date    STG - Patient will complete toileting tasks with 05/28/20 --    Goal Details:  Mod (I), AE/DME as needed and good safety                            Electronically signed by: Derick Small DO   Date: 5/29/2020   Time: 8:58 AM

## 2020-05-29 NOTE — INTERDISCIPLINARY/THERAPY
Physical Therapy  Treatment Note (PT)    Patient Name: Del Mckeon  Age: 59 y.o.  Gender: male    ----------------------------------------------------------------------------------------------------------------------       05/29/20 0820   Time Calculation   Start Time 0820   Stop Time 0910   Time Calculation (min) 50 min   General   Chart Reviewed Yes   Therapy Treatment Diagnosis CVA w/ L weakness (pontine stroke involving basilar artery)   Subjective Comments   Subjective Comments Pt agreed to PT   Transfer 1   Transfer From 1 Wheelchair   Transfer Type 1 To and from   Transfer to 1 Mat   Technique 1 Stand pivot   Transfer Device 1 Front wheeled walker   Transfers 2   Transfer From 2 Wheelchair   Transfer Type 2 To   Transfer to 2 Bed   Technique 2 Stand pivot   Transfer Device 2 Front wheeled walker   Level of Assistance 2 Contact guard assist x 1   Ambulation 1   Surface 1 Level surface   Device 1 Front wheeled walker   Assistance 1 Contact guard assist x 1   Quality of Gait 1 slow pace, narrow KYLE   Comments/Distance 1 50 m x 2   Ambulation 2   Surface 2 Level surface   Device 2 Parallel bars   Assistance 2 Standby assistance   Quality of Gait 2 working on one hand and sidestepping   Comments/Distance 2 6 m x 4 reps   Ambulation 3   Surface 3 Level surface   Device 3 Single point cane   Assistance 3 Contact guard assist x 1   Quality of Gait 3 slow pace   Comments/Distance 3 20 m x 2 reps   QI Functional Abilities and Goals: Mobility   Roll Left and Right 04   Sit to Lying 04   Lying to Sitting on Side of Bed 04   Sit to Stand 04   Chair/Bed-to-Chair Transfer 04   Toilet Transfer 04   Car Transfers 88   Does the Patient Walk? 2   Walk 10 Feet 04   Walk 50 Feet with Two Turns 04   Walk 150 Feet 04   Walking 10 Feet on Uneven Surfaces 04   1 Step (Curb) 04   4 Steps 04   Static Standing Balance   Static Standing-Balance Surface Firm   Static Standing-Balance Support No upper extremity supported   Static  "Standing-Level of Assistance Standby assistance   Dynamic Standing Balance   Dynamic Standing-Balance Surface Firm   Dynamic Standing-Balance Support No upper extremity supported   Dynamic Standing-Balance Reaching across midline;Reaching for objects;Forward lean   Dynamic Standing-Level of Assistance Contact guard x 1   Dynamic Standing-Comments also toe touches onto balance pod 10 reps   Standing   Standing-Exercises Lower extremity;Specific exercises   Standing-Exercise Type Hip flexion;Hip ABduction;Hip ADduction;Squats;Heel raises   Standing-Exercise Comments 10 reps in // bars   Therapeutic Activities   Therapeutic Activities sit to stand x 5 from 18\" ht.   Equipment Use   Other Equipment Use Sci Fit 4 minutes level 1   Assessment   Assessment Comment Pt safer at this time with use of fww rather than cane, but will continue to practice.         _________________  JAIDA MONTES, PT  05/29/20 12:03 PM    "

## 2020-05-29 NOTE — INTERDISCIPLINARY/THERAPY
"   20 0930   Time Calculation   Start Time 0930   Stop Time 1000   Time Calculation (min) 30 min   Subjective Comments   Subjective Comments Pt agreeable to ST evaluation.   General   SLP Treatment Duration (Min) 30 Minutes   Family/Caregiver Present No   Home Living Comments Pt reports living independently managing his own medications (relying on memory, does not use a pillbox), cooking, and finances (relys on memory for paying rent, pays monthly bills as they arrive).   Prior Function Comments Pt reports using [employer] for transportation d/t visual deficits. Pt reports receiving a Bachelor of Science degree and working previously as an .   Cognitive Linguistic   Overall Cognitive Status Impaired   Degree of Impairment   (Mild)   Orientation Level Oriented X4   Cognitive Assessments Jeramy Cognitive Assessment (MoCA)   MoCA Score 22   Insight Mildly impaired insight   Task Initiation WFL   Awareness of Errors Decreased awareness of errors   Cognition Comments Visuospatial/Executive: 4/5; Namin/3; Immediate Recall: 7/10; Attention: 5/6; Language: 2/3; Abstraction: 2/2; Delayed Recall: 1/5; Memory Index Score: 8/15; Orientation: 6/6   Assessment Comments   Assessment Comments COGNITIVE EVALUATION: Pt was administered version 8.1 of the Jeramy Cognitive Assessment (MoCA) to screen for mild cognitive impairment.  Areas addressed included visuospatial/executive function, naming, attention, language, abstraction, delayed recall, and orientation. A score of 26 or greater is suggestive of \"normal\" cognitive function.  Pt obtained a total score of 22/30 with a delayed recall score of 1/5 after 5 minutes and memory index score of 8/15.  Results are suggestive of mild cognitive impairment with primary area of deficit in memory.     Due to pt's independent PLOF and reliance on memory for IADLs such as medication and financial management, recommend further evaluation of memory. " Additionally, recommend memory strategy training (e.g., internal and external memory aids).   Patient Education   Patient Education Pt was educated regarding purpose of evaluation and corresponding results/POC. Pt provided verbal agreement and understanding.   Evaluation Tolerance   Evaluation Tolerance Patient tolerated evaluation well   Plan   Plan Comments Nia

## 2020-05-29 NOTE — INTERDISCIPLINARY/THERAPY
Physical Therapy  Treatment Note (PT)    Patient Name: Del Mckeon  Age: 59 y.o.  Gender: male    ----------------------------------------------------------------------------------------------------------------------       05/29/20 1300   Time Calculation   Start Time 1300   Stop Time 1330   Time Calculation (min) 30 min   Pain Assessment   Pain Assessment No/denies pain   General   Therapy Treatment Diagnosis CVA with L weakness   Subjective Comments   Subjective Comments Pt agreed to PT   Bed Mobility 1   Bed Mobility From 1 Short sit   Bed Mobility Type 1 To   Bed Mobility to 1 Supine   Level of Assistance 1 Standby assistance   Transfer 1   Transfer From 1 Wheelchair   Transfer Type 1 To and from   Transfer to 1 Bed   Technique 1 Stand pivot   Transfer Device 1 Front wheeled walker   Level of Assistance 1 Contact guard assist x 1   Ambulation 1   Surface 1 Level surface;Outdoors   Device 1 Front wheeled walker   Assistance 1 Contact guard assist x 1   Quality of Gait 1 slow pace, narrow KYLE   Comments/Distance 1 60-75 m x 3 reps   Therapeutic Activities   Therapeutic Activities sit to stand x 5 reps   Assessment   Assessment Comment Pt needing just occasional cues for safety.         _________________  JAIDA MONTES, PT  05/29/20 3:36 PM

## 2020-05-29 NOTE — INTERDISCIPLINARY/THERAPY
Occupational Therapy  Treatment Note (OT)      Patient Name: Del Mckeon  Age: 59 y.o.  Gender: male    ----------------------------------------------------------------------------------------------------------------------         05/29/20 0700   Time Calculation   Start Time 0700   Stop Time 0745   Time Calculation (min) 45 min   OT Last Visit   OT Received On 05/29/20   General   Therapy Treatment Diagnosis CVA   OT Treatment Duration (Min) 45 Minutes   Is this a Co-Treatment? No   Family/Caregiver Present No   Precautions   Reinforced Precautions Yes   Other Precautions L side weakness. Fall risk.   Subjective Comments   Subjective Comments Pt agreeable to OT session; reported 0 pain but that he did not sleep well.   Whitten Fall Risk   History of Falling 25   Secondary Diagnosis 15   Ambulatory Aids 15   Intravenous Therapy/Heparin/Saline Lock 0   Gait/Transferring 10   Mental Status 15   Whitten Fall Risk Score 80   Required Fall Bundle  Yes   High Fall Risk Bundle  Yes   Cognition   Arousal/Alertness WFL   Cognition Comment Occasional cues for sequencing and safety.   Bed Mobility   Bed Mobility Assessed   Bed Mobility 1   Bed Mobility From 1 Supine   Bed Mobility Type 1 To   Bed Mobility to 1 Short sit   Level of Assistance 1 Standby assistance   Bed Mobility Comments 1 HOB flat; increased time to complete.   Transfers   Transfer Assessed   Transfer 1   Transfer From 1 Sit   Transfer Type 1 To and from   Transfer to 1 Stand   Technique 1 Sit to stand;Stand pivot;Stand to sit   Transfer Device 1 Front wheeled walker;Transfer belt   Level of Assistance 1 Contact guard assist x 1   Trials/Comments 1 Completed from bed>w/c and w/c<>stand w/CGA and cues for sequencing/safety.   Dynamic Standing Balance   Dynamic Standing-Comments SBA-CGA for safety in standing during ADLs.   Grooming   Grooming Level of Assistance Setup   Grooming Where Assessed Wheelchair   Grooming Comments 3/3 w/s/u while seated; pt also  shaved.   UE Dressing   UE Dressing Level of Assistance Setup   UE Dressing Where Assessed Wheelchair   UE Dressing Comments 4/4 to jose pullover shirt.   LE Dressing   LE Dressing Yes   Pants Level of Assistance Contact guard   Sock Level of Assistance Setup   Adult Briefs Level of Assistance Contact guard   LE Dressing Where Assessed Wheelchair   LE Dressing Comments 6/6 to jose underwear and pants w/CGA for safety in standing; 2/2 socks   QI Functional Abilities and Goals: Self-Care   Oral Hygiene 05   Upper Body Dressing 05   Lower Body Dressing 04   Putting On/ Taking Off Foorwear 05   Additional Activities   Additional Activities Comments Pt left seated in w/c w/alarm on, call light and needs w/in reach and PCC present.   Activity Tolerance   Activity Tolerance Comments Pt reported he was tired.   Assessment   Rehab Potential Good   Progress Progressing toward goals   Recommendations for Therapy Continue skilled therapy   Assessment Comment Pt required less A for LB dressing; continues to require CGA and cues to safely complete fxal xfers.   Plan   Plan Comment finish eval         _________________  JACK MONROE, OTCJ  05/29/20 11:40 AM

## 2020-05-29 NOTE — INTERDISCIPLINARY/THERAPY
Occupational Therapy  Treatment Note (OT)      Patient Name: Del Mckeon  Age: 59 y.o.  Gender: male    ----------------------------------------------------------------------------------------------------------------------         05/29/20 1100   Orientation   What day of the week is it? 1   What is the year? 1   What state are we in? 1   Memory   Please remember these five objects, I will ask you to recall them later: Apple, Pen, Tie, House, Car apple;pen;tie;house;car   You have $100 and you buy a dozen apples for $3 and a tricycle for $20.   How much did you spend? 1   How much do you have left? 2   Recall   What were the five objects you were asked to remember?  2   I am going to give you a series of numbers and I would like you to give them to me backwards. For example, if I say 42, you would say 24.    8537 0   649 1   List Animals   Please name as many animals as you can in one minute.  2   Please draw a clock showing 3 o'clock.    Hour markers correct? 2   Time correct? 2   Figures   Show the patient a picture of a square, a triangle and a rectangle. Ask them to put an X in the triangle. 1   Ask the patient to identify which of the previous shapes is the largest.  1   Please recall as much of the following story as possible:    What was the female's name?  2   What kind of work did she do? 2   When did she go back to work? 0   What state did they live in?  2   SLUMS Total   SLUMS Total Score 23         _________________  JACK MONROE, OTR  05/29/20 3:52 PM

## 2020-05-29 NOTE — PLAN OF CARE
Problem: Pain - Adult  Description: Pt denies discomfort at this time  Goal: Verbalizes/displays adequate comfort level or baseline comfort level  Description: INTERVENTIONS:  1. Encourage patient to monitor pain and request interventions  2. Assess pain using the appropriate pain scale  3. Administer analgesics based on type and severity of pain and evaluate response  4. Educate/Implement non-pharmacological measures as appropriate and evaluate response  5. Consider cultural, developmental and social influences on pain and pain management  6. Notify Provider if interventions unsuccessful or patient reports new pain  Outcome: Progressing     Problem: Infection - Adult  Goal: Absence of infection during hospitalization  Description: INTERVENTIONS:  1. Assess and monitor for signs and symptoms of infection  2. Monitor lab/diagnostic results  3. Monitor all insertion sites/wounds/incisions  4. Monitor secretions for changes in amount and color  5. Administer medications as ordered  6. Educate and encourage patient and family to use good hand hygiene technique  7. Identify and educate in appropriate isolation precautions for identified infection/condition  Outcome: Progressing     Problem: Safety Adult - Fall  Goal: Free from fall injury  Description: INTERVENTIONS:    Inpatient - Please reference Cares/Safety Flowsheet under [facility] Fall Risk for interventions.  Pediatrics - Please reference Peds Daily Cares/Safety Flowsheet under [place] Pediatric Fall Assessment Fall Bundle for interventions  LD/OB - Please reference OB Shift Screening Flowsheet under OB Fall Risk for interventions.  Outcome: Progressing     Problem: Discharge Planning  Goal: Discharge to home or other facility with appropriate resources  Description: INTERVENTIONS:  1. Identify and discuss barriers to discharge with patient and caregiver.  2. Arrange for needed discharge resources and transportation as appropriate.  3. Identify discharge learning  needs (meds, wound care, etc).  4. Arrange for interpreters to assist at discharge as needed.  5. Refer to  for coordinating discharge planning if the patient needs post-hospital services based on physician order or complex needs related to functional status, cognitive ability or social support system.  Outcome: Progressing     Problem: Knowledge Deficit  Goal: Patient/family/caregiver demonstrates understanding of disease process, treatment plan, medications, and discharge instructions  Description: INTERVENTIONS:   1. Complete learning assessment and assess knowledge base  2. Provide teaching at level of understanding   3. Provide teaching via preferred learning methods  Outcome: Progressing     Problem: Potential for Compromised Skin Integrity  Goal: Skin Integrity is Maintained or Improved  Description: INTERVENTIONS:  1. Assess and monitor skin integrity  2. Collaborate with interdisciplinary team and initiate plans and interventions as needed  3. Alternate a full bath with partial baths for elderly   4. Monitor patient's hygiene practices   5. Collaborate with wound, ostomy, and continence nurse  Outcome: Progressing  Goal: Nutritional status is improving  Description: INTERVENTIONS:  1. Monitor and assess patient for malnutrition (ex- brittle hair, bruises, dry skin, pale skin and conjunctiva, muscle wasting, smooth red tongue, and disorientation)  2. Monitor patient's weight and dietary intake as ordered or per policy  3. Determine patient's food preferences and provide high-protein, high-caloric foods as appropriate  4. Assist patient with eating   5. Allow adequate time for meals   6. Encourage patient to take dietary supplement as ordered   7. Collaborate with dietitian  8. Include patient/family/caregiver in decisions related to nutrition  Outcome: Progressing  Goal: MOBILITY IS MAINTAINED OR IMPROVED  Description: INTERVENTIONS  1. Collaborate with interdisciplinary team and initiate plan  and interventions as ordered (PT/OT)  2. Encourage ambulation  3. Up to chair for meals  4. Monitor for signs of deconditioning  Outcome: Progressing     Problem: Safety Adult - Fall  Goal: Free from fall injury  Description: INTERVENTIONS:    Inpatient - Please reference Cares/Safety Flowsheet under [facility] Fall Risk for interventions.  Pediatrics - Please reference Peds Daily Cares/Safety Flowsheet under [place] Pediatric Fall Assessment Fall Bundle for interventions  LD/OB - Please reference OB Shift Screening Flowsheet under OB Fall Risk for interventions.  Outcome: Progressing     Problem: TRANSFERS  Description: Independently with or without fww  Goal: STG - Patient to transfer to and from sit to supine  Description: independently  Outcome: Progressing  Goal: STG - Patient will transfer from bed to chair  Description: Independently with or without fww  Outcome: Progressing     Problem: Toileting  Goal: STG - Patient will complete toileting tasks with  Description: Mod (I), AE/DME as needed and good safety  Outcome: Progressing

## 2020-05-29 NOTE — INTERDISCIPLINARY/THERAPY
"Occupational Therapy  Treatment Note (OT)      Patient Name: Del Mckeon  Age: 59 y.o.  Gender: male    ----------------------------------------------------------------------------------------------------------------------         05/29/20 1100   Time Calculation   Start Time 1100   Stop Time 1130   Time Calculation (min) 30 min   OT Last Visit   OT Received On 05/29/20   General   Therapy Treatment Diagnosis CVA   OT Treatment Duration (Min) 30 Minutes   Is this a Co-Treatment? No   Family/Caregiver Present No   Precautions   Reinforced Precautions Yes   Other Precautions L side weakness. Fall risk.   Subjective Comments   Subjective Comments Pt agreeable to OT session; reported 0 pain.   Cognition   Arousal/Alertness WFL   Cognition Comment Pt scored 23/30 on the SLUMS, which indicates \"mild neurocognitive impairments\"; please see SLUMS note for details.   Transfers   Transfer Assessed   Transfer 1   Transfer From 1 Sit   Transfer Type 1 To and from   Transfer to 1 Stand   Technique 1 Sit to stand;Stand pivot;Stand to sit   Transfer Device 1 Front wheeled walker;Transfer belt   Level of Assistance 1 Contact guard assist x 1   Trials/Comments 1 CGA for safety.   Hand Function   /Pinch Strength Right hand;Left hand   Right Hand  Strength (lbs) 45 lbs   Left Hand  Strength (lbs) 59 lbs   Right Lateral Pinch Strength (lbs) 7 lbs   left Lateral Pinch Strength (lbs) 14 lbs   Right [place] Pinch Strength (lbs) 5 lbs   Left [place] Pinch Strength (lbs) 11 lbs   Nine Hole Peg Test   Nine Hole Peg Test Dominant Hand Score (in seconds) 38 seconds   Nine Hole Peg Test Non-Dominant Hand Score (in seconds) 54 seconds   Additional Activities   Additional Activities Comments Pt left seated in w/c w/alarm on, call light and needs w/in reach and PCC present.   Assessment   Rehab Potential Good   Progress Progressing toward goals   Recommendations for Therapy Continue skilled therapy   Assessment Comment Pt demo'd " decreased strength in his dominant hand compared to the side affected by his CVA--will benefit from FM strengthening.   Plan   Plan Comment ADLs         _________________  JACK MONROE, OTR  05/29/20 3:59 PM

## 2020-05-30 LAB
ALBUMIN SERPL-MCNC: 4.3 G/DL (ref 3.5–5.3)
ALP SERPL-CCNC: 61 U/L (ref 45–115)
ALT SERPL-CCNC: 26 U/L (ref 7–52)
ANION GAP SERPL CALC-SCNC: 10 MMOL/L (ref 3–11)
AST SERPL-CCNC: 22 U/L
BASOPHILS # BLD AUTO: 0.1 10*3/UL
BASOPHILS NFR BLD AUTO: 1 % (ref 0–2)
BILIRUB SERPL-MCNC: 0.81 MG/DL (ref 0.2–1.4)
BUN SERPL-MCNC: 16 MG/DL (ref 7–25)
CALCIUM ALBUM COR SERPL-MCNC: 9.3 MG/DL (ref 8.6–10.3)
CALCIUM SERPL-MCNC: 9.5 MG/DL (ref 8.6–10.3)
CHLORIDE SERPL-SCNC: 103 MMOL/L (ref 98–107)
CO2 SERPL-SCNC: 28 MMOL/L (ref 21–32)
CREAT SERPL-MCNC: 1.01 MG/DL (ref 0.7–1.3)
EOSINOPHIL # BLD AUTO: 0.1 10*3/UL
EOSINOPHIL NFR BLD AUTO: 1 % (ref 0–3)
ERYTHROCYTE [DISTWIDTH] IN BLOOD BY AUTOMATED COUNT: 14 % (ref 11.5–15)
GFR SERPL CREATININE-BSD FRML MDRD: 81 ML/MIN/1.73M*2
GLUCOSE BLDC GLUCOMTR-MCNC: 134 MG/DL (ref 70–105)
GLUCOSE BLDC GLUCOMTR-MCNC: 155 MG/DL (ref 70–105)
GLUCOSE BLDC GLUCOMTR-MCNC: 170 MG/DL (ref 70–105)
GLUCOSE BLDC GLUCOMTR-MCNC: 173 MG/DL (ref 70–105)
GLUCOSE SERPL-MCNC: 164 MG/DL (ref 70–105)
HCT VFR BLD AUTO: 47.2 % (ref 38–50)
HGB BLD-MCNC: 16.2 G/DL (ref 13.2–17.2)
LYMPHOCYTES # BLD AUTO: 3.1 10*3/UL
LYMPHOCYTES NFR BLD AUTO: 31 % (ref 15–47)
MAGNESIUM SERPL-MCNC: 1.6 MG/DL (ref 1.8–2.4)
MCH RBC QN AUTO: 31.1 PG (ref 29–34)
MCHC RBC AUTO-ENTMCNC: 34.3 G/DL (ref 32–36)
MCV RBC AUTO: 90.6 FL (ref 82–97)
MONOCYTES # BLD AUTO: 0.7 10*3/UL
MONOCYTES NFR BLD AUTO: 7 % (ref 5–13)
NEUTROPHILS # BLD AUTO: 6.1 10*3/UL
NEUTROPHILS NFR BLD AUTO: 60 % (ref 46–70)
PLATELET # BLD AUTO: 287 10*3/UL (ref 130–350)
PMV BLD AUTO: 9.3 FL (ref 6.9–10.8)
POTASSIUM SERPL-SCNC: 3.6 MMOL/L (ref 3.5–5.1)
PROT SERPL-MCNC: 7.3 G/DL (ref 6–8.3)
RBC # BLD AUTO: 5.21 10*6/ΜL (ref 4.1–5.8)
SODIUM SERPL-SCNC: 141 MMOL/L (ref 135–145)
WBC # BLD AUTO: 10.1 10*3/UL (ref 3.7–9.6)

## 2020-05-30 PROCEDURE — 97535 SELF CARE MNGMENT TRAINING: CPT | Mod: GO

## 2020-05-30 PROCEDURE — 82947 ASSAY GLUCOSE BLOOD QUANT: CPT | Mod: QW

## 2020-05-30 PROCEDURE — 92526 ORAL FUNCTION THERAPY: CPT | Mod: GN

## 2020-05-30 PROCEDURE — 80053 COMPREHEN METABOLIC PANEL: CPT | Performed by: INTERNAL MEDICINE

## 2020-05-30 PROCEDURE — 97110 THERAPEUTIC EXERCISES: CPT | Mod: GP | Performed by: PHYSICAL THERAPIST

## 2020-05-30 PROCEDURE — 82306 VITAMIN D 25 HYDROXY: CPT | Performed by: INTERNAL MEDICINE

## 2020-05-30 PROCEDURE — (BLANK) HC ROOM PRIVATE

## 2020-05-30 PROCEDURE — 2590000100 HC RX 259: Performed by: PHYSICAL MEDICINE & REHABILITATION

## 2020-05-30 PROCEDURE — 6370000100 HC RX 637 (ALT 250 FOR IP): Performed by: INTERNAL MEDICINE

## 2020-05-30 PROCEDURE — 97530 THERAPEUTIC ACTIVITIES: CPT | Mod: GO

## 2020-05-30 PROCEDURE — 97112 NEUROMUSCULAR REEDUCATION: CPT | Mod: GP | Performed by: PHYSICAL THERAPIST

## 2020-05-30 PROCEDURE — 99232 SBSQ HOSP IP/OBS MODERATE 35: CPT | Performed by: INTERNAL MEDICINE

## 2020-05-30 PROCEDURE — 97110 THERAPEUTIC EXERCISES: CPT | Mod: GO

## 2020-05-30 PROCEDURE — 36415 COLL VENOUS BLD VENIPUNCTURE: CPT | Performed by: INTERNAL MEDICINE

## 2020-05-30 PROCEDURE — 2590000100 HC RX 259: Performed by: INTERNAL MEDICINE

## 2020-05-30 PROCEDURE — 6370000100 HC RX 637 (ALT 250 FOR IP): Performed by: PHYSICAL MEDICINE & REHABILITATION

## 2020-05-30 PROCEDURE — 97116 GAIT TRAINING THERAPY: CPT | Mod: GP | Performed by: PHYSICAL THERAPIST

## 2020-05-30 PROCEDURE — 85025 COMPLETE CBC W/AUTO DIFF WBC: CPT | Performed by: INTERNAL MEDICINE

## 2020-05-30 PROCEDURE — 2500000200 HC RX 250 WO HCPCS: Performed by: INTERNAL MEDICINE

## 2020-05-30 PROCEDURE — 6360000200 HC RX 636 W HCPCS (ALT 250 FOR IP): Performed by: PHYSICAL MEDICINE & REHABILITATION

## 2020-05-30 PROCEDURE — 83735 ASSAY OF MAGNESIUM: CPT | Performed by: INTERNAL MEDICINE

## 2020-05-30 RX ORDER — SYRING-NEEDL,DISP,INSUL,0.3 ML 29 G X1/2"
296 SYRINGE, EMPTY DISPOSABLE MISCELLANEOUS 2 TIMES DAILY PRN
Status: DISCONTINUED | OUTPATIENT
Start: 2020-05-30 | End: 2020-06-05 | Stop reason: HOSPADM

## 2020-05-30 RX ORDER — TRAZODONE HYDROCHLORIDE 50 MG/1
50 TABLET ORAL NIGHTLY PRN
Status: DISCONTINUED | OUTPATIENT
Start: 2020-05-30 | End: 2020-06-05 | Stop reason: HOSPADM

## 2020-05-30 RX ORDER — SENNOSIDES 8.6 MG/1
17.2 TABLET ORAL NIGHTLY PRN
Status: DISCONTINUED | OUTPATIENT
Start: 2020-05-30 | End: 2020-06-05 | Stop reason: HOSPADM

## 2020-05-30 RX ORDER — DIPHENHYDRAMINE HCL 25 MG
25 CAPSULE ORAL NIGHTLY
Status: DISCONTINUED | OUTPATIENT
Start: 2020-05-30 | End: 2020-06-01

## 2020-05-30 RX ORDER — DOCUSATE SODIUM 100 MG/1
100 CAPSULE, LIQUID FILLED ORAL DAILY
Status: DISCONTINUED | OUTPATIENT
Start: 2020-05-30 | End: 2020-06-05 | Stop reason: HOSPADM

## 2020-05-30 RX ADMIN — ASPIRIN 81 MG: 81 TABLET ORAL at 09:00

## 2020-05-30 RX ADMIN — PSYLLIUM HUSK 1 PACKET: 3.4 POWDER ORAL at 09:00

## 2020-05-30 RX ADMIN — CLOPIDOGREL BISULFATE 75 MG: 75 TABLET ORAL at 09:00

## 2020-05-30 RX ADMIN — Medication 500 MG: at 03:26

## 2020-05-30 RX ADMIN — LISINOPRIL 20 MG: 20 TABLET ORAL at 09:00

## 2020-05-30 RX ADMIN — DIPHENHYDRAMINE HYDROCHLORIDE 25 MG: 25 CAPSULE ORAL at 20:09

## 2020-05-30 RX ADMIN — POLYETHYLENE GLYCOL 3350 17 G: 17 POWDER, FOR SOLUTION ORAL at 09:00

## 2020-05-30 RX ADMIN — MAGNESIUM HYDROXIDE 30 ML: 400 SUSPENSION ORAL at 14:46

## 2020-05-30 RX ADMIN — INSULIN DETEMIR 10 UNITS: 100 INJECTION, SOLUTION SUBCUTANEOUS at 09:20

## 2020-05-30 RX ADMIN — MELATONIN 1000 UNITS: at 09:00

## 2020-05-30 RX ADMIN — MELATONIN 6 MG: at 20:09

## 2020-05-30 RX ADMIN — Medication 500 MG: at 09:00

## 2020-05-30 RX ADMIN — ROSUVASTATIN CALCIUM 40 MG: 20 TABLET, FILM COATED ORAL at 20:09

## 2020-05-30 RX ADMIN — ENOXAPARIN SODIUM 40 MG: 40 INJECTION SUBCUTANEOUS at 14:46

## 2020-05-30 RX ADMIN — Medication 1000 MG: at 20:09

## 2020-05-30 RX ADMIN — DOCUSATE SODIUM 100 MG: 100 CAPSULE, LIQUID FILLED ORAL at 09:00

## 2020-05-30 NOTE — INTERDISCIPLINARY/THERAPY
Occupational Therapy  Treatment Note (OT)      Patient Name: Del Mckeon  Age: 59 y.o.  Gender: male    ----------------------------------------------------------------------------------------------------------------------         05/30/20 1030   Time Calculation   Start Time 1030   Stop Time 1100   Time Calculation (min) 30 min   OT Last Visit   OT Received On 05/30/20   Visit Number 2   General   Chart Reviewed Yes   OT Treatment Duration (Min) 30 Minutes   Is this a Co-Treatment? No   Family/Caregiver Present No   Precautions   Reinforced Precautions Yes   Subjective Comments   Subjective Comments Pt agreeable. Seated EOB per his request to end, alarm on, needs in reach.    Pain Assessment   Pain Assessment No/denies pain   Cognition   Arousal/Alertness WFL   Cognition Comment Followed commands appropriately.    Additional Activities   Additional Activities Comments Engaged pt in bilateral upper extremity ther exercise with orange theraband completing 12 reps/1 set of shoulder flex/ext, internal/external rotation, elbow flex/ext, horizontal ab/adduction. Completed theraputty HEP with min vcs using green resistance theraputty with 5 reps/1 set.    Assessment   Rehab Potential Good   Progress Progressing toward goals   Recommendations for Therapy Continue skilled therapy   Assessment Comment Educated patient on BUE HEP for functional hand and UB strengthening. Pt required min vcs to complete HEP. Will benefit from continued OT to increase overall fxl strength, activity tolerance, functional mobility, dynamic and static balance, functional safety prior to d/c.    Plan   Plan Comment ADL          _________________  Sara Mccray, OTR  05/30/20 11:56 AM

## 2020-05-30 NOTE — PLAN OF CARE
Problem: Pain - Adult  Description: Pt denies discomfort at this time  Goal: Verbalizes/displays adequate comfort level or baseline comfort level  Description: INTERVENTIONS:  1. Encourage patient to monitor pain and request interventions  2. Assess pain using the appropriate pain scale  3. Administer analgesics based on type and severity of pain and evaluate response  4. Educate/Implement non-pharmacological measures as appropriate and evaluate response  5. Consider cultural, developmental and social influences on pain and pain management  6. Notify Provider if interventions unsuccessful or patient reports new pain  Outcome: Progressing  Flowsheets (Taken 5/30/2020 1443 by Levi Marte LPN)  Verbalizes/displays adequate comfort level or baseline comfort level:   Encourage patient to monitor pain and request interventions   Assess pain using the appropriate pain scale   Administer analgesics based on type and severity of pain and evaluate response   Educate/Implement non-pharmacological measures as appropriate and evaluate response   Consider cultural, developmental and social influences on pain and pain management     Problem: Infection - Adult  Goal: Absence of infection during hospitalization  Description: INTERVENTIONS:  1. Assess and monitor for signs and symptoms of infection  2. Monitor lab/diagnostic results  3. Monitor all insertion sites/wounds/incisions  4. Monitor secretions for changes in amount and color  5. Administer medications as ordered  6. Educate and encourage patient and family to use good hand hygiene technique  7. Identify and educate in appropriate isolation precautions for identified infection/condition  Outcome: Progressing  Flowsheets (Taken 5/30/2020 1443 by Levi Marte LPN)  Absence of infection during hospitalization:   Assess and monitor for signs and symptoms of infection   Monitor secretions for changes in amount and colo   Educate and encourage patient and family to use good  hand hygiene technique     Problem: Safety Adult - Fall  Goal: Free from fall injury  Description: INTERVENTIONS:    Inpatient - Please reference Cares/Safety Flowsheet under [facility] Fall Risk for interventions.  Pediatrics - Please reference Peds Daily Cares/Safety Flowsheet under [facility] Pediatric Fall Assessment Fall Bundle for interventions  LD/OB - Please reference OB Shift Screening Flowsheet under OB Fall Risk for interventions.  Outcome: Progressing     Problem: Knowledge Deficit  Goal: Patient/family/caregiver demonstrates understanding of disease process, treatment plan, medications, and discharge instructions  Description: INTERVENTIONS:   1. Complete learning assessment and assess knowledge base  2. Provide teaching at level of understanding   3. Provide teaching via preferred learning methods  Outcome: Progressing  Flowsheets (Taken 5/30/2020 1443 by Levi Marte LPN)  Patient/family/caregiver demonstrates understanding of disease process, treatment plan, medications, and discharge instructions:   Complete learning assessment and assess knowledge base   Provide teaching via preferred learning methods   Provide teaching at level of understanding     Problem: Potential for Compromised Skin Integrity  Goal: Skin Integrity is Maintained or Improved  Description: INTERVENTIONS:  1. Assess and monitor skin integrity  2. Collaborate with interdisciplinary team and initiate plans and interventions as needed  3. Alternate a full bath with partial baths for elderly   4. Monitor patient's hygiene practices   5. Collaborate with wound, ostomy, and continence nurse  Outcome: Progressing  Flowsheets (Taken 5/30/2020 1443 by Levi Marte LPN)  Skin integrity is maintained or improved:   Assess and monitor skin integrity   Monitor patient's hygiene practices  Goal: Nutritional status is improving  Description: INTERVENTIONS:  1. Monitor and assess patient for malnutrition (ex- brittle hair, bruises, dry skin, pale  skin and conjunctiva, muscle wasting, smooth red tongue, and disorientation)  2. Monitor patient's weight and dietary intake as ordered or per policy  3. Determine patient's food preferences and provide high-protein, high-caloric foods as appropriate  4. Assist patient with eating   5. Allow adequate time for meals   6. Encourage patient to take dietary supplement as ordered   7. Collaborate with dietitian  8. Include patient/family/caregiver in decisions related to nutrition  Outcome: Progressing  Flowsheets (Taken 5/30/2020 1443 by Levi Marte LPN)  Nutritional status is improving:   Monitor and assess patient for malnutrition (ex- brittle hair, bruises, dry skin, pale skin and conjunctiva, muscle wasting, smooth red tongue, and disorientation)   Monitor patient's weight and dietary intake as ordered or per policy   Determine patient's food preferences and provide high-protein, high-caloric foods as appropriate   Allow adequate time for meals   Include patient/family/caregiver in decisions related to nutrition  Goal: MOBILITY IS MAINTAINED OR IMPROVED  Description: INTERVENTIONS  1. Collaborate with interdisciplinary team and initiate plan and interventions as ordered (PT/OT)  2. Encourage ambulation  3. Up to chair for meals  4. Monitor for signs of deconditioning  Outcome: Progressing  Flowsheets (Taken 5/30/2020 1443 by Levi Marte LPN)  Mobility is Maintained or Improved:   Encourage ambulation   Up to chair for meals   Monitor for signs of deconditioning     Problem: Safety Adult - Fall  Goal: Free from fall injury  Description: INTERVENTIONS:    Inpatient - Please reference Cares/Safety Flowsheet under [facility] Fall Risk for interventions.  Pediatrics - Please reference Peds Daily Cares/Safety Flowsheet under [facility] Pediatric Fall Assessment Fall Bundle for interventions  LD/OB - Please reference OB Shift Screening Flowsheet under OB Fall Risk for interventions.  Outcome: Progressing

## 2020-05-30 NOTE — INTERDISCIPLINARY/THERAPY
Physical Therapy  Treatment Note (PT)    Patient Name: Del Mckeon  Age: 59 y.o.  Gender: male    ----------------------------------------------------------------------------------------------------------------------       05/30/20 0830   Time Calculation   Start Time 0830   Stop Time 0915   Time Calculation (min) 45 min   Pain Assessment   Pain Assessment No/denies pain   General   Chart Reviewed Yes   Therapy Treatment Diagnosis CVA with L weakness   Is this a Co-Treatment? No   Precautions   Reinforced Precautions Yes   Subjective Comments   Subjective Comments Patient agreeable to working with PT.   Transfer 1   Transfer From 1 Wheelchair   Transfer Type 1 To and from   Transfer to 1 Stand   Technique 1 Sit to stand;Stand to sit   Transfer Device 1 Front wheeled walker;Transfer belt   Level of Assistance 1 Contact guard assist x 1   Trials/Comments 1 CGA for safety   Ambulation 1   Surface 1 Level surface   Device 1 Front wheeled walker   Assistance 1 Contact guard assist x 1   Quality of Gait 1 Slow pace, no LOB, narrow KYLE   Comments/Distance 1 60m x 2   Ambulation 2   Surface 2 Level surface   Device 2 Parallel bars   Assistance 2 Standby assistance   Quality of Gait 2 sidestepping, marching, backward walking   Comments/Distance 2 6m x 2 reps each   QI Functional Abilities and Goals: Mobility   Sit to Stand 04   Chair/Bed-to-Chair Transfer 04   Car Transfers 88   Does the Patient Walk? 2   Walk 10 Feet 04   Walk 50 Feet with Two Turns 04   Walk 150 Feet 04   Static Standing Balance   Static Standing-Balance Surface Firm   Static Standing-Balance Support No upper extremity supported   Static Standing-Level of Assistance Standby assistance   Static Standing-Comment/# of Minutes narrow KYLE, eyes open/closed   Dynamic Standing Balance   Dynamic Standing-Balance Surface Firm   Dynamic Standing-Balance Support No upper extremity supported   Dynamic Standing-Balance Reaching across midline;Forward lean   Dynamic  Standing-Level of Assistance Contact guard x 1   Dynamic Standing-Comments also head turns horizontal   Standing   Standing-Exercises Lower extremity   Standing-Exercise Type Hip flexion;Hip ABduction;Hip ADduction;Squats;Heel raises   Standing-Exercise Comments 10 reps in // bars   Therapeutic Activities   Therapeutic Activities sit to stand x 7 reps   Assessment   Rehab Potential Good   Progress Progressing toward goals   Assessment Comment Patient tolerated treatment well.  He required cuing for safety.  Performed strengthening, gait and balance activities well during session.         _________________  Austin Nugent, PT  05/30/20 10:46 AM

## 2020-05-30 NOTE — NURSING END OF SHIFT
Nursing End of Shift Summary:    Patient: Del Mckeon  MRN: [id number]  : 1960, Age: 59 y.o.    Location: [place] [id number]    Nursing Goals  Clinical Goals for the Shift: pt safety and comfort maintained    Narrative Summary of Progress Toward Clinical Goals:  Pt remained free from falls, appropriate call light use. Pt reported a headache as a 4/10 at 0900, gave Tylenol which pt reported helped. Pt reports adequate comfort level throughout shift    Barriers to Goals/Nursing Concerns:  No    New Patient or Family Concerns/Issues:  No    Shift Summary:      Significant Events & Communications to Providers (last 12 hours)      Last 5 Values    No documentation.              Oxygen Usage (last 12 hours)      Last 5 Values     Row Name 20 0830                   Oxygen Weaning Trial by Nursing    Is Patient on Room Air OR on the Same Amount of O2 as at Home?  Yes room air                   Mobility (last 12 hours)      Last 5 Values     Row Name 20 0830                   Mobility    Anti-Embolism Devices  Bilateral;AE calf pump        Anti-Embolism Intervention  -- off for pt safety, can be impulsive            Urethral Catheter    Active Urethral Catheter     None            Active Lines    Active Central venous catheter / Peripherally inserted central catheter / Implantable Port / Hemodialysis catheter / Midline Catheter     None              Infusing Medications   Medication Dose Last Rate     PRN Medications   Medication Dose Last Dose   • senna  17.2 mg     • magnesium citrate  296 mL     • traZODone  50 mg     • diphenhydrAMINE  25 mg     • senna  17.2 mg     • magnesium hydroxide  30 mL     • ondansetron  4 mg     • acetaminophen  500 mg 500 mg at 20 0900   • sodium chloride  2 spray       _________________________  Levi Marte LPN  20 2:44 PM

## 2020-05-30 NOTE — PLAN OF CARE
Problem: Pain - Adult  Description: Pt denies discomfort at this time  Goal: Verbalizes/displays adequate comfort level or baseline comfort level  Description: INTERVENTIONS:  1. Encourage patient to monitor pain and request interventions  2. Assess pain using the appropriate pain scale  3. Administer analgesics based on type and severity of pain and evaluate response  4. Educate/Implement non-pharmacological measures as appropriate and evaluate response  5. Consider cultural, developmental and social influences on pain and pain management  6. Notify Provider if interventions unsuccessful or patient reports new pain  Outcome: Progressing     Problem: Safety Adult - Fall  Goal: Free from fall injury  Description: INTERVENTIONS:    Inpatient - Please reference Cares/Safety Flowsheet under [facility] Fall Risk for interventions.  Pediatrics - Please reference Peds Daily Cares/Safety Flowsheet under [place] Pediatric Fall Assessment Fall Bundle for interventions  LD/OB - Please reference OB Shift Screening Flowsheet under OB Fall Risk for interventions.  Outcome: Progressing     Problem: Knowledge Deficit  Goal: Patient/family/caregiver demonstrates understanding of disease process, treatment plan, medications, and discharge instructions  Description: INTERVENTIONS:   1. Complete learning assessment and assess knowledge base  2. Provide teaching at level of understanding   3. Provide teaching via preferred learning methods  Outcome: Progressing     Problem: Potential for Compromised Skin Integrity  Goal: Skin Integrity is Maintained or Improved  Description: INTERVENTIONS:  1. Assess and monitor skin integrity  2. Collaborate with interdisciplinary team and initiate plans and interventions as needed  3. Alternate a full bath with partial baths for elderly   4. Monitor patient's hygiene practices   5. Collaborate with wound, ostomy, and continence nurse  Outcome: Progressing  Goal: MOBILITY IS MAINTAINED OR  IMPROVED  Description: INTERVENTIONS  1. Collaborate with interdisciplinary team and initiate plan and interventions as ordered (PT/OT)  2. Encourage ambulation  3. Up to chair for meals  4. Monitor for signs of deconditioning  Outcome: Progressing     Problem: Safety Adult - Fall  Goal: Free from fall injury  Description: INTERVENTIONS:    Inpatient - Please reference Cares/Safety Flowsheet under [facility] Fall Risk for interventions.  Pediatrics - Please reference Peds Daily Cares/Safety Flowsheet under [place] Pediatric Fall Assessment Fall Bundle for interventions  LD/OB - Please reference OB Shift Screening Flowsheet under OB Fall Risk for interventions.  Outcome: Progressing

## 2020-05-30 NOTE — INTERDISCIPLINARY/THERAPY
TREATMENT NOTE (SLP)      Patient Name: Del Mckeon  Age: 59 y.o.  Gender: male    ----------------------------------------------------------------------------------------------------------------------       05/30/20 0800   Time Calculation   Start Time 0800   Stop Time 0825   Time Calculation (min) 25 min   Subjective Comments   Subjective Comments Pt was agreeable to ST session; with breakfast tray.   General   SLP Treatment Duration (Min) 30 Minutes   Amount of Missed Time (min) 5 Minutes   Missed Time Reason Toileting   Daily Treatment   Session Activities Dysphagia   Dysphagia   Current Diet/Liquid Consistency Regular;Thin liquids   Temperature Spikes w/in 72h No   Respiratory Status Room air   Behavior/Cognition Alert;Cooperative   Dentition Adequate   Vision Functional for self-feeding   Patient Positioning Upright in chair   Baseline Vocal Quality Normal   Consistencies Assessed Yes   Thin Liquids   Presentation Self-fed;Straw   Oral WFL   Suction Required? No   Swab/Finger Swipe Required? No   Pharyngeal Cough - immediate;Throat clearing - immediate   Regular Diet Texture   Oral WFL   Suction Required? No   Swab/Finger Swipe Required? No   Pharyngeal No signs or symptoms of aspiration   Aspiration Risk   Risk for Aspiration Mild   Follow Up Recommendations Dysphagia treatment   Diet Solids Recommendations Regular   Diet Liquids Recommendations Thin liquids   Compensatory Swallowing Strategies Upright at 90 degrees for all oral intake;Small bites/sips;Eat/feed slowly;Other (Comment)  (double swallow intermittently during meals)   Medication Recommendations Whole;With liquid   Activity Tolerance   Activity Tolerance Patient tolerated treatment well   Caregiver Made Aware PCC   Assessment Comments   Assessment Comments SWALLOW TX: Pt was seen with breakfast tray this date (regular/thins). SLP introduced swallow strategies of small bites/sips and intermittent double swallow during solids. A visual aid was  "left at the pt's bedside as a reminder. Pt was observed with trials of hard solids and thins via straw. No s/s of penetration or aspiration were observed with hard solids. Thin liquids via straw resulted in intermittent cough (1/10) and throat clear (1/10) in addition to x1 delayed throat clear. Pt required min-mod cues to take small bites and stated \"no, I guess not\" when asked if he was remembering to use a double swallow throughout solid trials, requiring moderate cues to implement strategy. Pt demonstrated independent use of small sips of thins via straw. Pt will benefit from continued ST to increase consistency of strategy use and monitor diet tolerance with thin liquids. ST will continue to follow for POC.   Patient Education   Patient Education Pt was educated regarding purpose of tx session and recommended swallow strategies; pt provided verbal agreement and understanding.   Plan   Plan Comments Memory tx          _________________  Yael Meza CF-SLP  05/30/20 9:51 AM    "

## 2020-05-30 NOTE — INTERDISCIPLINARY/THERAPY
Physical Therapy  Treatment Note (PT)    Patient Name: Del Mckeon  Age: 59 y.o.  Gender: male    ----------------------------------------------------------------------------------------------------------------------       05/30/20 1315   Time Calculation   Start Time 1315   Stop Time 1345   Time Calculation (min) 30 min   Pain Assessment   Pain Assessment No/denies pain   General   Therapy Treatment Diagnosis CVA with L weakness   Is this a Co-Treatment? No   Precautions   Reinforced Precautions Yes   Subjective Comments   Subjective Comments Patient agreeable to working with PT.   Bed Mobility 1   Bed Mobility From 1 Short sit   Bed Mobility Type 1 To   Bed Mobility to 1 Supine   Level of Assistance 1 Standby assistance   Transfer 1   Transfer From 1 Wheelchair;Sit   Transfer Type 1 To and from   Transfer to 1 Stand   Technique 1 Stand to sit;Sit to stand   Transfer Device 1 Front wheeled walker;Transfer belt   Level of Assistance 1 Contact guard assist x 1   Trials/Comments 1 CGA for safety   Ambulation 1   Surface 1 Level surface   Device 1 Front wheeled walker;Gait belt   Assistance 1 Contact guard assist x 1   Quality of Gait 1 Slow pace, no LOB, narrow KYLE    Comments/Distance 1 60m x 2   QI Functional Abilities and Goals: Mobility   Roll Left and Right 04   Sit to Lying 04   Lying to Sitting on Side of Bed 04   Sit to Stand 04   Chair/Bed-to-Chair Transfer 04   Does the Patient Walk? 2   Walk 10 Feet 04   Walk 50 Feet with Two Turns 04   Walk 150 Feet 04   Static Standing Balance   Static Standing-Balance Surface Firm   Static Standing-Balance Support No upper extremity supported   Static Standing-Level of Assistance Standby assistance   Static Standing-Comment/# of Minutes narrow KYLE, eyes open/closed, tandem stance with HHA   Dynamic Standing Balance   Dynamic Standing-Balance Surface Firm   Dynamic Standing-Balance Support No upper extremity supported   Dynamic Standing-Balance Reaching across  midline;Forward lean   Dynamic Standing-Level of Assistance Contact guard x 1   Dynamic Standing-Comments also head turns horizontal and vertical, and marching in place   Standing   Standing-Exercises Lower extremity   Standing-Exercise Type Hip flexion;Hip ABduction;Hip ADduction;Squats;Heel raises   Standing-Exercise Comments 10 reps each   Therapeutic Activities   Therapeutic Activities sit to stand x 7 reps   Assessment   Rehab Potential Good   Progress Progressing toward goals   Assessment Comment Patient was able to tolerate balance activities well during session.  He required cuing for safety.  Patient fatigued with gait and balance.   Recommendation   Recommendations for Therapy Continue skilled therapy         _________________  Austin Nugent, PT  05/30/20 3:19 PM

## 2020-05-30 NOTE — PROGRESS NOTES
Daily Progress Note       LOS: 3 days     Subjective   Patient is a 59-year-old male who is here to inpatient rehab after an acute right pontine CVA.  Patient is working well with the therapist and feels like they have been helping him quite a bit.  He is concerned about his constipation.  I did talked about scheduling some additional medicines and also having multiple PRN treatments.  He also said he did not sleep well yesterday.  He was given Tylenol and melatonin.  He does take Benadryl at home in addition to these 2 medicines.  The Benadryl had been ordered as a PRN treatment but he did not take it.  He like to have that scheduled tonight.        ROS  No headaches, vision changes, or hearing changes.  No URI symptoms.  No dysphagia or odynophagia.  No shortness of breath, coughing, wheezing or PND.  No chest pain, palpitations, or anginal symptoms.  No nausea/ vomiting, heartburn or abdominal discomfort.  No urinary changes.  Review of systems otherwise is negative.      Vital signs in last 24 hours:  Temp:  [36.5 °C (97.7 °F)] 36.5 °C (97.7 °F)  Heart Rate:  [70-72] 70  Resp:  [17-18] 18  BP: (138-140)/(89) 138/89    Intake/Output last 3 shifts:  I/O last 3 completed shifts:  In: 2225 [P.O.:2225]  Out: 2500 [Urine:2500]  Intake/Output this shift:  No intake/output data recorded.    Physical Exam  GENERAL: Patient is alert and oriented, in no acute distress.  HEENT: Normocephalic, atraumatic. Sclerae anicteric.  No tenderness to palpation of sinuses.    NECK: No cervical lymphadenopathy.  Trachea is midline.  LUNGS:  Clear to auscultation bilaterally, both anterior and posterior.  CARDIOVASCULAR EXAM: Regular rate and rhythm without murmur, rub, or gallop.    ABDOMEN:  Soft. Positive bowel sounds in all four quadrants.  No rebound. No guarding.   Nontender, nondistended.  EXTREMITIES:  No clubbing, cyanosis, or edema.    Medications:    Current Facility-Administered Medications:   •  senna (SENOKOT) tablet 17.2  mg, 17.2 mg, oral, Nightly PRN, Sarah Harris MD  •  magnesium citrate solution 296 mL, 296 mL, oral, 2x daily PRN, Sarah Harris MD  •  diphenhydrAMINE (BENADRYL) tab/cap 25 mg, 25 mg, oral, Nightly, Sarah Harris MD  •  docusate sodium (COLACE) capsule 100 mg, 100 mg, oral, Daily, Sarah Harris MD  •  melatonin tablet 6 mg, 6 mg, oral, Nightly, Sarah Harris MD, 6 mg at 05/29/20 2015  •  acetaminophen (TYLENOL) tablet 1,000 mg, 1,000 mg, oral, Nightly, Sarha Harris MD, 1,000 mg at 05/29/20 2014  •  diphenhydrAMINE (BENADRYL) tab/cap 25 mg, 25 mg, oral, Nightly PRN, Sarah Harris MD  •  polyethylene glycol (GLYCOLAX) powder 17 g, 17 g, oral, Daily, Sarah Harris MD, 17 g at 05/29/20 2013  •  senna (SENOKOT) tablet 17.2 mg, 17.2 mg, oral, Nightly PRN, Sarah Harris MD  •  magnesium hydroxide (MILK OF MAGNESIA) 400 mg/5 mL oral suspension 30 mL, 30 mL, oral, Daily PRN, Sarah Harris MD  •  insulin detemir U-100 (LEVEMIR) injection 15 Units, 15 Units, subcutaneous, Insulin: Nightly, INOCENCIO Bernard MD, 15 Units at 05/29/20 2114  •  insulin detemir U-100 (LEVEMIR) injection 10 Units, 10 Units, subcutaneous, q AM, INOCENCIO Bernard MD, 10 Units at 05/29/20 0800  •  enoxaparin (LOVENOX) syringe 40 mg, 40 mg, subcutaneous, Daily at 1600, Derick Small DO, 40 mg at 05/29/20 1544  •  ondansetron (ZOFRAN) tablet 4 mg, 4 mg, oral, q6h PRN, Derick Small DO  •  rosuvastatin (CRESTOR) tablet 40 mg, 40 mg, oral, Nightly, Derick Small DO, 40 mg at 05/29/20 2014  •  acetaminophen (TYLENOL) tablet 500 mg, 500 mg, oral, q6h PRN, Derick Small DO, 500 mg at 05/30/20 0326  •  aspirin EC tablet 81 mg, 81 mg, oral, Daily, Derick Small DO, 81 mg at 05/29/20 0757  •  cholecalciferol (vitamin D3) 1,000 unit (25mcg) tab/cap 1,000 Units, 1,000 Units, oral, Daily, Derick Small DO, 1,000 Units at 05/29/20 0757  •  clopidogreL (PLAVIX) tablet 75 mg, 75 mg, oral, Daily, Derick Small DO, 75 mg at 05/29/20 0758  •  lisinopriL  (PRINIVIL,ZESTRIL) tablet 20 mg, 20 mg, oral, Daily, Derick Small DO, 20 mg at 05/29/20 0757  •  psyllium (METAMUCIL SUGAR FREE) 1 packet, 1 packet, oral, Daily, Derick Small DO, 1 packet at 05/29/20 0755  •  sodium chloride (OCEAN) 0.65 % nasal spray 2 spray, 2 spray, Each Nostril, PRN, Derick Small DO    Labs:  CBC:   Lab Results   Component Value Date    WBC 6.9 05/28/2020    RBC 4.56 05/28/2020    HGB 14.7 05/28/2020    HCT 42.1 05/28/2020     05/28/2020     CMP:   Lab Results   Component Value Date     05/28/2020    K 3.7 05/28/2020     05/28/2020    CO2 27 05/28/2020    GLUCOSE 173 (H) 05/28/2020    CREATININE 0.74 05/28/2020    CALCIUM 9.0 05/28/2020    ALBUMIN 3.8 05/28/2020    ALKPHOS 61 05/28/2020    BILITOT 0.54 05/28/2020    ALT 24 05/28/2020    AST 16 05/28/2020    BUN 19 05/28/2020    ANIONGAP 7 05/28/2020     Coagulation: No results found for: PT, INR, APTT              Active Problems:    Benign essential hypertension    Hyperlipidemia    Obstructive sleep apnea syndrome    Controlled type 2 diabetes mellitus without complication (CMS/HCC) (HCC)    Mild aortic sclerosis (CMS/HCC) (HCC)    Ischemic cerebrovascular accident (CVA) (CMS/HCC) (HCC)    Microscopic hematuria    Hypomagnesemia    NIK (obstructive sleep apnea)    CVA (cerebral vascular accident) (CMS/HCC) (HCC)        Assessment and Plan:  1.  Status post right pontine CVA.  Patient's on aspirin, statin and Plavix.  Carotid ultrasound has been ordered.  He will continue to work with the therapist and does feel like that has been very helpful for him.  He was asking about being more independent.  We talked about how the therapist help guide his activities.  2.  Hypertension.  Patient is on lisinopril 20 mg daily.  We are giving some permissive hypertension because of his recent CVA.  Patient home normally takes Cozaar 50 mg twice daily.  He seems to be doing okay at this time.  We will monitor and adjust if  needed.  3.  Diabetes mellitus.  Patient does have adjustments with his Levemir and is now on 15 units in evening to 10.  He seems to be doing better.  We will monitor and adjust as needed.  4.  Hyperlipidemia.  Patient is on Crestor and doing well.  5.  Constipation.  Patient is now on MiraLAX, Colace and fiber.  I have also added some as needed treatment including magnesium citrate.    6.  Microscopic hematuria.  Patient has some issues previously for this.  He has had an ultrasound which was negative.  7.  Hypomagnesia.  Lab work is pending.    8.  Obstructive sleep apnea.  Patient is on oxygen.    9.  Vitamin D deficiency.  Vitamin D level is pending.    10.  DVT prophylaxis.  Patient is on Lovenox.  11.  Insomnia.  Patient was given Tylenol and melatonin last night.  Benadryl was PRN.  He did not take it therefore we will schedule it tonight.  This is his home regime.  I will also add some as needed trazodone.              Sarah Harris MD  8:56 AM, 5/30/2020.      A voice recognition program was used to aid in documentation of this record. Sometimes words are not presented exactly as they were spoken.  While efforts were made to carefully edit and correct any inaccuracies, some errors may be present.  Please take this into context.  Please contact the provider if errors are identified.

## 2020-05-30 NOTE — INTERDISCIPLINARY/THERAPY
Occupational Therapy  Treatment Note (OT)      Patient Name: Del Mckeon  Age: 59 y.o.  Gender: male    ----------------------------------------------------------------------------------------------------------------------         05/30/20 0700   Time Calculation   Start Time 0700   Stop Time 0748   Time Calculation (min) 48 min   OT Last Visit   OT Received On 05/30/20   General   Chart Reviewed Yes   OT Treatment Duration (Min) 48 Minutes   Is this a Co-Treatment? No   Family/Caregiver Present No   Precautions   Reinforced Precautions Yes   Subjective Comments   Subjective Comments Pt agreeable. Reports he did not sleep well last night and is very constipated. Reports he wants to go home Monday or Tuesday due to these issues, notified Dr. Wood. Pt seated in w/c to end, alarm on, needs in reach.    Pain Assessment   Pain Assessment No/denies pain   Cognition   Arousal/Alertness WFL   Cognition Comment Engaged pt in medication management with 75% accuracy to complete 5 pills for 1 week. Required min vcs initially to sequence task.    Bed Mobility 1   Level of Assistance 1 Standby assistance   Bed Mobility Comments 1 Supine to short sit SBA with HOB flat   Transfer 1   Transfer From 1 Bed   Transfer Type 1 To and from   Transfer to 1 Sit;Stand   Technique 1 Sit to stand;Stand to sit   Transfer Device 1 Front wheeled walker;Transfer belt   Level of Assistance 1 Contact guard assist x 1   Trials/Comments 1 CGA for safety.    Ambulation 1   Surface 1 Level surface   Device 1 Front wheeled walker;Gait belt   Assistance 1 Contact guard assist x 1   Quality of Gait 1 Slow pace, no LOB   Comments/Distance 1 60 meters x2   Grooming   Grooming Comments Pt declined, would like to wait until after breakfast.    UE Dressing   UE Dressing Level of Assistance Setup   UE Dressing Where Assessed Edge of bed   UE Dressing Comments 4/4 overhead shirt   LE Dressing   LE Dressing Yes   Pants Level of Assistance Contact guard    Sock Level of Assistance Setup   Adult Briefs Level of Assistance Contact guard   LE Dressing Where Assessed Edge of bed   LE Dressing Comments 6/6 pants, underwear and socks. CGA for standing balance.    QI Functional Abilities and Goals: Self-Care   Oral Hygiene 07   Toileting Hygiene 07   Upper Body Dressing 05   Lower Body Dressing 04   Putting On/ Taking Off Foorwear 05   Medication Management   Medication Management Level of Assistance Minimal verbal cues   Medication Management Pt pebbles'd 75% accuracy with medication management with vcs required to initiate and for direction following of prescription bottles.    Coordination   Gross Motor Pt voicing that he is concerned that he has lost gross motor strength in his hands over the last several years.    Activity Tolerance   Activity Tolerance Comments Tolerated tx session well.    Assessment   Rehab Potential Good   Recommendations for Therapy Continue skilled therapy   Assessment Comment Pt viv decreased accuracy with medication management requiring vcs and 75% accuracy. Viv decreased insight in limitations, gross and FM coordination and strength affected by his CVA. Will benefit from continued OT.    Plan   Plan Comment UB strength         _________________  Sara Mccray, KAREL  05/30/20 10:15 AM

## 2020-05-30 NOTE — PLAN OF CARE
Problem: Pain - Adult  Description: Pt denies discomfort at this time  Goal: Verbalizes/displays adequate comfort level or baseline comfort level  Description: INTERVENTIONS:  1. Encourage patient to monitor pain and request interventions  2. Assess pain using the appropriate pain scale  3. Administer analgesics based on type and severity of pain and evaluate response  4. Educate/Implement non-pharmacological measures as appropriate and evaluate response  5. Consider cultural, developmental and social influences on pain and pain management  6. Notify Provider if interventions unsuccessful or patient reports new pain  Outcome: Progressing  Flowsheets (Taken 5/30/2020 1443)  Verbalizes/displays adequate comfort level or baseline comfort level:   Encourage patient to monitor pain and request interventions   Assess pain using the appropriate pain scale   Administer analgesics based on type and severity of pain and evaluate response   Educate/Implement non-pharmacological measures as appropriate and evaluate response   Consider cultural, developmental and social influences on pain and pain management     Problem: Infection - Adult  Goal: Absence of infection during hospitalization  Description: INTERVENTIONS:  1. Assess and monitor for signs and symptoms of infection  2. Monitor lab/diagnostic results  3. Monitor all insertion sites/wounds/incisions  4. Monitor secretions for changes in amount and color  5. Administer medications as ordered  6. Educate and encourage patient and family to use good hand hygiene technique  7. Identify and educate in appropriate isolation precautions for identified infection/condition  Outcome: Progressing  Flowsheets (Taken 5/30/2020 1443)  Absence of infection during hospitalization:   Assess and monitor for signs and symptoms of infection   Monitor secretions for changes in amount and colo   Educate and encourage patient and family to use good hand hygiene technique     Problem: Safety  Adult - Fall  Goal: Free from fall injury  Description: INTERVENTIONS:    Inpatient - Please reference Cares/Safety Flowsheet under [facility] Fall Risk for interventions.  Pediatrics - Please reference Peds Daily Cares/Safety Flowsheet under [facility] Pediatric Fall Assessment Fall Bundle for interventions  LD/OB - Please reference OB Shift Screening Flowsheet under OB Fall Risk for interventions.  Outcome: Progressing     Problem: Discharge Planning  Goal: Discharge to home or other facility with appropriate resources  Description: INTERVENTIONS:  1. Identify and discuss barriers to discharge with patient and caregiver.  2. Arrange for needed discharge resources and transportation as appropriate.  3. Identify discharge learning needs (meds, wound care, etc).  4. Arrange for interpreters to assist at discharge as needed.  5. Refer to  for coordinating discharge planning if the patient needs post-hospital services based on physician order or complex needs related to functional status, cognitive ability or social support system.  Outcome: Progressing  Flowsheets (Taken 5/30/2020 1443)  Discharge to home or other facility with appropriate resources:   Identify and discuss barriers to discharge with patient and caregiver   Identify discharge learning needs (meds, wound care, etc)     Problem: Knowledge Deficit  Goal: Patient/family/caregiver demonstrates understanding of disease process, treatment plan, medications, and discharge instructions  Description: INTERVENTIONS:   1. Complete learning assessment and assess knowledge base  2. Provide teaching at level of understanding   3. Provide teaching via preferred learning methods  Outcome: Progressing  Flowsheets (Taken 5/30/2020 1443)  Patient/family/caregiver demonstrates understanding of disease process, treatment plan, medications, and discharge instructions:   Complete learning assessment and assess knowledge base   Provide teaching via preferred learning  methods   Provide teaching at level of understanding     Problem: Potential for Compromised Skin Integrity  Goal: Skin Integrity is Maintained or Improved  Description: INTERVENTIONS:  1. Assess and monitor skin integrity  2. Collaborate with interdisciplinary team and initiate plans and interventions as needed  3. Alternate a full bath with partial baths for elderly   4. Monitor patient's hygiene practices   5. Collaborate with wound, ostomy, and continence nurse  Outcome: Progressing  Flowsheets (Taken 5/30/2020 1443)  Skin integrity is maintained or improved:   Assess and monitor skin integrity   Monitor patient's hygiene practices  Goal: Nutritional status is improving  Description: INTERVENTIONS:  1. Monitor and assess patient for malnutrition (ex- brittle hair, bruises, dry skin, pale skin and conjunctiva, muscle wasting, smooth red tongue, and disorientation)  2. Monitor patient's weight and dietary intake as ordered or per policy  3. Determine patient's food preferences and provide high-protein, high-caloric foods as appropriate  4. Assist patient with eating   5. Allow adequate time for meals   6. Encourage patient to take dietary supplement as ordered   7. Collaborate with dietitian  8. Include patient/family/caregiver in decisions related to nutrition  Outcome: Progressing  Flowsheets (Taken 5/30/2020 1443)  Nutritional status is improving:   Monitor and assess patient for malnutrition (ex- brittle hair, bruises, dry skin, pale skin and conjunctiva, muscle wasting, smooth red tongue, and disorientation)   Monitor patient's weight and dietary intake as ordered or per policy   Determine patient's food preferences and provide high-protein, high-caloric foods as appropriate   Allow adequate time for meals   Include patient/family/caregiver in decisions related to nutrition  Goal: MOBILITY IS MAINTAINED OR IMPROVED  Description: INTERVENTIONS  1. Collaborate with interdisciplinary team and initiate plan and  interventions as ordered (PT/OT)  2. Encourage ambulation  3. Up to chair for meals  4. Monitor for signs of deconditioning  Outcome: Progressing  Flowsheets (Taken 5/30/2020 1443)  Mobility is Maintained or Improved:   Encourage ambulation   Up to chair for meals   Monitor for signs of deconditioning     Problem: Safety Adult - Fall  Goal: Free from fall injury  Description: INTERVENTIONS:    Inpatient - Please reference Cares/Safety Flowsheet under [facility] Fall Risk for interventions.  Pediatrics - Please reference Peds Daily Cares/Safety Flowsheet under [facility] Pediatric Fall Assessment Fall Bundle for interventions  LD/OB - Please reference OB Shift Screening Flowsheet under OB Fall Risk for interventions.  Outcome: Progressing

## 2020-05-30 NOTE — INTERDISCIPLINARY/THERAPY
TREATMENT NOTE (SLP)      Patient Name: Del Mckeon  Age: 59 y.o.  Gender: male    ----------------------------------------------------------------------------------------------------------------------       05/30/20 1135   Time Calculation   Start Time 1135   Stop Time 1143   Time Calculation (min) 8 min   Subjective Comments   Subjective Comments Pt agreeable to ST session; with lunch tray.   General   SLP Treatment Duration (Min) 8 Minutes   Family/Caregiver Present Yes  (PCC to bring lunch tray)   Daily Treatment   Session Activities Dysphagia   Dysphagia   Current Diet/Liquid Consistency Regular;Thin liquids   Temperature Spikes w/in 72h No   Respiratory Status Room air   Behavior/Cognition Alert;Cooperative   Dentition Adequate   Vision Functional for self-feeding   Patient Positioning Upright in chair   Baseline Vocal Quality Normal   Consistencies Assessed Yes   Thin Liquids   Presentation Self-fed;Cup   Oral WFL   Suction Required? No   Swab/Finger Swipe Required? No   Pharyngeal No signs or symptoms of aspiration   Regular Diet Texture   Presentation Self-fed   Oral WFL   Suction Required? No   Swab/Finger Swipe Required? No   Pharyngeal No signs or symptoms of aspiration   Aspiration Risk   Risk for Aspiration Mild   Follow Up Recommendations Dysphagia treatment   Diet Solids Recommendations Regular   Diet Liquids Recommendations Thin liquids   Compensatory Swallowing Strategies Upright at 90 degrees for all oral intake;Small bites/sips;Eat/feed slowly;Other (Comment)  (double swallow intermittently during meals)   Medication Recommendations Whole;With liquid   Activity Tolerance   Activity Tolerance Patient tolerated treatment well   Caregiver Made Aware PCC   Assessment Comments   Assessment Comments SWALLOW TX: Pt was seen with lunch tray to evaluate thin liquids via cup edge vs straw. Pt demonstrated no s/s of penetration/aspiration across x9 trials of thins via cup edge and hard solids. Pt  demonstrated decreased bolus size with hard solids during this session vs when seen with breakfast tray earlier this date. SLP educated pt regarding rationale for cup edge vs. straw and POC to continue monitoring consistency of effectiveness of both methods. Additionally, SLP reinforced swallow strategies introduced earlier this date. ST will continue to follow for POC.   Patient Education   Patient Education Pt was educated regarding purpose of trials via cup edge vs straw and potential benefit of removing straws; pt provided verbal agreement and understanding.   Plan   Plan Comments Memory tx, monitor diet (?no straws)         _________________  CHERELLE Jimenez-SLP  05/30/20 1:27 PM

## 2020-05-31 LAB
GLUCOSE BLDC GLUCOMTR-MCNC: 131 MG/DL (ref 70–105)
GLUCOSE BLDC GLUCOMTR-MCNC: 147 MG/DL (ref 70–105)
GLUCOSE BLDC GLUCOMTR-MCNC: 166 MG/DL (ref 70–105)
GLUCOSE BLDC GLUCOMTR-MCNC: 189 MG/DL (ref 70–105)

## 2020-05-31 PROCEDURE — 99232 SBSQ HOSP IP/OBS MODERATE 35: CPT | Performed by: INTERNAL MEDICINE

## 2020-05-31 PROCEDURE — 6360000200 HC RX 636 W HCPCS (ALT 250 FOR IP): Performed by: PHYSICAL MEDICINE & REHABILITATION

## 2020-05-31 PROCEDURE — 82947 ASSAY GLUCOSE BLOOD QUANT: CPT | Mod: QW

## 2020-05-31 PROCEDURE — 6370000100 HC RX 637 (ALT 250 FOR IP): Performed by: INTERNAL MEDICINE

## 2020-05-31 PROCEDURE — 97110 THERAPEUTIC EXERCISES: CPT | Mod: GP | Performed by: PHYSICAL THERAPIST

## 2020-05-31 PROCEDURE — 97129 THER IVNTJ 1ST 15 MIN: CPT | Mod: GN

## 2020-05-31 PROCEDURE — 2500000200 HC RX 250 WO HCPCS: Performed by: INTERNAL MEDICINE

## 2020-05-31 PROCEDURE — 97116 GAIT TRAINING THERAPY: CPT | Mod: GP | Performed by: PHYSICAL THERAPIST

## 2020-05-31 PROCEDURE — 6370000100 HC RX 637 (ALT 250 FOR IP): Performed by: PHYSICAL MEDICINE & REHABILITATION

## 2020-05-31 PROCEDURE — 97535 SELF CARE MNGMENT TRAINING: CPT | Mod: GO | Performed by: OCCUPATIONAL THERAPIST

## 2020-05-31 PROCEDURE — 2590000100 HC RX 259: Performed by: INTERNAL MEDICINE

## 2020-05-31 PROCEDURE — 97530 THERAPEUTIC ACTIVITIES: CPT | Mod: GO | Performed by: OCCUPATIONAL THERAPIST

## 2020-05-31 PROCEDURE — 97112 NEUROMUSCULAR REEDUCATION: CPT | Mod: GP | Performed by: PHYSICAL THERAPIST

## 2020-05-31 PROCEDURE — 2590000100 HC RX 259: Performed by: PHYSICAL MEDICINE & REHABILITATION

## 2020-05-31 PROCEDURE — 97112 NEUROMUSCULAR REEDUCATION: CPT | Mod: GO | Performed by: OCCUPATIONAL THERAPIST

## 2020-05-31 PROCEDURE — 92526 ORAL FUNCTION THERAPY: CPT | Mod: GN

## 2020-05-31 PROCEDURE — (BLANK) HC ROOM PRIVATE

## 2020-05-31 RX ORDER — LANOLIN ALCOHOL/MO/W.PET/CERES
400 CREAM (GRAM) TOPICAL DAILY
Status: DISCONTINUED | OUTPATIENT
Start: 2020-05-31 | End: 2020-06-05 | Stop reason: HOSPADM

## 2020-05-31 RX ADMIN — DIPHENHYDRAMINE HYDROCHLORIDE 25 MG: 25 CAPSULE ORAL at 20:36

## 2020-05-31 RX ADMIN — ENOXAPARIN SODIUM 40 MG: 40 INJECTION SUBCUTANEOUS at 14:50

## 2020-05-31 RX ADMIN — DOCUSATE SODIUM 100 MG: 100 CAPSULE, LIQUID FILLED ORAL at 08:15

## 2020-05-31 RX ADMIN — Medication 1000 MG: at 20:35

## 2020-05-31 RX ADMIN — MAGNESIUM OXIDE 400 MG: 400 TABLET ORAL at 13:03

## 2020-05-31 RX ADMIN — LISINOPRIL 20 MG: 20 TABLET ORAL at 08:15

## 2020-05-31 RX ADMIN — INSULIN DETEMIR 10 UNITS: 100 INJECTION, SOLUTION SUBCUTANEOUS at 08:16

## 2020-05-31 RX ADMIN — PSYLLIUM HUSK 1 PACKET: 3.4 POWDER ORAL at 08:15

## 2020-05-31 RX ADMIN — POLYETHYLENE GLYCOL 3350 17 G: 17 POWDER, FOR SOLUTION ORAL at 08:15

## 2020-05-31 RX ADMIN — ROSUVASTATIN CALCIUM 40 MG: 20 TABLET, FILM COATED ORAL at 20:34

## 2020-05-31 RX ADMIN — MELATONIN 6 MG: at 20:36

## 2020-05-31 RX ADMIN — ASPIRIN 81 MG: 81 TABLET ORAL at 08:15

## 2020-05-31 RX ADMIN — MELATONIN 1000 UNITS: at 08:15

## 2020-05-31 RX ADMIN — CLOPIDOGREL BISULFATE 75 MG: 75 TABLET ORAL at 08:15

## 2020-05-31 NOTE — PLAN OF CARE
Problem: Pain - Adult  Description: Pt denies discomfort at this time  Goal: Verbalizes/displays adequate comfort level or baseline comfort level  Description: INTERVENTIONS:  1. Encourage patient to monitor pain and request interventions  2. Assess pain using the appropriate pain scale  3. Administer analgesics based on type and severity of pain and evaluate response  4. Educate/Implement non-pharmacological measures as appropriate and evaluate response  5. Consider cultural, developmental and social influences on pain and pain management  6. Notify Provider if interventions unsuccessful or patient reports new pain  Outcome: Progressing  Flowsheets (Taken 5/31/2020 1313)  Verbalizes/displays adequate comfort level or baseline comfort level:   Encourage patient to monitor pain and request interventions   Assess pain using the appropriate pain scale     Problem: Infection - Adult  Goal: Absence of infection during hospitalization  Description: INTERVENTIONS:  1. Assess and monitor for signs and symptoms of infection  2. Monitor lab/diagnostic results  3. Monitor all insertion sites/wounds/incisions  4. Monitor secretions for changes in amount and color  5. Administer medications as ordered  6. Educate and encourage patient and family to use good hand hygiene technique  7. Identify and educate in appropriate isolation precautions for identified infection/condition  Outcome: Progressing  Flowsheets (Taken 5/31/2020 1313)  Absence of infection during hospitalization:   Assess and monitor for signs and symptoms of infection   Monitor secretions for changes in amount and colo   Educate and encourage patient and family to use good hand hygiene technique     Problem: Safety Adult - Fall  Goal: Free from fall injury  Description: INTERVENTIONS:    Inpatient - Please reference Cares/Safety Flowsheet under [place] Fall Risk for interventions.  Pediatrics - Please reference Peds Daily Cares/Safety Flowsheet under [facility]  Pediatric Fall Assessment Fall Bundle for interventions  LD/OB - Please reference OB Shift Screening Flowsheet under OB Fall Risk for interventions.  Outcome: Progressing     Problem: Discharge Planning  Goal: Discharge to home or other facility with appropriate resources  Description: INTERVENTIONS:  1. Identify and discuss barriers to discharge with patient and caregiver.  2. Arrange for needed discharge resources and transportation as appropriate.  3. Identify discharge learning needs (meds, wound care, etc).  4. Arrange for interpreters to assist at discharge as needed.  5. Refer to  for coordinating discharge planning if the patient needs post-hospital services based on physician order or complex needs related to functional status, cognitive ability or social support system.  Outcome: Progressing  Flowsheets (Taken 5/31/2020 1313)  Discharge to home or other facility with appropriate resources:   Identify and discuss barriers to discharge with patient and caregiver   Identify discharge learning needs (meds, wound care, etc)     Problem: Knowledge Deficit  Goal: Patient/family/caregiver demonstrates understanding of disease process, treatment plan, medications, and discharge instructions  Description: INTERVENTIONS:   1. Complete learning assessment and assess knowledge base  2. Provide teaching at level of understanding   3. Provide teaching via preferred learning methods  Outcome: Progressing  Flowsheets (Taken 5/31/2020 1313)  Patient/family/caregiver demonstrates understanding of disease process, treatment plan, medications, and discharge instructions:   Complete learning assessment and assess knowledge base   Provide teaching via preferred learning methods   Provide teaching at level of understanding     Problem: Potential for Compromised Skin Integrity  Goal: Skin Integrity is Maintained or Improved  Description: INTERVENTIONS:  1. Assess and monitor skin integrity  2. Collaborate with  interdisciplinary team and initiate plans and interventions as needed  3. Alternate a full bath with partial baths for elderly   4. Monitor patient's hygiene practices   5. Collaborate with wound, ostomy, and continence nurse  Outcome: Progressing  Flowsheets (Taken 5/31/2020 1313)  Skin integrity is maintained or improved:   Assess and monitor skin integrity   Monitor patient's hygiene practices  Goal: Nutritional status is improving  Description: INTERVENTIONS:  1. Monitor and assess patient for malnutrition (ex- brittle hair, bruises, dry skin, pale skin and conjunctiva, muscle wasting, smooth red tongue, and disorientation)  2. Monitor patient's weight and dietary intake as ordered or per policy  3. Determine patient's food preferences and provide high-protein, high-caloric foods as appropriate  4. Assist patient with eating   5. Allow adequate time for meals   6. Encourage patient to take dietary supplement as ordered   7. Collaborate with dietitian  8. Include patient/family/caregiver in decisions related to nutrition  Outcome: Progressing  Flowsheets (Taken 5/31/2020 1313)  Nutritional status is improving:   Monitor and assess patient for malnutrition (ex- brittle hair, bruises, dry skin, pale skin and conjunctiva, muscle wasting, smooth red tongue, and disorientation)   Monitor patient's weight and dietary intake as ordered or per policy   Determine patient's food preferences and provide high-protein, high-caloric foods as appropriate   Allow adequate time for meals   Include patient/family/caregiver in decisions related to nutrition  Goal: MOBILITY IS MAINTAINED OR IMPROVED  Description: INTERVENTIONS  1. Collaborate with interdisciplinary team and initiate plan and interventions as ordered (PT/OT)  2. Encourage ambulation  3. Up to chair for meals  4. Monitor for signs of deconditioning  Outcome: Progressing  Flowsheets (Taken 5/31/2020 1313)  Mobility is Maintained or Improved:   Encourage ambulation   Up  to chair for meals   Monitor for signs of deconditioning     Problem: Safety Adult - Fall  Goal: Free from fall injury  Description: INTERVENTIONS:    Inpatient - Please reference Cares/Safety Flowsheet under [place] Fall Risk for interventions.  Pediatrics - Please reference Peds Daily Cares/Safety Flowsheet under [facility] Pediatric Fall Assessment Fall Bundle for interventions  LD/OB - Please reference OB Shift Screening Flowsheet under OB Fall Risk for interventions.  Outcome: Progressing

## 2020-05-31 NOTE — PROGRESS NOTES
Daily Progress Note       LOS: 4 days     Subjective     Patient is a 59-year-old male who is here to inpatient rehab after having an acute right pontine CVA.  Patient does seem to be doing well with his current treatment.  He is working well with the therapist.  He was complain about constipation.  We added several PRN and scheduled medicines yesterday.  He said that was much better today.  I also added Benadryl to his home regime of Tylenol and melatonin last night.  He slept much better.  He seems to be doing better today in general.  Blood pressure has been well controlled.  We did talk about permissive hypertension after CVAs.      ROS  No headaches, vision changes, or hearing changes.  No URI symptoms.  No dysphagia or odynophagia.  No shortness of breath, coughing, wheezing or PND.  No chest pain, palpitations, or anginal symptoms.  No nausea/ vomiting, heartburn or abdominal discomfort.  No urinary or bowel changes.  Review of systems otherwise is negative.      Vital signs in last 24 hours:  Temp:  [36.5 °C (97.7 °F)-36.6 °C (97.9 °F)] 36.6 °C (97.9 °F)  Heart Rate:  [60-76] 60  Resp:  [17-18] 18  BP: (129-134)/(81-84) 134/81    Intake/Output last 3 shifts:  I/O last 3 completed shifts:  In: 1950 [P.O.:1950]  Out: 1900 [Urine:1900]  Intake/Output this shift:  No intake/output data recorded.    Physical Exam  GENERAL: Patient is alert and oriented, in no acute distress.  HEENT: Normocephalic, atraumatic. Sclerae anicteric.  No tenderness to palpation of sinuses.    NECK: No cervical lymphadenopathy.  Trachea is midline.  LUNGS:  Clear to auscultation bilaterally, both anterior and posterior.  CARDIOVASCULAR EXAM: Regular rate and rhythm without murmur, rub, or gallop.    ABDOMEN:  Soft. Positive bowel sounds in all four quadrants.  No rebound. No guarding.   Nontender, nondistended.  EXTREMITIES:  No clubbing, cyanosis, or edema.    Medications:    Current Facility-Administered Medications:   •  senna  (SENOKOT) tablet 17.2 mg, 17.2 mg, oral, Nightly PRN, Sarah Harris MD  •  magnesium citrate solution 296 mL, 296 mL, oral, 2x daily PRN, Sarah Harris MD  •  diphenhydrAMINE (BENADRYL) tab/cap 25 mg, 25 mg, oral, Nightly, Sarah Harris MD, 25 mg at 05/30/20 2009  •  docusate sodium (COLACE) capsule 100 mg, 100 mg, oral, Daily, aSrah Harris MD, 100 mg at 05/31/20 0815  •  traZODone (DESYREL) tablet 50 mg, 50 mg, oral, Nightly PRN, Sarah Harris MD  •  melatonin tablet 6 mg, 6 mg, oral, Nightly, Sarah Harris MD, 6 mg at 05/30/20 2009  •  acetaminophen (TYLENOL) tablet 1,000 mg, 1,000 mg, oral, Nightly, Sarah Harris MD, 1,000 mg at 05/30/20 2009  •  diphenhydrAMINE (BENADRYL) tab/cap 25 mg, 25 mg, oral, Nightly PRN, Sarah Harris MD  •  polyethylene glycol (GLYCOLAX) powder 17 g, 17 g, oral, Daily, Sarah Harris MD, 17 g at 05/31/20 0815  •  senna (SENOKOT) tablet 17.2 mg, 17.2 mg, oral, Nightly PRN, Sarah Harris MD  •  magnesium hydroxide (MILK OF MAGNESIA) 400 mg/5 mL oral suspension 30 mL, 30 mL, oral, Daily PRN, Sarah Harris MD, 30 mL at 05/30/20 1446  •  insulin detemir U-100 (LEVEMIR) injection 15 Units, 15 Units, subcutaneous, Insulin: Nightly, INOCENCIO Bernard MD, 15 Units at 05/30/20 2101  •  insulin detemir U-100 (LEVEMIR) injection 10 Units, 10 Units, subcutaneous, q AM, INOCENCIO Bernard MD, 10 Units at 05/31/20 0816  •  enoxaparin (LOVENOX) syringe 40 mg, 40 mg, subcutaneous, Daily at 1600, Derick Small DO, 40 mg at 05/30/20 1446  •  ondansetron (ZOFRAN) tablet 4 mg, 4 mg, oral, q6h PRN, Derick Small DO  •  rosuvastatin (CRESTOR) tablet 40 mg, 40 mg, oral, Nightly, Derick Small DO, 40 mg at 05/30/20 2009  •  acetaminophen (TYLENOL) tablet 500 mg, 500 mg, oral, q6h PRN, Derick Small DO, 500 mg at 05/30/20 0900  •  aspirin EC tablet 81 mg, 81 mg, oral, Daily, Derick Small DO, 81 mg at 05/31/20 0815  •  cholecalciferol (vitamin D3) 1,000 unit (25mcg) tab/cap 1,000 Units, 1,000 Units, oral, Daily,  Derick Small DO, 1,000 Units at 05/31/20 0815  •  clopidogreL (PLAVIX) tablet 75 mg, 75 mg, oral, Daily, Derick Small DO, 75 mg at 05/31/20 0815  •  lisinopriL (PRINIVIL,ZESTRIL) tablet 20 mg, 20 mg, oral, Daily, Derick Small DO, 20 mg at 05/31/20 0815  •  psyllium (METAMUCIL SUGAR FREE) 1 packet, 1 packet, oral, Daily, Derick Small DO, 1 packet at 05/31/20 0815  •  sodium chloride (OCEAN) 0.65 % nasal spray 2 spray, 2 spray, Each Nostril, PRN, Derick Small DO    Labs:  CBC:   Lab Results   Component Value Date    WBC 10.1 (H) 05/30/2020    RBC 5.21 05/30/2020    HGB 16.2 05/30/2020    HCT 47.2 05/30/2020     05/30/2020     CMP:   Lab Results   Component Value Date     05/30/2020    K 3.6 05/30/2020     05/30/2020    CO2 28 05/30/2020    GLUCOSE 164 (H) 05/30/2020    CREATININE 1.01 05/30/2020    CALCIUM 9.5 05/30/2020    ALBUMIN 4.3 05/30/2020    ALKPHOS 61 05/30/2020    BILITOT 0.81 05/30/2020    ALT 26 05/30/2020    AST 22 05/30/2020    BUN 16 05/30/2020    ANIONGAP 10 05/30/2020     Coagulation: No results found for: PT, INR, APTT              Active Problems:    Benign essential hypertension    Hyperlipidemia    Obstructive sleep apnea syndrome    Controlled type 2 diabetes mellitus without complication (CMS/HCC) ([facility])    Mild aortic sclerosis (CMS/HCC) ([facility])    Ischemic cerebrovascular accident (CVA) (CMS/HCC) ([facility])    Microscopic hematuria    Hypomagnesemia    NIK (obstructive sleep apnea)    CVA (cerebral vascular accident) (CMS/HCC) ([facility])        Assessment and Plan:  1.  Status post right pontine CVA.  Patient's on aspirin, statin and Plavix.  Carotid ultrasound report shows less than 50% stenosis bilateral ICA.  Patient is working with our therapist here at inpatient rehab.  He seems more comfortable today.  2.  Hypertension.  Patient is on lisinopril 20 mg daily.  We are giving some permissive hypertension because of his recent CVA.  We did talk about permissive  hypertension and why we do this.  He seemed okay with that at this time.  3.  Diabetes mellitus.  Blood sugars seem to be doing okay with his current treatment.  His Levemir was changed to 15 units in the morning and 10 in the evening.  I am going to increase it to 20 in the morning and decreased to 5 evening.  The goal is to try to get him on a once a day dosing.    4.  Hyperlipidemia.  Patient is on Crestor and doing well.  LFTs have been fine.  5.  Constipation.  Patient is doing much better now.  We did increase his scheduled medicines and he has multiple PRN treatments.    6.  Microscopic hematuria.  Patient has some issues previously for this.  He has had an ultrasound which was negative.  7.  Hypomagnesia.  Patient is on any treatment at this time.  I will start him on mag oxide.  8.  Obstructive sleep apnea.  Patient is on oxygen.    9.  Vitamin D deficiency.  Vitamin D level is pending.    10.  DVT prophylaxis.  Patient is on Lovenox.  11.  Insomnia.  Patient did much better with the Tylenol, melatonin, Benadryl.            Sarah Harris MD  11:55 AM, 5/31/2020.      A voice recognition program was used to aid in documentation of this record. Sometimes words are not presented exactly as they were spoken.  While efforts were made to carefully edit and correct any inaccuracies, some errors may be present.  Please take this into context.  Please contact the provider if errors are identified.

## 2020-05-31 NOTE — INTERDISCIPLINARY/THERAPY
Physical Therapy  Treatment Note (PT)    Patient Name: Del Mckeon  Age: 59 y.o.  Gender: male    ----------------------------------------------------------------------------------------------------------------------       05/31/20 1315   Time Calculation   Start Time 1315   Stop Time 1345   Time Calculation (min) 30 min   Pain Assessment   Pain Assessment No/denies pain   General   Chart Reviewed Yes   Therapy Treatment Diagnosis CVA with L weakness   Is this a Co-Treatment? No   Subjective Comments   Subjective Comments Patient agreeable to working with PT.   Bed Mobility 1   Bed Mobility From 1 Supine   Bed Mobility Type 1 To and from   Bed Mobility to 1 Short sit   Level of Assistance 1 Standby assistance   Bed Mobility Comments 1 HOB slightly elevated   Transfer 1   Transfer From 1 Bed   Transfer Type 1 To and from   Transfer to 1 Stand   Technique 1 Sit to stand;Stand to sit   Transfer Device 1 Front wheeled walker;Transfer belt   Level of Assistance 1 Contact guard assist x 1   Ambulation 1   Surface 1 Level surface   Device 1 Front wheeled walker;Gait belt   Assistance 1 Contact guard assist x 1   Quality of Gait 1 Slow pace, no LOB, narrow KYLE   Comments/Distance 1 35m x 2   Ambulation 2   Surface 2 Outdoors   Device 2 Front wheeled walker;Gait belt   Assistance 2 Contact guard assist x 1   Quality of Gait 2 slow pace, no LOB   Comments/Distance 2 80m x 2; up and down 1 curb   QI Functional Abilities and Goals: Mobility   Walking 10 Feet on Uneven Surfaces 04   1 Step (Curb) 04   Assessment   Progress Progressing toward goals   Assessment Comment Patient tolerated ambulation outside well.  He did not take rest breaks other than standing waiting for doors to be opened.  He was able to negotiate objects on sidewalk well and had no LOB.  Cuing provided to increase step length.  He is progressing well.   Recommendation   Recommendations for Therapy Continue skilled therapy         _________________  Austin  Jovanna, PT  05/31/20 3:13 PM

## 2020-05-31 NOTE — INTERDISCIPLINARY/THERAPY
Occupational Therapy  Treatment Note (OT)      Patient Name: Del Mckeon  Age: 59 y.o.  Gender: male    ----------------------------------------------------------------------------------------------------------------------         05/31/20 1015   Time Calculation   Start Time 1015   Stop Time 1045   Time Calculation (min) 30 min   General   Is this a Co-Treatment? No   Family/Caregiver Present No   Precautions   Reinforced Precautions Yes   Subjective Comments   Subjective Comments Pt agreeable for OT   Ambulation 1   Surface 1 Level surface   Device 1 Front wheeled walker   Assistance 1 Contact guard assist x 1   Comments/Distance 1 Pt walked 20 m x 2 to/from gym!   Additional Activities   Additional Activities Comments In gym pt did one row of MN board - fillping then with L in-hand coord and then replacing and slow but indep.  He then stacked 10 blocks with L hand - again slow but indep.  Then did fastener board and he had particular problem with buttoning buttons - needing cues and extended time but did finally complete on own.  He did shoe tying however easily with both hands.  Walked back to room and into bed at end of the session.   Assessment   Rehab Potential Good   Progress Progressing toward goals   Recommendations for Therapy Continue skilled therapy   Assessment Comment Pt gaining in L hand coord!   Plan   Plan Comment ADL         _________________  Saman Rosado, OTR  05/31/20 11:25 AM

## 2020-05-31 NOTE — PLAN OF CARE
Problem: Pain - Adult  Description: Pt denies discomfort at this time  Goal: Verbalizes/displays adequate comfort level or baseline comfort level  Description: INTERVENTIONS:  1. Encourage patient to monitor pain and request interventions  2. Assess pain using the appropriate pain scale  3. Administer analgesics based on type and severity of pain and evaluate response  4. Educate/Implement non-pharmacological measures as appropriate and evaluate response  5. Consider cultural, developmental and social influences on pain and pain management  6. Notify Provider if interventions unsuccessful or patient reports new pain  Outcome: Progressing  Flowsheets (Taken 5/31/2020 1313 by Levi Marte LPN)  Verbalizes/displays adequate comfort level or baseline comfort level:   Encourage patient to monitor pain and request interventions   Assess pain using the appropriate pain scale     Problem: Infection - Adult  Goal: Absence of infection during hospitalization  Description: INTERVENTIONS:  1. Assess and monitor for signs and symptoms of infection  2. Monitor lab/diagnostic results  3. Monitor all insertion sites/wounds/incisions  4. Monitor secretions for changes in amount and color  5. Administer medications as ordered  6. Educate and encourage patient and family to use good hand hygiene technique  7. Identify and educate in appropriate isolation precautions for identified infection/condition  Outcome: Progressing  Flowsheets (Taken 5/31/2020 1313 by Levi Marte LPN)  Absence of infection during hospitalization:   Assess and monitor for signs and symptoms of infection   Monitor secretions for changes in amount and colo   Educate and encourage patient and family to use good hand hygiene technique     Problem: Safety Adult - Fall  Goal: Free from fall injury  Description: INTERVENTIONS:    Inpatient - Please reference Cares/Safety Flowsheet under [facility] Fall Risk for interventions.  Pediatrics - Please reference Peds Daily  Cares/Safety Flowsheet under [place] Pediatric Fall Assessment Fall Bundle for interventions  LD/OB - Please reference OB Shift Screening Flowsheet under OB Fall Risk for interventions.  Outcome: Progressing     Problem: Knowledge Deficit  Goal: Patient/family/caregiver demonstrates understanding of disease process, treatment plan, medications, and discharge instructions  Description: INTERVENTIONS:   1. Complete learning assessment and assess knowledge base  2. Provide teaching at level of understanding   3. Provide teaching via preferred learning methods  Outcome: Progressing  Flowsheets (Taken 5/31/2020 1313 by Levi Marte LPN)  Patient/family/caregiver demonstrates understanding of disease process, treatment plan, medications, and discharge instructions:   Complete learning assessment and assess knowledge base   Provide teaching via preferred learning methods   Provide teaching at level of understanding     Problem: Potential for Compromised Skin Integrity  Goal: Skin Integrity is Maintained or Improved  Description: INTERVENTIONS:  1. Assess and monitor skin integrity  2. Collaborate with interdisciplinary team and initiate plans and interventions as needed  3. Alternate a full bath with partial baths for elderly   4. Monitor patient's hygiene practices   5. Collaborate with wound, ostomy, and continence nurse  Outcome: Progressing  Flowsheets (Taken 5/31/2020 1313 by Levi Marte LPN)  Skin integrity is maintained or improved:   Assess and monitor skin integrity   Monitor patient's hygiene practices  Goal: Nutritional status is improving  Description: INTERVENTIONS:  1. Monitor and assess patient for malnutrition (ex- brittle hair, bruises, dry skin, pale skin and conjunctiva, muscle wasting, smooth red tongue, and disorientation)  2. Monitor patient's weight and dietary intake as ordered or per policy  3. Determine patient's food preferences and provide high-protein, high-caloric foods as appropriate  4.  Assist patient with eating   5. Allow adequate time for meals   6. Encourage patient to take dietary supplement as ordered   7. Collaborate with dietitian  8. Include patient/family/caregiver in decisions related to nutrition  Outcome: Progressing  Flowsheets (Taken 5/31/2020 1313 by Levi Marte LPN)  Nutritional status is improving:   Monitor and assess patient for malnutrition (ex- brittle hair, bruises, dry skin, pale skin and conjunctiva, muscle wasting, smooth red tongue, and disorientation)   Monitor patient's weight and dietary intake as ordered or per policy   Determine patient's food preferences and provide high-protein, high-caloric foods as appropriate   Allow adequate time for meals   Include patient/family/caregiver in decisions related to nutrition  Goal: MOBILITY IS MAINTAINED OR IMPROVED  Description: INTERVENTIONS  1. Collaborate with interdisciplinary team and initiate plan and interventions as ordered (PT/OT)  2. Encourage ambulation  3. Up to chair for meals  4. Monitor for signs of deconditioning  Outcome: Progressing  Flowsheets (Taken 5/31/2020 1313 by Levi Marte LPN)  Mobility is Maintained or Improved:   Encourage ambulation   Up to chair for meals   Monitor for signs of deconditioning     Problem: Safety Adult - Fall  Goal: Free from fall injury  Description: INTERVENTIONS:    Inpatient - Please reference Cares/Safety Flowsheet under [place] Fall Risk for interventions.  Pediatrics - Please reference Peds Daily Cares/Safety Flowsheet under [facility] Pediatric Fall Assessment Fall Bundle for interventions  LD/OB - Please reference OB Shift Screening Flowsheet under OB Fall Risk for interventions.  Outcome: Progressing

## 2020-05-31 NOTE — NURSING END OF SHIFT
Nursing End of Shift Summary:    Patient: Del Mckeon  MRN: [id number]  : 1960, Age: 59 y.o.    Location: [place] [address]    Nursing Goals  Clinical Goals for the Shift: pt free of falls , comfort maintained    Narrative Summary of Progress Toward Clinical Goals:  Pt remained free from falls. Pt noted to be impulsive at times, reinforced call light use this morning, pt used call light appropriately for remainder of shift. Pt denies pain throughout shift, reports adequate comfort level.    Barriers to Goals/Nursing Concerns:  No    New Patient or Family Concerns/Issues:  No    Shift Summary:      Significant Events & Communications to Providers (last 12 hours)      Last 5 Values    No documentation.              Oxygen Usage (last 12 hours)      Last 5 Values     Row Name 20 0830                   Oxygen Weaning Trial by Nursing    Is Patient on Room Air OR on the Same Amount of O2 as at Home?  Yes room air                   Mobility (last 12 hours)      Last 5 Values     Row Name 20 0830                   Mobility    Anti-Embolism Devices  Bilateral        Anti-Embolism Intervention  -- off for pt safety, can be impulsive            Urethral Catheter    Active Urethral Catheter     None            Active Lines    Active Central venous catheter / Peripherally inserted central catheter / Implantable Port / Hemodialysis catheter / Midline Catheter     None              Infusing Medications   Medication Dose Last Rate     PRN Medications   Medication Dose Last Dose   • senna  17.2 mg     • magnesium citrate  296 mL     • traZODone  50 mg     • diphenhydrAMINE  25 mg     • senna  17.2 mg     • magnesium hydroxide  30 mL 30 mL at 20 1446   • ondansetron  4 mg     • acetaminophen  500 mg 500 mg at 20 0900   • sodium chloride  2 spray       _________________________  Levi Marte LPN  20 2:30 PM

## 2020-05-31 NOTE — INTERDISCIPLINARY/THERAPY
"TREATMENT NOTE (SLP)      Patient Name: Del Mckeon  Age: 59 y.o.  Gender: male    ----------------------------------------------------------------------------------------------------------------------       05/31/20 0830   Time Calculation   Start Time 0830   Stop Time 0900   Time Calculation (min) 30 min   Subjective Comments   Subjective Comments Pt agreeable to ST session.   General   SLP Treatment Duration (Min) 30 Minutes   Daily Treatment   Session Activities Memory;Dysphagia   Memory   Memory   (Internal/external memory aids)   Dysphagia   Current Diet/Liquid Consistency Regular;Thin liquids   Temperature Spikes w/in 72h No   Respiratory Status Room air   Behavior/Cognition Alert;Cooperative;Pleasant mood   Dentition Adequate   Vision Functional for self-feeding   Patient Positioning Upright in chair   Baseline Vocal Quality Normal   Consistencies Assessed Yes   Thin Liquids   Presentation Self-fed;Cup;Straw   Oral WFL   Suction Required? No   Swab/Finger Swipe Required? No   Pharyngeal No signs or symptoms of aspiration   Aspiration Risk   Risk for Aspiration Mild   Follow Up Recommendations Dysphagia treatment   Diet Solids Recommendations Regular   Diet Liquids Recommendations Thin liquids   Compensatory Swallowing Strategies Upright at 90 degrees for all oral intake;Small bites/sips;Eat/feed slowly;Other (Comment)  (double swallow intermittently during meals )   Medication Recommendations Whole;With liquid   Activity Tolerance   Activity Tolerance Patient tolerated treatment well   Assessment Comments   Assessment Comments SWALLOW TX: Pt was presented trials of thins via cup edge and straw d/t intermittent coughing with straws in previous session(s). Pt reports \"it's a matter of taking small drinks\" when using a straw to avoid coughing. Pt was observed to take small, single drinks from both the cup edge and straw this date with no s/s of penetration/aspiration. SLP provided education and rationale " for use of small drinks.     MEMORY TX: SLP described and provided examples for various internal and external memory aids, prompting discussion of baseline use and potential for use upon discharge (e.g., pt reports currently using a shopping list, using a pill box in the past). Pt was provided a handout with corresponding information and examples. Pt reports no perceived difficulties with memory, however acknowledges current environment is controlled moreso than his life prior to admission. SLP will continue to follow for POC.   Patient Education   Patient Education Pt was educated regarding purpose of cup edge vs straw trials. Additionally, pt was provided education regarding various internal and external memory aids; pt provided verbal agreement and understanding.   Plan   Plan Comments Memory tx, monitor swallow strategy implementation         _________________  CHERELLE Jimenez-SLP  05/31/20 11:21 AM

## 2020-05-31 NOTE — INTERDISCIPLINARY/THERAPY
Physical Therapy  Treatment Note (PT)    Patient Name: Del Mckeon  Age: 59 y.o.  Gender: male    ----------------------------------------------------------------------------------------------------------------------       05/31/20 0915   Time Calculation   Start Time 0915   Stop Time 1000   Time Calculation (min) 45 min   Pain Assessment   Pain Assessment No/denies pain   General   Chart Reviewed Yes   Therapy Treatment Diagnosis CVA with L weakness   Is this a Co-Treatment? No   Precautions   Reinforced Precautions Yes   Subjective Comments   Subjective Comments Patient agreeable to working with PT.   Bed Mobility 1   Bed Mobility From 1 Supine   Bed Mobility Type 1 To and from   Bed Mobility to 1 Short sit   Level of Assistance 1 Standby assistance   Bed Mobility Comments 1 HOB slightly elevated   Transfer 1   Transfer From 1 Bed;Sit   Transfer Type 1 To and from   Transfer to 1 Stand   Technique 1 Sit to stand;Stand to sit   Transfer Device 1 Front wheeled walker;Transfer belt   Level of Assistance 1 Contact guard assist x 1   Trials/Comments 1 CGA for safety    Ambulation 1   Surface 1 Level surface   Device 1 Front wheeled walker;Gait belt   Assistance 1 Contact guard assist x 1   Quality of Gait 1 Slow pace, no LOB, narrow KYLE    Comments/Distance 1 30m x 2   Ambulation 2   Surface 2 Level surface   Device 2 Parallel bars   Assistance 2 Standby assistance   Quality of Gait 2 sidestepping, marching, backward walking    Comments/Distance 2 6m x 2 reps each    QI Functional Abilities and Goals: Mobility   Roll Left and Right 04   Sit to Lying 04   Lying to Sitting on Side of Bed 04   Sit to Stand 04   Chair/Bed-to-Chair Transfer 04   Does the Patient Walk? 2   Walk 10 Feet 04   Walk 50 Feet with Two Turns 04   Walk 150 Feet 04   Static Standing Balance   Static Standing-Balance Surface Firm   Static Standing-Balance Support No upper extremity supported   Static Standing-Level of Assistance Standby assistance  "  Static Standing-Comment/# of Minutes narrow KYLE, eyes open/closed, tandem stance with HHA    Dynamic Standing Balance   Dynamic Standing-Balance Surface Firm   Dynamic Standing-Balance Support No upper extremity supported   Dynamic Standing-Balance Reaching across midline;Forward lean;Reaching for objects   Dynamic Standing-Level of Assistance Contact guard x 1   Dynamic Standing-Comments also head turns horizontal and vertical, and marching in place   Standing   Standing-Exercises Lower extremity   Standing-Exercise Type Hip flexion;Hip ABduction;Hip ADduction;Squats;Heel raises   Standing-Exercise Comments 10 reps each   Other Exercise   Other Exercise Comment sit to stand 2x7 reps; alternating step touches to 6\" step 3x5 reps R/L   Assessment   Progress Progressing toward goals   Assessment Comment Patient demonstrated improved stability during activities this session.  He required cuing for safety.  Patient did well with gait during session and had no LOB.  Cuing provided to increase step length.   Recommendation   Recommendations for Therapy Continue skilled therapy         _________________  Austin Nugent, PT  05/31/20 11:46 AM    "

## 2020-05-31 NOTE — INTERDISCIPLINARY/THERAPY
Occupational Therapy     05/31/20 0700   Time Calculation   Start Time 0700   Stop Time 0745   Time Calculation (min) 45 min   OT Last Visit   OT Received On 05/31/20   General   Chart Reviewed Yes   Therapy Treatment Diagnosis CVA   OT Treatment Duration (Min) 45 Minutes   Is this a Co-Treatment? No   Precautions   Reinforced Precautions Yes   Other Precautions L weakness, fall    Subjective Comments   Subjective Comments Pt agreeable to OT, pt in bed at start of tx, ended in w/c with chair alarm on , all needs within reach.     Pain Assessment   Pain Assessment No/denies pain   Cognition   Arousal/Alertness WFL   Cognition Comment conversation appropriate,  able to follow commands.    Bed Mobility   Bed Mobility Assessed   Bed Mobility 1   Bed Mobility From 1 Supine   Bed Mobility Type 1 To   Bed Mobility to 1 Short sit   Level of Assistance 1 Standby assistance   Bed Mobility Comments 1 HOB slightly elevated.      Transfers   Transfer Assessed   Transfer 1   Transfer From 1 Bed;Sit   Transfer Type 1 To and from   Transfer to 1 Stand;Shower   Technique 1 Sit to stand;Stand to sit   Transfer Device 1 Front wheeled walker;Transfer belt   Level of Assistance 1 Contact guard assist x 1   Transfers 2   Transfer From 2 Bed;Sit   Transfer Type 2 To   Transfer to 2 Stand;Wheelchair   Technique 2 Sit to stand;Stand to sit   Transfer Device 2 Front wheeled walker;Transfer belt   Level of Assistance 2 Contact guard assist x 1   Ambulation   Ambulation Assessed   Ambulation 1   Surface 1 Level surface   Device 1 Front wheeled walker;Gait belt   Assistance 1 Contact guard assist x 1   Quality of Gait 1 slow, pace,  no LOB,  ambulated bed<> shower    Comments/Distance 1 6m x 2    Eating   Eating Comments Pt able to set-up tray indep,     Grooming   Grooming Comments declined grooming stating he wanted to wait till after breakfast    Bathing   Bathing Adaptive Equipment   ([facility], extended tub bench )   Bathing Patient Completed  Right arm;Right upper leg;Right lower leg;Left arm;Left upper leg;Left lower leg;Chest;Abdomen   Bathing Level of Assistance Contact guard   Bathing Where Assessed Shower   Bathing Comments 10/10 parts with CGA , required increased time to complete    UE Dressing   UE Dressing Level of Assistance Setup   UE Dressing Where Assessed Edge of bed   UE Dressing Comments 4/4. pullover shirt,  cues to orient shirt,  pt initally placed backwards.    LE Dressing   LE Dressing Yes   Pants Level of Assistance Contact guard   Sock Level of Assistance Setup   Adult Briefs Level of Assistance Contact guard   LE Dressing Where Assessed Edge of bed   LE Dressing Comments 6/6,  required CGA in standing to pullover hips.     QI Functional Abilities and Goals: Self-Care   Eating  06   Shower/ Bathe Self 05   Upper Body Dressing 05   Lower Body Dressing 04   Putting On/ Taking Off Foorwear 05   Assessment   Rehab Potential Good   Progress Progressing toward goals   Problem List Decreased ADL status;Decreased upper extremity strength;Decreased safe judgment during ADL;Decreased endurance;Decreased fine motor control;Decreased functional mobility   Recommendations for Therapy Continue skilled therapy   Assessment Comment Pt requires supervision for ADL's and fx mobility,  motivated to increase fx indep.  Pt will benefit from continued skilled OT to maximize fx indep.     Plan   Plan Comment ADL,  UE strengthening, stand bipin    Treatment Interventions ADL retraining;Therapeutic activity;Therapeutic exercise;UE strengthening/ROM;Endurance training;Cognitive skills development;Neuromuscular reeducation   OT Frequency 5-7x/wk;1-2x/day

## 2020-06-01 LAB
GLUCOSE BLDC GLUCOMTR-MCNC: 113 MG/DL (ref 70–105)
GLUCOSE BLDC GLUCOMTR-MCNC: 124 MG/DL (ref 70–105)
GLUCOSE BLDC GLUCOMTR-MCNC: 132 MG/DL (ref 70–105)
GLUCOSE BLDC GLUCOMTR-MCNC: 145 MG/DL (ref 70–105)

## 2020-06-01 PROCEDURE — (BLANK) HC ROOM PRIVATE

## 2020-06-01 PROCEDURE — 6370000100 HC RX 637 (ALT 250 FOR IP): Performed by: PHYSICAL MEDICINE & REHABILITATION

## 2020-06-01 PROCEDURE — 97530 THERAPEUTIC ACTIVITIES: CPT | Mod: GP | Performed by: PHYSICAL THERAPIST

## 2020-06-01 PROCEDURE — 99232 SBSQ HOSP IP/OBS MODERATE 35: CPT | Performed by: PHYSICAL MEDICINE & REHABILITATION

## 2020-06-01 PROCEDURE — 2590000100 HC RX 259: Performed by: PHYSICAL MEDICINE & REHABILITATION

## 2020-06-01 PROCEDURE — 6370000100 HC RX 637 (ALT 250 FOR IP): Performed by: INTERNAL MEDICINE

## 2020-06-01 PROCEDURE — 2590000100 HC RX 259: Performed by: INTERNAL MEDICINE

## 2020-06-01 PROCEDURE — 97530 THERAPEUTIC ACTIVITIES: CPT | Mod: GO

## 2020-06-01 PROCEDURE — 6360000200 HC RX 636 W HCPCS (ALT 250 FOR IP): Performed by: PHYSICAL MEDICINE & REHABILITATION

## 2020-06-01 PROCEDURE — 97110 THERAPEUTIC EXERCISES: CPT | Mod: GO

## 2020-06-01 PROCEDURE — 99232 SBSQ HOSP IP/OBS MODERATE 35: CPT | Performed by: INTERNAL MEDICINE

## 2020-06-01 PROCEDURE — 97535 SELF CARE MNGMENT TRAINING: CPT | Mod: GO | Performed by: OCCUPATIONAL THERAPIST

## 2020-06-01 PROCEDURE — 97530 THERAPEUTIC ACTIVITIES: CPT | Mod: GO | Performed by: OCCUPATIONAL THERAPIST

## 2020-06-01 PROCEDURE — 82947 ASSAY GLUCOSE BLOOD QUANT: CPT | Mod: QW

## 2020-06-01 PROCEDURE — 97110 THERAPEUTIC EXERCISES: CPT | Mod: GP | Performed by: PHYSICAL THERAPIST

## 2020-06-01 RX ORDER — TRAZODONE HYDROCHLORIDE 50 MG/1
50 TABLET ORAL NIGHTLY
Status: DISCONTINUED | OUTPATIENT
Start: 2020-06-01 | End: 2020-06-02

## 2020-06-01 RX ADMIN — MAGNESIUM OXIDE 400 MG: 400 TABLET ORAL at 08:07

## 2020-06-01 RX ADMIN — PSYLLIUM HUSK 1 PACKET: 3.4 POWDER ORAL at 08:06

## 2020-06-01 RX ADMIN — CLOPIDOGREL BISULFATE 75 MG: 75 TABLET ORAL at 08:08

## 2020-06-01 RX ADMIN — TRAZODONE HYDROCHLORIDE 50 MG: 50 TABLET ORAL at 21:11

## 2020-06-01 RX ADMIN — ROSUVASTATIN CALCIUM 40 MG: 20 TABLET, FILM COATED ORAL at 21:10

## 2020-06-01 RX ADMIN — LISINOPRIL 20 MG: 20 TABLET ORAL at 08:07

## 2020-06-01 RX ADMIN — ASPIRIN 81 MG: 81 TABLET ORAL at 08:07

## 2020-06-01 RX ADMIN — Medication 1000 MG: at 21:10

## 2020-06-01 RX ADMIN — DOCUSATE SODIUM 100 MG: 100 CAPSULE, LIQUID FILLED ORAL at 08:07

## 2020-06-01 RX ADMIN — ENOXAPARIN SODIUM 40 MG: 40 INJECTION SUBCUTANEOUS at 14:38

## 2020-06-01 RX ADMIN — Medication 500 MG: at 06:29

## 2020-06-01 RX ADMIN — MELATONIN 1000 UNITS: at 08:08

## 2020-06-01 RX ADMIN — POLYETHYLENE GLYCOL 3350 17 G: 17 POWDER, FOR SOLUTION ORAL at 08:05

## 2020-06-01 NOTE — INTERDISCIPLINARY/THERAPY
Occupational Therapy  Treatment Note (OT)      Patient Name: Del Mckeon  Age: 59 y.o.  Gender: male    ----------------------------------------------------------------------------------------------------------------------         06/01/20 0700   Time Calculation   Start Time 0700   Stop Time 0745   Time Calculation (min) 45 min   OT Last Visit   OT Received On 06/01/20   General   Therapy Treatment Diagnosis CVA   OT Treatment Duration (Min) 45 Minutes   Is this a Co-Treatment? No   Family/Caregiver Present No   Precautions   Reinforced Precautions Yes   Other Precautions L side weakness. Fall risk.   Subjective Comments   Subjective Comments Pt agreeable to OT session; reported 0 pain.   Cognition   Arousal/Alertness WFL   Cognition Comment Appropriate in conversation and to task.   Bed Mobility   Bed Mobility Assessed   Bed Mobility 1   Bed Mobility From 1 Supine   Bed Mobility Type 1 To   Bed Mobility to 1 Short sit   Level of Assistance 1 Standby assistance   Bed Mobility Comments 1 HOB flat.   Transfers   Transfer Assessed   Transfer 1   Transfer From 1 Sit   Transfer Type 1 To and from   Transfer to 1 Stand   Technique 1 Sit to stand;Stand to sit   Transfer Device 1 Front wheeled walker;Transfer belt   Level of Assistance 1 Standby assistance;Contact guard assist x 1   Trials/Comments 1 SBA-CGA for xfers from bed and w/c.   Ambulation   Ambulation Assessed   Ambulation 1   Surface 1 Level surface;Smooth   Device 1 Front wheeled walker;Gait belt   Assistance 1 Contact guard assist x 1   Quality of Gait 1 Slow but steady w/0 LOB w/FWW.   Comments/Distance 1 8m x2   Dynamic Standing Balance   Dynamic Standing-Comments Pt tolerated standing for 4 mins w/SBA-CGA for safety in standing.   Eating   Eating Level of Assistance Setup   Eating Where Assessed Wheelchair   Eating Comments Pt reported difficulty grasping utensils; provided w/built-up handles for feeding and grooming.   Grooming   Grooming Level of  Assistance Close supervision;Contact guard   Grooming Where Assessed Standing sinkside   Grooming Comments Pt washed face while standing w/SBA-CGA.   UE Dressing   UE Dressing Level of Assistance Setup   UE Dressing Where Assessed Edge of bed   UE Dressing Comments 4/4 to jose pullover shirt.   LE Dressing   LE Dressing Yes   Pants Level of Assistance Close supervision;Contact guard   Sock Level of Assistance Setup   Adult Briefs Level of Assistance Close supervision;Contact guard   LE Dressing Where Assessed Edge of bed   LE Dressing Comments 6/6 to doff/jose underwear and pants w/SBA-CGA for safety in standing; 2/2 to doff/jose socks.   QI Functional Abilities and Goals: Self-Care   Eating  05   Oral Hygiene 04   Upper Body Dressing 05   Lower Body Dressing 04   Putting On/ Taking Off Foorwear 05   Additional Activities   Additional Activities Comments Pt educated on use of built-up handles for feeding and grooming. Pt was left seated in w/c w/alarm on and call light and needs w/in reach.   Assessment   Rehab Potential Good   Progress Progressing toward goals   Recommendations for Therapy Continue skilled therapy   Assessment Comment Pt continues to require SBA-CGA for fxal xfers/mobility and safety in standing; did demo increased standing tolerance.   Plan   Plan Comment UE/FM strength; standing tolerance         _________________  JACK MONROE, OTR  06/01/20 8:05 AM

## 2020-06-01 NOTE — INTERDISCIPLINARY/THERAPY
Physical Therapy  Treatment Note (PT)    Patient Name: Del Mckeon  Age: 59 y.o.  Gender: male    ----------------------------------------------------------------------------------------------------------------------       06/01/20 1230   Time Calculation   Start Time 1230   Stop Time 1255   Time Calculation (min) 25 min   Pain Assessment   Pain Assessment No/denies pain   General   Therapy Treatment Diagnosis CVA with L weakness   Subjective Comments   Subjective Comments Pt agreed to PT   Transfer 1   Transfer From 1 Wheelchair   Transfer Type 1 To   Transfer to 1 Bed   Technique 1 Stand pivot   Transfer Device 1 Front wheeled walker   Level of Assistance 1 Standby assistance   Ambulation 1   Surface 1 Level surface;Outdoors;Uneven   Device 1 Front wheeled walker   Assistance 1 Contact guard assist x 1;Standby assistance   Quality of Gait 1 outside cga, inside on level surfaces sba   Comments/Distance 1 75 m x 2, 20 m x 1         _________________  JAIDA MONTES, PT  06/01/20 3:36 PM

## 2020-06-01 NOTE — PROGRESS NOTES
Subjective   He feels he is doing reasonably well  He denies any fevers chills nausea or vomiting  He denies any shortness of breath chest pain or palpitation  He says that his bowels and urine are working well  He does say that he is not sleeping well at night and he usually does not sleep well he says he usually takes a couple of Tylenol PM  He denies problems with urination  Denies dizziness or lightheadedness  Objective  Temp:  [36.7 °C (98.1 °F)-37 °C (98.6 °F)] 36.7 °C (98.1 °F)  Heart Rate:  [64-85] 64  Resp:  [17-18] 18  BP: (129-142)/(87-91) 142/91  I/O last 3 completed shifts:  In: 2600 [P.O.:2600]  Out: 700 [Urine:700]  No intake/output data recorded.   General: Alert pleasant no distress laying in bed    HEENT exam: No scleral icterus pharynx is clear and moist neck is supple trachea              midline no lymphadenopathy    Lungs: Clear bilaterally no wheezes rhonchi or rales normal respiratory effort    Cardiovascular exam: S1-S2 regular rate and rhythm,                                         No murmurs    Abdominal exam: Soft positive bowel sounds nontender    Extremities: Warm no palpable cords no edema    Neurological exam: Alert and oriented ×3                                       Cranial nerves II through XII are grossly intact                                        Grossly nonfocal exam    Skin exam: No significant rashes, warm                                               Assessment & Plan   Status post CVA: Patient is participating in rehab seems to be motivated to get better feels he is making progress  Carotid Dopplers showed less than 50% stenosis  Consider Holter monitor as an outpatient    Diabetes mellitus: Blood sugars are moderately elevated he had been on scheduled Levemir just at night I am trying to convert him over to morning insulin all make some slight adjustments decreasing his evening insulin to 10 and increasing his morning to 25 overall blood sugar not ideal would like to  see it a little bit better    Essential hypertension: Blood pressure mildly elevated we will consider gradually adjusting his medications to bring this down however it is only mildly elevated    DVT prophylaxis: On Lovenox    Dyslipidemia: On Crestor    Insomnia: Patient on multiple medicines for this but uses Benadryl at home on a regular basis fortunately does not have significant problems with urinary retention      Active Problems:    Benign essential hypertension    Hyperlipidemia    Obstructive sleep apnea syndrome    Controlled type 2 diabetes mellitus without complication (CMS/HCC) (HCC)    Mild aortic sclerosis (CMS/HCC) (HCC)    Ischemic cerebrovascular accident (CVA) (CMS/HCC) (HCC)    Microscopic hematuria    Hypomagnesemia    NIK (obstructive sleep apnea)    CVA (cerebral vascular accident) (CMS/HCC) (HCC)

## 2020-06-01 NOTE — NURSING END OF SHIFT
Nursing End of Shift Summary:    Patient: Del Mckeon  MRN: [id number]  : 1960, Age: 59 y.o.    Location: [place] [id number]    Nursing Goals  Clinical Goals for the Shift: pt free of falls, comfort monitored    Narrative Summary of Progress Toward Clinical Goals:  Comfort and safety was maintained for this patient this shift.    Barriers to Goals/Nursing Concerns:  N    New Patient or Family Concerns/Issues:  N    Shift Summary:      Significant Events & Communications to Providers (last 12 hours)      Last 5 Values    No documentation.              Oxygen Usage (last 12 hours)      Last 5 Values    No documentation.              Mobility (last 12 hours)      Last 5 Values     Row Name 20 0700                   Mobility    Anti-Embolism Devices  Bilateral            Urethral Catheter    Active Urethral Catheter     None            Active Lines    Active Central venous catheter / Peripherally inserted central catheter / Implantable Port / Hemodialysis catheter / Midline Catheter     None              Infusing Medications   Medication Dose Last Rate     PRN Medications   Medication Dose Last Dose   • senna  17.2 mg     • magnesium citrate  296 mL     • traZODone  50 mg     • diphenhydrAMINE  25 mg     • senna  17.2 mg     • magnesium hydroxide  30 mL 30 mL at 20 1446   • ondansetron  4 mg     • acetaminophen  500 mg 500 mg at 20 0629   • sodium chloride  2 spray       _________________________  Elsa Brown RN  20 1:36 PM

## 2020-06-01 NOTE — INTERDISCIPLINARY/THERAPY
Physical Therapy  Treatment Note (PT)    Patient Name: Del Mckeon  Age: 59 y.o.  Gender: male    ----------------------------------------------------------------------------------------------------------------------       06/01/20 0820   Time Calculation   Start Time 0820   Stop Time 0905   Time Calculation (min) 45 min   General   Therapy Treatment Diagnosis CVA with L weakness   Subjective Comments   Subjective Comments Pt agreed to PT   Bed Mobility 1   Bed Mobility From 1 Short sit   Bed Mobility Type 1 To   Bed Mobility to 1 Supine   Level of Assistance 1 Standby assistance   Transfer 1   Transfer From 1 Wheelchair   Transfer Type 1 To and from   Transfer to 1 Stand   Technique 1 Sit to stand;Stand to sit   Transfer Device 1 Front wheeled walker   Level of Assistance 1 Standby assistance   Transfers 2   Transfer From 2 Wheelchair   Transfer Type 2 To   Transfer to 2 Bed   Technique 2 Stand pivot   Transfer Device 2 Front wheeled walker   Level of Assistance 2 Standby assistance   Ambulation 1   Surface 1 Level surface   Device 1 Front wheeled walker   Assistance 1 Standby assistance   Comments/Distance 1 50 m x 2   Ambulation 2   Surface 2 Level surface   Device 2 Single point cane   Assistance 2 Contact guard assist x 1   Quality of Gait 2 slow pace   Comments/Distance 2 30 m x 2   Ambulation 3   Surface 3 Level surface   Device 3 Parallel bars   Assistance 3 Standby assistance   Quality of Gait 3 sidestepping, forward with one hand   Comments/Distance 3 6 m x 3 reps   QI Functional Abilities and Goals: Mobility   Roll Left and Right 06   Sit to Lying 04   Lying to Sitting on Side of Bed 04   Sit to Stand 04   Chair/Bed-to-Chair Transfer 04   Toilet Transfer 04   Car Transfers 88   Does the Patient Walk? 2   Walk 10 Feet 04   Walk 50 Feet with Two Turns 04   Walk 150 Feet 04   Walking 10 Feet on Uneven Surfaces 04   1 Step (Curb) 04   4 Steps 04   Static Standing Balance   Static Standing-Balance Surface  Firm   Static Standing-Balance Support No upper extremity supported   Static Standing-Level of Assistance Standby assistance   Dynamic Standing Balance   Dynamic Standing-Balance Surface Firm   Dynamic Standing-Balance Support No upper extremity supported   Dynamic Standing-Balance Forward lean;Lateral lean;Reaching across midline;Reaching for objects   Dynamic Standing-Level of Assistance Contact guard x 1   Dynamic Standing-Comments also toe touches onto balance pod 10 reps   Standing   Standing-Exercise Type Hip flexion;Squats;Heel raises;Hip ABduction   Standing-Exercise Comments 10 reps   Therapeutic Activities   Therapeutic Activities sit to stand x 5 reps   Equipment Use   Other Equipment Use Sci Fit level 1 for 6 minutes   Assessment   Assessment Comment Pt improved with gait with str cane, however still safer with fww. Will benefit from continued skilled PT services to work on impairments.         _________________  JAIDA MONTES, PT  06/01/20 12:10 PM

## 2020-06-01 NOTE — PROGRESS NOTES
Physical Medicine and Rehabilitation  Daily Progress Note       LOS: 5 days        [facility]    Subjective     Slept poorly last night, would like something different to help him sleep.      Review of Systems  Except as noted above:  No headaches, vision changes, or hearing changes.  No URI symptoms.  No dysphagia or odynophagia.  No shortness of breath, coughing, wheezing.  No chest pain. No nausea/ vomiting, abdominal discomfort.  No urinary or bowel changes.  Review of systems otherwise is negative.       Objective           Vital signs in last 24 hours:  Temp:  [36.7 °C (98.1 °F)-37 °C (98.6 °F)] 36.7 °C (98.1 °F)  Heart Rate:  [64-85] 64  Resp:  [17-18] 18  BP: (129-142)/(87-91) 142/91    Intake/Output last 3 shifts:  I/O last 3 completed shifts:  In: 2600 [P.O.:2600]  Out: 700 [Urine:700]  Intake/Output this shift:  I/O this shift:  In: 590 [P.O.:590]  Out: -     Physical Exam  GEN:  Alert. No acute distress.  PSYCH: Normal affect.  HEENT: Normocephalic, atraumatic. EOMI, sclerae anicteric.  Mucous membranes moist.  NECK: Normal ROM. Trachea midline.  PULM:  Unlabored respiratory effort.  CV: Distal pulses intact.  ABD:  Nondistended.  EXTREM:  No clubbing, cyanosis, gross deformity.  NEURO:   Grossly unchanged. Speech is intelligible and coherent.  SKIN:  Warm and dry, normal turgor.        Results from last 4 days   Lab Units 05/30/20  0915   WBC AUTO 10*3/uL 10.1*   HEMOGLOBIN g/dL 16.2   HEMATOCRIT % 47.2   PLATELETS AUTO 10*3/uL 287       Results from last 4 days   Lab Units 05/30/20  0915   SODIUM mmol/L 141   POTASSIUM mmol/L 3.6   CHLORIDE mmol/L 103   CO2 mmol/L 28   BUN mg/dL 16   CREATININE mg/dL 1.01   CALCIUM mg/dL 9.5   TOTAL PROTEIN g/dL 7.3   BILIRUBIN TOTAL mg/dL 0.81   ALK PHOS U/L 61   ALT U/L 26   AST U/L 22   GLUCOSE mg/dL 164*       DIET:       Dietary Orders   (From admission, onward)             Start     Ordered    06/01/20 1450  Diet Regular; Diabetic; Carb Counting  (mealtime insulin); No Straws  Diet effective now     Comments:  Pills whole with water if tolerating (i.e., no coughing), otherwise whole in puree.   Question Answer Comment   Nutrition Therapy Protocol (Dietitian May Adjust Diet and Nourishments) Yes    Diet type Regular    Diet Restrictions Diabetic    Diabetic Restrictions Carb Counting (mealtime insulin)    Straws/Supervision No Straws        06/01/20 1450                  Scheduled Meds:  insulin detemir U-100, 10 Units, subcutaneous, Insulin: Nightly  insulin detemir U-100, 25 Units, subcutaneous, q AM  traZODone, 50 mg, oral, Nightly  magnesium oxide, 400 mg, oral, Daily  docusate sodium, 100 mg, oral, Daily  acetaminophen, 1,000 mg, oral, Nightly  polyethylene glycol, 17 g, oral, Daily  enoxaparin, 40 mg, subcutaneous, Daily at 1600  rosuvastatin, 40 mg, oral, Nightly  aspirin, 81 mg, oral, Daily  cholecalciferol (vitamin D3), 1,000 Units, oral, Daily  clopidogreL, 75 mg, oral, Daily  lisinopriL, 20 mg, oral, Daily  psyllium, 1 packet, oral, Daily      Continuous Infusions:     PRN Meds:  senna  •  magnesium citrate  •  diphenhydrAMINE  •  senna  •  magnesium hydroxide  •  ondansetron  •  acetaminophen  •  sodium chloride      Active Problems:    Benign essential hypertension    Hyperlipidemia    Obstructive sleep apnea syndrome    Controlled type 2 diabetes mellitus without complication (CMS/HCC) (HCC)    Mild aortic sclerosis (CMS/HCC) (HCC)    Ischemic cerebrovascular accident (CVA) (CMS/HCC) (HCC)    Microscopic hematuria    Hypomagnesemia    NIK (obstructive sleep apnea)    CVA (cerebral vascular accident) (CMS/HCC) (HCC)        Assessment/Plan     Assessment/Plan:      59-year-old male admitted to [facility] 5/21/2020 after approximately 4 days of left-sided weakness, gait instability. MRI demonstrated acute right pontine CVA, and aspirin, Plavix, statin were started. Sinus rhythm on EKG/telemetry.  His acute stay was noted to have hematuria for  which a renal bladder ultrasound was obtained and was negative for any evidence of bleeding or other abnormality.  VFSS was performed and negative for aspiration.  Left-sided weakness, gait ataxia, and disequilibrium are his primary functional deficits.    - Right pontine CVA. ASA, plavix, statin for 2' ppx.  PT/OT/TR.  - Gait ataxia. Fall precautions.  - Essential hypertension. Lisinopril.  - Insomnia due to medical.  DC Benadryl, DC melatonin.  Start trazodone 50 mg p.o. nightly.  - Hyperlipidemia. Statin.  - DM2. Diet, accuchecks, levemir.  - Microscopic hematuria. Neg US kidneys/bladder.  - Hypomagnesemia.  - NIK.  - DVT ppx. Lovenox.  - Code Status:  Full Code             Derick GRAY Geovanny,   3:43 PM, 6/1/2020.      A voice recognition program was used to aid in documentation of this record. Sometimes words are not presented exactly as they were spoken.  While efforts were made to carefully edit and correct any inaccuracies, some errors may be present.  Please take this into context.  Please contact the provider if errors are identified.

## 2020-06-01 NOTE — PLAN OF CARE
Problem: Pain - Adult  Description: Pt denies discomfort at this time  Goal: Verbalizes/displays adequate comfort level or baseline comfort level  Description: INTERVENTIONS:  1. Encourage patient to monitor pain and request interventions  2. Assess pain using the appropriate pain scale  3. Administer analgesics based on type and severity of pain and evaluate response  4. Educate/Implement non-pharmacological measures as appropriate and evaluate response  5. Consider cultural, developmental and social influences on pain and pain management  6. Notify Provider if interventions unsuccessful or patient reports new pain  Outcome: Progressing     Problem: Infection - Adult  Goal: Absence of infection during hospitalization  Description: INTERVENTIONS:  1. Assess and monitor for signs and symptoms of infection  2. Monitor lab/diagnostic results  3. Monitor all insertion sites/wounds/incisions  4. Monitor secretions for changes in amount and color  5. Administer medications as ordered  6. Educate and encourage patient and family to use good hand hygiene technique  7. Identify and educate in appropriate isolation precautions for identified infection/condition  Outcome: Progressing     Problem: Safety Adult - Fall  Goal: Free from fall injury  Description: INTERVENTIONS:    Inpatient - Please reference Cares/Safety Flowsheet under [place] Fall Risk for interventions.  Pediatrics - Please reference Peds Daily Cares/Safety Flowsheet under [place] Pediatric Fall Assessment Fall Bundle for interventions  LD/OB - Please reference OB Shift Screening Flowsheet under OB Fall Risk for interventions.  Outcome: Progressing     Problem: Discharge Planning  Goal: Discharge to home or other facility with appropriate resources  Description: INTERVENTIONS:  1. Identify and discuss barriers to discharge with patient and caregiver.  2. Arrange for needed discharge resources and transportation as appropriate.  3. Identify discharge learning  needs (meds, wound care, etc).  4. Arrange for interpreters to assist at discharge as needed.  5. Refer to  for coordinating discharge planning if the patient needs post-hospital services based on physician order or complex needs related to functional status, cognitive ability or social support system.  Outcome: Progressing     Problem: Knowledge Deficit  Goal: Patient/family/caregiver demonstrates understanding of disease process, treatment plan, medications, and discharge instructions  Description: INTERVENTIONS:   1. Complete learning assessment and assess knowledge base  2. Provide teaching at level of understanding   3. Provide teaching via preferred learning methods  Outcome: Progressing     Problem: Potential for Compromised Skin Integrity  Goal: Skin Integrity is Maintained or Improved  Description: INTERVENTIONS:  1. Assess and monitor skin integrity  2. Collaborate with interdisciplinary team and initiate plans and interventions as needed  3. Alternate a full bath with partial baths for elderly   4. Monitor patient's hygiene practices   5. Collaborate with wound, ostomy, and continence nurse  Outcome: Progressing  Goal: Nutritional status is improving  Description: INTERVENTIONS:  1. Monitor and assess patient for malnutrition (ex- brittle hair, bruises, dry skin, pale skin and conjunctiva, muscle wasting, smooth red tongue, and disorientation)  2. Monitor patient's weight and dietary intake as ordered or per policy  3. Determine patient's food preferences and provide high-protein, high-caloric foods as appropriate  4. Assist patient with eating   5. Allow adequate time for meals   6. Encourage patient to take dietary supplement as ordered   7. Collaborate with dietitian  8. Include patient/family/caregiver in decisions related to nutrition  Outcome: Progressing  Goal: MOBILITY IS MAINTAINED OR IMPROVED  Description: INTERVENTIONS  1. Collaborate with interdisciplinary team and initiate plan  and interventions as ordered (PT/OT)  2. Encourage ambulation  3. Up to chair for meals  4. Monitor for signs of deconditioning  Outcome: Progressing     Problem: Safety Adult - Fall  Goal: Free from fall injury  Description: INTERVENTIONS:    Inpatient - Please reference Cares/Safety Flowsheet under [place] Fall Risk for interventions.  Pediatrics - Please reference Peds Daily Cares/Safety Flowsheet under [place] Pediatric Fall Assessment Fall Bundle for interventions  LD/OB - Please reference OB Shift Screening Flowsheet under OB Fall Risk for interventions.  Outcome: Progressing     Problem: Mobility  Goal: STG - Patient will ambulate  Description: 50 m with fww and sba.  Outcome: Progressing  Goal: LTG - Patient will ascend and descend stairs  Description: 12 with sba  Outcome: Progressing  Goal: LTG - Patient will ambulate household distance  Description: Independently with or without fww.  Outcome: Progressing     Problem: TRANSFERS  Description: Independently with or without fww  Goal: STG - Patient to transfer to and from sit to supine  Description: independently  Outcome: Progressing  Goal: STG - Patient will transfer from bed to chair  Description: Independently with or without fww  Outcome: Progressing     Problem: Toileting  Goal: STG - Patient will complete toileting tasks with  Description: Mod (I), AE/DME as needed and good safety  Outcome: Progressing

## 2020-06-01 NOTE — PLAN OF CARE
Problem: SLP Misc  Goal: STG - Misc 1  Description: Patient will identify and utilize memory compensatory strategies (i.e. internal and external) for delayed recall in 90% of opportunities with minimal assist.     6/1/2020 1453 by CHERELLE Baum-SLP  Outcome: Progressing  Note: Goal is not yet addressed due to ongoing evaluation.   6/1/2020 1452 by CHERELLE Baum-SLP  Outcome: Progressing  Note: Goal is not yet addressed due to ongoing evaluation.      Problem: Dysphagia  Description: Pt will tolerate regular/thin liquid diet with no overt signs or symptoms of penetration or aspiration.   Goal: LTG-Patient will tolerate the safest and least restrictive diet without overt signs and/or symptoms of aspiration  Description: Regular/thin liquid diet.  Outcome: Progressing  Note: Pt demonstrates inconsistent signs and symptoms of penetration or aspiration with thin liquids via straw; no signs or symptoms observed with thin liquids via cup. ST to observe pt with thin liquids via cup during meal session.

## 2020-06-01 NOTE — INTERDISCIPLINARY/THERAPY
Occupational Therapy  Treatment Note (OT)      Patient Name: Del Mckeon  Age: 59 y.o.  Gender: male    ----------------------------------------------------------------------------------------------------------------------         06/01/20 1100   Time Calculation   Start Time 1100   Stop Time 1130   Time Calculation (min) 30 min   OT Last Visit   OT Received On 06/01/20   General   Chart Reviewed Yes   Therapy Treatment Diagnosis CVA    OT Treatment Duration (Min) 30 Minutes   Is this a Co-Treatment? No   Family/Caregiver Present No   Precautions   Reinforced Precautions Yes   Other Precautions L side weakness, fall risk    Subjective Comments   Subjective Comments Upon arrival, pt in a supine position in bed. Pt agreeable to OT tx session. At the end of the tx session pt seated in w/c with all needs within reach.    Pain Assessment   Pain Assessment No/denies pain   Cognition   Arousal/Alertness WFL   Cognition Comment appropriate to task and conversation   Bed Mobility   Bed Mobility Assessed   Bed Mobility 1   Bed Mobility From 1 Supine   Bed Mobility Type 1 To   Bed Mobility to 1 Short sit   Level of Assistance 1 Standby assistance   Bed Mobility Comments 1 HOB flat    Transfers   Transfer Assessed   Transfer 1   Transfer From 1 Sit;Bed   Transfer Type 1 To   Transfer to 1 Stand;Sit;Chair with arms;Wheelchair   Technique 1 Sit to stand;Stand to sit   Transfer Device 1 Front wheeled walker;Transfer belt   Level of Assistance 1 Standby assistance   Trials/Comments 1 SBA for all fxal transfers    Ambulation   Ambulation Assessed   Ambulation 1   Surface 1 Level surface;Smooth   Device 1 Front wheeled walker;Gait belt   Assistance 1 Contact guard assist x 1   Quality of Gait 1 steady no LOB noted    Comments/Distance 1 20m x2    Dynamic Standing Balance   Dynamic Standing-Balance Surface Firm   Dynamic Standing-Balance Support   (unilateral UE supported )   Dynamic Standing-Balance Reaching across  midline;Reaching for objects   Dynamic Standing-Level of Assistance Contact guard x 1;Standby assistance   Dynamic Standing-Comments CGA/SBA stood for 4 minutes 58 seconds    Therapeutic Exercise   Strengthening Yes   Side Exercised - Strengthening Bilateral   Bilateral Shoulder Motion  Flexion   Bilateral Elbow Motion  Elbow flexion;Elbow extension   Bilateral Forearm Motion  Forearm pronation;Forearm supination   Bilateral Wrist Motion Wrist flexion;Wrist extension   Exercise Equipment Used Weights   Bilateral Rep/Sets  10 reps x 1 set    Weight 3lbs    Bilateral Comment Demo'd and discussed proper technique with UE exercises.    OT Therapeutic Groups   OT Therapeutic Yes   UE Therapeutic Activity Pt participated in table top activities with CGA/SBA. Pt worked on functional arch addressing all planes of motion with B UE's.    Assessment   Rehab Potential Good   Progress Progressing toward goals   Recommendations for Therapy Continue skilled therapy   Assessment Comment Pt demo'd well with UE exercises and standing tolerance activity. Continue skilled OT services recommended.    Plan   Plan Comment ADLs    OT Frequency 5-7x/wk;1-2x/day         _________________  KAREL Driver  06/01/20 12:08 PM

## 2020-06-02 ENCOUNTER — APPOINTMENT (OUTPATIENT)
Age: 60
End: 2020-06-02
Payer: COMMERCIAL

## 2020-06-02 LAB
25(OH)D2 SERPL-MCNC: <4 NG/ML
25(OH)D3 SERPL-MCNC: 34 NG/ML
25(OH)D3+25(OH)D2 SERPL-MCNC: 34 NG/ML
GLUCOSE BLDC GLUCOMTR-MCNC: 101 MG/DL (ref 70–105)
GLUCOSE BLDC GLUCOMTR-MCNC: 108 MG/DL (ref 70–105)
GLUCOSE BLDC GLUCOMTR-MCNC: 110 MG/DL (ref 70–105)
GLUCOSE BLDC GLUCOMTR-MCNC: 88 MG/DL (ref 70–105)

## 2020-06-02 PROCEDURE — 90834 PSYTX W PT 45 MINUTES: CPT | Performed by: PSYCHOLOGIST

## 2020-06-02 PROCEDURE — 99080 SPECIAL REPORTS OR FORMS: CPT | Performed by: PHYSICAL THERAPIST

## 2020-06-02 PROCEDURE — 99232 SBSQ HOSP IP/OBS MODERATE 35: CPT | Performed by: INTERNAL MEDICINE

## 2020-06-02 PROCEDURE — 2590000100 HC RX 259: Performed by: PHYSICAL MEDICINE & REHABILITATION

## 2020-06-02 PROCEDURE — 82947 ASSAY GLUCOSE BLOOD QUANT: CPT | Mod: QW

## 2020-06-02 PROCEDURE — 6360000200 HC RX 636 W HCPCS (ALT 250 FOR IP): Performed by: PHYSICAL MEDICINE & REHABILITATION

## 2020-06-02 PROCEDURE — 99232 SBSQ HOSP IP/OBS MODERATE 35: CPT | Performed by: PHYSICAL MEDICINE & REHABILITATION

## 2020-06-02 PROCEDURE — 2590000100 HC RX 259: Performed by: INTERNAL MEDICINE

## 2020-06-02 PROCEDURE — (BLANK) HC ROOM PRIVATE

## 2020-06-02 PROCEDURE — 99080 SPECIAL REPORTS OR FORMS: CPT | Performed by: OCCUPATIONAL THERAPIST

## 2020-06-02 PROCEDURE — 97129 THER IVNTJ 1ST 15 MIN: CPT | Mod: GN

## 2020-06-02 PROCEDURE — 6370000100 HC RX 637 (ALT 250 FOR IP): Performed by: INTERNAL MEDICINE

## 2020-06-02 PROCEDURE — 97130 THER IVNTJ EA ADDL 15 MIN: CPT | Mod: GN

## 2020-06-02 PROCEDURE — 6370000100 HC RX 637 (ALT 250 FOR IP): Performed by: PHYSICAL MEDICINE & REHABILITATION

## 2020-06-02 RX ORDER — SERTRALINE HYDROCHLORIDE 25 MG/1
25 TABLET, FILM COATED ORAL DAILY
Status: DISCONTINUED | OUTPATIENT
Start: 2020-06-02 | End: 2020-06-05 | Stop reason: HOSPADM

## 2020-06-02 RX ORDER — TALC
12 POWDER (GRAM) TOPICAL NIGHTLY
Status: DISCONTINUED | OUTPATIENT
Start: 2020-06-02 | End: 2020-06-05 | Stop reason: HOSPADM

## 2020-06-02 RX ORDER — LISINOPRIL 20 MG/1
20 TABLET ORAL 2 TIMES DAILY
Status: DISCONTINUED | OUTPATIENT
Start: 2020-06-02 | End: 2020-06-05 | Stop reason: HOSPADM

## 2020-06-02 RX ADMIN — PSYLLIUM HUSK 1 PACKET: 3.4 POWDER ORAL at 07:00

## 2020-06-02 RX ADMIN — MAGNESIUM OXIDE 400 MG: 400 TABLET ORAL at 07:02

## 2020-06-02 RX ADMIN — ASPIRIN 81 MG: 81 TABLET ORAL at 07:03

## 2020-06-02 RX ADMIN — CLOPIDOGREL BISULFATE 75 MG: 75 TABLET ORAL at 07:03

## 2020-06-02 RX ADMIN — ROSUVASTATIN CALCIUM 40 MG: 20 TABLET, FILM COATED ORAL at 20:13

## 2020-06-02 RX ADMIN — DOCUSATE SODIUM 100 MG: 100 CAPSULE, LIQUID FILLED ORAL at 07:03

## 2020-06-02 RX ADMIN — MELATONIN 1000 UNITS: at 07:02

## 2020-06-02 RX ADMIN — MELATONIN 12 MG: at 20:13

## 2020-06-02 RX ADMIN — ENOXAPARIN SODIUM 40 MG: 40 INJECTION SUBCUTANEOUS at 15:24

## 2020-06-02 RX ADMIN — POLYETHYLENE GLYCOL 3350 17 G: 17 POWDER, FOR SOLUTION ORAL at 07:00

## 2020-06-02 RX ADMIN — LISINOPRIL 20 MG: 20 TABLET ORAL at 07:03

## 2020-06-02 RX ADMIN — LISINOPRIL 20 MG: 20 TABLET ORAL at 20:13

## 2020-06-02 RX ADMIN — SERTRALINE HYDROCHLORIDE 25 MG: 25 TABLET ORAL at 11:13

## 2020-06-02 RX ADMIN — Medication 1000 MG: at 20:12

## 2020-06-02 NOTE — INTERDISCIPLINARY/THERAPY
"TREATMENT NOTE (SLP)      Patient Name: Del Mckeon  Age: 59 y.o.  Gender: male    ----------------------------------------------------------------------------------------------------------------------       06/01/20 0940   Time Calculation   Start Time 0940   Stop Time 1035   Time Calculation (min) 55 min   Subjective Comments   Subjective Comments Pt was agreeable to ST session. Pt denied changes in memory after admittance to the hospital. When asked if he coughs when he drinks, pt stated, \" I do well keeping my portions down, sipping versus gulping.\" In beginning of memory evaluation, pt demonstrated emotional lability by suddenly crying. When asked what was wrong, pt responded that he was crying as a result of the stroke.    General   Family/Caregiver Present No   Memory   Memory Impaired   Memory Assessments [facility] Behavioral Memory Test   [place] Edition 3rd Edition   Sum of Scaled Scores 113   General Memory Index Score 80   Attention   Attention WFL   Auditory Comprehension   Yes/No Question Accuracy WFL   Commands WFL   Conversation WFL   Hearing WFL   Aspiration Risk   Risk for Aspiration Mild   Follow Up Recommendations Dysphagia treatment   Diet Solids Recommendations Regular   Diet Liquids Recommendations Thin liquids   Compensatory Swallowing Strategies Upright at 90 degrees for all oral intake;Remain upright for 20-30 minutes after meals;No straws;Small bites/sips;Eat/feed slowly   Medication Recommendations Whole;With liquid   Assessment Comments   Assessment Comments Pt demonstrated functional memory by describing his therapy sessions he had earlier in the day and the day before by including details such as therapist names and exercises he participated in (e.g. \"I do physical therapy to work on my balance going sideways and backwards.\")     MEMORY EVALUATION: Patient was administered the [place] Behavioural Memory Test - Third Edition (RBMT-3) to evaluate verbal memory, visual memory, " spatial memory, prospective memory, orientation/date, and new learning.  Test was modified as described in Appendix H to accommodate patient's inability to move safely around the room for route task.  Patient obtained the following scores: Sum of scaled actimr=167, general memory index score (GMI) =80 (95% confidence interval of 69-90), and percentile rank=9. The average GMI score is 100 with a standard deviation of 15. The patient's GMI score indicates performance below the normal range when compared to his age group. New learning and prospective memory appeared to be relative area of weakness. Spatial memory and orientation appeared to be relative areas of strength. Based on these results and considering the patient's previous high prior level of function, SLP recommends skilled speech therapy targeting memory with use of external and internal memory aids.    SWALLOW: Per SLP notes on 5/30/20, pt consistently tolerated thin liquid trials via cup with no overt signs or symptoms of penetration or aspiration. In today's session, pt trialed thin liquids via cup and via straw. Delayed cough was observed with thins via straw in 2/5 trials. No signs or symptoms of penetration or aspiration were observed with thins via cup with 5 trials. SLP recommends no straw when drinking thin liquids due to signs and symptoms observed via straw this session and in previous session. ST to continue to follow pt to monitor regular/thin liquid diet.    Patient Education   Patient Education SLP educated pt about the purpose of the memory assessment to gain information about strengths and weaknesses. SLP educated pt about rationale of drinking via cup vs. straw (i.e. drinking via straw can be less controlled; drinking via straw causes pt to cough). Pt verbalized understanding.   Evaluation Tolerance   Evaluation Tolerance Patient tolerated evaluation well   Plan   Plan Comments memory with visual aids (i.e. med schedule, calendar, notes)          _________________  Addie Fitzgerald -SLP  06/02/20 7:41 AM

## 2020-06-02 NOTE — PROGRESS NOTES
NEUROPSYCHOLOGY DEPT - Individual psychotherapy progress note    S.  I met with patient for 15 minutes face to face today.  He was reclining in bed and informed me that he can't sit up to speak with me without setting off an alarm on his bed.  He finds this frustrating.  He is restless and wants to be discharged to home as soon as possible.  We discussed that it is reasonable to ask his various therapists and physicians what benefit is provided by additional time here in rehabilitation so that he has more information about whether to agree to stay until the recommended discharge (estimated in 3-4 days).  He wondered what would happen if he decided to leave before the recommended discharge date, I advised him to learn from the therapists what the benefit is to staying before he decides whether to leave.  I advised him to discuss these issues with  if he decides he wants to leave sooner than advised by the treatment team.  Patient agreed.  Patient also discussed frustration with outpatient providers, reporting that he had four contacts with medical providers about his very high blood pressure in the 2 months before his stroke, and none effectively managed his health.     O. Patient was fully oriented to person, time, place and situation. Memory was intact - he could tell me dates of outpatient medical provider contacts in the months prior to stroke.  Speech was logical and coherent.  Thoughts were reality based with no indication of psychosis or formal thought disorder.  Personality impressed as mildly reclusive, suspicious and eccentric.  Affect was restricted in range until he found something humorous and then there was spontaneous laughter and sarcasm.  No report of thoughts of harming self or others.      A. Adjustment disorder with depressed mood.    P. As patient expects discharge to home by the end of this week, I do not anticipate being able to see him again.  He is not requesting outpatient  individual therapy or mental health treatment.   Patient will need to establish with a new primary care provider and would appreciate referral to one as part of the discharge planning.    Time spent: 15 minutes records review, 10 minutes staff consultation, 15 minutes face to face individual therapy, 10 minutes report writing.

## 2020-06-02 NOTE — FAX COVER SHEET
Fax Transmission  ----------------------------------------------------------------------------------------------------------------------  Facility Name: [facility]  Mailing address: [address] [place] [address], Zip: [place] [address]      6/2/2020  2:15 PM      Attention: UR      From: Jeremiah Granados RN  Phone Number: [phone]  Fax Number: [phone]      Comments:     Auth/Cert Number:   [id number]    Please call with any questions.    Thanks       This facsimile message is CONFIDENTIAL and may contain -privileged information and/or Protected Health Information (PHI) as defined in the federal Health Insurance Portability and Accountability Act, as amended.  This facsimile is  intended ONLY for the use of the individual or company named.  If the reader is NOT the intended recipient, or the employee or agent responsible to deliver it to the intended recipient, you are hereby notified that any dissemination, distribution, or copying of this communication is prohibited.  If you have received this communication in error, please immediately notify us by telephone so that we may arrange for the return of the original message.

## 2020-06-02 NOTE — PROGRESS NOTES
Daily Progress Note       LOS: 6 days     Subjective   Patient is a 59-year-old male who is here at inpatient rehab after having acute right pontine CVA.  He is on aspirin, Plavix and statin.  He feels like he is doing well from that standpoint.  He feels like therapy has been going well.  Today we did talk about how he is doing in general.  With that he started crying on me.  He said he just wants to go home.  We talked about the importance of therapy especially early in the course of treatment.  We did talk about starting some antidepressants.  I also reviewed with him I had like to get Dr. Louise over to help with some coping mechanisms.  He does have hypertension.  His blood pressure is little higher this morning than it has been.  Otherwise he is actually been doing okay.  He does have diabetes mellitus.  Blood sugars are doing okay.      ROS  No headaches, vision changes, or hearing changes.  No URI symptoms.  No dysphagia or odynophagia.  No shortness of breath, coughing, wheezing or PND.  No chest pain, palpitations, or anginal symptoms.  No nausea/ vomiting, heartburn or abdominal discomfort.  No urinary or bowel changes.  Review of systems otherwise is negative.      Vital signs in last 24 hours:  Temp:  [36.6 °C (97.9 °F)-36.9 °C (98.4 °F)] 36.6 °C (97.9 °F)  Heart Rate:  [62-63] 62  Resp:  [16-18] 18  BP: (149-157)/(87-97) 157/87    Intake/Output last 3 shifts:  I/O last 3 completed shifts:  In: 2440 [P.O.:2440]  Out: -   Intake/Output this shift:  No intake/output data recorded.    Physical Exam  GENERAL: Patient is alert and oriented, in no acute distress.  HEENT: Normocephalic, atraumatic. Sclerae anicteric.  No tenderness to palpation of sinuses.    NECK: No cervical lymphadenopathy.  Trachea is midline.  LUNGS:  Clear to auscultation bilaterally, both anterior and posterior.  CARDIOVASCULAR EXAM: Regular rate and rhythm without murmur, rub, or gallop.    ABDOMEN:  Soft. Positive bowel sounds in all  four quadrants.  No rebound. No guarding.   Nontender, nondistended.  EXTREMITIES:  No clubbing, cyanosis, or edema.    Medications:    Current Facility-Administered Medications:   •  sertraline (ZOLOFT) tablet 25 mg, 25 mg, oral, Daily, Sarah Harris MD, 25 mg at 06/02/20 1113  •  insulin detemir U-100 (LEVEMIR) injection 10 Units, 10 Units, subcutaneous, Insulin: Nightly, INOCENCIO Bernard MD, 10 Units at 06/01/20 2115  •  insulin detemir U-100 (LEVEMIR) injection 25 Units, 25 Units, subcutaneous, q AM, INOCENCIO Bernard MD, 25 Units at 06/02/20 0800  •  traZODone (DESYREL) tablet 50 mg, 50 mg, oral, Nightly, Derick Small DO, 50 mg at 06/01/20 2111  •  magnesium oxide (MAG-OX) tablet 400 mg, 400 mg, oral, Daily, Sarah Harris MD, 400 mg at 06/02/20 0702  •  senna (SENOKOT) tablet 17.2 mg, 17.2 mg, oral, Nightly PRN, Sarah Harris MD  •  magnesium citrate solution 296 mL, 296 mL, oral, 2x daily PRN, Sarah Harris MD  •  docusate sodium (COLACE) capsule 100 mg, 100 mg, oral, Daily, Sarah Harris MD, 100 mg at 06/02/20 0703  •  traZODone (DESYREL) tablet 50 mg, 50 mg, oral, Nightly PRN, Sarah Harris MD  •  acetaminophen (TYLENOL) tablet 1,000 mg, 1,000 mg, oral, Nightly, Sarah Harris MD, 1,000 mg at 06/01/20 2110  •  diphenhydrAMINE (BENADRYL) tab/cap 25 mg, 25 mg, oral, Nightly PRN, Sarah Harris MD  •  polyethylene glycol (GLYCOLAX) powder 17 g, 17 g, oral, Daily, Sarah Harris MD, 17 g at 06/02/20 0700  •  senna (SENOKOT) tablet 17.2 mg, 17.2 mg, oral, Nightly PRN, Sarah Harris MD  •  magnesium hydroxide (MILK OF MAGNESIA) 400 mg/5 mL oral suspension 30 mL, 30 mL, oral, Daily PRN, Sarah Harris MD, 30 mL at 05/30/20 1446  •  enoxaparin (LOVENOX) syringe 40 mg, 40 mg, subcutaneous, Daily at 1600, Derick Small DO, 40 mg at 06/01/20 1438  •  ondansetron (ZOFRAN) tablet 4 mg, 4 mg, oral, q6h PRN, Derick Small DO  •  rosuvastatin (CRESTOR) tablet 40 mg, 40 mg, oral, Nightly, Derick Small DO, 40 mg at 06/01/20 2110  •   acetaminophen (TYLENOL) tablet 500 mg, 500 mg, oral, q6h PRN, Derick Small DO, 500 mg at 06/01/20 0629  •  aspirin EC tablet 81 mg, 81 mg, oral, Daily, Derick Small DO, 81 mg at 06/02/20 0703  •  cholecalciferol (vitamin D3) 1,000 unit (25mcg) tab/cap 1,000 Units, 1,000 Units, oral, Daily, Derick Small DO, 1,000 Units at 06/02/20 0702  •  clopidogreL (PLAVIX) tablet 75 mg, 75 mg, oral, Daily, Derick Small DO, 75 mg at 06/02/20 0703  •  lisinopriL (PRINIVIL,ZESTRIL) tablet 20 mg, 20 mg, oral, Daily, Derick Small DO, 20 mg at 06/02/20 0703  •  psyllium (METAMUCIL SUGAR FREE) 1 packet, 1 packet, oral, Daily, Derick Small DO, 1 packet at 06/02/20 0700  •  sodium chloride (OCEAN) 0.65 % nasal spray 2 spray, 2 spray, Each Nostril, PRN, Derick Small DO    Labs:  CBC: No results found for: WBC, RBC, HGB, HCT, PLT  CMP: No results found for: NA, K, CL, CO2, GLUCOSE, CREATININE, CALCIUM, ALBUMIN, ALKPHOS, BILITOT, ALT, AST, BUN, ANIONGAP, BCR, GLOB  Coagulation: No results found for: PT, INR, APTT              Active Problems:    Benign essential hypertension    Hyperlipidemia    Obstructive sleep apnea syndrome    Controlled type 2 diabetes mellitus without complication (CMS/HCC) ([facility])    Mild aortic sclerosis (CMS/HCC) ([facility])    Ischemic cerebrovascular accident (CVA) (CMS/HCC) ([facility])    Microscopic hematuria    Hypomagnesemia    NIK (obstructive sleep apnea)    CVA (cerebral vascular accident) (CMS/HCC) ([facility])        Assessment and Plan:  1.  Status post right pontine CVA.  Patient's on aspirin, statin and Plavix.  Carotid ultrasound report shows less than 50% stenosis bilateral ICA.  He is working with our therapist here at rehab which includes PT, OT and TR. I will add Zoloft for motor recovery.  I am also concerned about his mood.  2.  Hypertension.  Patient is on lisinopril 20 mg daily.  I am going to increase it to twice daily.  3.  Diabetes mellitus.  Patient seems to be doing a little bit  better.  He is on 25 units in the morning and 10 in the evening.  4.  Hyperlipidemia.  Patient is on Crestor and doing well.  We will continue with his current treatment.   5.  Constipation.  He seems to be doing better.  We will continue with his current treatment.    6.  Microscopic hematuria.  Patient has some issues previously for this.  He has had an ultrasound which was negative.  7.  Hypomagnesia.  I will check a magnesium level in the morning.  Until then we will continue with his current treatment.  8.  Obstructive sleep apnea.  Patient is on oxygen.    9.  Vitamin D deficiency.  Vitamin D level is pending.    10.  DVT prophylaxis.  Patient is on Lovenox.  11.  Insomnia.  Patient did much better with the Tylenol, melatonin, Benadryl.  He seems to be doing well with his home regime.  We have talked about urinary retention a few times and the Benadryl does not seem to be affecting him.  12.  Mood.  Patient does seem to be having some issues with this.  I am not sure if is related to grief versus depression.  I have placed a consult for neuropsychology to help with coping.  We also are starting him on Zoloft to help with motor recovery but also his mood.            Sarah Harris MD  11:42 AM, 6/2/2020.      A voice recognition program was used to aid in documentation of this record. Sometimes words are not presented exactly as they were spoken.  While efforts were made to carefully edit and correct any inaccuracies, some errors may be present.  Please take this into context.  Please contact the provider if errors are identified.

## 2020-06-02 NOTE — NURSING END OF SHIFT
Nursing End of Shift Summary:    Patient: Del Mckeon  MRN: [id number]  : 1960, Age: 59 y.o.    Location: [place] [address]    Nursing Goals  Clinical Goals for the Shift: pt comfort and safety    Narrative Summary of Progress Toward Clinical Goals:  Comfort and safety was maintained this shift.    Barriers to Goals/Nursing Concerns:  NNew Patient or Family Concerns/Issues:N    Shift Summary:      Significant Events & Communications to Providers (last 12 hours)      Last 5 Values    No documentation.              Oxygen Usage (last 12 hours)      Last 5 Values    No documentation.              Mobility (last 12 hours)      Last 5 Values     Row Name 20 2300 20 0700                Mobility    Anti-Embolism Devices  Bilateral  Bilateral       Anti-Embolism Intervention  -- off for safety pt is impulsive at times  --           Urethral Catheter    Active Urethral Catheter     None            Active Lines    Active Central venous catheter / Peripherally inserted central catheter / Implantable Port / Hemodialysis catheter / Midline Catheter     None              Infusing Medications   Medication Dose Last Rate     PRN Medications   Medication Dose Last Dose   • senna  17.2 mg     • magnesium citrate  296 mL     • traZODone  50 mg     • diphenhydrAMINE  25 mg     • senna  17.2 mg     • magnesium hydroxide  30 mL 30 mL at 20 1446   • ondansetron  4 mg     • acetaminophen  500 mg 500 mg at 20 0629   • sodium chloride  2 spray       _________________________  Elsa Brown RN  20 9:57 AM

## 2020-06-02 NOTE — PATIENT CARE CONFERENCE
Team Conference Note  Inpatient Rehabilitation  Date: 6/2/2020   Time: 12:56 PM       Patient Name:  Del Mckeon       Medical Record Number: [id number]   YOB: 1960  Sex: Male          Room/Bed:  A111/A111-01    Admitting Diagnosis: [place] CVA.  Admit Date/Time:  5/27/2020  3:05 PM    Primary Diagnosis/Principal Problem: [place] CVA.  All Active Problems:   Patient Active Problem List    Diagnosis Date Noted   • CVA (cerebral vascular accident) (CMS/HCC) ([facility]) 05/27/2020   • Ischemic cerebrovascular accident (CVA) (CMS/[facility]) ([facility]) 05/21/2020   • Type 2 diabetes mellitus without complication, with long-term current use of insulin (CMS/[facility]) ([facility]) 05/21/2020   • Microscopic hematuria 05/21/2020   • Hypomagnesemia 05/21/2020   • NIK (obstructive sleep apnea) 05/21/2020   • Benign essential hypertension 04/20/2018   • Hyperlipidemia 04/20/2018   • Obstructive sleep apnea syndrome 04/20/2018   • Controlled type 2 diabetes mellitus without complication (CMS/[facility]) ([facility]) 04/20/2018   • Mild aortic sclerosis (CMS/[facility]) ([facility]) 04/20/2018         Team Members Present:  Physician Team Member: Derick Small DO  Nursing Team Member: Sandra Hurtado RN  Case Management Team Member: MARTY Ferrara  Social Work Team Member: RAKESH Viera  PT Team Member: Gemma Ross PT  OT Team Member: Naida Hamilton, OTR/L  SLP Team Member: Addie FitzgeraldCF-SLP    Progress Towards Rehab Goals:  Progress towards rehab goals  PT Progress: Pt indep with bed mobility, sba with transfers.  Ambulating 50 m with fww sba, 30 m with str. cane cga.  SLP Progress: SWALLOW: Pt demonstrates inconsistent signs and symptoms of penetration or aspiration with thin liquids via straw; no signs or symptoms observed with thin liquids via cup. ST to observed pt with thin liquids via cup with meal tray. COG-LING: MoCA: 22/30, 1/5 in delayed recall after 5 minutes, MIS 8/15.MEMORY: [facility] Behavioural Memory Test: Patient obtained the following  scores: Sum of scaled osfvrh=478, general memory index score (GMI) =80 (95% confidence interval of 69-90), and percentile rank=9. The patient's GMI score indicates performance below the normal range when compared to his age group.  OT Progress: Self-feeding: (I); grooming: CGA in standing; UB dressing: s/u A; LB dressing: SBA-CGA for safety in standing; toilet and toileting xfer: CGA; shower: CGA.  Nursing Progress: patient continent of bowel and bladder, colace, glycolax, psyllium, ASA daily, tylenol SANDOVAL, plavix, lovenox for clot prevention, levemir 25 units AM 10u PM, BS 130s-180s, A1c 5.8, Lisinopril /91, Admit weight 90.1kg, Current weight 91.3kg  Care Management: patient is retired and lives in [place], [place], one level apartment, elevator, tub/shower, used [employer] previsously,  Social Work: SW met with patient bedside. Patient confirms he lived alone in apartment/[place] PTA.  Limited support system evident. Patient states his ex-wife lives in [place] ,as well as ,a son. 2 sisters in [place]. Patient is retired air traffic control. Used [employer] PTA and aware this service is currenly shut down due to Covid.  Treatment Plan Changes: Zoloft started for depression. Right hand weaker than Left affected side. ?? Carpal Tunnel involement. Plan DC 6/5 Home with Home Health PT/OT/N/BA will need FWW.    Treatment Goals:     Nursing:       Multi-Disciplinary Problems (from Inpt Rehab Nursing Plan of Care Statement)    Active Problems     Problem: Inpt Rehab Nursing Plan of Care Statement  Start Date: 05/28/20    Problem Details:  Rehab nursing care plan focus includes:   Bowel: Continent, last BM 5/30  Meds: SANDOVAL Metamucil. No PRNs.    Bladder:Off scans  Meds: None. Pain: Denies.  PRN Tylenol available.   Skin: Intake. Parveen Scale 19.    Nutrition: DB diet.  Consumed % of meals in the last 24-hours.   DB: Glucs ACHS:  SANDOVAL Levemir 25u at HS and 10u QAM (new order 5/28).    Weights: Rehab admit wt 90.1kg on  5/27. Wt 6/1- 91.3kg  Safety: Whitten Fall Risk Score 80, indicating a high fall risk.  Alarms in use.  Impulsive with poor safety awareness, attempts to self-transfer.  Therefore SCDs D/C'd for safety.   Starting on zoloft today.                              Physical Therapy:        Multi-Disciplinary Problems (from Physical Therapy)    Active Problems     Problem: PT Treatment Plan  Start Date: 05/28/20    Problem Details:  PT services for  75 minutes per day, 5 times per week, with additional therapy services, as needed, based on patient progress for a total of 10 days during the admission.  Pt will be seen for functional transfer training, bed mobility training, gait/stair training, balance training, endurance training, therapeutic activity, therapeutic exercise, neuromuscular re-education, equipment evaluation/education, manual therapy, vestibular treatment and pain education in order to progress towards safe and functional discharge home.                Problem: Mobility  Start Date: 05/28/20    Goal Start Date End Date    STG - Patient will ambulate 05/28/20 --    Goal Details:  50 m with fww and sba.      Goal Start Date End Date    LTG - Patient will ascend and descend stairs 05/28/20 --    Goal Details:  12 with sba      Goal Start Date End Date    LTG - Patient will ambulate household distance 05/28/20 --    Goal Details:  Independently with or without fww.            Problem: TRANSFERS  Start Date: 05/28/20    Problem Details:  Independently with or without fww      Goal Start Date End Date    STG - Patient to transfer to and from sit to supine 05/28/20 --    Goal Details:  independently      Goal Start Date End Date    STG - Patient will transfer from bed to chair 05/28/20 --    Goal Details:  Independently with or without fww            Problem: PT Misc  Start Date: 05/28/20    Problem Details:  Patient able to follow HEP      Goal Start Date End Date    STG - Misc 1 05/28/20 --    Goal Details:  Pt able  to follow HEP independently.            Problem: Balance  Start Date: 05/28/20    Goal Start Date End Date    STG - Maintains static standing balance without upper extremity support 05/28/20 --    Goal Start Date End Date    STG-Patient will demonstrate improved GARCIA score 05/28/20 --    Goal Details:  Increase by 7-10 points                         Occupational Therapy:        Multi-Disciplinary Problems (from Occupational Therapy)    Active Problems     Problem: OT TREATMENT PLAN  Start Date: 05/28/20    Problem Details:  OT services for 75 minutes per day, 5 times per week, with additional therapy services, as needed, based on patient progress for a total of 10 days during the admission.  Patient will be seen for neuro re-ed, ADL training, transfer training, functional mobility, endurance training, upper extremity strengthening, adaptive equipment evaluation and training and family/patient training, as well as therapeutic recreational activities in order to progress towards safe and functional discharge home.                 Problem: Balance  Start Date: 05/28/20    Goal Start Date End Date    STG - Maintains dynamic standing balance with upper extremity support 05/28/20 --    Goal Details:  For 5+ mins during ADLs/fxal act w/SBA and 0 LOB            Problem: Bathing  Start Date: 05/28/20    Goal Start Date End Date    STG - Patient will complete bathing 05/28/20 --    Goal Details:  W/mod (I), AE/DME as needed and good safety awareness            Problem: Dressings Lower Extremities  Start Date: 05/28/20    Goal Start Date End Date    STG - Patient to complete lower body dressing 05/28/20 --    Goal Details:  W/mod (I) and AE as needed            Problem: Instrumental Activities of Daily Living  Start Date: 05/28/20    Goal Start Date End Date    LTG - Patient will complete simple meal preparation activities 05/28/20 --    Goal Details:  W/SUP and good safety awareness            Problem: Toileting  Start Date:  05/28/20    Goal Start Date End Date    STG - Patient will complete toileting tasks with 05/28/20 --    Goal Details:  Mod (I), AE/DME as needed and good safety                        Speech Therapy:       Multi-Disciplinary Problems (from Speech Therapy)    Active Problems     Problem: ST Treatment Plan  Start Date: 05/28/20    Problem Details:  ST services for 30 minutes per day, 5 times per week, with additional therapy services, as needed, based on patient progress for a total of 10 days during the admission.            Problem: SLP Misc  Start Date: 05/28/20    Goal Start Date Expected End Date End Date    STG - Misc 1 05/29/20 -- --    Goal Details:  Patient will identify and utilize memory compensatory strategies (i.e. internal and external) for delayed recall in 90% of opportunities with minimal assist.               Problem: Dysphagia  Start Date: 05/28/20    Problem Details:  Pt will tolerate regular/thin liquid diet with no overt signs or symptoms of penetration or aspiration.       Goal Start Date Expected End Date End Date    LTG-Patient will tolerate the safest and least restrictive diet without overt signs and/or symptoms of aspiration 06/01/20 -- --    Goal Details:  Regular/thin liquid diet.                                                                   As the leader of today’s meeting, I concur with all decisions made by the interdisciplinary team.

## 2020-06-02 NOTE — INTERDISCIPLINARY/THERAPY
This CM with RAKESH Viera did review DC plan with patient at bedside to include plan DC 6/5 Home with [facility] as provider PT/OT/N/BA. Patient will need FWW also chose to get [facility]. Patient agrees to this DC plan.

## 2020-06-02 NOTE — PLAN OF CARE
Problem: Pain - Adult  Description: Pt denies discomfort at this time  Goal: Verbalizes/displays adequate comfort level or baseline comfort level  Description: INTERVENTIONS:  1. Encourage patient to monitor pain and request interventions  2. Assess pain using the appropriate pain scale  3. Administer analgesics based on type and severity of pain and evaluate response  4. Educate/Implement non-pharmacological measures as appropriate and evaluate response  5. Consider cultural, developmental and social influences on pain and pain management  6. Notify Provider if interventions unsuccessful or patient reports new pain  Outcome: Progressing     Problem: Infection - Adult  Goal: Absence of infection during hospitalization  Description: INTERVENTIONS:  1. Assess and monitor for signs and symptoms of infection  2. Monitor lab/diagnostic results  3. Monitor all insertion sites/wounds/incisions  4. Monitor secretions for changes in amount and color  5. Administer medications as ordered  6. Educate and encourage patient and family to use good hand hygiene technique  7. Identify and educate in appropriate isolation precautions for identified infection/condition  Outcome: Progressing     Problem: Safety Adult - Fall  Goal: Free from fall injury  Description: INTERVENTIONS:    Inpatient - Please reference Cares/Safety Flowsheet under Whitten Fall Risk for interventions.  Pediatrics - Please reference Peds Daily Cares/Safety Flowsheet under [place] Pediatric Fall Assessment Fall Bundle for interventions  LD/OB - Please reference OB Shift Screening Flowsheet under OB Fall Risk for interventions.  Outcome: Progressing     Problem: Discharge Planning  Goal: Discharge to home or other facility with appropriate resources  Description: INTERVENTIONS:  1. Identify and discuss barriers to discharge with patient and caregiver.  2. Arrange for needed discharge resources and transportation as appropriate.  3. Identify discharge learning  needs (meds, wound care, etc).  4. Arrange for interpreters to assist at discharge as needed.  5. Refer to  for coordinating discharge planning if the patient needs post-hospital services based on physician order or complex needs related to functional status, cognitive ability or social support system.  Outcome: Progressing     Problem: Knowledge Deficit  Goal: Patient/family/caregiver demonstrates understanding of disease process, treatment plan, medications, and discharge instructions  Description: INTERVENTIONS:   1. Complete learning assessment and assess knowledge base  2. Provide teaching at level of understanding   3. Provide teaching via preferred learning methods  Outcome: Progressing     Problem: Potential for Compromised Skin Integrity  Goal: Skin Integrity is Maintained or Improved  Description: INTERVENTIONS:  1. Assess and monitor skin integrity  2. Collaborate with interdisciplinary team and initiate plans and interventions as needed  3. Alternate a full bath with partial baths for elderly   4. Monitor patient's hygiene practices   5. Collaborate with wound, ostomy, and continence nurse  Outcome: Progressing  Goal: Nutritional status is improving  Description: INTERVENTIONS:  1. Monitor and assess patient for malnutrition (ex- brittle hair, bruises, dry skin, pale skin and conjunctiva, muscle wasting, smooth red tongue, and disorientation)  2. Monitor patient's weight and dietary intake as ordered or per policy  3. Determine patient's food preferences and provide high-protein, high-caloric foods as appropriate  4. Assist patient with eating   5. Allow adequate time for meals   6. Encourage patient to take dietary supplement as ordered   7. Collaborate with dietitian  8. Include patient/family/caregiver in decisions related to nutrition  Outcome: Progressing  Goal: MOBILITY IS MAINTAINED OR IMPROVED  Description: INTERVENTIONS  1. Collaborate with interdisciplinary team and initiate plan  and interventions as ordered (PT/OT)  2. Encourage ambulation  3. Up to chair for meals  4. Monitor for signs of deconditioning  Outcome: Progressing     Problem: Safety Adult - Fall  Goal: Free from fall injury  Description: INTERVENTIONS:    Inpatient - Please reference Cares/Safety Flowsheet under Whitten Fall Risk for interventions.  Pediatrics - Please reference Peds Daily Cares/Safety Flowsheet under [place] Pediatric Fall Assessment Fall Bundle for interventions  LD/OB - Please reference OB Shift Screening Flowsheet under OB Fall Risk for interventions.  Outcome: Progressing     Problem: Mobility  Goal: STG - Patient will ambulate  Description: 50 m with fww and sba.  Outcome: Progressing  Goal: LTG - Patient will ascend and descend stairs  Description: 12 with sba  Outcome: Progressing  Goal: LTG - Patient will ambulate household distance  Description: Independently with or without fww.  Outcome: Progressing     Problem: TRANSFERS  Description: Independently with or without fww  Goal: STG - Patient to transfer to and from sit to supine  Description: independently  Outcome: Progressing  Goal: STG - Patient will transfer from bed to chair  Description: Independently with or without fww  Outcome: Progressing     Problem: Balance  Goal: STG - Maintains static standing balance without upper extremity support  Outcome: Progressing  Goal: STG-Patient will demonstrate improved GARCIA score  Description: Increase by 7-10 points  Outcome: Progressing     Problem: Toileting  Goal: STG - Patient will complete toileting tasks with  Description: Mod (I), AE/DME as needed and good safety  Outcome: Progressing

## 2020-06-03 LAB
ALBUMIN SERPL-MCNC: 3.8 G/DL (ref 3.5–5.3)
ALP SERPL-CCNC: 59 U/L (ref 45–115)
ALT SERPL-CCNC: 30 U/L (ref 7–52)
ANION GAP SERPL CALC-SCNC: 9 MMOL/L (ref 3–11)
AST SERPL-CCNC: 27 U/L
BASOPHILS # BLD AUTO: 0.1 10*3/UL
BASOPHILS NFR BLD AUTO: 1 % (ref 0–2)
BILIRUB SERPL-MCNC: 0.67 MG/DL (ref 0.2–1.4)
BUN SERPL-MCNC: 13 MG/DL (ref 7–25)
CALCIUM ALBUM COR SERPL-MCNC: 9.5 MG/DL (ref 8.6–10.3)
CALCIUM SERPL-MCNC: 9.3 MG/DL (ref 8.6–10.3)
CHLORIDE SERPL-SCNC: 103 MMOL/L (ref 98–107)
CO2 SERPL-SCNC: 28 MMOL/L (ref 21–32)
CREAT SERPL-MCNC: 0.71 MG/DL (ref 0.7–1.3)
EOSINOPHIL # BLD AUTO: 0.1 10*3/UL
EOSINOPHIL NFR BLD AUTO: 2 % (ref 0–3)
ERYTHROCYTE [DISTWIDTH] IN BLOOD BY AUTOMATED COUNT: 13.6 % (ref 11.5–15)
GFR SERPL CREATININE-BSD FRML MDRD: 103 ML/MIN/1.73M*2
GLUCOSE BLDC GLUCOMTR-MCNC: 138 MG/DL (ref 70–105)
GLUCOSE BLDC GLUCOMTR-MCNC: 138 MG/DL (ref 70–105)
GLUCOSE BLDC GLUCOMTR-MCNC: 158 MG/DL (ref 70–105)
GLUCOSE BLDC GLUCOMTR-MCNC: 96 MG/DL (ref 70–105)
GLUCOSE SERPL-MCNC: 92 MG/DL (ref 70–105)
HCT VFR BLD AUTO: 43.6 % (ref 38–50)
HGB BLD-MCNC: 15.3 G/DL (ref 13.2–17.2)
LYMPHOCYTES # BLD AUTO: 2 10*3/UL
LYMPHOCYTES NFR BLD AUTO: 29 % (ref 15–47)
MAGNESIUM SERPL-MCNC: 1.8 MG/DL (ref 1.8–2.4)
MCH RBC QN AUTO: 32 PG (ref 29–34)
MCHC RBC AUTO-ENTMCNC: 35 G/DL (ref 32–36)
MCV RBC AUTO: 91.4 FL (ref 82–97)
MONOCYTES # BLD AUTO: 0.4 10*3/UL
MONOCYTES NFR BLD AUTO: 6 % (ref 5–13)
NEUTROPHILS # BLD AUTO: 4.4 10*3/UL
NEUTROPHILS NFR BLD AUTO: 63 % (ref 46–70)
PLATELET # BLD AUTO: 197 10*3/UL (ref 130–350)
PMV BLD AUTO: 9.4 FL (ref 6.9–10.8)
POTASSIUM SERPL-SCNC: 3.6 MMOL/L (ref 3.5–5.1)
PROT SERPL-MCNC: 6.7 G/DL (ref 6–8.3)
RBC # BLD AUTO: 4.77 10*6/ΜL (ref 4.1–5.8)
SODIUM SERPL-SCNC: 140 MMOL/L (ref 135–145)
WBC # BLD AUTO: 7 10*3/UL (ref 3.7–9.6)

## 2020-06-03 PROCEDURE — 97535 SELF CARE MNGMENT TRAINING: CPT | Mod: GO | Performed by: OCCUPATIONAL THERAPIST

## 2020-06-03 PROCEDURE — 36415 COLL VENOUS BLD VENIPUNCTURE: CPT | Performed by: INTERNAL MEDICINE

## 2020-06-03 PROCEDURE — 97112 NEUROMUSCULAR REEDUCATION: CPT | Mod: GP

## 2020-06-03 PROCEDURE — 97116 GAIT TRAINING THERAPY: CPT | Mod: GP

## 2020-06-03 PROCEDURE — 80053 COMPREHEN METABOLIC PANEL: CPT | Performed by: INTERNAL MEDICINE

## 2020-06-03 PROCEDURE — 97130 THER IVNTJ EA ADDL 15 MIN: CPT | Mod: GN | Performed by: SPEECH-LANGUAGE PATHOLOGIST

## 2020-06-03 PROCEDURE — 97129 THER IVNTJ 1ST 15 MIN: CPT | Mod: GN | Performed by: SPEECH-LANGUAGE PATHOLOGIST

## 2020-06-03 PROCEDURE — 6370000100 HC RX 637 (ALT 250 FOR IP): Performed by: PHYSICAL MEDICINE & REHABILITATION

## 2020-06-03 PROCEDURE — 97110 THERAPEUTIC EXERCISES: CPT | Mod: GP

## 2020-06-03 PROCEDURE — 99232 SBSQ HOSP IP/OBS MODERATE 35: CPT | Performed by: INTERNAL MEDICINE

## 2020-06-03 PROCEDURE — 6370000100 HC RX 637 (ALT 250 FOR IP): Performed by: INTERNAL MEDICINE

## 2020-06-03 PROCEDURE — 97530 THERAPEUTIC ACTIVITIES: CPT | Mod: GO | Performed by: OCCUPATIONAL THERAPIST

## 2020-06-03 PROCEDURE — (BLANK) HC ROOM PRIVATE

## 2020-06-03 PROCEDURE — 85025 COMPLETE CBC W/AUTO DIFF WBC: CPT | Performed by: INTERNAL MEDICINE

## 2020-06-03 PROCEDURE — 83735 ASSAY OF MAGNESIUM: CPT | Performed by: INTERNAL MEDICINE

## 2020-06-03 PROCEDURE — 2590000100 HC RX 259: Performed by: INTERNAL MEDICINE

## 2020-06-03 PROCEDURE — 6360000200 HC RX 636 W HCPCS (ALT 250 FOR IP): Performed by: PHYSICAL MEDICINE & REHABILITATION

## 2020-06-03 PROCEDURE — 2590000100 HC RX 259: Performed by: PHYSICAL MEDICINE & REHABILITATION

## 2020-06-03 PROCEDURE — 97530 THERAPEUTIC ACTIVITIES: CPT | Mod: GP

## 2020-06-03 PROCEDURE — 82947 ASSAY GLUCOSE BLOOD QUANT: CPT | Mod: QW

## 2020-06-03 RX ADMIN — MELATONIN 12 MG: at 21:11

## 2020-06-03 RX ADMIN — MAGNESIUM CITRATE 296 ML: 1.75 LIQUID ORAL at 08:02

## 2020-06-03 RX ADMIN — LISINOPRIL 20 MG: 20 TABLET ORAL at 21:11

## 2020-06-03 RX ADMIN — SERTRALINE HYDROCHLORIDE 25 MG: 25 TABLET ORAL at 08:00

## 2020-06-03 RX ADMIN — ENOXAPARIN SODIUM 40 MG: 40 INJECTION SUBCUTANEOUS at 15:16

## 2020-06-03 RX ADMIN — ROSUVASTATIN CALCIUM 40 MG: 20 TABLET, FILM COATED ORAL at 21:12

## 2020-06-03 RX ADMIN — POLYETHYLENE GLYCOL 3350 17 G: 17 POWDER, FOR SOLUTION ORAL at 08:01

## 2020-06-03 RX ADMIN — MELATONIN 1000 UNITS: at 08:00

## 2020-06-03 RX ADMIN — Medication 1000 MG: at 21:11

## 2020-06-03 RX ADMIN — LISINOPRIL 20 MG: 20 TABLET ORAL at 08:06

## 2020-06-03 RX ADMIN — PSYLLIUM HUSK 1 PACKET: 3.4 POWDER ORAL at 08:01

## 2020-06-03 RX ADMIN — CLOPIDOGREL BISULFATE 75 MG: 75 TABLET ORAL at 07:59

## 2020-06-03 RX ADMIN — DOCUSATE SODIUM 100 MG: 100 CAPSULE, LIQUID FILLED ORAL at 08:00

## 2020-06-03 RX ADMIN — MAGNESIUM OXIDE 400 MG: 400 TABLET ORAL at 07:59

## 2020-06-03 RX ADMIN — ASPIRIN 81 MG: 81 TABLET ORAL at 07:59

## 2020-06-03 NOTE — INTERDISCIPLINARY/THERAPY
Occupational Therapy  Treatment Note (OT)      Patient Name: Del Mckeon  Age: 59 y.o.  Gender: male    ----------------------------------------------------------------------------------------------------------------------         06/03/20 0700   Time Calculation   Start Time 0700   Stop Time 0745   Time Calculation (min) 45 min   OT Last Visit   OT Received On 06/03/20   General   Therapy Treatment Diagnosis CVA   OT Treatment Duration (Min) 45 Minutes   Is this a Co-Treatment? No   Family/Caregiver Present No   Precautions   Reinforced Precautions Yes   Other Precautions Fall risk.   Subjective Comments   Subjective Comments Pt agreeable to OT session; reported 0 pain; reported he didn't sleep well.   Cognition   Arousal/Alertness WFL   Cognition Comment Pt appropriate in conversation and to task.   Bed Mobility   Bed Mobility Assessed   Bed Mobility 1   Bed Mobility From 1 Supine   Bed Mobility Type 1 To   Bed Mobility to 1 Short sit   Level of Assistance 1 Modified independent   Bed Mobility Comments 1 HOB flat w/use of HR.   Transfers   Transfer Assessed   Transfer 1   Transfer From 1 Sit   Transfer Type 1 To and from   Transfer to 1 Stand   Technique 1 Sit to stand;Stand to sit   Transfer Device 1 Front wheeled walker;Grab bar;Transfer belt   Level of Assistance 1 Standby assistance   Trials/Comments 1 Completed from EOB, toilet and shower chair w/SBA for safety; did not require cues for safety.   Ambulation   Ambulation Assessed   Ambulation 1   Surface 1 Level surface;Smooth   Device 1 Front wheeled walker;Gait belt   Assistance 1 Standby assistance;Contact guard assist x 1   Quality of Gait 1 SBA>CGA; 0 LOB w/FWW.   Comments/Distance 1 12m x2   QI Functional Abilities and Goals: Mobility   Toilet Transfer 04   Dynamic Standing Balance   Dynamic Standing-Comments SBA>CGA for safety in standing during ADLs.   Eating   Eating Level of Assistance Modified independent   Eating Where Assessed Wheelchair    Eating Comments Mod (I) w/use of built-up handle.   Grooming   Grooming Comments (I) in washing face in shower.   Bathing   Bathing Adaptive Equipment   (Shower chair, GB, HHSH )   Bathing Patient Completed Right arm;Right upper leg;Right lower leg;Left arm;Left upper leg;Left lower leg;Chest;Abdomen;Latoya area;Buttocks   Bathing Level of Assistance Distant supervision;Close supervision   Bathing Where Assessed Shower   Bathing Comments 10/10 parts w/SUP when sitting and SBA when standing; discussed GB and shower chair for use at home.   UE Dressing   UE Dressing Level of Assistance Independent   UE Dressing Where Assessed Edge of bed   UE Dressing Comments 4/4   LE Dressing   LE Dressing Yes   Pants Level of Assistance Close supervision   Sock Level of Assistance Setup   Adult Briefs Level of Assistance Close supervision   LE Dressing Where Assessed Edge of bed   LE Dressing Comments 6/6 to jose underwear and pants w/SBA for safety in standing; 2/2 socks.   Toileting   Toileting Bladder Incontinence No   Toileting Bowel Incontinence No   Toileting Level of Assistance Close supervision   Where Assessed Toilet   Toileting Comments 3/3 w/SBA for safety in standing.   QI Functional Abilities and Goals: Self-Care   Eating  06   Oral Hygiene 06   Toileting Hygiene 04   Shower/ Bathe Self 04   Upper Body Dressing 06   Lower Body Dressing 04   Putting On/ Taking Off Foorwear 05   Meal Prep   Meal Preparation Pt reported 0 questions/concerns; did discuss Meals-on-Wheels.   Kitchen Mobility   Kitchen Mobility Comments Discussed kitchen safety and efficiency, including having often used items easily accessible.   Money Management   Money Management Pt reported 0 questions/concerns.   Medication Management   Medication Management Pt reported 0 questions/concerns.   Additional Activities   Additional Activities Comments Pt was left seated in w/c w/alarm on and call light and needs w/in reach.   Activity Tolerance   Activity  Tolerance Comments Tolerated session well.   Assessment   Rehab Potential Good   Progress Progressing toward goals   Recommendations for Therapy Continue skilled therapy   Assessment Comment Pt demo'd increased (I) in ADL routine, including shower--will likely benefit from shower chair and GB to ensure safety.   Recommendation   Equipment Recommended Grab bars;Shower chair   Plan   Plan Comment UE strength; standing tolerance         _________________  JACK MONROE, OTR  06/03/20 10:53 AM

## 2020-06-03 NOTE — INTERDISCIPLINARY/THERAPY
Physical Therapy  Treatment Note (PT)    Patient Name: Del Mckeon  Age: 59 y.o.  Gender: male    ----------------------------------------------------------------------------------------------------------------------       06/03/20 0915   Time Calculation   Start Time 0915   Stop Time 1000   Time Calculation (min) 45 min   PT Last Visit   PT Received On 06/03/20   Pain Assessment   Pain Assessment No/denies pain   General   Chart Reviewed Yes   Therapy Treatment Diagnosis CVA with L weakness   Family/Caregiver Present No   Precautions   Reinforced Precautions Yes   Other Precautions fall, alarms   Subjective Comments   Subjective Comments Pt agrees to PT. At end of session sitting in w/c with chair alarm engaged, call light/desired items in reach.    Cognition   Arousal/Alertness WFL   Transfer 1   Trials/Comments 1 SBA from w/c, to/from mat. With use of BUE on mat/arm rests, or stabilizes with BUE on FWW. Gait belt on.    Ambulation 1   Comments/Distance 1 40mx1, 20mx1 with FWW and SBA indoors with decreased foot clearance on L and decreased L stance time, occasionally during turns has narrowed stance/foot crossing midline increasing fall risk but no tripping observed. Walks on outdoor uneven sidewalk 10mx2 with CGA and FWW and w/c follow.    Wheelchair Activities   Propulsion Yes   Description/ Details 1 Self propels w/c x 10m with S/U and BUE, manual, on level carpet.   QI Functional Abilities and Goals: Mobility   Roll Left and Right 06   Sit to Lying 04   Lying to Sitting on Side of Bed 04   Sit to Stand 04   Chair/Bed-to-Chair Transfer 04   Toilet Transfer 04   Car Transfers 88   Does the Patient Walk? 2   Walk 10 Feet 04   Walk 50 Feet with Two Turns 04   Walk 150 Feet 04   Walking 10 Feet on Uneven Surfaces 04   1 Step (Curb) 04   4 Steps 04   12 Steps 88   Picking Up Object 88   Dynamic Standing Balance   Dynamic Standing-Comments Stands on uneven surface with mod A to maintain balance, posterior, poor  reactive. Then does dynamic standing balance activities on level surface as part of neuro re-ed.    Neuromuscular Re-education/Vestibular Rehabilitation   Neuromuscular Re-education Simulating golf activities including golf swing and hitting golf balls to targets in normal golf stance, with CGA, then challenged to use staggered/uneven stance needing min A and cues to improve truncal twisting with core stabilization.    Activity Tolerance   Position Standing   Standing Endurance Patient tolerated treatment well;Patient limited by fatigue   Activity Tolerance Comments Requires several sitting rest breaks.    Assessment   Rehab Potential Good   Progress Progressing toward goals   Problem List Impaired balance;Decreased mobility;Decreased strength   Assessment Comment Pt states one of his goals is to get back to golf and cycling. He demonstrates improving activity tolerance and safety although presents with some impairments that may increase his risk of falls; AD does help maximize gait safety. PT services indicated to progress mobility, reduce risk of falls/deconditioning, assist with d/c planning.    Recommendation   Recommendations for Therapy Continue skilled therapy;Able to tolerate 3 hours therapy   Plan   Treatment Interventions Bed mobility;Functional transfer training;Gait training;Stair training;Balance training;Endurance training;Therapeutic activities;Therapeutic exercises;Neuromuscular re-education;Equipment evaluation/education;Caregiver training   PT Frequency 1-2x/day;5-7x/wk   Plan Comment high level balance, gait safety/tolerance         _________________  Gemma Szymanski, PT  06/03/20 12:17 PM

## 2020-06-03 NOTE — NURSING END OF SHIFT
Nursing End of Shift Summary:    Patient: Del Mckeon  MRN: [id number]  : 1960, Age: 59 y.o.    Location: [place] [address]    Nursing Goals  Clinical Goals for the Shift: Pt to remain free of falls and aspiration.     Narrative Summary of Progress Toward Clinical Goals:  Pt has remained free of falls. Uses call light, but does not always wait for assistance to arrive, slightly impulsive. Received magnesium citrate this morning for constipation, no results yet.     Barriers to Goals/Nursing Concerns:  Yes   Poor safety awareness at times.     New Patient or Family Concerns/Issues:  No     Shift Summary:      Significant Events & Communications to Providers (last 12 hours)      Last 5 Values    No documentation.              Oxygen Usage (last 12 hours)      Last 5 Values    No documentation.              Mobility (last 12 hours)      Last 5 Values     Row Name 20 0758                   Mobility    Anti-Embolism Devices  Bilateral        Anti-Embolism Intervention  -- off for safety             Urethral Catheter    Active Urethral Catheter     None            Active Lines    Active Central venous catheter / Peripherally inserted central catheter / Implantable Port / Hemodialysis catheter / Midline Catheter     None              Infusing Medications   Medication Dose Last Rate     PRN Medications   Medication Dose Last Dose   • senna  17.2 mg     • magnesium citrate  296 mL 296 mL at 20 0802   • traZODone  50 mg     • diphenhydrAMINE  25 mg     • senna  17.2 mg     • magnesium hydroxide  30 mL 30 mL at 20 1446   • ondansetron  4 mg     • acetaminophen  500 mg 500 mg at 20 0629   • sodium chloride  2 spray       _________________________  Dominique Hays LPN  20 1:31 PM

## 2020-06-03 NOTE — PROGRESS NOTES
Daily Progress Note       LOS: 7 days     Subjective   Patient is a 59-year-old male who is here to inpatient rehab after having acute right pontine CVA.  Patient has been working with our therapist here at rehab.  He is presently on aspirin, Plavix and a statin.  Patient did see Dr. Louise yesterday.  She did diagnose him with an adjustment disorder with depressed mood.  I did start him on treatment with Zoloft to help both of the motor recovery and with his mood.  His blood pressure is doing much better in general.  I did increase his lisinopril to twice daily.  He also has diabetes mellitus.  He seems to be doing well with this.  We have been trying to change over his Lantus to once a day.        ROS  No headaches, vision changes, or hearing changes.  No URI symptoms.  No dysphagia or odynophagia.  No shortness of breath, coughing, wheezing or PND.  No chest pain, palpitations, or anginal symptoms.  No nausea/ vomiting, heartburn or abdominal discomfort.  No urinary or bowel changes.  Review of systems otherwise is negative.      Vital signs in last 24 hours:  Temp:  [36.8 °C (98.2 °F)-36.9 °C (98.4 °F)] 36.8 °C (98.2 °F)  Heart Rate:  [75-92] 92  Resp:  [16-18] 18  BP: (123-159)/() 123/90    Intake/Output last 3 shifts:  I/O last 3 completed shifts:  In: 3615 [P.O.:3615]  Out: -   Intake/Output this shift:  I/O this shift:  In: 690 [P.O.:690]  Out: -     Physical Exam  GENERAL: Patient is alert and oriented, in no acute distress.  HEENT: Normocephalic, atraumatic. Sclerae anicteric.  No tenderness to palpation of sinuses.    NECK: No cervical lymphadenopathy.  Trachea is midline.  LUNGS:  Clear to auscultation bilaterally, both anterior and posterior.  CARDIOVASCULAR EXAM: Regular rate and rhythm without murmur, rub, or gallop.    ABDOMEN:  Soft. Positive bowel sounds in all four quadrants.  No rebound. No guarding.   Nontender, nondistended.  EXTREMITIES:  No clubbing, cyanosis, or  edema.    Medications:    Current Facility-Administered Medications:   •  sertraline (ZOLOFT) tablet 25 mg, 25 mg, oral, Daily, Sarah Harris MD, 25 mg at 06/03/20 0800  •  lisinopriL (PRINIVIL,ZESTRIL) tablet 20 mg, 20 mg, oral, 2x daily, Sarah Harris MD, 20 mg at 06/03/20 0806  •  melatonin tablet 12 mg, 12 mg, oral, Nightly, Derick Small DO, 12 mg at 06/02/20 2013  •  insulin detemir U-100 (LEVEMIR) injection 10 Units, 10 Units, subcutaneous, Insulin: Nightly, INOCENCIO Bernard MD, 10 Units at 06/02/20 2200  •  insulin detemir U-100 (LEVEMIR) injection 25 Units, 25 Units, subcutaneous, q AM, INOCENCIO Bernard MD, 25 Units at 06/03/20 0813  •  magnesium oxide (MAG-OX) tablet 400 mg, 400 mg, oral, Daily, Sarah Harris MD, 400 mg at 06/03/20 0759  •  senna (SENOKOT) tablet 17.2 mg, 17.2 mg, oral, Nightly PRN, Sarah Harris MD  •  magnesium citrate solution 296 mL, 296 mL, oral, 2x daily PRN, Sarah Harris MD, 296 mL at 06/03/20 0802  •  docusate sodium (COLACE) capsule 100 mg, 100 mg, oral, Daily, Sarah Harris MD, 100 mg at 06/03/20 0800  •  traZODone (DESYREL) tablet 50 mg, 50 mg, oral, Nightly PRN, Sarah Harris MD  •  acetaminophen (TYLENOL) tablet 1,000 mg, 1,000 mg, oral, Nightly, Sarah Harris MD, 1,000 mg at 06/02/20 2012  •  diphenhydrAMINE (BENADRYL) tab/cap 25 mg, 25 mg, oral, Nightly PRN, Sarah Harris MD  •  polyethylene glycol (GLYCOLAX) powder 17 g, 17 g, oral, Daily, Sarah Harris MD, 17 g at 06/03/20 0801  •  senna (SENOKOT) tablet 17.2 mg, 17.2 mg, oral, Nightly PRN, Sarah Harris MD  •  magnesium hydroxide (MILK OF MAGNESIA) 400 mg/5 mL oral suspension 30 mL, 30 mL, oral, Daily PRN, Sarah Harris MD, 30 mL at 05/30/20 1446  •  enoxaparin (LOVENOX) syringe 40 mg, 40 mg, subcutaneous, Daily at 1600, Derick Small DO, 40 mg at 06/03/20 1516  •  ondansetron (ZOFRAN) tablet 4 mg, 4 mg, oral, q6h PRN, Derick Small DO  •  rosuvastatin (CRESTOR) tablet 40 mg, 40 mg, oral, Nightly, Derick Small DO, 40 mg at  06/02/20 2013  •  acetaminophen (TYLENOL) tablet 500 mg, 500 mg, oral, q6h PRN, Derick Small DO, 500 mg at 06/01/20 0629  •  aspirin EC tablet 81 mg, 81 mg, oral, Daily, Derick Small DO, 81 mg at 06/03/20 0759  •  cholecalciferol (vitamin D3) 1,000 unit (25mcg) tab/cap 1,000 Units, 1,000 Units, oral, Daily, Derick Small DO, 1,000 Units at 06/03/20 0800  •  clopidogreL (PLAVIX) tablet 75 mg, 75 mg, oral, Daily, Derick Small DO, 75 mg at 06/03/20 0759  •  psyllium (METAMUCIL SUGAR FREE) 1 packet, 1 packet, oral, Daily, Derick Small DO, 1 packet at 06/03/20 0801  •  sodium chloride (OCEAN) 0.65 % nasal spray 2 spray, 2 spray, Each Nostril, PRN, Derick Small DO    Labs:  CBC:   Lab Results   Component Value Date    WBC 7.0 06/03/2020    RBC 4.77 06/03/2020    HGB 15.3 06/03/2020    HCT 43.6 06/03/2020     06/03/2020     CMP:   Lab Results   Component Value Date     06/03/2020    K 3.6 06/03/2020     06/03/2020    CO2 28 06/03/2020    GLUCOSE 92 06/03/2020    CREATININE 0.71 06/03/2020    CALCIUM 9.3 06/03/2020    ALBUMIN 3.8 06/03/2020    ALKPHOS 59 06/03/2020    BILITOT 0.67 06/03/2020    ALT 30 06/03/2020    AST 27 06/03/2020    BUN 13 06/03/2020    ANIONGAP 9 06/03/2020     Coagulation: No results found for: PT, INR, APTT              Active Problems:    Benign essential hypertension    Hyperlipidemia    Obstructive sleep apnea syndrome    Controlled type 2 diabetes mellitus without complication (CMS/HCC) (HCC)    Mild aortic sclerosis (CMS/HCC) (HCC)    Ischemic cerebrovascular accident (CVA) (CMS/HCC) (HCC)    Microscopic hematuria    Hypomagnesemia    NIK (obstructive sleep apnea)    CVA (cerebral vascular accident) (CMS/HCC) (HCC)        Assessment and Plan:    1.  Status post right pontine CVA.  Patient's on aspirin, statin and Plavix.  Carotid ultrasound report shows less than 50% stenosis bilateral ICA.  He continues to work with our therapist.  We did add Zoloft for  motor recovery.    2.  Hypertension.  Patient's lisinopril was increased to 20 mg twice daily.  He seems to be doing better.    3.  Diabetes mellitus.  I am going to change his Lantus to 35 units in the morning as opposed to 20 5 in the morning and 10 at night.  4.  Hyperlipidemia.  We will continue with Crestor.  LFTs have been fine.  Lab work was checked today.  5.  Constipation.  He does feel like he is doing better in general.  We will continue to monitor.  6.  Microscopic hematuria.  Patient has some issues previously for this.  He has had an ultrasound which was negative.  7.  Hypomagnesia.  Magnesium level is 1.8 today.  We will continue with his mag oxide once a day.  8.  Obstructive sleep apnea.  Patient is on oxygen.    9.  Vitamin D deficiency.  Vitamin D is back and it is 34.  10.  DVT prophylaxis.  Patient is on Lovenox.  11.  Insomnia.  Patient did much better with the Tylenol, melatonin, Benadryl.  We will continue with his current treatment.    12.  Mood.  He was seen by Dr. Louise and she diagnosed with adjustment disorder with depressed mood.  He was started on Zoloft with motor recovery but hopefully will help with this also.          Sarah Harris MD  4:37 PM, 6/3/2020.      A voice recognition program was used to aid in documentation of this record. Sometimes words are not presented exactly as they were spoken.  While efforts were made to carefully edit and correct any inaccuracies, some errors may be present.  Please take this into context.  Please contact the provider if errors are identified.

## 2020-06-03 NOTE — INTERDISCIPLINARY/THERAPY
Physical Therapy  Treatment Note (PT)    Patient Name: Del Mckeon  Age: 59 y.o.  Gender: male    ----------------------------------------------------------------------------------------------------------------------       06/03/20 1231   Bernadr Balance Scale   1. Sitting to Standing 4   2. Standing Unsupported 3   3. Sitting with Back Unsupported but Feet Supported on Floor or on a Stool 4   4. Standing to Sitting 3   5.  Transfers 3   6. Standing Unsupported with Eyes Closed 3   7. Standing Unsupported with Feet Together 3   8. Reach Forward with Outstretched Arm While Standing 1   9.  Object from Floor from a Standing Position 3   10. Turning to Look Behind Over Left and Right Shoulders While Standing 1   11. Turn 360 Degrees 1   12. Place Alternate Foot on Step or Stool While Standing Unsupported 0   13. Standing Unsupported One Foot in Front 1   14. Standing on One Leg 1   Bernard Balance Score 31     Based on available literature, this is a moderate fall risk and not a significant difference from prior score on 5/28 of 33/56. He did experience a few more losses of balance during harder tasks such as alternating toe tapping but has improved with some basic functional tasks such as transfers and even standing feet together.    _________________  Gemma Szymanski, PT  06/03/20 3:33 PM

## 2020-06-03 NOTE — INTERDISCIPLINARY/THERAPY
Occupational Therapy  Treatment Note (OT)      Patient Name: Del Mckeon  Age: 59 y.o.  Gender: male    ----------------------------------------------------------------------------------------------------------------------         06/03/20 1315   Time Calculation   Start Time 1315   Stop Time 1345   Time Calculation (min) 30 min   OT Last Visit   OT Received On 06/03/20   General   Therapy Treatment Diagnosis CVA   OT Treatment Duration (Min) 30 Minutes   Is this a Co-Treatment? No   Family/Caregiver Present No   Precautions   Reinforced Precautions Yes   Other Precautions Fall risk.   Subjective Comments   Subjective Comments Pt agreeable to OT session; reported 0 pain; reported 0 questions/concerns regarding ability to safely return home.   Cognition   Arousal/Alertness WFL   Cognition Comment Pt appropriate in conversation and to task.   Additional Activities   Additional Activities Comments Discussed OT goals/POC and d/c plan, as well as energy conservation, safety awareness and recommendations for home. Pt was left seated in w/c w/alarm on and call light and needs w/in reach.   Coordination   Fine Motor Pt educated on and participated in variety of t-putty exercises to increase , pincer and intrinsic strength; pt reported green t-putty was tiring and was provided w/orange t-putty. Pt (I) in stringing 10/10 beads w/increased time to complete.   Assessment   Rehab Potential Good   Progress Progressing toward goals   Recommendations for Therapy Continue skilled therapy   Assessment Comment Pt reported he is ready to go home; reported continued weakness in R hand (for years) and participated well in t-putty and FM/B hand use activities.   Plan   Plan Comment D/C shower/ADLs         _________________  JACK MONROE, OTR  06/03/20 2:08 PM

## 2020-06-03 NOTE — PROGRESS NOTES
Physical Medicine and Rehabilitation  Daily Progress Note       LOS: 6 days        CC: CVA    Subjective     Team meeting today, set discharge at Friday 6/5/2020.   Per nursing, he was noted to be somewhat confused after trazodone last night.  Reports right hand numbness.  He attributes this to carpal tunnel syndrome.    Review of Systems  Except as noted above:  No headaches, vision changes, or hearing changes.  No URI symptoms.  No dysphagia or odynophagia.  No shortness of breath, coughing, wheezing.  No chest pain. No nausea/ vomiting, abdominal discomfort.  No urinary or bowel changes.  Review of systems otherwise is negative.       Objective           Vital signs in last 24 hours:  Temp:  [36.6 °C (97.9 °F)] 36.6 °C (97.9 °F)  Heart Rate:  [62] 62  Resp:  [18] 18  BP: (157)/(87) 157/87    Intake/Output last 3 shifts:  I/O last 3 completed shifts:  In: 2440 [P.O.:2440]  Out: -   Intake/Output this shift:  I/O this shift:  In: 1765 [P.O.:1765]  Out: -     Physical Exam  GEN:  Alert. No acute distress.  PSYCH: Normal affect.  HEENT: Normocephalic, atraumatic. EOMI, sclerae anicteric.  Mucous membranes moist.  NECK: Normal ROM. Trachea midline.  PULM:  Unlabored respiratory effort.  CV: Distal pulses intact.  ABD:  Nondistended.  EXTREM:  No clubbing, cyanosis, gross deformity.  NEURO:   Grossly unchanged. Speech is intelligible and coherent.  SKIN:  Warm and dry, normal turgor.        Results from last 4 days   Lab Units 05/30/20  0915   WBC AUTO 10*3/uL 10.1*   HEMOGLOBIN g/dL 16.2   HEMATOCRIT % 47.2   PLATELETS AUTO 10*3/uL 287       Results from last 4 days   Lab Units 05/30/20  0915   SODIUM mmol/L 141   POTASSIUM mmol/L 3.6   CHLORIDE mmol/L 103   CO2 mmol/L 28   BUN mg/dL 16   CREATININE mg/dL 1.01   CALCIUM mg/dL 9.5   TOTAL PROTEIN g/dL 7.3   BILIRUBIN TOTAL mg/dL 0.81   ALK PHOS U/L 61   ALT U/L 26   AST U/L 22   GLUCOSE mg/dL 164*       DIET:       Dietary Orders   (From  admission, onward)             Start     Ordered    06/01/20 1450  Diet Regular; Diabetic; Carb Counting (mealtime insulin); No Straws  Diet effective now     Comments:  Pills whole with water if tolerating (i.e., no coughing), otherwise whole in puree.   Question Answer Comment   Nutrition Therapy Protocol (Dietitian May Adjust Diet and Nourishments) Yes    Diet type Regular    Diet Restrictions Diabetic    Diabetic Restrictions Carb Counting (mealtime insulin)    Straws/Supervision No Straws        06/01/20 1450                  Scheduled Meds:  sertraline, 25 mg, oral, Daily  lisinopriL, 20 mg, oral, 2x daily  insulin detemir U-100, 10 Units, subcutaneous, Insulin: Nightly  insulin detemir U-100, 25 Units, subcutaneous, q AM  traZODone, 50 mg, oral, Nightly  magnesium oxide, 400 mg, oral, Daily  docusate sodium, 100 mg, oral, Daily  acetaminophen, 1,000 mg, oral, Nightly  polyethylene glycol, 17 g, oral, Daily  enoxaparin, 40 mg, subcutaneous, Daily at 1600  rosuvastatin, 40 mg, oral, Nightly  aspirin, 81 mg, oral, Daily  cholecalciferol (vitamin D3), 1,000 Units, oral, Daily  clopidogreL, 75 mg, oral, Daily  psyllium, 1 packet, oral, Daily      Continuous Infusions:     PRN Meds:  senna  •  magnesium citrate  •  traZODone  •  diphenhydrAMINE  •  senna  •  magnesium hydroxide  •  ondansetron  •  acetaminophen  •  sodium chloride      Active Problems:    Benign essential hypertension    Hyperlipidemia    Obstructive sleep apnea syndrome    Controlled type 2 diabetes mellitus without complication (CMS/HCC) (HCC)    Mild aortic sclerosis (CMS/HCC) (HCC)    Ischemic cerebrovascular accident (CVA) (CMS/HCC) (HCC)    Microscopic hematuria    Hypomagnesemia    NIK (obstructive sleep apnea)    CVA (cerebral vascular accident) (CMS/HCC) (HCC)        Assessment/Plan     Assessment/Plan:    59-year-old male admitted to [facility] 5/21/2020 after approximately 4 days of left-sided weakness, gait instability. MRI  demonstrated acute right pontine CVA, and aspirin, Plavix, statin were started. Sinus rhythm on EKG/telemetry.  His acute stay was noted to have hematuria for which a renal bladder ultrasound was obtained and was negative for any evidence of bleeding or other abnormality.  VFSS was performed and negative for aspiration.  Left-sided weakness, gait ataxia, and disequilibrium are his primary functional deficits.    - Right pontine CVA. ASA, plavix, statin for 2' ppx.  PT/OT/TR. Neuropsych evaluated patient today.  - Gait ataxia. Fall precautions.  - Essential hypertension. Lisinopril.  - Adjustment disorder.  Zoloft started today by Dr. Harris.  - Right hand numbness.  Suspect carpal tunnel syndrome.  Plan on referral to EMG post discharge.  - Insomnia due to medical.  DC Benadryl, DC melatonin.  DC trazodone due to confusion.  Resume melatonin at 12 mg p.o. nightly.  - Hyperlipidemia. Statin.  - DM2. Diet, accuchecks, levemir.  - Microscopic hematuria. Neg US kidneys/bladder.  - Hypomagnesemia.  - NIK.  - DVT ppx. Lovenox.  - Code Status:  Full Code         Derick GRAY Geovanny,   6:25 PM, 6/2/2020.      A voice recognition program was used to aid in documentation of this record. Sometimes words are not presented exactly as they were spoken.  While efforts were made to carefully edit and correct any inaccuracies, some errors may be present.  Please take this into context.  Please contact the provider if errors are identified.

## 2020-06-03 NOTE — INTERDISCIPLINARY/THERAPY
"TREATMENT NOTE (SLP)      Patient Name: Del Mckeon  Age: 59 y.o.  Gender: male    ----------------------------------------------------------------------------------------------------------------------       06/03/20 0800   Time Calculation   Start Time 0830   Stop Time 0900   Time Calculation (min) 30 min   Pain Assessment   Pain Assessment No/denies pain   Subjective Comments   Subjective Comments Patient was agreeable to ST session.  When asked about his memory, patient stated, \"Things I need to remember I think I do fine.\"  When asked if his memory has changed since his stroke, patient stated, \"I haven't noticed anything specific.  I know it's a possibility, but my memory seems okay.\"      Straw was observed in water cup on bedside table.  Patient reported drinking from straw.   General   Family/Caregiver Present Yes  (RN for medications)   Daily Treatment   Session Activities Memory   Memory Auditory   Basic Tasks Performed Use written notes to recall appointment information and 5 item grocery list after 5 minute delay with distraction   Auditory Memory Basic Average % 100   Auditory Memory Basic Cueing Support Minimal   Moderate Tasks Performed Use medication schedule to recall medication name, purpose, administration time, dosage   Auditory Memory Moderate Average % 100   Auditory Memory Moderate Cueing Support Minimal   Dysphagia   Current Diet/Liquid Consistency Regular;Thin liquids   Temperature Spikes w/in 72h No   Respiratory Status Room air   Behavior/Cognition Alert;Cooperative;Pleasant mood   Dentition Adequate   Vision Functional for self-feeding   Patient Positioning Upright in chair   Baseline Vocal Quality Normal   Thin Liquids   Presentation Self-fed;Cup   Oral WFL   Suction Required? No   Swab/Finger Swipe Required? No   Pharyngeal No signs or symptoms of aspiration   Aspiration Risk   Risk for Aspiration Mild   Follow Up Recommendations Dysphagia treatment   Diet Solids Recommendations " "Regular   Diet Liquids Recommendations Thin liquids   Compensatory Swallowing Strategies Upright at 90 degrees for all oral intake;No straws;Small bites/sips;Eat/feed slowly   Medication Recommendations Whole;One pill at a time;With liquid   Activity Tolerance   Activity Tolerance Patient tolerated treatment well   Caregiver Made Aware Nurse   Assessment Comments   Assessment Comments SWALLOW:    Patient tolerated thin liquids via cup without overt signs or symptoms of penetration or aspiration.  Patient spontaneously recall and implemented \"one drink at a time\" strategy.  Following education about rationale behind \"no straws\" recommendation, patient and RN verbalized understanding.  Recommendation for no straws is already on white board in patient's room and diet order.      MEMORY:    Minimal assist during delayed recall of functional information included verbal prompt to take thorough notes; patient spontaneously identified \"write it down\" as strategy to support delayed recall.  Minimal assist during recall of medication information included initial verbal prompts to use schedule versus guess.  Both self- and SLP-created visual aids were effective for supporting delayed recall.  Patient benefits from larger print due to cataracts.   Patient Education   Patient Education Patient and SLP reviewed internal and external memory strategies.  Patient demonstrated comprehension of education by describing previously established memory strategies (e.g., object cues such as leaving garbage by door or empty dish detergent box on chair to remind him to take out the trash and turn on the , stacking appointment reminder sheets on side table in chronological order to manage upcoming appointments)   Plan   Plan Comments Memory strat in func tasks     _________________  Emma Phelps CCC-SLP  06/03/20 9:26 AM    "

## 2020-06-04 LAB
GLUCOSE BLDC GLUCOMTR-MCNC: 110 MG/DL (ref 70–105)
GLUCOSE BLDC GLUCOMTR-MCNC: 112 MG/DL (ref 70–105)
GLUCOSE BLDC GLUCOMTR-MCNC: 130 MG/DL (ref 70–105)
GLUCOSE BLDC GLUCOMTR-MCNC: 137 MG/DL (ref 70–105)

## 2020-06-04 PROCEDURE — 6360000200 HC RX 636 W HCPCS (ALT 250 FOR IP): Performed by: PHYSICAL MEDICINE & REHABILITATION

## 2020-06-04 PROCEDURE — 6370000100 HC RX 637 (ALT 250 FOR IP): Performed by: INTERNAL MEDICINE

## 2020-06-04 PROCEDURE — (BLANK) HC ROOM PRIVATE

## 2020-06-04 PROCEDURE — 2590000100 HC RX 259: Performed by: INTERNAL MEDICINE

## 2020-06-04 PROCEDURE — 97530 THERAPEUTIC ACTIVITIES: CPT | Mod: GP | Performed by: PHYSICAL THERAPIST

## 2020-06-04 PROCEDURE — 97130 THER IVNTJ EA ADDL 15 MIN: CPT | Mod: GN

## 2020-06-04 PROCEDURE — 97112 NEUROMUSCULAR REEDUCATION: CPT | Mod: GO

## 2020-06-04 PROCEDURE — 99232 SBSQ HOSP IP/OBS MODERATE 35: CPT | Performed by: PHYSICAL MEDICINE & REHABILITATION

## 2020-06-04 PROCEDURE — 97129 THER IVNTJ 1ST 15 MIN: CPT | Mod: GN

## 2020-06-04 PROCEDURE — 6370000100 HC RX 637 (ALT 250 FOR IP): Performed by: PHYSICAL MEDICINE & REHABILITATION

## 2020-06-04 PROCEDURE — 2590000100 HC RX 259: Performed by: PHYSICAL MEDICINE & REHABILITATION

## 2020-06-04 PROCEDURE — 97110 THERAPEUTIC EXERCISES: CPT | Mod: GP | Performed by: PHYSICAL THERAPIST

## 2020-06-04 PROCEDURE — 97110 THERAPEUTIC EXERCISES: CPT | Mod: GO

## 2020-06-04 PROCEDURE — 97530 THERAPEUTIC ACTIVITIES: CPT | Mod: GO

## 2020-06-04 PROCEDURE — 82947 ASSAY GLUCOSE BLOOD QUANT: CPT | Mod: QW

## 2020-06-04 RX ADMIN — Medication 1000 MG: at 20:57

## 2020-06-04 RX ADMIN — CLOPIDOGREL BISULFATE 75 MG: 75 TABLET ORAL at 07:55

## 2020-06-04 RX ADMIN — MAGNESIUM OXIDE 400 MG: 400 TABLET ORAL at 07:55

## 2020-06-04 RX ADMIN — LISINOPRIL 20 MG: 20 TABLET ORAL at 07:55

## 2020-06-04 RX ADMIN — MELATONIN 12 MG: at 20:57

## 2020-06-04 RX ADMIN — LISINOPRIL 20 MG: 20 TABLET ORAL at 20:58

## 2020-06-04 RX ADMIN — Medication 500 MG: at 01:02

## 2020-06-04 RX ADMIN — ASPIRIN 81 MG: 81 TABLET ORAL at 07:55

## 2020-06-04 RX ADMIN — PSYLLIUM HUSK 1 PACKET: 3.4 POWDER ORAL at 07:55

## 2020-06-04 RX ADMIN — DOCUSATE SODIUM 100 MG: 100 CAPSULE, LIQUID FILLED ORAL at 07:56

## 2020-06-04 RX ADMIN — SERTRALINE HYDROCHLORIDE 25 MG: 25 TABLET ORAL at 07:56

## 2020-06-04 RX ADMIN — INSULIN DETEMIR 35 UNITS: 100 INJECTION, SOLUTION SUBCUTANEOUS at 08:01

## 2020-06-04 RX ADMIN — ROSUVASTATIN CALCIUM 40 MG: 20 TABLET, FILM COATED ORAL at 20:57

## 2020-06-04 RX ADMIN — ENOXAPARIN SODIUM 40 MG: 40 INJECTION SUBCUTANEOUS at 16:22

## 2020-06-04 RX ADMIN — POLYETHYLENE GLYCOL 3350 17 G: 17 POWDER, FOR SOLUTION ORAL at 07:53

## 2020-06-04 RX ADMIN — MELATONIN 1000 UNITS: at 07:56

## 2020-06-04 NOTE — NURSING END OF SHIFT
Nursing End of Shift Summary:    Patient: Del Mckeon  MRN: [id number]  : 1960, Age: 59 y.o.    Location: [place] [address]    Nursing Goals  Clinical Goals for the Shift: pt comfort and safety    Narrative Summary of Progress Toward Clinical Goals:  Pt denies discomfort at this time, free of falls    Barriers to Goals/Nursing Concerns:  no    New Patient or Family Concerns/Issues:  no    Shift Summary:      Significant Events & Communications to Providers (last 12 hours)      Last 5 Values    No documentation.              Oxygen Usage (last 12 hours)      Last 5 Values    No documentation.              Mobility (last 12 hours)      Last 5 Values     Row Name 20 0758 20 1900                Mobility    Activity  --  Bedrest;Chair       Anti-Embolism Devices  Bilateral  --       Anti-Embolism Intervention  -- off for safety   --           Urethral Catheter    Active Urethral Catheter     None            Active Lines    Active Central venous catheter / Peripherally inserted central catheter / Implantable Port / Hemodialysis catheter / Midline Catheter     None              Infusing Medications   Medication Dose Last Rate     PRN Medications   Medication Dose Last Dose   • senna  17.2 mg     • magnesium citrate  296 mL 296 mL at 20 0802   • traZODone  50 mg     • diphenhydrAMINE  25 mg     • senna  17.2 mg     • magnesium hydroxide  30 mL 30 mL at 20 1446   • ondansetron  4 mg     • acetaminophen  500 mg 500 mg at 20 0629   • sodium chloride  2 spray       _________________________  Laine Gregory RN  20 7:51 PM

## 2020-06-04 NOTE — NURSING END OF SHIFT
Nursing End of Shift Summary:    Patient: Del Mckeon  MRN: [id number]  : 1960, Age: 59 y.o.    Location: [place] [address]    Nursing Goals  Clinical Goals for the Shift: Pt to remain free of falls and aspiration. Monitor glucose levels.     Narrative Summary of Progress Toward Clinical Goals:  Pt remained free of falls and aspiration. Follows swallowing strategies without need of cues. Made modified independent with FWW by therapies today. Levemir tatyana 35u D now, glocuse levels today were 110, 130, and 112.     Barriers to Goals/Nursing Concerns:  No     New Patient or Family Concerns/Issues:  No     Shift Summary:      Significant Events & Communications to Providers (last 12 hours)      Last 5 Values    No documentation.              Oxygen Usage (last 12 hours)      Last 5 Values    No documentation.              Mobility (last 12 hours)      Last 5 Values     Row Name 20 0752                   Mobility    Anti-Embolism Devices  Bilateral        Anti-Embolism Intervention  -- not in use for safety             Urethral Catheter    Active Urethral Catheter     None            Active Lines    Active Central venous catheter / Peripherally inserted central catheter / Implantable Port / Hemodialysis catheter / Midline Catheter     None              Infusing Medications   Medication Dose Last Rate     PRN Medications   Medication Dose Last Dose   • senna  17.2 mg     • magnesium citrate  296 mL 296 mL at 20 0802   • traZODone  50 mg     • diphenhydrAMINE  25 mg     • senna  17.2 mg     • magnesium hydroxide  30 mL 30 mL at 20 1446   • ondansetron  4 mg     • acetaminophen  500 mg 500 mg at 20 0102   • sodium chloride  2 spray       _________________________  Dominique Hays LPN  20 5:44 PM

## 2020-06-04 NOTE — DISCHARGE INSTRUCTIONS
Occupational Therapy - Del is completing his Dressing, Toileting, grooming/hygiene, meals set up - to independent. He will continue to require distant supervision for showering tasks to ensure his safety and balance. He will continue to benefit from completing Right Upper Extremity exercise plan to continue to strengthen.     Speech Therapy: Del has demonstrated ability to effectively use visual aids to remember information such as medications. It is recommended that Del continue to use visual aids (I.e. pill boxes, medication schedule, written notes) as strategies to remember detailed information. It is recommended that Del drink from a cup instead of a straw and to take small drinks one at a time.     Physical Therapy--Del is independent walking with the walker.  He has an exercise program he can do several times a week and increase walking distance as able.

## 2020-06-04 NOTE — INTERDISCIPLINARY/THERAPY
TREATMENT NOTE (SLP)      Patient Name: Del Mckeon  Age: 59 y.o.  Gender: male    ----------------------------------------------------------------------------------------------------------------------       06/04/20 0800   Time Calculation   Start Time 0800   Stop Time 0830   Time Calculation (min) 30 min   Pain Assessment   Pain Assessment No/denies pain   Subjective Comments   Subjective Comments Pt agreeable to ST session.   General   Family/Caregiver Present Yes  (RN in beginning of session.)   Daily Treatment   Session Activities Memory;Dysphagia;Cognitive Linguistic   Cognitive Lingustics   Cognitive Linguistic Sequential thought   Sequential Thought   Basic Tasks Performed Navigated external aid (i.e. medication schedule) by naming medication when given the rationale, time (am , pm, or both)   Sequential Thought Basic Average % 95   Sequential Thought Basic Cueing Support Independent   Moderate Tasks Performed Fill weekly pill boxes (ie. am and pm) with 3 medications   Sequential Thought Moderate Average % 100   Sequential Thought Moderate Cueing Support Independent  (Mod-I with visual aid (i.e. medicaiton schedule))   Memory   Memory Visual   Memory Visual   Basic Tasks Performed Recall address and phone number after 10 minute delay   Visual Memory Basic Average % 100   Visual Memory Basic Cueing Support Minimal   Dysphagia   Current Diet/Liquid Consistency Regular;Thin liquids   Temperature Spikes w/in 72h No   Respiratory Status Room air   Behavior/Cognition Alert;Cooperative;Pleasant mood   Dentition Adequate   Vision Functional for self-feeding   Patient Positioning Upright in chair   Baseline Vocal Quality Normal   Consistencies Assessed Yes   Compensatory Strategy Training Completed   Compensatory Swallowing Strategies Used Other (Comment)  (small sips)   Percent of Time Implemented Consistently (90%+)   Compensatory Strategy Cueing Support  Independent   Thin Liquids   Presentation  Cup;Self-fed  (9 trials)   Oral WFL   Suction Required? No   Swab/Finger Swipe Required? No   Pharyngeal No signs or symptoms of aspiration   Aspiration Risk   Risk for Aspiration Mild   Diet Solids Recommendations Regular   Diet Liquids Recommendations Thin liquids   Compensatory Swallowing Strategies Upright at 90 degrees for all oral intake;Remain upright for 20-30 minutes after meals;No straws;Small bites/sips;Eat/feed slowly   Medication Recommendations Whole;One pill at a time;With liquid   Activity Tolerance   Activity Tolerance Patient tolerated treatment well   Assessment Comments   Assessment Comments SWALLOW: Pt demonstrated no overt signs or symptoms of penetration or aspiration with thin liquids via cup. Pt independently demonstrated and recalled swallow strategies of taking small drinks. SLP recommends pt continue regular thin liquid diet and to drink thin liquids via cup vs. straw.      MEMORY: Pt demonstrated functional recall by discussing days of admit, discharge, and activities completed in OT and PT therapy session. When asked about medications, pt spontaneously referred to visual aid (i.e. medication schedule). Pt also independently referred to self-written note after 10 minute delay to recall phone number and address; min assist required to read address number as error was noted x1. Pt accurately completed medication task by  filling pill boxes with 3 medications by times they are take (i.e. am, pm, or both); not enough pills were provided for a medications, however, pt verbalized which boxes to fill if provided with enough pills. Visual aids are effective for pt in recalling detailed information and are utilized independently.    Patient Education   Patient Education SLP educated pt about utlizing visual aids to assist in remembering information. Pt reported using pill box and and organizing medications   Plan   Plan Comments pt to d/c         _________________  Addie Fitzgerald,  CHERELLE-SLP  06/04/20 11:20 AM

## 2020-06-04 NOTE — INTERDISCIPLINARY/THERAPY
Physical Therapy  Treatment Note (PT)    Patient Name: Del Mckeon  Age: 59 y.o.  Gender: male    ----------------------------------------------------------------------------------------------------------------------       06/04/20 0910   Time Calculation   Start Time 0910   Stop Time 1000   Time Calculation (min) 50 min   Pain Assessment   Pain Assessment No/denies pain   General   Therapy Treatment Diagnosis CVA   Subjective Comments   Subjective Comments Pt agreed to PT   Bed Mobility 1   Bed Mobility From 1 Supine   Bed Mobility Type 1 To and from   Bed Mobility to 1 Short sit   Level of Assistance 1 Independent   Transfer 1   Transfer From 1 Wheelchair   Transfer Type 1 To and from   Transfer to 1 Bed   Technique 1 Stand pivot   Transfer Device 1 Front wheeled walker   Level of Assistance 1 Modified independent   Trials/Comments 1 demonstrated safe transfers   Transfers 2   Transfer From 2 Bed   Transfer Type 2 To and from   Transfer to 2 Chair with arms   Technique 2 Stand pivot   Transfer Device 2 Front wheeled walker   Level of Assistance 2 Modified independent   Trials/Comments 2 demonstrated safe transfer   Ambulation 1   Surface 1 Level surface   Device 1 Front wheeled walker   Assistance 1 Modified independent   Quality of Gait 1 slow but steady gait   Comments/Distance 1 60 m x 3   Ambulation 2   Surface 2 Level surface   Device 2 Single point cane   Assistance 2 Contact guard assist x 1   Quality of Gait 2 slow pace   Comments/Distance 2 30 m x 2 reps   Stairs 1   Rails 1 Bilateral   Device 1 Gait belt   Assistance 1 Standby assistance;Verbal cueing   Comment/# Steps 1 12   QI Functional Abilities and Goals: Mobility   Roll Left and Right 06   Sit to Lying 06   Lying to Sitting on Side of Bed 06   Sit to Stand 06   Chair/Bed-to-Chair Transfer 06   Toilet Transfer 06   Car Transfers 04   Does the Patient Walk? 2   Walk 10 Feet 06   Walk 50 Feet with Two Turns 06   Walk 150 Feet 06   Walking 10 Feet  "on Uneven Surfaces 04   1 Step (Curb) 04   4 Steps 04   12 Steps 04   Picking Up Object 04   Static Standing Balance   Static Standing-Balance Surface Firm   Static Standing-Balance Support No upper extremity supported   Static Standing-Level of Assistance Modified independent   Dynamic Standing Balance   Dynamic Standing-Balance Surface Firm   Dynamic Standing-Balance Support No upper extremity supported   Dynamic Standing-Balance Reaching across midline;Forward lean   Dynamic Standing-Level of Assistance Contact guard x 1   Standing   Standing-Exercise Type Hip flexion;Hip ABduction;Hip extension;Squats;Heel raises   Standing-Exercise Comments reviewed written HEP   Therapeutic Activities   Therapeutic Activities sit to stand x 5 from 18 \" surface   Equipment Use   Other Equipment Use Sci Fit level 1 6 min   Assessment   Assessment Comment Pt demonstrates safe mobility in room with fww and states he feels comfortable being up indep in room with fww         _________________  JAIDA MONTES, PT  06/04/20 3:09 PM    "

## 2020-06-04 NOTE — INTERDISCIPLINARY/THERAPY
Physical Therapy  Treatment Note (PT)    Patient Name: Del Mckeon  Age: 59 y.o.  Gender: male    ----------------------------------------------------------------------------------------------------------------------       06/04/20 1235   Time Calculation   Start Time 1235   Stop Time 1300   Time Calculation (min) 25 min   Pain Assessment   Pain Assessment No/denies pain   General   Therapy Treatment Diagnosis CVA   Subjective Comments   Subjective Comments Pt reports he is managing well with mod indep in room   Bed Mobility 1   Bed Mobility From 1 Supine   Bed Mobility Type 1 To and from   Bed Mobility to 1 Short sit   Level of Assistance 1 Independent   Transfer 1   Transfer From 1 Bed   Transfer Type 1 To and from   Transfer to 1 Chair with arms   Technique 1 Stand pivot   Transfer Device 1 Front wheeled walker   Level of Assistance 1 Modified independent   Transfers 2   Transfer From 2 Stand   Transfer Type 2 To and from   Transfer to 2 Mat   Technique 2 Stand pivot   Transfer Device 2 Front wheeled walker   Level of Assistance 2 Modified independent   Ambulation 1   Surface 1 Level surface   Device 1 Front wheeled walker   Assistance 1 Modified independent   Comments/Distance 1 60 m x 2   Ambulation 2   Surface 2 Level surface   Device 2 Single point cane   Assistance 2 Contact guard assist x 1   Quality of Gait 2 slow pace   Comments/Distance 2 30 m x 1   Standing   Standing-Exercise Type Hip flexion;Squats;Heel raises   Standing-Exercise Comments 10 reps   Equipment Use   Other Equipment Use Sci Fit level 1 x 6 min         _________________  JAIDA MONTES, PT  06/04/20 3:14 PM

## 2020-06-04 NOTE — PROGRESS NOTES
Physical Medicine and Rehabilitation  Daily Progress Note       LOS: 8 days        CC: JIM    Subjective     Slept well, pain controlled, ready for discharge tomorrow.  He would like referral to a PCP other than the one he was assigned previously.      Review of Systems  Except as noted above:  No headaches, vision changes, or hearing changes.  No URI symptoms.  No dysphagia or odynophagia.  No shortness of breath, coughing, wheezing.  No chest pain. No nausea/ vomiting, abdominal discomfort.  No urinary or bowel changes.  Review of systems otherwise is negative.       Objective           Vital signs in last 24 hours:  Temp:  [36.2 °C (97.2 °F)-36.9 °C (98.4 °F)] 36.2 °C (97.2 °F)  Heart Rate:  [87-92] 87  Resp:  [16-18] 16  BP: (102-123)/(79-90) 102/79    Intake/Output last 3 shifts:  I/O last 3 completed shifts:  In: 2510 [P.O.:2510]  Out: -   Intake/Output this shift:  No intake/output data recorded.    Physical Exam  GEN:  Alert. No acute distress.  PSYCH: Normal affect.  HEENT: Normocephalic, atraumatic. EOMI, sclerae anicteric.  Mucous membranes moist.  NECK: Normal ROM. Trachea midline.  PULM:  Unlabored respiratory effort.  CV: Distal pulses intact.  ABD:  Nondistended.  EXTREM:  No clubbing, cyanosis, gross deformity.  NEURO:   Grossly unchanged. Speech is intelligible and coherent.  SKIN:  Warm and dry, normal turgor.        Results from last 4 days   Lab Units 06/03/20  0614   WBC AUTO 10*3/uL 7.0   HEMOGLOBIN g/dL 15.3   HEMATOCRIT % 43.6   PLATELETS AUTO 10*3/uL 197       Results from last 4 days   Lab Units 06/03/20  0614   SODIUM mmol/L 140   POTASSIUM mmol/L 3.6   CHLORIDE mmol/L 103   CO2 mmol/L 28   BUN mg/dL 13   CREATININE mg/dL 0.71   CALCIUM mg/dL 9.3   TOTAL PROTEIN g/dL 6.7   BILIRUBIN TOTAL mg/dL 0.67   ALK PHOS U/L 59   ALT U/L 30   AST U/L 27   GLUCOSE mg/dL 92       DIET:       Dietary Orders   (From admission, onward)             Start     Ordered    06/01/20 7360   Diet Regular; Diabetic; Carb Counting (mealtime insulin); No Straws  Diet effective now     Comments:  Pills whole with water if tolerating (i.e., no coughing), otherwise whole in puree.   Question Answer Comment   Nutrition Therapy Protocol (Dietitian May Adjust Diet and Nourishments) Yes    Diet type Regular    Diet Restrictions Diabetic    Diabetic Restrictions Carb Counting (mealtime insulin)    Straws/Supervision No Straws        06/01/20 1450                  Scheduled Meds:  insulin detemir U-100, 35 Units, subcutaneous, q AM  sertraline, 25 mg, oral, Daily  lisinopriL, 20 mg, oral, 2x daily  melatonin, 12 mg, oral, Nightly  magnesium oxide, 400 mg, oral, Daily  docusate sodium, 100 mg, oral, Daily  acetaminophen, 1,000 mg, oral, Nightly  polyethylene glycol, 17 g, oral, Daily  enoxaparin, 40 mg, subcutaneous, Daily at 1600  rosuvastatin, 40 mg, oral, Nightly  aspirin, 81 mg, oral, Daily  cholecalciferol (vitamin D3), 1,000 Units, oral, Daily  clopidogreL, 75 mg, oral, Daily  psyllium, 1 packet, oral, Daily      Continuous Infusions:     PRN Meds:  senna  •  magnesium citrate  •  traZODone  •  diphenhydrAMINE  •  senna  •  magnesium hydroxide  •  ondansetron  •  acetaminophen  •  sodium chloride      Active Problems:    Benign essential hypertension    Hyperlipidemia    Obstructive sleep apnea syndrome    Controlled type 2 diabetes mellitus without complication (CMS/HCC) (HCC)    Mild aortic sclerosis (CMS/HCC) (HCC)    Ischemic cerebrovascular accident (CVA) (CMS/HCC) (HCC)    Microscopic hematuria    Hypomagnesemia    NIK (obstructive sleep apnea)    CVA (cerebral vascular accident) (CMS/HCC) (HCC)        Assessment/Plan     Assessment/Plan:    59-year-old male admitted to [facility] 5/21/2020 after approximately 4 days of left-sided weakness, gait instability. MRI demonstrated acute right pontine CVA, and aspirin, Plavix, statin were started. Sinus rhythm on EKG/telemetry.  His acute stay was noted to  have hematuria for which a renal bladder ultrasound was obtained and was negative for any evidence of bleeding or other abnormality.  VFSS was performed and negative for aspiration.  Left-sided weakness, gait ataxia, and disequilibrium are his primary functional deficits.    - Right pontine CVA. ASA, plavix, statin for 2' ppx.  PT/OT/TR. Neuropsych following.  - Gait ataxia. Fall precautions.  - Essential hypertension. Lisinopril.  - Adjustment disorder.  Zoloft started 6/3.  - Right hand numbness.  Suspect carpal tunnel syndrome.  Discharge referral for EMG.  - Insomnia due to medical.  DC Benadryl, DC melatonin.  DC trazodone due to confusion.  Melatonin 12 mg p.o. nightly.  - Hyperlipidemia. Statin.  - DM2. Diet, accuchecks, levemir.  - Microscopic hematuria. Neg US kidneys/bladder.  - Hypomagnesemia.  - NIK.  - DVT ppx. Lovenox.  - Code Status:  Full Code     Discharge plans:   Discharge referral to Family medicine ordered.  Discharge order for EMG/NCS ordered.      Derick Small DO  1:14 PM, 6/4/2020.      A voice recognition program was used to aid in documentation of this record. Sometimes words are not presented exactly as they were spoken.  While efforts were made to carefully edit and correct any inaccuracies, some errors may be present.  Please take this into context.  Please contact the provider if errors are identified.

## 2020-06-04 NOTE — INTERDISCIPLINARY/THERAPY
Occupational Therapy  Treatment Note (OT)      Patient Name: Del Mckeon  Age: 59 y.o.  Gender: male    ----------------------------------------------------------------------------------------------------------------------         06/04/20 0700   Time Calculation   Start Time 0700   Stop Time 0745   Time Calculation (min) 45 min   OT Last Visit   OT Received On 06/04/20   General   Chart Reviewed Yes   Is this a Co-Treatment? No   Family/Caregiver Present No   Subjective Comments   Subjective Comments pt is agreeable to participate in OT intervention. Following session, pt is left in w/c with alarm on and all of his needs within reach.   Pain Assessment   Pain Assessment No/denies pain   Cognition   Arousal/Alertness WFL   Cognition Comment Appropriate to task and commands   Bed Mobility   Bed Mobility Not assessed   Transfers   Transfer Assessed   Transfer 1   Transfer From 1 Sit;Wheelchair   Transfer Type 1 To and from   Transfer to 1 Stand   Technique 1 Sit to stand;Stand to sit   Transfer Device 1 Front wheeled walker;Transfer belt   Level of Assistance 1 Standby assistance   Trials/Comments 1 SBA for safety and balance   Ambulation   Ambulation Assessed   Ambulation 1   Surface 1 Level surface;Smooth;Carpet   Device 1 Front wheeled walker;Gait belt   Assistance 1 Standby assistance   Quality of Gait 1 SBA for safety   Comments/Distance 1 40m x2   QI Functional Abilities and Goals: Mobility   Toilet Transfer 04   Car Transfers 04   Balance   Balance Impaired   QI Functional Abilities and Goals: Self-Care   Eating  06   Oral Hygiene 06   Toileting Hygiene 04   Shower/ Bathe Self 04   Upper Body Dressing 06   Lower Body Dressing 04   Putting On/ Taking Off Foorwear 05   OT Therapeutic Groups   UE Therapeutic Activity pt completed nuts and bolts board x24 pieces manipulating items on and off of board utilizing only RUE with mild difficulty. Increased time needed. pt completed additional RUE clothes pin activity  of all grades of stength 25 pieces. Increased time needed   Assessment   Rehab Potential Good   Progress Progressing toward goals   Recommendations for Therapy Continue skilled therapy   Assessment Comment pt demo'd well during RUE FMC and GMC strengthening and manipuation skills however continues to be limited due to weakness and fatigue. pt will continue to benefit from skilled OT services to ensure safety upon d/c.   Plan   Plan Comment /pinch         _________________  MARILUZ Campos  06/04/20 8:24 AM

## 2020-06-05 ENCOUNTER — TELEPHONE (OUTPATIENT)
Dept: EMERGENCY MEDICINE | Facility: HOSPITAL | Age: 60
End: 2020-06-05

## 2020-06-05 VITALS
WEIGHT: 195.55 LBS | HEIGHT: 71 IN | SYSTOLIC BLOOD PRESSURE: 143 MMHG | DIASTOLIC BLOOD PRESSURE: 95 MMHG | HEART RATE: 75 BPM | OXYGEN SATURATION: 96 % | TEMPERATURE: 98.2 F | BODY MASS INDEX: 27.38 KG/M2 | RESPIRATION RATE: 16 BRPM

## 2020-06-05 LAB — GLUCOSE BLDC GLUCOMTR-MCNC: 110 MG/DL (ref 70–105)

## 2020-06-05 PROCEDURE — 6370000100 HC RX 637 (ALT 250 FOR IP): Performed by: PHYSICAL MEDICINE & REHABILITATION

## 2020-06-05 PROCEDURE — 2590000100 HC RX 259: Performed by: INTERNAL MEDICINE

## 2020-06-05 PROCEDURE — 99239 HOSP IP/OBS DSCHRG MGMT >30: CPT | Performed by: PHYSICAL MEDICINE & REHABILITATION

## 2020-06-05 PROCEDURE — 6360000200 HC RX 636 W HCPCS (ALT 250 FOR IP): Performed by: PHYSICAL MEDICINE & REHABILITATION

## 2020-06-05 PROCEDURE — 6370000100 HC RX 637 (ALT 250 FOR IP): Performed by: INTERNAL MEDICINE

## 2020-06-05 PROCEDURE — 2590000100 HC RX 259: Performed by: PHYSICAL MEDICINE & REHABILITATION

## 2020-06-05 PROCEDURE — 82947 ASSAY GLUCOSE BLOOD QUANT: CPT | Mod: QW

## 2020-06-05 PROCEDURE — 99233 SBSQ HOSP IP/OBS HIGH 50: CPT | Performed by: INTERNAL MEDICINE

## 2020-06-05 RX ORDER — ROSUVASTATIN CALCIUM 40 MG/1
40 TABLET, COATED ORAL NIGHTLY
Qty: 30 TABLET | Refills: 0 | Status: SHIPPED | OUTPATIENT
Start: 2020-06-05 | End: 2020-06-11 | Stop reason: SDUPTHER

## 2020-06-05 RX ORDER — ASPIRIN 81 MG/1
81 TABLET ORAL DAILY
Qty: 30 TABLET | Refills: 0 | Status: SHIPPED | OUTPATIENT
Start: 2020-06-06 | End: 2020-06-11 | Stop reason: SDUPTHER

## 2020-06-05 RX ORDER — CLOPIDOGREL BISULFATE 75 MG/1
75 TABLET ORAL DAILY
Qty: 30 TABLET | Refills: 0 | Status: SHIPPED | OUTPATIENT
Start: 2020-06-06 | End: 2020-06-11 | Stop reason: SDUPTHER

## 2020-06-05 RX ORDER — SERTRALINE HYDROCHLORIDE 25 MG/1
25 TABLET, FILM COATED ORAL DAILY
Qty: 30 TABLET | Refills: 0 | Status: SHIPPED | OUTPATIENT
Start: 2020-06-06 | End: 2020-06-11 | Stop reason: SDUPTHER

## 2020-06-05 RX ADMIN — ENOXAPARIN SODIUM 40 MG: 40 INJECTION SUBCUTANEOUS at 10:20

## 2020-06-05 RX ADMIN — CLOPIDOGREL BISULFATE 75 MG: 75 TABLET ORAL at 07:51

## 2020-06-05 RX ADMIN — MELATONIN 1000 UNITS: at 07:51

## 2020-06-05 RX ADMIN — INSULIN DETEMIR 35 UNITS: 100 INJECTION, SOLUTION SUBCUTANEOUS at 09:00

## 2020-06-05 RX ADMIN — LISINOPRIL 20 MG: 20 TABLET ORAL at 07:51

## 2020-06-05 RX ADMIN — SERTRALINE HYDROCHLORIDE 25 MG: 25 TABLET ORAL at 07:51

## 2020-06-05 RX ADMIN — PSYLLIUM HUSK 1 PACKET: 3.4 POWDER ORAL at 07:51

## 2020-06-05 RX ADMIN — MAGNESIUM OXIDE 400 MG: 400 TABLET ORAL at 07:51

## 2020-06-05 RX ADMIN — ASPIRIN 81 MG: 81 TABLET ORAL at 07:51

## 2020-06-05 RX ADMIN — DOCUSATE SODIUM 100 MG: 100 CAPSULE, LIQUID FILLED ORAL at 07:51

## 2020-06-05 NOTE — INTERDISCIPLINARY/THERAPY
06/05/20 1103   Discharge Planning   Primary Caregiver Self   Living Arrangements Alone   Type of Residence Private residence   Home Care Services Yes   Home Care Agency Name [facility]   Type of Home Care Services Home health aide;Home PT;Home OT;Nursing   Type of Home Care Nursing Intermittent   Current Community Resources/Support Systems Transportation   Home Oxygen Use No   Home Medical Equipment Use Front Wheeled Walker   Medical Equipment Vendor [facility]   Patient expects to be discharged to: Own home   Does the patient need discharge transport arranged? No   Referral Needs Assessment   Stressors New Diagnosis   Community Case Management   Community Case Management Health Home Services   Referred To   Resources Referred To Home health services;Transportation   Discharge Disposition   Disposition Location Home   Additional Comments   Comments CM did discuss AVS with patient, patient did verbalize understanding, patient had zero questions after questions were answered, patient walked to car with FWW, has CM contact information for further questions

## 2020-06-05 NOTE — PLAN OF CARE
Problem: Pain - Adult  Description: Pt denies discomfort at this time  Goal: Verbalizes/displays adequate comfort level or baseline comfort level  Description: INTERVENTIONS:  1. Encourage patient to monitor pain and request interventions  2. Assess pain using the appropriate pain scale  3. Administer analgesics based on type and severity of pain and evaluate response  4. Educate/Implement non-pharmacological measures as appropriate and evaluate response  5. Consider cultural, developmental and social influences on pain and pain management  6. Notify Provider if interventions unsuccessful or patient reports new pain  6/5/2020 1043 by Levi Marte LPN  Outcome: Adequate for Discharge  Flowsheets (Taken 6/5/2020 1034)  Verbalizes/displays adequate comfort level or baseline comfort level:   Encourage patient to monitor pain and request interventions   Assess pain using the appropriate pain scale  6/5/2020 1034 by Levi Marte LPN  Outcome: Progressing  Flowsheets (Taken 6/5/2020 1034)  Verbalizes/displays adequate comfort level or baseline comfort level:   Encourage patient to monitor pain and request interventions   Assess pain using the appropriate pain scale     Problem: Infection - Adult  Goal: Absence of infection during hospitalization  Description: INTERVENTIONS:  1. Assess and monitor for signs and symptoms of infection  2. Monitor lab/diagnostic results  3. Monitor all insertion sites/wounds/incisions  4. Monitor secretions for changes in amount and color  5. Administer medications as ordered  6. Educate and encourage patient and family to use good hand hygiene technique  7. Identify and educate in appropriate isolation precautions for identified infection/condition  6/5/2020 1043 by Levi Marte LPN  Outcome: Adequate for Discharge  Flowsheets (Taken 6/5/2020 1034)  Absence of infection during hospitalization:   Assess and monitor for signs and symptoms of infection   Monitor secretions for changes in  amount and colo   Educate and encourage patient and family to use good hand hygiene technique  6/5/2020 1034 by Levi Marte LPN  Outcome: Progressing  Flowsheets (Taken 6/5/2020 1034)  Absence of infection during hospitalization:   Assess and monitor for signs and symptoms of infection   Monitor secretions for changes in amount and colo   Educate and encourage patient and family to use good hand hygiene technique     Problem: Safety Adult - Fall  Goal: Free from fall injury  Description: INTERVENTIONS:    Inpatient - Please reference Cares/Safety Flowsheet under [facility] Fall Risk for interventions.  Pediatrics - Please reference Peds Daily Cares/Safety Flowsheet under [facility] Pediatric Fall Assessment Fall Bundle for interventions  LD/OB - Please reference OB Shift Screening Flowsheet under OB Fall Risk for interventions.  6/5/2020 1043 by Levi Marte LPN  Outcome: Adequate for Discharge  6/5/2020 1034 by Levi Marte LPN  Outcome: Progressing     Problem: Discharge Planning  Goal: Discharge to home or other facility with appropriate resources  Description: INTERVENTIONS:  1. Identify and discuss barriers to discharge with patient and caregiver.  2. Arrange for needed discharge resources and transportation as appropriate.  3. Identify discharge learning needs (meds, wound care, etc).  4. Arrange for interpreters to assist at discharge as needed.  5. Refer to  for coordinating discharge planning if the patient needs post-hospital services based on physician order or complex needs related to functional status, cognitive ability or social support system.  6/5/2020 1043 by Levi Marte LPN  Outcome: Adequate for Discharge  Flowsheets (Taken 6/5/2020 1043)  Discharge to home or other facility with appropriate resources:   Identify and discuss barriers to discharge with patient and caregiver   Arrange for needed discharge resources and transportation as appropriate   Identify discharge learning needs  (meds, wound care, etc)  6/5/2020 1034 by Levi Marte LPN  Outcome: Progressing  Flowsheets (Taken 6/5/2020 1034)  Discharge to home or other facility with appropriate resources:   Identify and discuss barriers to discharge with patient and caregiver   Identify discharge learning needs (meds, wound care, etc)     Problem: Knowledge Deficit  Goal: Patient/family/caregiver demonstrates understanding of disease process, treatment plan, medications, and discharge instructions  Description: INTERVENTIONS:   1. Complete learning assessment and assess knowledge base  2. Provide teaching at level of understanding   3. Provide teaching via preferred learning methods  6/5/2020 1043 by Levi Marte LPN  Outcome: Adequate for Discharge  Flowsheets (Taken 6/5/2020 1034)  Patient/family/caregiver demonstrates understanding of disease process, treatment plan, medications, and discharge instructions:   Complete learning assessment and assess knowledge base   Provide teaching via preferred learning methods   Provide teaching at level of understanding  6/5/2020 1034 by Levi Marte LPN  Outcome: Progressing  Flowsheets (Taken 6/5/2020 1034)  Patient/family/caregiver demonstrates understanding of disease process, treatment plan, medications, and discharge instructions:   Complete learning assessment and assess knowledge base   Provide teaching via preferred learning methods   Provide teaching at level of understanding     Problem: Potential for Compromised Skin Integrity  Goal: Skin Integrity is Maintained or Improved  Description: INTERVENTIONS:  1. Assess and monitor skin integrity  2. Collaborate with interdisciplinary team and initiate plans and interventions as needed  3. Alternate a full bath with partial baths for elderly   4. Monitor patient's hygiene practices   5. Collaborate with wound, ostomy, and continence nurse  6/5/2020 1043 by Levi Marte LPN  Outcome: Adequate for Discharge  Flowsheets (Taken 6/5/2020  1034)  Skin integrity is maintained or improved:   Assess and monitor skin integrity   Monitor patient's hygiene practices  6/5/2020 1034 by Levi Marte LPN  Outcome: Progressing  Flowsheets (Taken 6/5/2020 1034)  Skin integrity is maintained or improved:   Assess and monitor skin integrity   Monitor patient's hygiene practices  Goal: Nutritional status is improving  Description: INTERVENTIONS:  1. Monitor and assess patient for malnutrition (ex- brittle hair, bruises, dry skin, pale skin and conjunctiva, muscle wasting, smooth red tongue, and disorientation)  2. Monitor patient's weight and dietary intake as ordered or per policy  3. Determine patient's food preferences and provide high-protein, high-caloric foods as appropriate  4. Assist patient with eating   5. Allow adequate time for meals   6. Encourage patient to take dietary supplement as ordered   7. Collaborate with dietitian  8. Include patient/family/caregiver in decisions related to nutrition  6/5/2020 1043 by Levi Marte LPN  Outcome: Adequate for Discharge  Flowsheets (Taken 6/5/2020 1034)  Nutritional status is improving:   Monitor and assess patient for malnutrition (ex- brittle hair, bruises, dry skin, pale skin and conjunctiva, muscle wasting, smooth red tongue, and disorientation)   Monitor patient's weight and dietary intake as ordered or per policy   Determine patient's food preferences and provide high-protein, high-caloric foods as appropriate   Allow adequate time for meals   Include patient/family/caregiver in decisions related to nutrition  6/5/2020 1034 by Levi Marte LPN  Outcome: Progressing  Flowsheets (Taken 6/5/2020 1034)  Nutritional status is improving:   Monitor and assess patient for malnutrition (ex- brittle hair, bruises, dry skin, pale skin and conjunctiva, muscle wasting, smooth red tongue, and disorientation)   Monitor patient's weight and dietary intake as ordered or per policy   Determine patient's food preferences  and provide high-protein, high-caloric foods as appropriate   Allow adequate time for meals   Include patient/family/caregiver in decisions related to nutrition  Goal: MOBILITY IS MAINTAINED OR IMPROVED  Description: INTERVENTIONS  1. Collaborate with interdisciplinary team and initiate plan and interventions as ordered (PT/OT)  2. Encourage ambulation  3. Up to chair for meals  4. Monitor for signs of deconditioning  6/5/2020 1043 by Levi Marte LPN  Outcome: Adequate for Discharge  Flowsheets (Taken 6/5/2020 1034)  Mobility is Maintained or Improved:   Encourage ambulation   Up to chair for meals   Monitor for signs of deconditioning  6/5/2020 1034 by Levi Marte LPN  Outcome: Progressing  Flowsheets (Taken 6/5/2020 1034)  Mobility is Maintained or Improved:   Encourage ambulation   Up to chair for meals   Monitor for signs of deconditioning     Problem: Safety Adult - Fall  Goal: Free from fall injury  Description: INTERVENTIONS:    Inpatient - Please reference Cares/Safety Flowsheet under [facility] Fall Risk for interventions.  Pediatrics - Please reference Peds Daily Cares/Safety Flowsheet under [facility] Pediatric Fall Assessment Fall Bundle for interventions  LD/OB - Please reference OB Shift Screening Flowsheet under OB Fall Risk for interventions.  6/5/2020 1043 by Levi Marte LPN  Outcome: Adequate for Discharge  6/5/2020 1034 by Levi Marte LPN  Outcome: Progressing

## 2020-06-05 NOTE — PLAN OF CARE
Problem: Pain - Adult  Description: Pt denies discomfort at this time  Goal: Verbalizes/displays adequate comfort level or baseline comfort level  Description: INTERVENTIONS:  1. Encourage patient to monitor pain and request interventions  2. Assess pain using the appropriate pain scale  3. Administer analgesics based on type and severity of pain and evaluate response  4. Educate/Implement non-pharmacological measures as appropriate and evaluate response  5. Consider cultural, developmental and social influences on pain and pain management  6. Notify Provider if interventions unsuccessful or patient reports new pain  Outcome: Progressing  Flowsheets (Taken 6/5/2020 1034)  Verbalizes/displays adequate comfort level or baseline comfort level:   Encourage patient to monitor pain and request interventions   Assess pain using the appropriate pain scale     Problem: Infection - Adult  Goal: Absence of infection during hospitalization  Description: INTERVENTIONS:  1. Assess and monitor for signs and symptoms of infection  2. Monitor lab/diagnostic results  3. Monitor all insertion sites/wounds/incisions  4. Monitor secretions for changes in amount and color  5. Administer medications as ordered  6. Educate and encourage patient and family to use good hand hygiene technique  7. Identify and educate in appropriate isolation precautions for identified infection/condition  Outcome: Progressing  Flowsheets (Taken 6/5/2020 1034)  Absence of infection during hospitalization:   Assess and monitor for signs and symptoms of infection   Monitor secretions for changes in amount and colo   Educate and encourage patient and family to use good hand hygiene technique     Problem: Safety Adult - Fall  Goal: Free from fall injury  Description: INTERVENTIONS:    Inpatient - Please reference Cares/Safety Flowsheet under [facility] Fall Risk for interventions.  Pediatrics - Please reference Peds Daily Cares/Safety Flowsheet under [facility]  Pediatric Fall Assessment Fall Bundle for interventions  LD/OB - Please reference OB Shift Screening Flowsheet under OB Fall Risk for interventions.  Outcome: Progressing     Problem: Discharge Planning  Goal: Discharge to home or other facility with appropriate resources  Description: INTERVENTIONS:  1. Identify and discuss barriers to discharge with patient and caregiver.  2. Arrange for needed discharge resources and transportation as appropriate.  3. Identify discharge learning needs (meds, wound care, etc).  4. Arrange for interpreters to assist at discharge as needed.  5. Refer to  for coordinating discharge planning if the patient needs post-hospital services based on physician order or complex needs related to functional status, cognitive ability or social support system.  Outcome: Progressing  Flowsheets (Taken 6/5/2020 1034)  Discharge to home or other facility with appropriate resources:   Identify and discuss barriers to discharge with patient and caregiver   Identify discharge learning needs (meds, wound care, etc)     Problem: Knowledge Deficit  Goal: Patient/family/caregiver demonstrates understanding of disease process, treatment plan, medications, and discharge instructions  Description: INTERVENTIONS:   1. Complete learning assessment and assess knowledge base  2. Provide teaching at level of understanding   3. Provide teaching via preferred learning methods  Outcome: Progressing  Flowsheets (Taken 6/5/2020 1034)  Patient/family/caregiver demonstrates understanding of disease process, treatment plan, medications, and discharge instructions:   Complete learning assessment and assess knowledge base   Provide teaching via preferred learning methods   Provide teaching at level of understanding     Problem: Potential for Compromised Skin Integrity  Goal: Skin Integrity is Maintained or Improved  Description: INTERVENTIONS:  1. Assess and monitor skin integrity  2. Collaborate with  interdisciplinary team and initiate plans and interventions as needed  3. Alternate a full bath with partial baths for elderly   4. Monitor patient's hygiene practices   5. Collaborate with wound, ostomy, and continence nurse  Outcome: Progressing  Flowsheets (Taken 6/5/2020 1034)  Skin integrity is maintained or improved:   Assess and monitor skin integrity   Monitor patient's hygiene practices  Goal: Nutritional status is improving  Description: INTERVENTIONS:  1. Monitor and assess patient for malnutrition (ex- brittle hair, bruises, dry skin, pale skin and conjunctiva, muscle wasting, smooth red tongue, and disorientation)  2. Monitor patient's weight and dietary intake as ordered or per policy  3. Determine patient's food preferences and provide high-protein, high-caloric foods as appropriate  4. Assist patient with eating   5. Allow adequate time for meals   6. Encourage patient to take dietary supplement as ordered   7. Collaborate with dietitian  8. Include patient/family/caregiver in decisions related to nutrition  Outcome: Progressing  Flowsheets (Taken 6/5/2020 1034)  Nutritional status is improving:   Monitor and assess patient for malnutrition (ex- brittle hair, bruises, dry skin, pale skin and conjunctiva, muscle wasting, smooth red tongue, and disorientation)   Monitor patient's weight and dietary intake as ordered or per policy   Determine patient's food preferences and provide high-protein, high-caloric foods as appropriate   Allow adequate time for meals   Include patient/family/caregiver in decisions related to nutrition  Goal: MOBILITY IS MAINTAINED OR IMPROVED  Description: INTERVENTIONS  1. Collaborate with interdisciplinary team and initiate plan and interventions as ordered (PT/OT)  2. Encourage ambulation  3. Up to chair for meals  4. Monitor for signs of deconditioning  Outcome: Progressing  Flowsheets (Taken 6/5/2020 1034)  Mobility is Maintained or Improved:   Encourage ambulation   Up to  chair for meals   Monitor for signs of deconditioning     Problem: Safety Adult - Fall  Goal: Free from fall injury  Description: INTERVENTIONS:    Inpatient - Please reference Cares/Safety Flowsheet under [facility] Fall Risk for interventions.  Pediatrics - Please reference Peds Daily Cares/Safety Flowsheet under [facility] Pediatric Fall Assessment Fall Bundle for interventions  LD/OB - Please reference OB Shift Screening Flowsheet under OB Fall Risk for interventions.  Outcome: Progressing

## 2020-06-05 NOTE — DISCHARGE SUMMARY
Rehabilitation Discharge Summary     BRIEF OVERVIEW  Admitting Provider:   Derick Small DO  Discharge Provider:  Derick Small DO     Primary Care Physician at Discharge: Referral for new PCP ordered.    Admission Date: 5/27/2020       Discharge Date:  6/5/2020    Discharge Diagnosis  Problem List Items Addressed This Visit        Nervous    CVA (cerebral vascular accident) (CMS/HCC) ([facility]) - Primary    Relevant Medications    aspirin 81 mg EC tablet (Start on 6/6/2020)    clopidogreL (PLAVIX) 75 mg tablet (Start on 6/6/2020)    rosuvastatin (CRESTOR) 40 mg tablet    sertraline (ZOLOFT) 25 mg tablet (Start on 6/6/2020)    Other Relevant Orders    Walker rolling    Ambulatory referral to Family Practice    Walker rolling    Ambulatory referral to Home Health    Ambulatory referral to Family Practice       Respiratory    NIK (obstructive sleep apnea)    Relevant Orders    Ambulatory referral to Family Practice       Circulatory    Benign essential hypertension    Relevant Medications    aspirin 81 mg EC tablet (Start on 6/6/2020)    clopidogreL (PLAVIX) 75 mg tablet (Start on 6/6/2020)    rosuvastatin (CRESTOR) 40 mg tablet    sertraline (ZOLOFT) 25 mg tablet (Start on 6/6/2020)    Other Relevant Orders    Ambulatory referral to Family Practice       Endocrine/Metabolic    Hyperlipidemia    Relevant Medications    rosuvastatin (CRESTOR) 40 mg tablet    Other Relevant Orders    Ambulatory referral to Family Practice    Controlled type 2 diabetes mellitus without complication (CMS/HCC) ([facility])    Relevant Medications    aspirin 81 mg EC tablet (Start on 6/6/2020)    clopidogreL (PLAVIX) 75 mg tablet (Start on 6/6/2020)    rosuvastatin (CRESTOR) 40 mg tablet    sertraline (ZOLOFT) 25 mg tablet (Start on 6/6/2020)    Type 2 diabetes mellitus without complication, with long-term current use of insulin (CMS/HCC) ([facility])    Relevant Orders    Walker rolling    Ambulatory referral to Home Health    Ambulatory referral to  Family Practice    Hypomagnesemia    Relevant Orders    Ambulatory referral to Family Practice      Other Visit Diagnoses     Numbness and tingling in right hand        Relevant Medications    aspirin 81 mg EC tablet (Start on 6/6/2020)    clopidogreL (PLAVIX) 75 mg tablet (Start on 6/6/2020)    rosuvastatin (CRESTOR) 40 mg tablet    sertraline (ZOLOFT) 25 mg tablet (Start on 6/6/2020)    Other Relevant Orders    EMG        HOME HEALTH ATTESTATION:  I attest that I or another qualified licensed provider saw Del Mckeon 90 days prior to or 30 days post admission and this face to face encounter meets the necessary Home Health requirements. The face to face encounter occurred on 06/05/20 .    The encounter with the patient was in whole, or in part, for the following medical condition, which is the primary reason for home health care. Neuro assessment and teaching and Development of an in-home therapy program .    Further, I certify that my clinical findings support this patient's homebound status (i.e. absences from home require considerable and taxing effort, are for health treatment, or for attendance at [employer] events; absences from home for nonmedical reasons are infrequent or are of relatively short duration). Homebound criteria are met because: Unsteady gait, dizziness, syncope.      Discharge Disposition:  06 - Home Health Care      Code Status at Discharge:   Full Code      Outpatient Follow-Up Appointments:  Future Appointments   Date Time Provider Department Center   6/11/2020  8:15 AM Derick Small DO [id number]    6/25/2020  9:50 AM Jimbo Beckett MD [facility]  [address] [facility] [id number] [phone]        Karri Esteves MD  [address]  [facility] [address]  [phone]    Follow up on 6/11/2020  Appointment is at 11:20AM    [facility]  [address]  [phone]  Call on 6/10/2020  They will call with follow-up to set up new PCP appointment.        Test Results Pending at Discharge:  Referrals and Follow-ups to Schedule     Ambulatory referral to Family Practice      Former patient of Dr. Baker. Patient would like to be reassigned to different physician.    Ambulatory referral to Home Health      Please evaluate Del Mckeon for admission to Home Health.    Special Instructions:       The primary reason for home health care is Neuro assessment and teaching and Development of an in-home therapy program and Needs assessment and education due to unstable condition and/or . Homebound criteria are met because: Unsteady gait, dizziness, syncope, Patient requires assist of one or stand by assist to ambulate, Significant weakness following hospital stay and Impaire mobility due to recent fracture surgery, arthritis or paralysis, mobility assist device needed to leave the home and assistance of another person needed to leave the home.    Requested Start of Care Date:  Within 48 hours    Disciplines Requested:   Skilled Nursing  Physical Therapy  Occupational Therapy  Home Health Aide       Services Requested:   Skilled Assessment  Strengthening/Exercise  ADL Training  Functional Safety Training       Physician to follow patient's care:  PCP Comment - Danielito     CMS requires the Home Health referral order to be signed by a physician.        CMS allows a physician, nurse practitioner, or physician assistant to provide supporting documentation in an encounter note and/or discharge summary. The dot phrase .HH must be used in the encounter note to support this referral.                  Discharge Medications:     Your medication list      START taking these medications      Instructions Last Dose Given Next Dose Due   aspirin 81 mg EC tablet  Start taking on:  June 6, 2020  Notes to patient:  Helps prevent clots      Take 1 tablet (81 mg total) by mouth  "daily       clopidogreL 75 mg tablet  Commonly known as:  PLAVIX  Start taking on:  June 6, 2020  Notes to patient:  Helps prevent clots      Take 1 tablet (75 mg total) by mouth daily       rosuvastatin 40 mg tablet  Commonly known as:  CRESTOR  Notes to patient:  Helps lower cholesterol      Take 1 tablet (40 mg total) by mouth nightly       sertraline 25 mg tablet  Commonly known as:  ZOLOFT  Start taking on:  June 6, 2020  Notes to patient:  Depression      Take 1 tablet (25 mg total) by mouth daily          CONTINUE taking these medications      Instructions Last Dose Given Next Dose Due   Fish Oil 340-1,000 mg capsule  Generic drug:  omega-3 fatty acids-fish oil  Notes to patient:  Supplement           insulin syringe-needle U-100 1 mL 29 gauge x 1/2\" syringe  Notes to patient:  Diabetes           losartan 50 mg tablet  Commonly known as:  COZAAR  Notes to patient:  Helps lower blood pressure           NovoLIN N Flexpen 100 unit/mL insulin pen pen  Generic drug:  insulin NPH isoph U-100 human  Notes to patient:  Diabetes           Tylenol PM Extra Strength  mg tablet  Generic drug:  diphenhydrAMINE-acetaminophen  Notes to patient:  Pain           Vitamin D3 25 mcg (1,000 unit) capsule  Generic drug:  cholecalciferol (vitamin D3)  Notes to patient:  Vitamin              STOP taking these medications    Aleve 220 mg tablet  Generic drug:  naproxen sodium        ibuprofen 200 mg tablet  Commonly known as:  ADVILMOTRIN        niacin 500 mg tablet              Where to Get Your Medications      These medications were sent to [facility] [address] [place] [address], [place] - [address]  [address], [facility] [address]    Phone:  [phone]   · aspirin 81 mg EC tablet  · clopidogreL 75 mg tablet  · rosuvastatin 40 mg tablet  · sertraline 25 mg tablet         DETAILS OF HOSPITAL STAY    ACUTE HOSPITAL AND INPATIENT REHABILITATION COURSE    59-year-old male admitted to [facility] 5/21/2020 after " approximately 4 days of left-sided weakness, gait instability. MRI demonstrated acute right pontine CVA, and aspirin, Plavix, statin were started. Sinus rhythm on EKG/telemetry.  His acute stay was noted to have hematuria for which a renal bladder ultrasound was obtained and was negative for any evidence of bleeding or other abnormality.  VFSS was performed and negative for aspiration.  Left-sided weakness, gait ataxia, and disequilibrium are his primary functional deficits upon rehab admission.    - Right pontine CVA. ASA, plavix, statin for 2' ppx.  PT/OT/TR. Neuropsych following.  - Gait ataxia. Fall precautions.  - Essential hypertension. Lisinopril.  - Adjustment disorder.  Zoloft started 6/3. Tolerating well.  - Right hand numbness.  Suspect carpal tunnel syndrome.  Discharge referral for EMG.  - Insomnia due to medical.  DC Benadryl, DC melatonin.  DC trazodone due to confusion.  Melatonin 12 mg p.o. nightly.  - Hyperlipidemia. Statin.  - DM2. Diet, accuchecks, levemir during rehab stay. Resumed home insulin regime for discharge.  - Microscopic hematuria. Neg US kidneys/bladder.  - Hypomagnesemia.  - NIK.  - DVT ppx. Lovenox.  - Code Status:  Full Code     Discharge plans:   Discharge referral to Family medicine ordered.  Discharge order for EMG/NCS RUE ordered.              Current Level of Function   Ambulation  Ambulation: Assessed  Ambulation 1  Surface 1: Level surface  Device 1: Front wheeled walker  Assistance 1: Modified independent  Quality of Gait 1: slow but steady gait  Comments/Distance 1: 60 m x 2     Bed Mobility  Bed Mobility: Not assessed     Balance  Balance: Impaired  Balance: Impaired  Cognition  Orientation Level: Oriented X4  Awareness of Errors: Decreased awareness of errors  Cognition Comments: Visuospatial/Executive: 4/5; Namin/3; Immediate Recall: /10; Attention: 5/6; Language: 2/3; Abstraction: 2/2; Delayed Recall: 5; Memory Index Score: 8/15; Orientation: /6    Toileting  Toileting Bladder Incontinence: No  Toileting Bowel Incontinence: No  Toileting Level of Assistance: Close supervision  Where Assessed: Toilet  Toileting Comments: 3/3 w/SBA for safety in standing.  Grooming  Grooming Level of Assistance: Close supervision, Contact guard  Grooming Where Assessed: Standing sinkside  Grooming Comments: (I) in washing face in shower.  Bathing  Bathing Patient Completed: Right arm, Right upper leg, Right lower leg, Left arm, Left upper leg, Left lower leg, Chest, Abdomen, Latoya area, Buttocks  Bathing Level of Assistance: Distant supervision, Close supervision  Bathing Where Assessed: Shower  Bathing Comments: 10/10 parts w/SUP when sitting and SBA when standing; discussed GB and shower chair for use at home.              Transfers  Transfer: Not Assessed   UE Dressing  UE Dressing Level of Assistance: Independent  UE Dressing Where Assessed: Edge of bed  UE Dressing Comments: 4/4  LE Dressing  LE Dressing: Yes  Pants Level of Assistance: Close supervision  Sock Level of Assistance: Setup  Adult Briefs Level of Assistance: Close supervision  LE Dressing Where Assessed: Edge of bed  LE Dressing Comments: 6/6 to jose underwear and pants w/SBA for safety in standing; 2/2 socks.  RUE Assessment  RUE Assessment: Within Functional Limits(5/5 strength)  LUE Assessment  LUE Assessment: Within Functional Limits(4+/5 strength)           Cognitive Linguistic  Overall Cognitive Status: Impaired  Orientation Level: Oriented X4  Cognitive Assessments: Jeramy Cognitive Assessment (MoCA)  MoCA Score: 22  Insight: Mildly impaired insight  Task Initiation: Within Functional Limits  Awareness of Errors: Decreased awareness of errors  Cognition Comments: Visuospatial/Executive: 4/5; Namin/3; Immediate Recall: 7/10; Attention: 5/6; Language: 2/3; Abstraction: 2/2; Delayed Recall: 1/5; Memory Index Score: 8/15; Orientation: 6/6Memory  Memory: Impaired  Memory Assessments: Nia  "Behavioral Memory Test  [facility] Edition: 3rd Edition  Sum of Scaled Scores: 113  General Memory Index Score: 80Attention  Attention: Within Functional Limits  Dysphagia Screening  Risk Factors: Stroke symptoms  Current Diet/Liquid Consistency: Regular, Thin liquids      Recommendation  Equipment Recommended: Grab bars, Shower chair  Recommendation  Recommendations for Therapy: Continue skilled therapy          DISCHARGE PHYSICAL EXAM:  /95 (BP Location: Left arm, Patient Position: Supine, Cuff Size: Regular Adult)   Pulse 75   Temp 36.8 °C (98.2 °F) (Oral)   Resp 16   Ht 1.8 m (5' 10.87\")   Wt 88.7 kg (195 lb 8.8 oz)   SpO2 96%   BMI 27.38 kg/m²   Weight: 88.7 kg (195 lb 8.8 oz)  GEN: well nourished. No apparent distress.  PSYCH: normal affect.  HEENT: normocephalic. Sclerae anicteric. Mucous membranes moist.  NECK: supple.  CARD: distal pulses intact. Regular rate.  PULM: non-labored.  ABD: non-distended.  EXTREM: no gross deformities .  SKIN: warm and dry, normal turgor.  NEURO: CN grossly intact. Lateralized deficits resolved.  MSK: normal muscle bulk and tone.    Consults:   IM    Procedures:   None.    Pertinent Test Results:   Results from last 4 days   Lab Units 06/03/20  0614   WBC AUTO 10*3/uL 7.0   HEMOGLOBIN g/dL 15.3   HEMATOCRIT % 43.6   PLATELETS AUTO 10*3/uL 197     Results from last 4 days   Lab Units 06/03/20  0614   SODIUM mmol/L 140   POTASSIUM mmol/L 3.6   CHLORIDE mmol/L 103   CO2 mmol/L 28   BUN mg/dL 13   CREATININE mg/dL 0.71   CALCIUM mg/dL 9.3   TOTAL PROTEIN g/dL 6.7   BILIRUBIN TOTAL mg/dL 0.67   ALK PHOS U/L 59   ALT U/L 30   AST U/L 27   GLUCOSE mg/dL 92       Radiology:  Ct Head Without Iv Contrast    Result Date: 5/21/2020  Narrative: Exam: CT of the head without contrast  05/21/2020 1119. Clinical History:  59-year-old male with left-sided weakness x4 days with neurological changes on the left side. Comparison(s): None Technique: Axial imaging was performed through the " head.  Multiplanar reformations were constructed and reviewed.   Dose reduction technique utilized; automatic/anatomic modulation of X-ray tube current (Auto mA). Findings: Skull and scalp are normal. Mild mucosal thickening in the left maxillary sinus. Sulci and ventricles are normal. Some subtle low density in the right temporal lobe and in the left posterior parietal lobe.. No hemorrhage or mass effect.     Impression: IMPRESSION: 1.   Some subtle low density involving the right temporal and left posterior parietal lobes. Could represent subtle infarct. Tumor considered unlikely but not excluded. Recommend MR for further evaluation.     Mr Brain With And Without Contrast    Result Date: 5/21/2020  Narrative: MRI OF THE BRAIN WITH AND WITHOUT CONTRAST 05/21/2020 3605. CLINICAL HISTORY:   59-year-old male with sudden weakness and left temporal and right posterior parietal subtle densities.  Concern for stroke.. COMPARISON(S): CT brain same day for TECHNIQUE: Multiplanar T1 pre and postgadolinium, T2, FLAIR and diffusion-weighted images through the brain.. 15 cc of ProHance administered intravenously. 0 cc's of contrast were discarded from a single use vial. FINDINGS: Mild mucosal thickening involving the left maxillary sinus. There is mild chronic ischemic white matter change and mild atrophy. Small acute infarct involving the anterior lower right danial. No hemorrhage or mass effect at this level. Small areas of hyperintensity on the diffusion-weighted and T2-weighted images involving the left posterior paraventricular region and the left parietal white matter. No corresponding ADC abnormality. Areas of low density involving the right temporal lobe and left parietal lobe on the CT are normal. These areas appear to have represented artifact. No hemorrhage or mass lesion. Normal vascular flow voids.     Impression: IMPRESSION: 1.   Small acute right inferior anterior pontine infarct. 2.  There are Small areas have  abnormal signal intensity involving the left posterior periventricular white matter in the left posterior parietal white matter. Probably chronic ischemic change rather than acute acute infarcts. 3.  Areas of low density on the CT involving the right temporal lobe and left parietal lobe are normal and appear to have represented artifacts on the CT.     Fl Video Swallow With Speech    Result Date: 5/22/2020  Narrative: Exam: Swallowing study from 05/22/2020 Clinical History:  aspiration pneumonia prevention Procedure/Views: Video-fluoroscopic evaluation of swallowing was performed in conjunction with the speech pathology service. The patient was fluoroscopically observed swallowing thin liquids, solid, and semisolid consistencies. Comparison/s: None Fluoroscopy time: 0.8 minutes.  7 Fluorographic images were obtained. Findings: No aspiration. There was one episode with minimal flash penetration with thin consistency. No definite penetration with other consistency. When swallowing barium pill, this was not captured on images. However there was no residual pill in the field-of-view.     Impression: IMPRESSION: No aspiration Comment: Please refer to separate speech pathology report for additional details and recommendations.    Us Renal With Bladder    Result Date: 5/22/2020  Narrative: EXAM: Renal and bladder ultrasound 05/22/2020 . CLINICAL HISTORY:  Hematuria  renal cause suspected COMPARISON: 5/20/19 CT scan TECHNIQUE: Grayscale ultrasound evaluation of both kidneys and the bladder was performed. FINDINGS: The right kidney measures 11.5 x 5.6 x 5.5 cm and the left kidney measures 13.2 x 5.4 x 6.6 cm.  There is no hydronephrosis. Evidence of a focal parenchymal lesion is not seen.  The urinary bladder is nearly empty. No distinct bladder filling defects are identified.   No free fluid is seen in the pelvis.     Impression: IMPRESSION: Negative exam. Ultrasound is an insensitive means of evaluating hematuria. Further  evaluation with CT urogram should be considered.    Us Carotid Duplex Bilateral    Result Date: 5/28/2020  Narrative: Exam: Duplex ultrasound of the bilateral carotid arteries 05/28/2020 Clinical History:  CVA Comparison(s): There are no comparable previous exams Technique: Grayscale, color, and pulsed Doppler evaluation of the bilateral carotid arteries was performed. Findings: RIGHT CAROTID ICA peak systolic velocity 50 cm/s. The ICA to CCA ratio on the right is 1.0. There is antegrade flow seen in the right vertebral artery. LEFT CAROTID ICA peak systolic velocity 77 cm/s. The ICA to CCA ratio on the right is 2.2. There is antegrade flow seen in the left vertebral artery. Bilateral mild plaque     Impression: IMPRESSION: Less than 50% stenosis bilateral ICA US- Based on the Consensus Panel Gray-Scale and Doppler US Criteria for Diagnosis of ICA Stenosis.  Radiology 2003; 229: 340-346.    Us Echo Ltd    Result Date: 5/22/2020  Narrative: · Limited study performed. · Normal left ventricular size, wall thickness and low normal systolic function. EF 52%. · Normal left ventricular wall motion. · Diastolic function was not assessed. · Mild biatrial enlargement. · There is no patent foramen ovale detected by agitated saline. · Mildly dilated right ventricle with normal function. · Calcified aortic valve with stenosis likely, though full Doppler was not performed. Mild regurgitation noted. · Inadequate TR spectral Doppler to accurately assess right ventricular systolic pressure. · I suggest a complete echocardiographic Doppler evaluation of the aortic valve          Total time involved in discharge  process greater than 30 minutes involving patient education, coordination of care and discharge summary preparation.              A voice recognition program was used to aid in documentation of this record. Sometimes words are not presented exactly as they were spoken.  While efforts were made to carefully edit and correct  any inaccuracies, some errors may be present.  Please take this into context.  Please contact the provider if errors are identified.    Electronically signed by:  Derick Small, DO

## 2020-06-05 NOTE — PROGRESS NOTES
Daily Progress Note       LOS: 9 days     Subjective   Patient is a 59-year-old male who is here to inpatient rehab after having an acute pontine CVA.  Patient has been working with our therapist here at rehab and does feel like that has been going well.  He is excited because he will be going home today.  We talked about the importance of continuing with therapy on discharge.  He was diagnosed with adjustment disorder with depressed mood.  He did see Dr. Louise while here at the hospital.  He was started on Zoloft.  This will help with both motor recovery and with his mood.  Hopefully he will continue to work with his providers for this.  He does have diabetes mellitus.  That has been doing fine.  We have been adjusting his Levemir and now he is on it only once a day.  He does have hypertension.  Blood pressure needs to be monitored on discharge.  We did talk about that today.        ROS  No headaches, vision changes, or hearing changes.  No URI symptoms.  No dysphagia or odynophagia.  No shortness of breath, coughing, wheezing or PND.  No chest pain, palpitations, or anginal symptoms.  No nausea/ vomiting, heartburn or abdominal discomfort.  No urinary or bowel changes.  Review of systems otherwise is negative.      Vital signs in last 24 hours:  Temp:  [36.4 °C (97.5 °F)-36.8 °C (98.2 °F)] 36.8 °C (98.2 °F)  Heart Rate:  [75-80] 75  Resp:  [16] 16  BP: (129-143)/(77-95) 143/95    Intake/Output last 3 shifts:  I/O last 3 completed shifts:  In: 1590 [P.O.:1590]  Out: -   Intake/Output this shift:  No intake/output data recorded.    Physical Exam  GENERAL: Patient is alert and oriented, in no acute distress.  HEENT: Normocephalic, atraumatic. Sclerae anicteric.  No tenderness to palpation of sinuses.    NECK: No cervical lymphadenopathy.  Trachea is midline.  LUNGS:  Clear to auscultation bilaterally, both anterior and posterior.  CARDIOVASCULAR EXAM: Regular rate and rhythm without murmur, rub, or gallop.     ABDOMEN:  Soft. Positive bowel sounds in all four quadrants.  No rebound. No guarding.   Nontender, nondistended.  EXTREMITIES:  No clubbing, cyanosis, or edema.    Medications:    Current Facility-Administered Medications:   •  insulin detemir U-100 (LEVEMIR) injection 35 Units, 35 Units, subcutaneous, q AM, Sarah Harris MD, 35 Units at 06/05/20 0900  •  sertraline (ZOLOFT) tablet 25 mg, 25 mg, oral, Daily, Sarah Harris MD, 25 mg at 06/05/20 0751  •  lisinopriL (PRINIVIL,ZESTRIL) tablet 20 mg, 20 mg, oral, 2x daily, Sarah Harris MD, 20 mg at 06/05/20 0751  •  melatonin tablet 12 mg, 12 mg, oral, Nightly, Derick Small DO, 12 mg at 06/04/20 2057  •  magnesium oxide (MAG-OX) tablet 400 mg, 400 mg, oral, Daily, Sarah Harris MD, 400 mg at 06/05/20 0751  •  senna (SENOKOT) tablet 17.2 mg, 17.2 mg, oral, Nightly PRN, Sarah Harris MD  •  magnesium citrate solution 296 mL, 296 mL, oral, 2x daily PRN, Sarah Harris MD, 296 mL at 06/03/20 0802  •  docusate sodium (COLACE) capsule 100 mg, 100 mg, oral, Daily, Sarah Harris MD, 100 mg at 06/05/20 0751  •  traZODone (DESYREL) tablet 50 mg, 50 mg, oral, Nightly PRN, Sarah Harris MD  •  acetaminophen (TYLENOL) tablet 1,000 mg, 1,000 mg, oral, Nightly, Sarah Harris MD, 1,000 mg at 06/04/20 2057  •  diphenhydrAMINE (BENADRYL) tab/cap 25 mg, 25 mg, oral, Nightly PRN, Sarah Harris MD  •  polyethylene glycol (GLYCOLAX) powder 17 g, 17 g, oral, Daily, Sarah Harris MD, 17 g at 06/04/20 0753  •  senna (SENOKOT) tablet 17.2 mg, 17.2 mg, oral, Nightly PRN, Sarah Harris MD  •  magnesium hydroxide (MILK OF MAGNESIA) 400 mg/5 mL oral suspension 30 mL, 30 mL, oral, Daily PRN, Sarah Harris MD, 30 mL at 05/30/20 1446  •  enoxaparin (LOVENOX) syringe 40 mg, 40 mg, subcutaneous, Daily at 1600, Derick Small DO, 40 mg at 06/05/20 1020  •  ondansetron (ZOFRAN) tablet 4 mg, 4 mg, oral, q6h PRN, Derick Small DO  •  rosuvastatin (CRESTOR) tablet 40 mg, 40 mg, oral, Nightly, Derick Small,  "DO, 40 mg at 06/04/20 2057  •  acetaminophen (TYLENOL) tablet 500 mg, 500 mg, oral, q6h PRN, Derick Small DO, 500 mg at 06/04/20 0102  •  aspirin EC tablet 81 mg, 81 mg, oral, Daily, Derick Small DO, 81 mg at 06/05/20 0751  •  cholecalciferol (vitamin D3) 1,000 unit (25mcg) tab/cap 1,000 Units, 1,000 Units, oral, Daily, Derick Small DO, 1,000 Units at 06/05/20 0751  •  clopidogreL (PLAVIX) tablet 75 mg, 75 mg, oral, Daily, Derick Small DO, 75 mg at 06/05/20 0751  •  psyllium (METAMUCIL SUGAR FREE) 1 packet, 1 packet, oral, Daily, Derick Small DO, 1 packet at 06/05/20 0751  •  sodium chloride (OCEAN) 0.65 % nasal spray 2 spray, 2 spray, Each Nostril, PRN, Derick Small DO    Current Outpatient Medications:   •  insulin NPH isoph U-100 human (NovoLIN N Flexpen) 100 unit/mL insulin pen pen, Inject 36 Units under the skin daily, Disp: , Rfl:   •  omega-3 fatty acids-fish oil (Fish Oil) 340-1,000 mg capsule, Take 1 capsule by mouth daily, Disp: , Rfl:   •  losartan (COZAAR) 50 mg tablet, Take 50 mg by mouth 2 (two) times a day  , Disp: , Rfl:   •  diphenhydramine-acetaminophen (TYLENOL PM EXTRA STRENGTH)  mg tablet, Take by mouth nightly as needed  , Disp: , Rfl:   •  cholecalciferol, vitamin D3, (VITAMIN D3) 1,000 unit capsule, Take 1,000 Units by mouth daily  , Disp: 30, Rfl: 6  •  insulin syringe-needle U-100 1 mL 29 gauge x 1/2\" syringe, , Disp: 90, Rfl:   •  [START ON 6/6/2020] aspirin 81 mg EC tablet, Take 1 tablet (81 mg total) by mouth daily, Disp: 30 tablet, Rfl: 0  •  [START ON 6/6/2020] clopidogreL (PLAVIX) 75 mg tablet, Take 1 tablet (75 mg total) by mouth daily, Disp: 30 tablet, Rfl: 0  •  rosuvastatin (CRESTOR) 40 mg tablet, Take 1 tablet (40 mg total) by mouth nightly, Disp: 30 tablet, Rfl: 0  •  [START ON 6/6/2020] sertraline (ZOLOFT) 25 mg tablet, Take 1 tablet (25 mg total) by mouth daily, Disp: 30 tablet, Rfl: 0    Labs:  CBC:   Lab Results   Component Value Date    WBC 7.0 " 06/03/2020    RBC 4.77 06/03/2020    HGB 15.3 06/03/2020    HCT 43.6 06/03/2020     06/03/2020     CMP:   Lab Results   Component Value Date     06/03/2020    K 3.6 06/03/2020     06/03/2020    CO2 28 06/03/2020    GLUCOSE 92 06/03/2020    CREATININE 0.71 06/03/2020    CALCIUM 9.3 06/03/2020    ALBUMIN 3.8 06/03/2020    ALKPHOS 59 06/03/2020    BILITOT 0.67 06/03/2020    ALT 30 06/03/2020    AST 27 06/03/2020    BUN 13 06/03/2020    ANIONGAP 9 06/03/2020     Coagulation: No results found for: PT, INR, APTT              Active Problems:    Benign essential hypertension    Hyperlipidemia    Obstructive sleep apnea syndrome    Controlled type 2 diabetes mellitus without complication (CMS/HCC) (HCC)    Mild aortic sclerosis (CMS/HCC) (HCC)    Ischemic cerebrovascular accident (CVA) (CMS/HCC) (HCC)    Microscopic hematuria    Hypomagnesemia    NIK (obstructive sleep apnea)    CVA (cerebral vascular accident) (CMS/HCC) ([facility])        Assessment and Plan:  1.  Status post right pontine CVA.  Patient's on aspirin, statin and Plavix.  Carotid ultrasound report shows less than 50% stenosis bilateral ICA.  He was started on Zoloft to help with motor recovery.  He has been working with our therapist here at rehab and we did continue with this on discharge.    2.  Hypertension.  Lisinopril was just increased to 20 mg twice daily.  He will need to follow-up with his primary care provider.  3.  Diabetes mellitus.  Patient is now on Levemir 35 units in the morning.  He seems to be doing well with this.  He will monitor his blood sugars and follow-up with his PCP.  4.  Hyperlipidemia.  Patient is on Crestor and doing well.  LFTs have been normal.  5.  Constipation.  Patient has had some issues here while at rehab.  We talked about ways to help with this at home.  He was doing better on discharge.  6.  Microscopic hematuria.  Patient has some issues previously for this.  He has had an ultrasound which was  negative.  7.  Hypomagnesia.  Patient will continue with mag oxide once a day.  8.  Obstructive sleep apnea.  Patient is on oxygen.  He would benefit from being on his CPAP.  9.  Vitamin D deficiency.  Vitamin D is back and it is 34.  10.  DVT prophylaxis.  Patient has been on Lovenox.  11.  Insomnia.  He has had issues here at rehab.  We have had him on his normal home regime of Tylenol, Benadryl and melatonin.  Hopefully he will do better when he gets back home.  12.  Adjustment disorder with depressed mood.  Patient was diagnosed this with Dr. Louise a few days ago.  He has been on Zoloft.  I would encourage him to continue with some counseling or follow-up with his PCP.  13.  All other medical conditions appear to be stable.  At this time I see no contraindication with patient being discharged from the medical standpoint.  Total time spent today has been 35 minutes with more than 50% time spent face-to-face in counseling and coordination of care of patient as outlined in the assessment plan.                  Sarah Harris MD  1:18 PM, 6/5/2020.      A voice recognition program was used to aid in documentation of this record. Sometimes words are not presented exactly as they were spoken.  While efforts were made to carefully edit and correct any inaccuracies, some errors may be present.  Please take this into context.  Please contact the provider if errors are identified.

## 2020-06-05 NOTE — TELEPHONE ENCOUNTER
Caller would like to discuss (a) post hospital Writer has advised caller of a callback from within 24 hours.    Patient: Del L Patton    Caller Name (Last and first, relation/role): self    Name of Facility: [facility]    Callback Number: [phone]    Best Availability: anytime    Fax Number: [phone]     Additional Info: needs post hospital 1-2 weeks. Does not want to see Dr. Esteves    Did you confirm the message with the caller: Yes    Is it okay that the nurse communicates your response through Floqqhart? No

## 2020-06-08 NOTE — INTERDISCIPLINARY/THERAPY
"Physical Therapy  DISCHARGE SUMMARY      Patient Name: Del Mckeon      History/Diagnosis/Past Medical History      Past Medical History:   Diagnosis Date   • Anemia    • Asthma    • Depressive disorder    • Heart murmur    • Hyperlipidemia    • Hypertension    • Obesity    • Obstructive sleep apnea    • Type 2 diabetes mellitus (CMS/HCC) (HCC)        No current facility-administered medications for this encounter.     Current Outpatient Medications:   •  insulin NPH isoph U-100 human (NovoLIN N Flexpen) 100 unit/mL insulin pen pen, Inject 36 Units under the skin daily, Disp: , Rfl:   •  omega-3 fatty acids-fish oil (Fish Oil) 340-1,000 mg capsule, Take 1 capsule by mouth daily, Disp: , Rfl:   •  losartan (COZAAR) 50 mg tablet, Take 50 mg by mouth 2 (two) times a day  , Disp: , Rfl:   •  diphenhydramine-acetaminophen (TYLENOL PM EXTRA STRENGTH)  mg tablet, Take by mouth nightly as needed  , Disp: , Rfl:   •  cholecalciferol, vitamin D3, (VITAMIN D3) 1,000 unit capsule, Take 1,000 Units by mouth daily  , Disp: 30, Rfl: 6  •  insulin syringe-needle U-100 1 mL 29 gauge x 1/2\" syringe, , Disp: 90, Rfl:   •  aspirin 81 mg EC tablet, Take 1 tablet (81 mg total) by mouth daily, Disp: 30 tablet, Rfl: 0  •  clopidogreL (PLAVIX) 75 mg tablet, Take 1 tablet (75 mg total) by mouth daily, Disp: 30 tablet, Rfl: 0  •  rosuvastatin (CRESTOR) 40 mg tablet, Take 1 tablet (40 mg total) by mouth nightly, Disp: 30 tablet, Rfl: 0  •  sertraline (ZOLOFT) 25 mg tablet, Take 1 tablet (25 mg total) by mouth daily, Disp: 30 tablet, Rfl: 0    Penicillins    Active Problems:    Benign essential hypertension    Hyperlipidemia    Obstructive sleep apnea syndrome    Controlled type 2 diabetes mellitus without complication (CMS/HCC) (HCC)    Mild aortic sclerosis (CMS/HCC) (HCC)    Ischemic cerebrovascular accident (CVA) (CMS/HCC) (HCC)    Microscopic hematuria    Hypomagnesemia    NIK (obstructive sleep apnea)    CVA (cerebral vascular " accident) (CMS/[facility]) ([facility])        Admission Date       5/27/2020      Treatment Included:       Bed mobility, Functional transfer training, Gait training, Stair training, Balance training, Endurance training, Therapeutic activities and Therapeutic exercises        Treatment Plan:      Patient was seen by PT services for 75 minutes per day, 5 times per week, with additional therapy services, as needed, based on patient progress for a total of 9 days during this admission.      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                                                                               Static Standing Balance  Static Standing-Balance Surface: Firm  Static Standing-Balance Support: No upper extremity supported  Static Standing-Level of Assistance: Standby assistance  Static Standing-Comment/# of Minutes: narrow KYLE, eyes open/closed     Dynamic Standing Balance  Dynamic Standing-Balance Surface: Firm  Dynamic Standing-Balance Support: No upper extremity supported  Dynamic Standing-Balance: Reaching across midline, Reaching for objects, Forward lean  Dynamic Standing-Level of Assistance: Contact guard x 1  Dynamic Standing-Comments: also toe touches onto balance pod 10 reps     Bed Mobility 1  Bed Mobility From 1: Supine  Bed Mobility Type 1: To and from  Bed Mobility to 1: Short sit  Level of Assistance 1: Standby assistance  Bed Mobility Comments 1: HOB slightly elevated     Transfer 1  Transfer From 1: Wheelchair  Transfer Type 1: To  and from  Transfer to 1: Bed  Technique 1: Stand pivot  Transfer Device 1: Front wheeled walker  Level of Assistance 1: Contact guard assist x 1  Trials/Comments 1: CGA for safety  Transfers 2  Transfer From 2: Wheelchair  Transfer Type 2: To  Transfer to 2: Bed  Technique 2: Stand pivot  Transfer Device 2: Front wheeled walker  Level of Assistance 2: Contact guard assist x 1  Trials/Comments 2: demonstrated safe transfer     Wheelchair Activities  Propulsion: Yes  Description/ Details 1: Self propels w/c x 10m with S/U and BUE, manual, on level carpet.     Ambulation 1  Surface 1: Level surface  Device 1: Front wheeled walker  Assistance 1: Contact guard assist x 1  Quality of Gait 1: slow, narrow KYLE  Comments/Distance 1: 60 m x 2     Stairs 1  Rails 1: Bilateral  Device 1: Gait belt  Assistance 1: Contact guard assist x 1, Verbal cueing  Comment/# Steps 1: 4                      Strength RLE  R Hip Flexion: 4+/5  R Hip ABduction: 4+/5  R Knee Extension: 5/5  R Ankle Dorsiflexion: 5/5    Strength LLE  L Hip Flexion: 3+/5  L Hip ABduction: 3+/5  L Knee Extension: 4/5  L Ankle Dorsiflexion: 4/5    Assessment  Rehab Potential: Good  Progress: Progressing toward goals  Problem List: Impaired balance, Decreased mobility, Decreased strength  Assessment Comment: Pt will benefit from continued skilled PT to work toward his previous indep level.    Recommendation  Recommendations for Therapy: Continue skilled therapy    Plan  Treatment Interventions: Bed mobility, Functional transfer training, Gait training, Stair training, Balance training, Endurance training, Therapeutic activities, Therapeutic exercises  PT Frequency: 5-7x/wk, 1-2x/day  Plan Comment: high level balance, gait safety/tolerance      Discharge FIM Scores                  Discharge Physical Findings:                           Strength RLE  R Hip Flexion: 4+/5  R Hip ABduction: 4+/5  R Knee Extension: 5/5  R Ankle Dorsiflexion: 5/5    Strength LLE  L Hip  "Flexion: 4-/5  L Hip ABduction: 3+/5  L Knee Extension: 4+/5  L Ankle Dorsiflexion: 4+/5    Activity Tolerance  Position: Standing  Standing Endurance: Patient tolerated treatment well, Patient limited by fatigue  Activity Tolerance Comments: Tolerates well.                                        Static Standing Balance  Static Standing-Balance Surface: Firm  Static Standing-Balance Support: No upper extremity supported  Static Standing-Level of Assistance: Modified independent  Static Standing-Comment/# of Minutes: narrow KYLE, eyes open/closed, tandem stance with HHA      Dynamic Standing Balance  Dynamic Standing-Balance Surface: Firm  Dynamic Standing-Balance Support: No upper extremity supported  Dynamic Standing-Balance: Reaching across midline, Forward lean  Dynamic Standing-Level of Assistance: Contact guard                               Standing  Standing-Exercises: Lower extremity  Standing-Exercise Type: Hip flexion, Squats, Heel raises  Standing-Exercise Comments: 10 reps     Other Exercise  Other Exercise Comment: sit to stand 2x7 reps; alternating step touches to 6\" step 3x5 reps R/L                       Neuromuscular Re-education/Vestibular Rehabilitation  Neuromuscular Re-education: Simulating golf activities including golf swing and hitting golf balls to targets in normal golf stance, with CGA, then challenged to use staggered/uneven stance needing min A and cues to improve truncal twisting with core stabilization.      Therapeutic Activities  Therapeutic Activities: sit to stand x 5 from 18 \" surface          Equipment Use  Recumbent Bike : 10 min on SciFit recumbent at level 2 and METS 2.5. Pt educated on neuroplasticity/benefits of aerobic activity on brain health with pt demonstrating engagement with discussion. Cycles with BUE and BLE.  Other Equipment Use: Sci Fit level 1 x 6 min    Bed Mobility 1  Bed Mobility From 1: Supine  Bed Mobility Type 1: To and from  Bed Mobility to 1: Short " sit  Level of Assistance 1: Independent  Bed Mobility Comments 1: HOB slightly elevated    Transfer 1  Transfer From 1: Bed  Transfer Type 1: To and from  Transfer to 1: Chair with arms  Technique 1: Stand pivot  Transfer Device 1: Front wheeled walker  Level of Assistance 1: Modified independent  Trials/Comments 1: demonstrated safe transfers  Transfers 2  Transfer From 2: Stand  Transfer Type 2: To and from  Transfer to 2: Mat  Technique 2: Stand pivot  Transfer Device 2: Front wheeled walker  Level of Assistance 2: Modified independent  Trials/Comments 2: demonstrated safe transfer     Wheelchair Activities  Propulsion: Yes  Description/ Details 1: Self propels w/c x 10m with S/U and BUE, manual, on level carpet.    Ambulation 1  Surface 1: Level surface  Device 1: Front wheeled walker  Assistance 1: Modified independent  Quality of Gait 1: slow but steady gait  Comments/Distance 1: 60 m x 2    Stairs 1  Rails 1: Bilateral  Device 1: Gait belt  Assistance 1: Standby assistance, Verbal cueing  Comment/# Steps 1: 12      Goals Met:    All met      Family Education:      No family here for training      Recommendations/Equipment:      Recommendation. FWW  Recommendations for Therapy: Home Health PT      Discharge Disposition:    Patient discharged to: Home to apartment by himself.

## 2020-06-08 NOTE — INTERDISCIPLINARY/THERAPY
"INPATIENT SPEECH THERAPY DISCHARGE SUMMARY      Patient Name: Del Mckeon  Age: 59 y.o.  Gender: male  Admission Date: 5/27/2020    History/Diagnosis/Past Medical History    Past Medical History:   Diagnosis Date   • Anemia    • Asthma    • Depressive disorder    • Heart murmur    • Hyperlipidemia    • Hypertension    • Obesity    • Obstructive sleep apnea    • Type 2 diabetes mellitus (CMS/HCC) (HCC)        No current facility-administered medications for this encounter.     Current Outpatient Medications:   •  insulin NPH isoph U-100 human (NovoLIN N Flexpen) 100 unit/mL insulin pen pen, Inject 36 Units under the skin daily, Disp: , Rfl:   •  omega-3 fatty acids-fish oil (Fish Oil) 340-1,000 mg capsule, Take 1 capsule by mouth daily, Disp: , Rfl:   •  losartan (COZAAR) 50 mg tablet, Take 50 mg by mouth 2 (two) times a day  , Disp: , Rfl:   •  diphenhydramine-acetaminophen (TYLENOL PM EXTRA STRENGTH)  mg tablet, Take by mouth nightly as needed  , Disp: , Rfl:   •  cholecalciferol, vitamin D3, (VITAMIN D3) 1,000 unit capsule, Take 1,000 Units by mouth daily  , Disp: 30, Rfl: 6  •  insulin syringe-needle U-100 1 mL 29 gauge x 1/2\" syringe, , Disp: 90, Rfl:   •  aspirin 81 mg EC tablet, Take 1 tablet (81 mg total) by mouth daily, Disp: 30 tablet, Rfl: 0  •  clopidogreL (PLAVIX) 75 mg tablet, Take 1 tablet (75 mg total) by mouth daily, Disp: 30 tablet, Rfl: 0  •  rosuvastatin (CRESTOR) 40 mg tablet, Take 1 tablet (40 mg total) by mouth nightly, Disp: 30 tablet, Rfl: 0  •  sertraline (ZOLOFT) 25 mg tablet, Take 1 tablet (25 mg total) by mouth daily, Disp: 30 tablet, Rfl: 0    Allergies  Penicillins    Current Hospital Problems  Active Problems:    Benign essential hypertension    Hyperlipidemia    Obstructive sleep apnea syndrome    Controlled type 2 diabetes mellitus without complication (CMS/HCC) (HCC)    Mild aortic sclerosis (CMS/HCC) (HCC)    Ischemic cerebrovascular accident (CVA) (CMS/HCC) (HCC)    " "Microscopic hematuria    Hypomagnesemia    NIK (obstructive sleep apnea)    CVA (cerebral vascular accident) (CMS/HCC) ([facility])    Treatment(s) Performed     Memory and Dysphagia    Treatment Plan    Patient was seen by SLP services for 30 minutes per day, 5 times per week, with additional therapy services, as needed, based on patient progress for a total of 9 days during this admission.    Initial Physical Findings    General  Additional Pertinent History: Dx: CVA-MRI \"small acute, right inferior anterior pontine infarct.\" Hx: HTN, DM II, aortic sclerosis, cataracts with diminished visual acuity   SLP Treatment Duration (Min): 30 Minutes  Amount of Missed Time (min): 5 Minutes  Missed Time Reason: Toileting  Family/Caregiver Present: No  Home Living Comments: Pt reported that he lives in alone in [place] in an apartment and that he is a retired .   Prior Function Comments: Pt denied hx of swallowing difficulties and avoiding foods due to difficulties chewing or swallowing. Pt eats foods consistent with regular diet (i.e. rivera, ham, hamburger, turkey). Pt reported to have pneumonia 2x in the 1980s. Pt reported managing his own medications (i.e. pills from bottles) and finances (e.g. pays bills, writes checks) and driving and cooking independently.      Cognitive Linguistic  Overall Cognitive Status: Impaired  Degree of Impairment: (Mild)  Orientation Level: Oriented X4  Cognitive Assessments: [place] Cognitive Assessment (MoCA)  MoCA Score: 22  Insight: Mildly impaired insight  Task Initiation: Within Functional Limits  Awareness of Errors: Decreased awareness of errors  Cognition Comments: Visuospatial/Executive: 4/5; Namin/3; Immediate Recall: 7/10; Attention: 5/6; Language: 2/3; Abstraction: 2/2; Delayed Recall: 1/5; Memory Index Score: 8/15; Orientation: 6/6     Memory  Memory: Impaired  Memory Assessments: [place] Behavioral Memory Test  [place] Edition: 3rd Edition  Sum of " Scaled Scores: 113  General Memory Index Score: 80  Attention  Attention: Within Functional Limits    Auditory Comprehension  Yes/No Question Accuracy: Within Functional Limits  Commands: Within Functional Limits  Conversation: Within Functional Limits  Hearing: Within Functional Limits    Dysphagia Screening  Current Diet/Liquid Consistency: Regular, Thin liquids  Oral/Motor  Labial ROM: Within Functional Limits  Labial Symmetry: Within Functional Limits  Labial Strength: Within Functional Limits  Labial Sensation: Within Functional Limits(per pt)  Lingual ROM: Mild reduced left  Lingual Symmetry: Within Functional Limits  Lingual Strength: Within Functional Limits  Mandible: Within Functional Limits  Facial ROM: Mild reduced left  Facial Symmetry: Within Functional Limits  Facial Sensation: Within Functional Limits(per pt)      Initial Impressions: Patient would benefit from ongoing treatment of memory deficits and diet tolerance monitoring.  Regular/thin diet was recommended following VFSS during acute inpatient admission; however, patient continues to demonstrate rare cough with thin liquids.    Discharge Physical Findings    Cognitive Linguistic: WFL.    Memory: Completes basic to moderate delayed recall tasks with 100% accuracy with minimal assist to with modified independence.     Attention: WFL.    Verbal Expression: WFL.    Auditory Comprehension: WFL.    Dysphagia: Consistent tolerance of thin liquids via cup versus straw were observed; therefore regular/thin diet with no straws was recommended.    Discharge Impressions: Patient benefits from use of external aids to support delayed recall.  Patient is judged to be safe to continue on regular/thin diet with no straws.    Goals Met    Yes    Family Education    Patient was educated about internal and external compensatory memory strategies.    Recommendations    Ongoing use of external compensatory memory strategies is recommended for managing appointments  and medications (e.g., pill organizer, print outs or cards from provider for appointments).  Further ST services not recommended at this time.    Patient discharged to:  Home      _________________  Emma Phelps CCC-SLP  06/08/20 11:02 AM

## 2020-06-08 NOTE — TELEPHONE ENCOUNTER
Return call to Del. Scheduled with Dr. Gasca 06/18/2020. Explained screening procedures and visitor policy. Encouraged Del to bring his medications with him to his appointment. States understanding.

## 2020-06-09 NOTE — INTERDISCIPLINARY/THERAPY
"Occupational Therapy  DISCHARGE SUMMARY      Patient Name: Del Mckeon      History/Diagnosis/Past Medical History      Past Medical History:   Diagnosis Date   • Anemia    • Asthma    • Depressive disorder    • Heart murmur    • Hyperlipidemia    • Hypertension    • Obesity    • Obstructive sleep apnea    • Type 2 diabetes mellitus (CMS/HCC) (HCC)        No current facility-administered medications for this encounter.     Current Outpatient Medications:   •  insulin NPH isoph U-100 human (NovoLIN N Flexpen) 100 unit/mL insulin pen pen, Inject 36 Units under the skin daily, Disp: , Rfl:   •  omega-3 fatty acids-fish oil (Fish Oil) 340-1,000 mg capsule, Take 1 capsule by mouth daily, Disp: , Rfl:   •  losartan (COZAAR) 50 mg tablet, Take 50 mg by mouth 2 (two) times a day  , Disp: , Rfl:   •  diphenhydramine-acetaminophen (TYLENOL PM EXTRA STRENGTH)  mg tablet, Take by mouth nightly as needed  , Disp: , Rfl:   •  cholecalciferol, vitamin D3, (VITAMIN D3) 1,000 unit capsule, Take 1,000 Units by mouth daily  , Disp: 30, Rfl: 6  •  insulin syringe-needle U-100 1 mL 29 gauge x 1/2\" syringe, , Disp: 90, Rfl:   •  aspirin 81 mg EC tablet, Take 1 tablet (81 mg total) by mouth daily, Disp: 30 tablet, Rfl: 0  •  clopidogreL (PLAVIX) 75 mg tablet, Take 1 tablet (75 mg total) by mouth daily, Disp: 30 tablet, Rfl: 0  •  rosuvastatin (CRESTOR) 40 mg tablet, Take 1 tablet (40 mg total) by mouth nightly, Disp: 30 tablet, Rfl: 0  •  sertraline (ZOLOFT) 25 mg tablet, Take 1 tablet (25 mg total) by mouth daily, Disp: 30 tablet, Rfl: 0    Penicillins    Active Problems:    Benign essential hypertension    Hyperlipidemia    Obstructive sleep apnea syndrome    Controlled type 2 diabetes mellitus without complication (CMS/HCC) (HCC)    Mild aortic sclerosis (CMS/HCC) (HCC)    Ischemic cerebrovascular accident (CVA) (CMS/HCC) (HCC)    Microscopic hematuria    Hypomagnesemia    NIK (obstructive sleep apnea)    CVA (cerebral " vascular accident) (CMS/HCC) ([facility])        Admission Date       5/27/2020        Treatment Included:       ADL retraining  Therapeutic activity  Therapeutic exercise  Neuromuscular reeducation  UE strengthening/ROM  Endurance training  Cognitive skills development  Patient/family training  Equipment evaluation/education  Fine motor coordination activities  Compensatory technique education        Treatment Plan:    Patient was seen by OT services for 75 minutes per day, 5 times per week, with additional therapy services, as needed, based on patient progress for a total of 9 days during this admission.        Initial Physical Findings:      General  Chart Reviewed: Yes  Therapy Treatment Diagnosis: CVA  OT Treatment Duration (Min): 75 Minutes  Is this a Co-Treatment?: No  Additional Pertinent History: DM. HTN.  Family/Caregiver Present: No     Pain Assessment  Pain Assessment: No/denies pain        Balance  Balance: Impaired              Dynamic Standing Balance  Dynamic Standing-Balance Surface: Firm  Dynamic Standing-Balance Support: (unilateral UE supported )  Dynamic Standing-Balance: Reaching across midline, Reaching for objects  Dynamic Standing-Level of Assistance: Contact guard x 1, Standby assistance  Dynamic Standing-Comments: CGA for safety in standing during ADLs.     Vision-Basic Assessment  Visual History: Cataracts     Hand Function  Gross Grasp: R Functional, L Functional  /Pinch Strength: Right hand, Left hand  Right Hand  Strength (lbs): 45 lbs  Left Hand  Strength (lbs): 59 lbs  Right Lateral Pinch Strength (lbs): 7 lbs  left Lateral Pinch Strength (lbs): 14 lbs  Right Palmar Pinch Strength (lbs): 5 lbs  Left Palmar Pinch Strength (lbs): 11 lbs  Coordination: R Functional, L Functional  Nine Hole Peg Test  Nine Hole Peg Test Dominant Hand Score (in seconds): 38 seconds  Nine Hole Peg Test Non-Dominant Hand Score (in seconds): 54 seconds  Box and Blocks Test  Box and Blocks Dominant Hand  (1 Minute): 26  Box and Blocks Non-Dominant Hand (1 Minute): 26  Eating  Eating Level of Assistance: Setup  Eating Where Assessed: Wheelchair  Eating Comments: Pt able to set-up tray indep,    Grooming  Grooming Level of Assistance: Setup  Grooming Where Assessed: Wheelchair  Grooming Comments: S/U A while seated.  Bathing  Bathing Adaptive Equipment: (Shower chair, GB, HHSH)  Bathing Patient Completed: Right arm, Right upper leg, Right lower leg, Left arm, Left upper leg, Left lower leg, Chest, Abdomen, Latoya area, Buttocks  Bathing Level of Assistance: Contact guard  Bathing Where Assessed: Shower  Bathing Comments: 10/10 parts w/CGA when leaning over to wash feet and for safety in standing.  UE Dressing  UE Dressing Level of Assistance: Setup  UE Dressing Where Assessed: Wheelchair  UE Dressing Comments: 4/4 to jose pullover shirt.  LE Dressing  LE Dressing: Yes  Pants Level of Assistance: Minimum assistance  Sock Level of Assistance: Setup  Adult Briefs Level of Assistance: Contact guard  LE Dressing Where Assessed: Wheelchair  LE Dressing Comments: 5/6 to jose underwear and pants w/min A to thread into pants and CGA for safety in standing; 2/2 socks.  Toileting  Toileting Bladder Incontinence: No  Toileting Bowel Incontinence: No  Toileting Level of Assistance: Contact guard  Where Assessed: Toilet  Toileting Comments: 3/3 w/CGA for safety in standing.                 Meal Prep  Meal Preparation: Pt reported 0 questions/concerns; did discuss Meals-on-Wheels.  Kitchen Mobility  Kitchen Mobility Comments: Discussed kitchen safety and efficiency, including having often used items easily accessible.              Money Management  Money Management: Pt reported 0 questions/concerns.  Medication Management  Medication Management Level of Assistance: Minimal verbal cues  Medication Management: Pt demo'd 75% accuracy with medication management with vcs required to initiate and for direction following of prescription  bottles.      RUE Assessment  RUE Assessment: Within Functional Limits(5/5 strength)            Light Touch  RUE Light Touch: Grossly intact  LUE Light Touch: Grossly intact        Perception  Inattention/Neglect: Appears intact  Initiation: Appears intact  Motor Planning: Appears intact  Perseveration: Not present        Discharge Physical Findings:      General  Chart Reviewed: Yes  Therapy Treatment Diagnosis: CVA  OT Treatment Duration (Min): 30 Minutes  Is this a Co-Treatment?: No  Additional Pertinent History: DM. HTN.  Family/Caregiver Present: No     Pain Assessment  Pain Assessment: No/denies pain        Balance  Balance: Impaired              Dynamic Standing Balance  Dynamic Standing-Balance Surface: Firm  Dynamic Standing-Balance Support: (unilateral UE supported )  Dynamic Standing-Balance: Reaching across midline, Reaching for objects  Dynamic Standing-Level of Assistance: Contact guard x 1, Standby assistance  Dynamic Standing-Comments: SBA>CGA for safety in standing during ADLs.     Vision-Basic Assessment  Visual History: Cataracts     Hand Function  Gross Grasp: R Functional, L Functional  /Pinch Strength: Right hand, Left hand  Right Hand  Strength (lbs): 55 lbs  Left Hand  Strength (lbs): 65 lbs  Right Lateral Pinch Strength (lbs): 8 lbs  left Lateral Pinch Strength (lbs): 15 lbs  Right Palmar Pinch Strength (lbs): 7 lbs  Left Palmar Pinch Strength (lbs): 13 lbs  Coordination: R Functional, L Functional  Nine Hole Peg Test  Nine Hole Peg Test Dominant Hand Score (in seconds): 39 seconds  Nine Hole Peg Test Non-Dominant Hand Score (in seconds): 50 seconds  Box and Blocks Test  Box and Blocks Dominant Hand (1 Minute): 26  Box and Blocks Non-Dominant Hand (1 Minute): 26  Eating  Eating Level of Assistance: Modified independent  Eating Where Assessed: Wheelchair  Eating Comments: Mod (I) w/use of built-up handle.  Grooming  Grooming Level of Assistance: Close supervision, Contact  guard  Grooming Where Assessed: Standing sinkside  Grooming Comments: (I) in washing face in shower.  Bathing  Bathing Adaptive Equipment: (Shower chair, GB, HHSH )  Bathing Patient Completed: Right arm, Right upper leg, Right lower leg, Left arm, Left upper leg, Left lower leg, Chest, Abdomen, Latoya area, Buttocks  Bathing Level of Assistance: Distant supervision, Close supervision  Bathing Where Assessed: Shower  Bathing Comments: 10/10 parts w/SUP when sitting and SBA when standing; discussed GB and shower chair for use at home.  UE Dressing  UE Dressing Level of Assistance: Independent  UE Dressing Where Assessed: Edge of bed  UE Dressing Comments: 4/4  LE Dressing  LE Dressing: Yes  Pants Level of Assistance: Close supervision  Sock Level of Assistance: Setup  Adult Briefs Level of Assistance: Close supervision  LE Dressing Where Assessed: Edge of bed  LE Dressing Comments: 6/6 to jose underwear and pants w/SBA for safety in standing; 2/2 socks.  Toileting  Toileting Bladder Incontinence: No  Toileting Bowel Incontinence: No  Toileting Level of Assistance: Close supervision  Where Assessed: Toilet  Toileting Comments: 3/3 w/SBA for safety in standing.                 Meal Prep  Meal Preparation: Pt reported 0 questions/concerns; did discuss Meals-on-Wheels.  Kitchen Mobility  Kitchen Mobility Comments: Discussed kitchen safety and efficiency, including having often used items easily accessible.              Money Management  Money Management: Pt reported 0 questions/concerns.  Medication Management  Medication Management Level of Assistance: Minimal verbal cues  Medication Management: Pt reported 0 questions/concerns.     RUE Assessment  RUE Assessment: Within Functional Limits(5/5 strength)            Therapeutic Exercise  Strengthening: Yes  Side Exercised - Strengthening: Bilateral  Bilateral Shoulder Motion : Flexion  Bilateral Elbow Motion : Elbow flexion, Elbow extension  Bilateral Forearm Motion : Forearm  pronation, Forearm supination  Bilateral Wrist Motion: Wrist flexion, Wrist extension  Exercise Equipment Used: Weights  Bilateral Rep/Sets : 10 reps x 1 set   Weight: 3lbs   Bilateral Comment: Demo'd and discussed proper technique with UE exercises.      OT Therapeutic Groups  OT Therapeutic: Yes  UE Therapeutic Activity: pt completed nuts and bolts board x24 pieces manipulating items on and off of board utilizing only RUE with mild difficulty. Increased time needed. pt completed additional RUE clothes pin activity of all grades of stength 25 pieces. Increased time needed         Additional Activities  Additional Activities Comments: Discussed OT goals/POC and d/c plan, as well as energy conservation, safety awareness and recommendations for home. Pt was left seated in w/c w/alarm on and call light and needs w/in reach.  Coordination  Gross Motor: Pt voicing that he is concerned that he has lost gross motor strength in his hands over the last several years.   Fine Motor: Pt educated on and participated in variety of t-putty exercises to increase , pincer and intrinsic strength; pt reported green t-putty was tiring and was provided w/orange t-putty. Pt (I) in stringing 10/10 beads w/increased time to complete.  Cognition  Arousal/Alertness: Within Functional Limits  Cognition Comment: Appropriate to task and commands            Goals Met:      Yes      Family Education:      Pt educated on energy conservation, safety awareness, possible DME/AE and OT HEP.      Recommendations/Equipment:      Recommendation  Equipment Recommended: Grab bars, Shower chair      Discharge Disposition:    Patient discharged to: home

## 2020-06-10 ENCOUNTER — OFFICE VISIT (OUTPATIENT)
Dept: FAMILY MEDICINE | Facility: CLINIC | Age: 60
End: 2020-06-10
Payer: COMMERCIAL

## 2020-06-10 ENCOUNTER — APPOINTMENT (OUTPATIENT)
Dept: LAB | Facility: CLINIC | Age: 60
End: 2020-06-10
Payer: COMMERCIAL

## 2020-06-10 VITALS
HEIGHT: 71 IN | OXYGEN SATURATION: 98 % | SYSTOLIC BLOOD PRESSURE: 138 MMHG | WEIGHT: 201.6 LBS | TEMPERATURE: 97.9 F | HEART RATE: 93 BPM | BODY MASS INDEX: 28.22 KG/M2 | RESPIRATION RATE: 16 BRPM | DIASTOLIC BLOOD PRESSURE: 94 MMHG

## 2020-06-10 DIAGNOSIS — E55.9 VITAMIN D DEFICIENCY: ICD-10-CM

## 2020-06-10 DIAGNOSIS — E11.9 TYPE 2 DIABETES MELLITUS WITHOUT COMPLICATION, WITH LONG-TERM CURRENT USE OF INSULIN (CMS/HCC): ICD-10-CM

## 2020-06-10 DIAGNOSIS — E78.2 MIXED HYPERLIPIDEMIA: ICD-10-CM

## 2020-06-10 DIAGNOSIS — I63.9 ISCHEMIC CEREBROVASCULAR ACCIDENT (CVA) (CMS/HCC): Primary | ICD-10-CM

## 2020-06-10 DIAGNOSIS — F43.21 ADJUSTMENT DISORDER WITH DEPRESSED MOOD: ICD-10-CM

## 2020-06-10 DIAGNOSIS — R20.2 NUMBNESS AND TINGLING IN RIGHT HAND: ICD-10-CM

## 2020-06-10 DIAGNOSIS — R31.29 MICROSCOPIC HEMATURIA: ICD-10-CM

## 2020-06-10 DIAGNOSIS — R20.0 NUMBNESS AND TINGLING IN RIGHT HAND: ICD-10-CM

## 2020-06-10 DIAGNOSIS — I10 BENIGN ESSENTIAL HYPERTENSION: ICD-10-CM

## 2020-06-10 DIAGNOSIS — G47.33 OBSTRUCTIVE SLEEP APNEA SYNDROME: ICD-10-CM

## 2020-06-10 DIAGNOSIS — Z79.4 TYPE 2 DIABETES MELLITUS WITHOUT COMPLICATION, WITH LONG-TERM CURRENT USE OF INSULIN (CMS/HCC): ICD-10-CM

## 2020-06-10 LAB
ANION GAP SERPL CALC-SCNC: 8 MMOL/L (ref 3–11)
BASOPHILS # BLD AUTO: 0.1 10*3/UL
BASOPHILS NFR BLD AUTO: 1 % (ref 0–2)
BUN SERPL-MCNC: 17 MG/DL (ref 7–25)
CALCIUM SERPL-MCNC: 9.4 MG/DL (ref 8.6–10.3)
CHLORIDE SERPL-SCNC: 105 MMOL/L (ref 98–107)
CO2 SERPL-SCNC: 25 MMOL/L (ref 21–32)
CREAT SERPL-MCNC: 0.76 MG/DL (ref 0.7–1.3)
EOSINOPHIL # BLD AUTO: 0.1 10*3/UL
EOSINOPHIL NFR BLD AUTO: 1 % (ref 0–3)
ERYTHROCYTE [DISTWIDTH] IN BLOOD BY AUTOMATED COUNT: 14 % (ref 11.5–15)
GFR SERPL CREATININE-BSD FRML MDRD: 100 ML/MIN/1.73M*2
GLUCOSE SERPL-MCNC: 120 MG/DL (ref 70–105)
HCT VFR BLD AUTO: 43.1 % (ref 38–50)
HGB BLD-MCNC: 14.9 G/DL (ref 13.2–17.2)
LYMPHOCYTES # BLD AUTO: 2.3 10*3/UL
LYMPHOCYTES NFR BLD AUTO: 27 % (ref 15–47)
MAGNESIUM SERPL-MCNC: 1.7 MG/DL (ref 1.8–2.4)
MCH RBC QN AUTO: 32.1 PG (ref 29–34)
MCHC RBC AUTO-ENTMCNC: 34.6 G/DL (ref 32–36)
MCV RBC AUTO: 92.8 FL (ref 82–97)
MONOCYTES # BLD AUTO: 0.5 10*3/UL
MONOCYTES NFR BLD AUTO: 6 % (ref 5–13)
NEUTROPHILS # BLD AUTO: 5.5 10*3/UL
NEUTROPHILS NFR BLD AUTO: 65 % (ref 46–70)
PLATELET # BLD AUTO: 223 10*3/UL (ref 130–350)
PMV BLD AUTO: 8.7 FL (ref 6.9–10.8)
POTASSIUM SERPL-SCNC: 3.9 MMOL/L (ref 3.5–5.1)
RBC # BLD AUTO: 4.65 10*6/ΜL (ref 4.1–5.8)
SODIUM SERPL-SCNC: 138 MMOL/L (ref 135–145)
WBC # BLD AUTO: 8.5 10*3/UL (ref 3.7–9.6)

## 2020-06-10 PROCEDURE — 83735 ASSAY OF MAGNESIUM: CPT | Performed by: FAMILY MEDICINE

## 2020-06-10 PROCEDURE — 99215 OFFICE O/P EST HI 40 MIN: CPT | Performed by: FAMILY MEDICINE

## 2020-06-10 PROCEDURE — 80048 BASIC METABOLIC PNL TOTAL CA: CPT | Performed by: FAMILY MEDICINE

## 2020-06-10 PROCEDURE — 85025 COMPLETE CBC W/AUTO DIFF WBC: CPT | Performed by: FAMILY MEDICINE

## 2020-06-10 PROCEDURE — 36415 COLL VENOUS BLD VENIPUNCTURE: CPT | Performed by: FAMILY MEDICINE

## 2020-06-10 RX ORDER — MELATONIN 5 MG
5 CAPSULE ORAL NIGHTLY PRN
COMMUNITY

## 2020-06-10 RX ORDER — GLUCOSAMINE/CHONDR SU A SOD 167-133 MG
500 CAPSULE ORAL 2 TIMES DAILY WITH MEALS
COMMUNITY
End: 2020-06-10 | Stop reason: ALTCHOICE

## 2020-06-10 RX ORDER — LOSARTAN POTASSIUM 100 MG/1
100 TABLET ORAL DAILY
Qty: 30 TABLET | Refills: 11 | Status: SHIPPED | OUTPATIENT
Start: 2020-06-10 | End: 2020-06-16 | Stop reason: SDUPTHER

## 2020-06-10 RX ORDER — NAPROXEN 250 MG/1
250 TABLET ORAL AS NEEDED
COMMUNITY
End: 2020-06-10 | Stop reason: ALTCHOICE

## 2020-06-10 RX ORDER — AMLODIPINE BESYLATE 5 MG/1
5 TABLET ORAL NIGHTLY
Qty: 30 TABLET | Refills: 11 | Status: SHIPPED | OUTPATIENT
Start: 2020-06-10 | End: 2020-06-11 | Stop reason: SDUPTHER

## 2020-06-10 RX ORDER — MAGNESIUM HYDROXIDE 400 MG/5ML
1 SUSPENSION, ORAL (FINAL DOSE FORM) ORAL 2 TIMES DAILY
COMMUNITY
End: 2021-01-06 | Stop reason: ALTCHOICE

## 2020-06-10 RX ORDER — SERTRALINE HYDROCHLORIDE 25 MG/1
25 TABLET, FILM COATED ORAL DAILY
Status: CANCELLED | OUTPATIENT
Start: 2020-06-10 | End: 2020-07-10

## 2020-06-10 ASSESSMENT — PAIN SCALES - GENERAL: PAINLEVEL: 0-NO PAIN

## 2020-06-10 NOTE — ASSESSMENT & PLAN NOTE
Patient is still with significant left sided weakness.  He also has unsteady gait and is unstable with standing.  He needs to continue walking with his walker for support.  Due to his left-sided weakness and his unsteady gait, he needs to continue physical therapy however he cannot drive because he does not have safe vision with his cataracts as well as with his weakness he is unable to drive safely.  Due to this, he is homebound and needs to have home health with home physical therapy, home speech therapy, Occupational Therapy and skilled nursing assessment to assist in medication administration.  Due to his weakness he is unable to administer his insulin or check his blood sugar safely and home health would be able to assist in this. I'm placing a referral to Home health:     Special Instructions:   Patient is s/p right inferior anterior pontine stroke with residual left sided weakness, occasional word finding difficulty and slower mentation. Also diabetic and hypertensive. Please eval and admit to home health for assistance with medications, PT/OT/ST     The primary reason for home health care is for care post stroke and assistance with management of diabetes and HTN. Homebound criteria are met because: Illness requires assistance of another person or assistive device to leave residence, Unsteady gait, dizziness, syncope, Significant weakness following hospital stay and mobility assist device needed to leave the home.     In regards to risk factors for his cerebrovascular disease, he has HLD and is on statin therapy. ASA 81mg daily and plavix 75mg daily. He does not smoke. Diabetes needs to be optimally controlled as below. I'm placing a neuro consult as he has had a stroke and he also needs to see sleep med for his NIK.

## 2020-06-10 NOTE — PROGRESS NOTES
Outpatient Progress Note    SUBJECTIVE:   ID/CC: Del Mckeon is a 59 y.o. male presenting to clinic today for   Chief Complaint   Patient presents with   • Establish Care   • Cerebrovascular Accident     feels very tires the last couple of days.    • Gait Problem     Having more troubles walking that last couple of days. He was doing fine over the weekend. 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  He also has a past medical history of a type 2 diabetes, essential hypertension, obstructive sleep apnea and hyperlipidemia.  He also has aortic valve sclerosis.  He is here today to establish care with me after recently getting discharged from the hospital on 6/5/2020 after his stroke.    He presented to the hospital on May 21 after having left-sided weakness and gait instability.  MRI at that time it showed right pontine ischemic stroke.  He was initiated on aspirin 81 mg daily, Plavix 75 mg daily and rosuvastatin 40 mg daily.  His hospitalization was unremarkable and he was transferred to inpatient rehab.  He stayed in inpatient rehab for approximately 2 weeks and was recently discharged this past Friday, for 4 days ago.  He was supposed to be discharged with home health however he has not heard from them.  They will need a new order as they did not have the following provider.      Since being home, he has noticed significant fatigue and feeling like he is weak on both the left side and the right side.  He also feels like he is running low on energy.  However, he is not as active as he was in rehab.  However he has been trying to get up and walk around his apartment building.  He is able to do this with his walker.  However he does tire very easily.    He has extensive cataract so he does not drive.  He does not have any family members or friends in town  who can drive him around.  He got to this appointment today by his landlord's wife and stated that his landlord was a bit disgruntled about this.  He usually uses [employer] but since that is down right now due to coronavirus, he has no way to get around.  He also is very weak and his gait and feels unbalanced.  He is mostly homebound.    He does state that he has had some right-sided hand weakness that was present since before his stroke.  He feels like his  strength has weakened.  He denies any numbness or tingling in his hand or fingers.  He denies any swelling of his hand.  He also denies any wrist elbow or shoulder pain.  He simply feels over the last year or so he has been unable to do things such as holding spoon or fork or toothbrush or opening jars also been difficult.    He does have type 2 diabetes.  He is currently on 36 units of Novolin and each morning before breakfast.  He was previously checking his blood sugars prior to his stroke but has not been checking them since he has been home due to the fact that his left hand is still weak from the stroke and he is unable to see the glucometer due to his cataracts.  He also struggles with giving himself the insulin injection but is able to do so.  He does state that he has been pretty sensitive with his blood sugars in the past and that his sugars will range from 60s all the way up to 400s.  However most of the time his blood sugars were in the 120s to 180s.  In the hospital, he was started on Levemir 35 units in the morning.  However this was changed to Novolin due to insurance coverage and this was what he was taking prior to coming into the hospital.  His A1c was 5.8% on admission.    In regards to his cholesterol, he did have a lipid panel in the emergency department did have an elevated LDL of 155 with a low HDL of 32 and a total cholesterol in the 260s.  His triglycerides were 160.  He is currently onRosuvastatin 40 mg.  This was started in the  hospital.  He was instructed to stop taking niacin however he has continued this.    His blood pressures were also elevated in the hospital.  Since he has been home, his blood pressures have been running in the 160s over 90s.  He is currently on losartan 50 mg in the morning and 50 mg at night.  This was not adjusted inpatient.  However there was some permissive hypertension allowed.  He is not having any headache, blurry vision or changes in vision, chest pain, shortness of breath, cough or wheezing, changes in urination or swelling in his hands or feet.    NIK: he states he has a cpap but is unable to tell me the condition of his machine or his settings or the last polysomnogram he has had. He does not use his cpap machine regularly.     Hematuria: he did have some hematuria on his UA in the hospital. This needs to be repeated in 1 month and potentially a referral to urology if still present.     Tobacco: denies  Alcohol: denies   IDU: denies .    Review of Systems  12 point ROS reviewed and negative except as in HPI.     Past Medical History:   Diagnosis Date   • Anemia    • Asthma    • Depressive disorder    • Heart murmur    • Hyperlipidemia    • Hypertension    • Obesity    • Obstructive sleep apnea    • Type 2 diabetes mellitus (CMS/HCC) (HCC)        Past Surgical History:   Procedure Laterality Date   • CRYOTHERAPY      dermatology         Current Outpatient Medications:   •  aspirin 81 mg EC tablet, Take 1 tablet (81 mg total) by mouth daily, Disp: 30 tablet, Rfl: 0  •  cholecalciferol, vitamin D3, (Vitamin D3) 25 mcg (1,000 unit) capsule, Take 1 capsule (1,000 Units total) by mouth daily, Disp: 30 capsule, Rfl: 3  •  clopidogreL (PLAVIX) 75 mg tablet, Take 1 tablet (75 mg total) by mouth daily, Disp: 30 tablet, Rfl: 3  •  insulin NPH isoph U-100 human (NovoLIN N Flexpen) 100 unit/mL insulin pen pen, Inject 0.36 mL (36 Units total) under the skin daily, Disp: 4 pen, Rfl: 3  •  insulin syringe-needle U-100 1  "mL 29 gauge x 1/2\" syringe, Inject 1 application under the skin daily, Disp: 30 each, Rfl: 3  •  rosuvastatin (CRESTOR) 40 mg tablet, Take 1 tablet (40 mg total) by mouth nightly, Disp: 30 tablet, Rfl: 3  •  sertraline (ZOLOFT) 25 mg tablet, Take 1 tablet (25 mg total) by mouth daily, Disp: 30 tablet, Rfl: 3  •  multivitamin with minerals tablet, Take 1 tablet by mouth daily, Disp: , Rfl:   •  melatonin 5 mg capsule, Take 5 mg by mouth nightly as needed, Disp: , Rfl:   •  potassium gluconate 595 mg (99 mg) tablet, Take 1 tablet by mouth 2 (two) times a day, Disp: , Rfl:   •  losartan (COZAAR) 100 mg tablet, Take 1 tablet (100 mg total) by mouth daily, Disp: 30 tablet, Rfl: 11  •  diphenhydramine-acetaminophen (TYLENOL PM EXTRA STRENGTH)  mg tablet, Take by mouth nightly as needed  , Disp: , Rfl:   •  amLODIPine (NORVASC) 5 mg tablet, Take 1 tablet (5 mg total) by mouth nightly, Disp: 30 tablet, Rfl: 0    Allergies   Allergen Reactions   • Penicillins      Tested as a child; showed positive       Social History     Socioeconomic History   • Marital status:      Spouse name: Not on file   • Number of children: Not on file   • Years of education: Not on file   • Highest education level: Not on file   Occupational History   • Not on file   Social Needs   • Financial resource strain: Not on file   • Food insecurity     Worry: Not on file     Inability: Not on file   • Transportation needs     Medical: Not on file     Non-medical: Not on file   Tobacco Use   • Smoking status: Never Smoker   • Smokeless tobacco: Never Used   Substance and Sexual Activity   • Alcohol use: No   • Drug use: Not Currently     Types: Hydrocodone     Comment: 3 times daily, low back/sciatica pain   • Sexual activity: Not Currently   Lifestyle   • Physical activity     Days per week: Not on file     Minutes per session: Not on file   • Stress: Not on file   Relationships   • Social connections     Talks on phone: Not on file     " Gets together: Not on file     Attends Taoism service: Not on file     Active member of club or organization: Not on file     Attends meetings of clubs or organizations: Not on file     Relationship status: Not on file   • Intimate partner violence     Fear of current or ex partner: Not on file     Emotionally abused: Not on file     Physically abused: Not on file     Forced sexual activity: Not on file   Other Topics Concern   • Not on file   Social History Narrative   • Not on file       Family History   Problem Relation Age of Onset   • Alzheimer's disease Father    • Diabetes type II Father    • Diabetes Sister        OBJECTIVE:   Vitals:   Vitals:    06/10/20 0918   BP: 138/94   Pulse: 93   Resp: 16   Temp: 36.6 °C (97.9 °F)   SpO2: 98%     Weights (last 14 days)     Date/Time   Weight    06/10/20 0918   91.4 kg (201 lb 9.6 oz)                Physical Exam:   General: Alert and oriented. In no acute distress but appears tired.   Head:normocephalic and atraumatic. No tenderness palpated  Eyes:Pupils equal equal and reactive, no corneal or scleral injection.  ENT: TMs visible without bulging or erythema. No nasal lesions. No oral lesions.   Cardio: S1 S2 without extra sounds or murmurs.  No edema. PP intact. Cap refill <2s.  No JVD  Pulm: CTAB without wheezing or crackles. Symmetrical air exchange.   Gi: BS present x4. Soft, nontender and non distended.  No masses palpated.  Ext: Full range of motion of all extremities.  No joint tenderness or erythema or swelling noted.  Neuro: Sensation intact bilaterally. Left side is obviously weaker than right in upper and lower extremities. Mild left sided facial droop. Gait is slow and unsteady.  Cranial nerves II through XII are intact and symmetrical.  Deep tendon reflexes 2 throughout.  Skin: No rashes or lesions noted.  Psych: Denies SI/HI/AH/VH      Labs/Imaging/Micro:  Labs reviewed from hospitalization    5/21/20 Brain MRI: 1.   Small acute right inferior  anterior pontine infarct.  2.  There are Small areas have abnormal signal intensity involving the left posterior periventricular white matter in the left posterior parietal white matter. Probably chronic ischemic change rather than acute acute infarcts.  3.  Areas of low density on the CT involving the right temporal lobe and left parietal lobe are normal and appear to have represented artifacts on the CT.    5.28.2020 carotid us: <50% stenosis bilaterally.     5/21/2020 renal US: Negative for renal masses or hydronephrosis. No dopplar was completed.     ASSESSMENT AND PLAN:   Del Mckeon is a 59 y.o. male presenting to clinic today to establish care. He is approximately 20 days s/p right anterior pontine stroke with residual left sided weakness.     Ischemic cerebrovascular accident (CVA) (CMS/HCC) ([facility])  Patient is still with significant left sided weakness.  He also has unsteady gait and is unstable with standing.  He needs to continue walking with his walker for support.  Due to his left-sided weakness and his unsteady gait, he needs to continue physical therapy however he cannot drive because he does not have safe vision with his cataracts as well as with his weakness he is unable to drive safely.  Due to this, he is homebound and needs to have home health with home physical therapy, home speech therapy, Occupational Therapy and skilled nursing assessment to assist in medication administration.  Due to his weakness he is unable to administer his insulin or check his blood sugar safely and home health would be able to assist in this. I'm placing a referral to Home health:     Special Instructions:   Patient is s/p right inferior anterior pontine stroke with residual left sided weakness, occasional word finding difficulty and slower mentation. Also diabetic and hypertensive. Please eval and admit to home health for assistance with medications, PT/OT/ST     The primary reason for home health care is for care  post stroke and assistance with management of diabetes and HTN. Homebound criteria are met because: Illness requires assistance of another person or assistive device to leave residence, Unsteady gait, dizziness, syncope, Significant weakness following hospital stay and mobility assist device needed to leave the home.     In regards to risk factors for his cerebrovascular disease, he has HLD and is on statin therapy. ASA 81mg daily and plavix 75mg daily. He does not smoke. Diabetes needs to be optimally controlled as below. I'm placing a neuro consult as he has had a stroke and he also needs to see sleep med for his NIK.     Obstructive sleep apnea syndrome  Unclear when last sleep study was completed. He would benefit from having this controlled and having a repeat polysomnogram. I've placed a referral to sleep medicine for this.     Mild aortic sclerosis (CMS/ABDIAZIZ) ([facility])  He had a limited echo in the hospital with an EF of 57% and needing further eval of valves. I'm going to order a complete echocardiogram to be completed for eval of his AV sclerosis. He has seen cardiology in the past about 2 years ago. Pending echo he may benefit from evaluation again.     Benign essential hypertension  Blood pressures are not controlled. Will continue losartan and dicussed changing to 100mg daily instead of 50mg BID. I'm also adding 5mg amlodipine daily. BP goal currently <120/80. In hospital it stated he was on lisniopril however, patient does not have this medication at home and only losartan. He also needs repeat BMP and urine studies.     Microscopic hematuria  Will repeat his UA. Pending results he would benefit from CT urogram as well as referral to urology for potential cystoscopy.       Type 2 diabetes mellitus without complication, with long-term current use of insulin (CMS/ABDIAZIZ) ([facility])  A1c 10 days ago was 5.8% which was down from the 12% in our previous results. He is currently on novolin 36 units daily. Not checking BGs  "due to his weakness preventing the ability to. Will have home health aid in this until he regains the ability to do so. Continuing insulin therapy and I would like a blood sugar log of fasting blood sugars and eventually 2 hour post prandial sugars sent once home health gets established.     Mixed hyperlipidemia  Most recent lipid panel in the hospital with elevated LDL and total cholesterol and TGs. Will continue rosuvastatin 40mg daily, this was just started inpatient. Repeat lipid panel in 4 weeks.     Adjustment disorder with depressed mood  HE is not currently in counseling and may benefit from this though he did not want this today. He is still taking the sertraline and this was started about 10 days ago. Will continue for now with follow up in 1 mo to evaluate effects. The counseling will help with his adjustment to his new normal. He does struggle with some insomnia and I recommend continuing his prn benadryl and nightly melatonin.     Vitamin D deficiency  Most recent check in the hospital was 34. Will continue once daily 1000mg supplement.      Diagnosis Plan   1. Ischemic cerebrovascular accident (CVA) (CMS/[facility]) ([facility])  Ambulatory referral to Neurology    aspirin 81 mg EC tablet    clopidogreL (PLAVIX) 75 mg tablet    rosuvastatin (CRESTOR) 40 mg tablet   2. Benign essential hypertension  Ambulatory referral to Home Health    CBC w/auto differential Blood, Venous    Basic metabolic panel Blood, Venous    Magnesium Blood, Venous    losartan (COZAAR) 100 mg tablet    Urine Albumin/Creatinine Ratio Urine, Clean Catch   3. Type 2 diabetes mellitus without complication, with long-term current use of insulin (CMS/[facility]) ([facility])  Urine Albumin/Creatinine Ratio Urine, Clean Catch    insulin NPH isoph U-100 human (NovoLIN N Flexpen) 100 unit/mL insulin pen pen    insulin syringe-needle U-100 1 mL 29 gauge x 1/2\" syringe   4. Mixed hyperlipidemia  rosuvastatin (CRESTOR) 40 mg tablet   5. Numbness and tingling in right " hand     6. Obstructive sleep apnea syndrome  Ambulatory referral to Sleep Medicine    Home sleep test    Ambulatory referral to Sleep Medicine   7. Mild aortic sclerosis (CMS/HCC) (HCC)     8. Microscopic hematuria  Urinalysis w/microscopic, reflex culture Urine, Clean Catch   9. Adjustment disorder with depressed mood  sertraline (ZOLOFT) 25 mg tablet   10. Vitamin D deficiency  cholecalciferol, vitamin D3, (Vitamin D3) 25 mcg (1,000 unit) capsule           Return in about 2 weeks (around 6/24/2020) for Chronic care follow up - 40 min., blood pressures, blood sugars, appropriate pharmacy, PT progress, status of neurology referrals and to discuss complete echocardiogram for aortic sclerosis.    Total time face to face spent with patient was 45 minutes with 45 minutes in counseling and coordination of care regarding the above which constitutes 100% of our face to face time.     Klarissa Lester MD  06/11/20  9:31 PM

## 2020-06-11 ENCOUNTER — TELEPHONE (OUTPATIENT)
Dept: FAMILY MEDICINE | Facility: CLINIC | Age: 60
End: 2020-06-11

## 2020-06-11 ENCOUNTER — HOME HEALTH ADMISSION (OUTPATIENT)
Dept: HOME HEALTH SERVICES | Facility: HOME HEALTH | Age: 60
End: 2020-06-11
Payer: COMMERCIAL

## 2020-06-11 PROBLEM — F43.21 ADJUSTMENT DISORDER WITH DEPRESSED MOOD: Status: ACTIVE | Noted: 2020-06-11

## 2020-06-11 PROBLEM — E55.9 VITAMIN D DEFICIENCY: Status: ACTIVE | Noted: 2020-06-11

## 2020-06-11 RX ORDER — SERTRALINE HYDROCHLORIDE 25 MG/1
25 TABLET, FILM COATED ORAL DAILY
Qty: 30 TABLET | Refills: 3 | Status: SHIPPED | OUTPATIENT
Start: 2020-06-11 | End: 2020-06-16 | Stop reason: SDUPTHER

## 2020-06-11 RX ORDER — SYRINGE,SAFETY WITH NEEDLE,1ML 25GX1"
1 SYRINGE (EA) MISCELLANEOUS DAILY
Qty: 30 EACH | Refills: 3 | Status: SHIPPED | OUTPATIENT
Start: 2020-06-11 | End: 2020-06-16 | Stop reason: SDUPTHER

## 2020-06-11 RX ORDER — CLOPIDOGREL BISULFATE 75 MG/1
75 TABLET ORAL DAILY
Qty: 30 TABLET | Refills: 3 | Status: SHIPPED | OUTPATIENT
Start: 2020-06-11 | End: 2020-06-16 | Stop reason: SDUPTHER

## 2020-06-11 RX ORDER — ASPIRIN 81 MG/1
81 TABLET ORAL DAILY
Qty: 30 TABLET | Refills: 0 | Status: SHIPPED | OUTPATIENT
Start: 2020-06-11 | End: 2020-06-16 | Stop reason: SDUPTHER

## 2020-06-11 RX ORDER — HUMAN INSULIN 100 [IU]/ML
36 INJECTION, SUSPENSION SUBCUTANEOUS DAILY
Qty: 4 PEN | Refills: 3 | Status: SHIPPED | OUTPATIENT
Start: 2020-06-11 | End: 2020-06-16 | Stop reason: SDUPTHER

## 2020-06-11 RX ORDER — VIT C/E/ZN/COPPR/LUTEIN/ZEAXAN 250MG-90MG
1000 CAPSULE ORAL DAILY
Qty: 30 CAPSULE | Refills: 3 | Status: SHIPPED | OUTPATIENT
Start: 2020-06-11

## 2020-06-11 RX ORDER — ROSUVASTATIN CALCIUM 40 MG/1
40 TABLET, COATED ORAL NIGHTLY
Qty: 30 TABLET | Refills: 3 | Status: SHIPPED | OUTPATIENT
Start: 2020-06-11 | End: 2020-06-16 | Stop reason: SDUPTHER

## 2020-06-11 NOTE — TELEPHONE ENCOUNTER
Caller would like to discuss (a) medication Writer has advised caller of a callback from within 24 hours.    Patient: Del L Patton    Caller Name (Last and first, relation/role): Self    Name of Facility: na    Callback Number: [phone]    Best Availability: anytime    Fax Number: na    Additional Info: Pt asking that his prescriptions be sent over to [facility] not the hospital pharmacu    Did you confirm the message with the caller: Yes    Is it okay that the nurse communicates your response through [employer]? No

## 2020-06-11 NOTE — TELEPHONE ENCOUNTER
Called and spoke with Del and he stated that he needs the amlodipine to be sent to [facility] on [address]. He has someone that will take him to get it instead of [facility]. I have sent this in for him.

## 2020-06-12 ENCOUNTER — HOME CARE VISIT (OUTPATIENT)
Dept: HOME HEALTH SERVICES | Facility: HOME HEALTH | Age: 60
End: 2020-06-12
Payer: COMMERCIAL

## 2020-06-12 ENCOUNTER — TELEPHONE (OUTPATIENT)
Dept: FAMILY MEDICINE | Facility: CLINIC | Age: 60
End: 2020-06-12

## 2020-06-12 ENCOUNTER — HOME CARE VISIT (OUTPATIENT)
Dept: HOME HEALTH SERVICES | Facility: HOSPICE | Age: 60
End: 2020-06-12
Payer: COMMERCIAL

## 2020-06-12 VITALS
SYSTOLIC BLOOD PRESSURE: 138 MMHG | DIASTOLIC BLOOD PRESSURE: 83 MMHG | HEART RATE: 97 BPM | RESPIRATION RATE: 20 BRPM | OXYGEN SATURATION: 97 %

## 2020-06-12 PROCEDURE — G0299 HHS/HOSPICE OF RN EA 15 MIN: HCPCS

## 2020-06-12 PROCEDURE — 02300001 HH START OF CARE

## 2020-06-12 ASSESSMENT — ACTIVITIES OF DAILY LIVING (ADL)
PREPARING MEALS: INDEPENDENT
OASIS_M1830: 03
BATHING_REQUIRES_ASSISTANCE: 1
TRANSPORTATION ASSESSED: 1
CURRENT_FUNCTION: ONE PERSON
BATHING ASSESSED: 1
TRANSPORTATION: DEPENDENT
LAUNDRY ASSESSED: 1
LAUNDRY: INDEPENDENT
AMBULATION ASSISTANCE: ONE PERSON
BATHING_CURRENT_FUNCTION: STAND BY ASSIST

## 2020-06-12 ASSESSMENT — ENCOUNTER SYMPTOMS
PAIN LOCATION: HEAD
PAIN LOCATION - PAIN DURATION: MINUTES
DEPRESSED MOOD: 1
LOWEST PAIN SEVERITY IN PAST 24 HOURS: 0/10
NERVOUS/ANXIOUS: 1
DYSPNEA ACTIVITY LEVEL: AFTER AMBULATING 10 - 20 FT
HIGHEST PAIN SEVERITY IN PAST 24 HOURS: 4/10
PAIN LOCATION - EXACERBATING FACTORS: STRESS
PAIN LOCATION - PAIN SEVERITY: 1/10
PAIN LOCATION - PAIN QUALITY: POUNDING
PAIN LOCATION - RELIEVING FACTORS: MEDICATION
LAST BOWEL MOVEMENT: 65541
MUSCLE WEAKNESS: 1
PERSON REPORTING PAIN: PATIENT
TROUBLE SWALLOWING: 1
PAIN CAUSE: HEADACHE
BOWEL PATTERN NORMAL: 1
PAIN SEVERITY GOAL: 0/10
STOOL FREQUENCY: EVERY OTHER DAY
SHORTNESS OF BREATH: 1
PAIN: 1

## 2020-06-12 ASSESSMENT — PATIENT HEALTH QUESTIONNAIRE - PHQ9
1. LITTLE INTEREST OR PLEASURE IN DOING THINGS: 02
2. FEELING DOWN, DEPRESSED, IRRITABLE, OR HOPELESS: 02

## 2020-06-12 NOTE — ASSESSMENT & PLAN NOTE
Unclear when last sleep study was completed. He would benefit from having this controlled and having a repeat polysomnogram. I've placed a referral to sleep medicine for this.

## 2020-06-12 NOTE — TELEPHONE ENCOUNTER
[facility] pharmacy is calling regarding pharmacy for the medication.    Writer advised call of a call back from the nurse within 24 hours.    Patient: Del L Patton    Caller Name (last and first, relation/role): dari    Name of Facility: [facility]    Callback Number: [phone]    Fax Number: anytime    Additional Info: Dari would like to clarify that the scripts for pt were supposed to come to [facility] pharmacy as they don't usually fill for pt.     Did you confirm the message with the caller: Yes    Is it okay that the nurse communicates your response through MyChart? No

## 2020-06-12 NOTE — ASSESSMENT & PLAN NOTE
A1c 10 days ago was 5.8% which was down from the 12% in our previous results. He is currently on novolin 36 units daily. Not checking BGs due to his weakness preventing the ability to. Will have home health aid in this until he regains the ability to do so. Continuing insulin therapy and I would like a blood sugar log of fasting blood sugars and eventually 2 hour post prandial sugars sent once home health gets established.

## 2020-06-12 NOTE — ASSESSMENT & PLAN NOTE
HE is not currently in counseling and may benefit from this though he did not want this today. He is still taking the sertraline and this was started about 10 days ago. Will continue for now with follow up in 1 mo to evaluate effects. The counseling will help with his adjustment to his new normal. He does struggle with some insomnia and I recommend continuing his prn benadryl and nightly melatonin.

## 2020-06-12 NOTE — ASSESSMENT & PLAN NOTE
Most recent lipid panel in the hospital with elevated LDL and total cholesterol and TGs. Will continue rosuvastatin 40mg daily, this was just started inpatient. Repeat lipid panel in 4 weeks.

## 2020-06-12 NOTE — ASSESSMENT & PLAN NOTE
Will repeat his UA. Pending results he would benefit from CT urogram as well as referral to urology for potential cystoscopy.

## 2020-06-12 NOTE — TELEPHONE ENCOUNTER
Home health nurse calling stating that pt was still taking medication Niacin and that it has severe reaction with Crestor. Stated per last visit from 6/10 pt was suppose to stop medication.  is able to verbalize understanding and will notify pt again. States that she educated pt to not take aleve as he is on blood thinner medication.  nurse states that pt would not let her set of pill box for him.   Just an FYI

## 2020-06-12 NOTE — ASSESSMENT & PLAN NOTE
He had a limited echo in the hospital with an EF of 57% and needing further eval of valves. I'm going to order a complete echocardiogram to be completed for eval of his AV sclerosis. He has seen cardiology in the past about 2 years ago. Pending echo he may benefit from evaluation again.

## 2020-06-12 NOTE — ASSESSMENT & PLAN NOTE
Blood pressures are not controlled. Will continue losartan and dicussed changing to 100mg daily instead of 50mg BID. I'm also adding 5mg amlodipine daily. BP goal currently <120/80. In hospital it stated he was on lisniopril however, patient does not have this medication at home and only losartan. He also needs repeat BMP and urine studies.

## 2020-06-15 ENCOUNTER — HOME CARE VISIT (OUTPATIENT)
Dept: HOME HEALTH SERVICES | Facility: HOME HEALTH | Age: 60
End: 2020-06-15
Payer: COMMERCIAL

## 2020-06-15 ENCOUNTER — HOME CARE VISIT (OUTPATIENT)
Dept: HOME HEALTH SERVICES | Facility: HOSPICE | Age: 60
End: 2020-06-15
Payer: COMMERCIAL

## 2020-06-15 ENCOUNTER — TELEPHONE (OUTPATIENT)
Dept: FAMILY MEDICINE | Facility: CLINIC | Age: 60
End: 2020-06-15

## 2020-06-15 VITALS — HEART RATE: 98 BPM | OXYGEN SATURATION: 98 % | SYSTOLIC BLOOD PRESSURE: 148 MMHG | DIASTOLIC BLOOD PRESSURE: 97 MMHG

## 2020-06-15 VITALS
DIASTOLIC BLOOD PRESSURE: 78 MMHG | WEIGHT: 194 LBS | OXYGEN SATURATION: 99 % | BODY MASS INDEX: 26.28 KG/M2 | RESPIRATION RATE: 18 BRPM | HEIGHT: 72 IN | TEMPERATURE: 96.5 F | SYSTOLIC BLOOD PRESSURE: 136 MMHG | HEART RATE: 101 BPM

## 2020-06-15 PROCEDURE — G0299 HHS/HOSPICE OF RN EA 15 MIN: HCPCS

## 2020-06-15 PROCEDURE — G0151 HHCP-SERV OF PT,EA 15 MIN: HCPCS

## 2020-06-15 SDOH — SOCIAL STABILITY: SOCIAL INSECURITY: RISK FACTORS RELATED TO PERSONAL SAFETY: 1

## 2020-06-15 ASSESSMENT — ENCOUNTER SYMPTOMS
DENIES PAIN: 1
DYSPNEA ON EXERTION: 1
PAIN SEVERITY GOAL: 0/10
DYSPNEA ACTIVITY LEVEL: AFTER AMBULATING MORE THAN 20 FT
DENIES PAIN: 1
SUBJECTIVE PAIN PROGRESSION: UNCHANGED
FATIGUE: 1
PERSON REPORTING PAIN: PATIENT
FATIGUES EASILY: 1
PERSON REPORTING PAIN: PATIENT
MUSCLE WEAKNESS: 1
HIGHEST PAIN SEVERITY IN PAST 24 HOURS: 0/10
STOOL FREQUENCY: DAILY
SHORTNESS OF BREATH: 1
LAST BOWEL MOVEMENT: 65545
DRY SKIN: 1
BOWEL PATTERN NORMAL: 1
LOWEST PAIN SEVERITY IN PAST 24 HOURS: 0/10
DEPRESSED MOOD: 1

## 2020-06-15 ASSESSMENT — BALANCE ASSESSMENTS
STANDING TO SITTING: 3
SITTING TO STANDING: 3 - ABLE TO STAND INDEPENDENTLY USING HANDS
STANDING UNSUPPORTED WITH FEET TOGETHER: 0
TURN 360 DEGREES: 1 - NEEDS CLOSE SUPERVISION OR VERBAL CUING
STANDING UNSUPPORTED WITH EYES CLOSED: 0 - NEEDS HELP TO KEEP FROM FALLING
STANDING UNSUPPORTED WITH EYES CLOSED: 0
STANDING UNSUPPORTED: 2 - ABLE TO STAND 30 SECONDS UNSUPPORTED
TURN 360 DEGREES: 1
STANDING UNSUPPORTED ONE FOOT IN FRONT: 0
STANDING UNSUPPORTED: 2
TRANSFERS: 3 - ABLE TO TRANSFER SAFELY DEFINITE NEED OF HANDS
STANDING UNSUPPORTED WITH FEET TOGETHER: 0 - NEEDS HELP TO ATTAIN POSITION AND UNABLE TO HOLD FOR 15 SECONDS
STANDING ON ONE LEG: 0 - UNABLE TO TRY OR NEEDS ASSIST TO PREVENT FALL
STANDING TO SITTING: 3 - CONTROLS DESCENT BY USING HANDS
TRANSFERS: 3
PICK UP OBJECT FROM THE FLOOR FROM A STANDING POSITION: 1 - UNABLE TO PICK UP AND NEEDS SUPERVISION WHILE TRYING
LONG VERSION TOTAL SCORE (MAX 56): 20
STANDING UNSUPPORTED ONE FOOT IN FRONT: 0 - LOSES BALANCE WHILE STEPPING OR STANDING
STANDING ON ONE LEG: 0
REACHING FORWARD WITH OUTSTRETCHED ARM WHILE STANDING: 2 - CAN REACH FORWARD 5 CM (2 INCHES)
SITTING TO STANDING: 3

## 2020-06-15 NOTE — TELEPHONE ENCOUNTER
Del called stating that he wants the Losartan sent to [facility] on [address]. I asked how he is going to get them as he had not started the amlodipine yet. He stated that he will have a ride tomorrow morning. I am going to hold off on this until I speak with Audra from [facility].

## 2020-06-15 NOTE — TELEPHONE ENCOUNTER
Called and advised that prescriptions needs to be filled as Home health is going to get them and take them to patient. Ruby was able to verbalize her understanding.

## 2020-06-15 NOTE — Clinical Note
"Dr. Lester ~ It recommended to patient that nursing see him twice per week. He declined and relayed feeling that having a nurse weekly should be sufficient. Nursing is able to check blood glucose at the visit, if patient agreeable. He is currently agreeable to therapy services. Should you have any further questions or concerns, please do not hesitate to contact me at [phone]. And, thank you for all you do! Cesar Bhatti RN, BSN  ----- Message -----  From: Klarissa Lester MD  Sent: 6/16/2020   1:01 PM MDT  To: AUGIE Tellez,     Thanks for seeing Del. How often do you plan to see him? And do you have the ability to at least check his glucose when you guys see him? And is he willing to do PT and OT?     Thanks,   Dr. Lester  ----- Message -----  From: Cesar Bhatti RN  Sent: 6/15/2020   6:16 PM MDT  To: MD Dr. 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 He greeted this writer at [facility], ambulating without AD and demonstrating a slow, semi-steady gait, and contact guarding. Stressed the need to use walker to avoid injury, and patient relays, \"It's really just in the way\". VS taken at rest: B/P and HR slightly elevated, and upon review of patient's recorded B/P and pulse levels have been elevated several times in the past week. Medications reconciled and patient has not picked up nor taken any Amlodipine. He reports that [facility], and then switched to [facility]. Called [facility] pharmacy and confirmed that medication was ready for . Patient relayed that he has no transportation until tomorrow and he will  medication when he goes grocery shopping. Questioned patient where he recorded his blood glucose levels and he relayed not checking his blood glucose levels, due to inability to see pricking self and using glucometer. His left radial pulse was not palpable. " Called Klarissa Lester's office, spoke with nurse Avendaño and updated regarding all findings. She questioned if home health delivered medications, and was advised that home health service does not include the delivery of medications, due to liability. Kateryna to update physician regarding findings. Patient aware of findings and phone call that was made. He denied questions by visits end.       eCsar Bhatti, RN, BSN

## 2020-06-15 NOTE — TELEPHONE ENCOUNTER
Called home health and the phone number is [phone]. Called and spoke with Audra and she stated that he did not  his amlodipine blood pressure is elevated, radial pulse on the left, not checking his blood sugars as he can't see. I advised that when I spoke with Harjinder for home health he stated that home health would be able to get his medications for him if filled by [facility]. Audra stated that is not something they typically do and she is going to follow up with Harjinder. I stated that if this is not an option then we need to switch to a pharmacy that will deliver to him. Audra will get back to me this afternoon.

## 2020-06-15 NOTE — TELEPHONE ENCOUNTER
Caller would like to discuss (a) return call Writer has advised caller of a callback from within 24 hours.    Patient: Del L Patton    Caller Name (Last and first, relation/role): self    Name of Facility: na    Callback Number: [phone]    Best Availability: anytime    Fax Number: na    Additional Info: Would like to discuss medications with nurse.    Did you confirm the message with the caller: Yes    Is it okay that the nurse communicates your response through [employer]? No

## 2020-06-16 ENCOUNTER — HOME CARE VISIT (OUTPATIENT)
Dept: HOME HEALTH SERVICES | Facility: HOSPICE | Age: 60
End: 2020-06-16
Payer: COMMERCIAL

## 2020-06-16 VITALS — SYSTOLIC BLOOD PRESSURE: 131 MMHG | HEART RATE: 103 BPM | DIASTOLIC BLOOD PRESSURE: 88 MMHG

## 2020-06-16 PROCEDURE — G0153 HHCP-SVS OF S/L PATH,EA 15MN: HCPCS

## 2020-06-16 PROCEDURE — G0152 HHCP-SERV OF OT,EA 15 MIN: HCPCS

## 2020-06-16 SDOH — SOCIAL STABILITY: SOCIAL INSECURITY: RISK FACTORS RELATED TO PERSONAL SAFETY: 1

## 2020-06-16 ASSESSMENT — ACTIVITIES OF DAILY LIVING (ADL)
SPONGING_LB_CURRENT_FUNCTION: STAND BY ASSIST
USING_UTENSILS_CURRENT_FUNCTION: INDEPENDENT
TRANSPORTATION: DEPENDENT
GROOMING_CURRENT_FUNCTION: INDEPENDENT
ORAL_CARE_CURRENT_FUNCTION: INDEPENDENT
TELEPHONE USE ASSESSED: 1
BATHING_CURRENT_FUNCTION: STAND BY ASSIST
TOILETING: 1
USING THE TELPHONE: INDEPENDENT
LAUNDRY: NEEDS ASSISTANCE
SHOPPING ASSESSED: 1
MONEY MANAGEMENT (EXPENSES/BILLS): NEEDS ASSISTANCE
LAUNDRY ASSESSED: 1
ORAL_CARE_ASSESSED: 1
WASHING_HAIR_CURRENT_FUNCTION: STAND BY ASSIST
WASHING_BACK_CURRENT_FUNCTION: STAND BY ASSIST
GROOMING ASSESSED: 1
DRESSING_UB_CURRENT_FUNCTION: MODIFIED INDEPENDENT
DRINKING_FROM_CUP_CURRENT_FUNCTION: INDEPENDENT
WASHING_LB_CURRENT_FUNCTION: STAND BY ASSIST
BATHING ASSESSED: 1
PREPARING MEALS: NEEDS ASSISTANCE
EATING_FINGER_FOODS_CURRENT_FUNCTION: INDEPENDENT
FEEDING ASSESSED: 1
DRESSING_LB_CURRENT_FUNCTION: MODIFIED INDEPENDENT
LIGHT HOUSEKEEPING: NEEDS ASSISTANCE
CUTTING_FOOD_CURRENT_FUNCTION: INDEPENDENT
SPONGING_UB_CURRENT_FUNCTION: SUPERVISION
HOUSEKEEPING ASSESSED: 1
FEEDING: MODIFIED INDEPENDENT
SHOPPING: NEEDS ASSISTANCE
TRANSPORTATION ASSESSED: 1
TOILETING: SUPERVISION
WASHING_UPB_CURRENT_FUNCTION: SUPERVISION

## 2020-06-16 ASSESSMENT — MONTREAL COGNITIVE ASSESSMENT (MOCA)
13D. WHAT DAY: 1
11. FOR EACH PAIR OF WORDS, WHAT CATEGORY DO THEY BELONG TO (OUT OF 2): 0
VISUOSPATIAL/EXECUTIVE SUBSCORE: NUMBERS ON CLOCK
LANGUAGE SUBSCORE: 1
13C. WHAT YEAR: 1
ORIENTATION SUBSCORE: YEAR
13F. WHAT CITY: 1
13E. WHAT IS THE NAME OF THIS PLACE: 1
7. [VIGILENCE] TAP WHEN HEARING DESIGNATED LETTER: < 2 ERRORS
12. MEMORY INDEX SCORE: VELVET
3. DRAW A CLOCK: CONTOUR, NUMBERS, HANDS: 1
13B. WHAT MONTH: 1
12. MEMORY INDEX SCORE: 3
12. MEMORY INDEX SCORE: RED
ORIENTATION SUBSCORE: PLACE
13A. WHAT DATE: 1
SHOW THE PATIENT THE PICTURE OF THE ANIMAL AND ASK THEM TO NAME IT.: 1
CATEGORY CUE (IF APPLICABLE): 1
6. READ LIST OF DIGITS [FORWARD/BACKWARD]: DIGITS REPEATED IN FORWARD ORDER
ORIENTATION SUBSCORE: MONTH
2. COPY DRAWING: 0
ORIENTATION SUBSCORE: CITY
9. REPEAT EACH SENTENCE: 0
4. NAME EACH OF THE THREE ANIMALS SHOWN: RHINO
9. REPEAT EACH SENTENCE: 1
ABSTRACTION SUBSCORE: 1
11. FOR EACH PAIR OF WORDS, WHAT CATEGORY DO THEY BELONG TO (OUT OF 2): 1
NAMING SUBSCORE: 3
ADD 1 POINT IF LESS THAN OR EQUAL TO 12 YR EDUCATION LEVEL: 0
VISUOSPATIAL/EXECUTIVE SUBSCORE: CONTOUR OF CLOCK
ORIENTATION SUBSCORE: 6
WHAT IS THE TOTAL SCORE (OUT OF 30): 22
4. NAME EACH OF THE THREE ANIMALS SHOWN: LION
8. SERIAL SUBTRACTION OF 7S: 2
10. [FLUENCY] NAME WORDS STARTING WITH DESIGNATED LETTER: GREATER THAN OR EQUAL TO 11 WORDS
11. FOR EACH PAIR OF WORDS, WHAT CATEGORY DO THEY BELONG TO (OUT OF 2): WATCH - RULER
12. MEMORY INDEX SCORE: FACE
VISUOSPATIAL/EXECUTIVE SUBSCORE: HANDS ON CLOCK
6. READ LIST OF DIGITS [FORWARD/BACKWARD]: 1
8. SERIAL SUBTRACTION OF 7S: 2 - 3  CORRECT
CATEGORY CUE (IF APPLICABLE): 0
VISUOSPATIAL/EXECUTIVE SUBSCORE: 3
VISUOSPATIAL/EXECUTIVE SUBSCORE: 0
6B. READ LIST OF DIGITS [BACKWARD]: 0
ORIENTATION SUBSCORE: DATE
9. REPEAT EACH SENTENCE: FIRST SENTENCE REPEATED

## 2020-06-16 ASSESSMENT — ENCOUNTER SYMPTOMS
VERTIGO: 0
DOUBLE VISION: 0
DENIES PAIN: 1
PERSON REPORTING PAIN: PATIENT
NYSTAGMUS: 1

## 2020-06-16 NOTE — TELEPHONE ENCOUNTER
Called and spoke with Audra and she confirmed that they can't deliver medications to the patient.

## 2020-06-16 NOTE — PROGRESS NOTES
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 He greeted this writer at [facility], ambulating without AD and demonstrating a slow, semi-steady gait, and contact guarding. Stressed the need to use walker to avoid injury, and patient relays, \"It's really just in the way\". VS taken at rest: B/P and HR slightly elevated, and upon review of patient's recorde d B/P and pulse levels have been elevated several times in the past week. Medications reconciled and patient has not picked up nor taken any Amlodipine. He reports that [facility], and then switched to [facility]. Called [facility] pharmacy and confirmed that medication was ready for . Patient relayed that he has no transportation until tomorrow and he will  medication when he goes grocery shopping. Question ed patient where he recorded his blood glucose levels and he relayed not checking his blood glucose levels, due to inability to see pricking self and using glucometer. His left radial pulse was not palpable. Called Klarissa Lester's office, spoke with nurse Avendaño and updated regarding all findings. She questioned if home health delivered medications, and was advised that home health service does not include the delivery of medications, due t o liability. Kateryna to update physician regarding findings. Patient aware of findings and phone call that was made. He denied questions by visits end.       Cesar Bhatti, RN, BSN"

## 2020-06-16 NOTE — TELEPHONE ENCOUNTER
Attempted to reach Del again to discuss dial a ride, changing pharmacy to get medications mailed or delivered, endocrinology referral and to ask about what procedure is being done on his eyes. LMTCB    Called and spoke with Paolo and they recommend that ordering the prodigy voice and the buttons are bigger and raised and requires less blood. Will discuss this with Dr. Lester tomorrow and await Del's call back.

## 2020-06-16 NOTE — TELEPHONE ENCOUNTER
Per Dr. Lester Home health is not going to get medications for patient. Dr. Lester has seen other notes and I attempted to call Del. DAI see other encounter

## 2020-06-16 NOTE — PROGRESS NOTES
"Dr. 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 He greeted this writer at [facility], ambulating without AD and demonstrating a slow, semi-steady gait, and contact guarding. Stressed the need to use walker to avoid injury, and patient relays, \"It's really just in the way\". VS taken at rest: B/P and HR slightly elevated, and upon review of patient's rachel rded B/P and pulse levels have been elevated several times in the past week. Medications reconciled and patient has not picked up nor taken any Amlodipine. He reports that [facility], and then switched to [facility]. Called [facility] pharmacy and confirmed that medication was ready for . Patient relayed that he has no transportation until tomorrow and he will  medication when he goes grocery shopping. [facility] ioned patient where he recorded his blood glucose levels and he relayed not checking his blood glucose levels, due to inability to see pricking self and using glucometer. His left radial pulse was not palpable. Called Klarissa Lester's office, spoke with nurse Avendaño and updated regarding all findings. She questioned if home health delivered medications, and was advised that home health service does not include the delivery of medications, du e to ronnie Avendaño to update physician regarding findings. Patient aware of findings and phone call that was made. He denied questions by visits end.       Cesar Bhatti, RN, BSN"

## 2020-06-17 ENCOUNTER — TELEPHONE (OUTPATIENT)
Dept: NEUROLOGY | Facility: CLINIC | Age: 60
End: 2020-06-17

## 2020-06-17 ENCOUNTER — HOME CARE VISIT (OUTPATIENT)
Dept: HOME HEALTH SERVICES | Facility: HOSPICE | Age: 60
End: 2020-06-17
Payer: COMMERCIAL

## 2020-06-17 ENCOUNTER — HOME CARE VISIT (OUTPATIENT)
Dept: HOME HEALTH SERVICES | Facility: HOME HEALTH | Age: 60
End: 2020-06-17
Payer: COMMERCIAL

## 2020-06-17 ENCOUNTER — TELEPHONE (OUTPATIENT)
Dept: FAMILY MEDICINE | Facility: CLINIC | Age: 60
End: 2020-06-17

## 2020-06-17 VITALS — OXYGEN SATURATION: 84 % | SYSTOLIC BLOOD PRESSURE: 118 MMHG | HEART RATE: 88 BPM | DIASTOLIC BLOOD PRESSURE: 84 MMHG

## 2020-06-17 PROCEDURE — G0157 HHC PT ASSISTANT EA 15: HCPCS

## 2020-06-17 SDOH — SOCIAL STABILITY: SOCIAL INSECURITY: RISK FACTORS RELATED TO PERSONAL SAFETY: 1

## 2020-06-17 ASSESSMENT — ENCOUNTER SYMPTOMS
HIGHEST PAIN SEVERITY IN PAST 24 HOURS: 2/10
PERSON REPORTING PAIN: PATIENT
PAIN LOCATION: HEAD
PAIN: 1
PAIN LOCATION - PAIN QUALITY: PRESSURE
PAIN LOCATION - PAIN SEVERITY: 2/10
PAIN LOCATION - RELIEVING FACTORS: COLD
LOWEST PAIN SEVERITY IN PAST 24 HOURS: 0/10
PAIN LOCATION - PAIN FREQUENCY: CONSTANT

## 2020-06-17 NOTE — TELEPHONE ENCOUNTER
"Caller would like to discuss (a) return call Writer has advised caller of a callback from within 24 hours.    Patient: Del L Patton    Caller Name (Last and first, relation/role): self     Name of Facility:     Callback Number: [phone]    Best Availability: anytime     Fax Number:     Additional Info: pt states he thinks he may be having a side effect to this medication. Pt states on the back of his head on left side he has a \"pain\" there   Declined triage           Pt also states he did not get his losartan filled and needs to have a new prescription sent             Did you confirm the message with the caller:     Is it okay that the nurse communicates your response through MyChart?     "

## 2020-06-17 NOTE — TELEPHONE ENCOUNTER
Spoke with Del he stated that he is going to be using Dial [employer] ride again since it is running again. He will be roscoe to get his medications from [facility] and make it to his appointments with Dr. Lester. I asked about him testing his blood sugar reading. He stated that he can't see the strip in the meter and he ends up smearing the blood on his finger. He was successful yesterday when he checked it 2 hours after eating at noon and it was 147. I advised that we found out about the prodigy voice, it is bigger and has raised buttons and requires less blood. In regards to his radial pulse we will check what when he comes into clinic next week. His blood pressure reading this morning 2 hours after taking his Losartan 118/84. I asked Del if had any questions and he denied at this time. I think that it is important to keep his appointment next week. He was in agreement with that.

## 2020-06-17 NOTE — PROGRESS NOTES
FYI  Pt reports new pain in post left head, worse when he is lying down. Pt states it feels like something is swollen. Pt reports MD Small with Neuro next week. Pt agreed to let PTA call MD today to reports symptoms. Pt reports no vision problems or any other changes in symptoms since pain started. Pt reports staring new BP medication yesterday. Amlodipine started 6/16/2020. Pt reports he is able to get blood sugar reading about every  other day or sometimes goes a week in between due to difficulty getting it lined up due to vision problems with cataracts. Pt also reported he practices intermittent fasting and eats a low carb diet.

## 2020-06-17 NOTE — TELEPHONE ENCOUNTER
"Called and spoke chio Normal and he stated \" something is swollen an inch or two behind my left ear. It is not real painful unless I am laying on my back. I don't feel it if I am walking around or sitting up. I noticed it this morning around 4:30 am when I got up to take Tylenol.\" I asked when he took his Amlodipine and it was taken at 6:00pm last night with his evening meal. He can't see this bump, he says it feels like a quarter size or smaller and he can't feel it otherwise. Advised that I don't think that this is reaction and he should take him medication again tonight. Advised that [facility]. Advise that if the bump gets worse with pain, and feels that it is getting bigger then we want to see him. He does have an appointment next week on 6- that he would like to keep.   "

## 2020-06-17 NOTE — TELEPHONE ENCOUNTER
"TC to f/u with pt after receiving VM from PT [facility]     Pt reported during therapy today that he felt pain \"like a swelling\" on left side of his head; pain level 2/10; and denied light-headedness, vision changes, or dizziness. Pt also reported \"new BP medicine\" and that \"his BP good today.\"   Pt advised pt that he should call 911 if symptoms worsen.     Pt clarifies that pain \"started about 2 days ago\" and continues to rate as 2/10. Pt states pain is relative to \"lying on it.\" When questioned, pt states new BP med started 1 week ago per primary, Dr. Lester. Pt states he has a f/u appt with her on 6/25/20.  Pt is scheduled for EMG with Dr. Small also on 6/25/20. Pt's f/u with cardiologist cancelled for 6/25/20.    Advised pt that Dr. Small would be out of the office until 6/22. Appt with Dr. Small would not be for evaluation of head pain or f/u recent stroke but for an EMG procedure. Advised pt to contact Dr. eLster's office today and inform of new symptoms in case med changes or earlier f/u is indicated and pt states he will do that. Also advised pt that if pain worsens, to call 911 or pursue eval in ED.     TC to Sofy PT and LVM of status and action taken.       "

## 2020-06-17 NOTE — PROGRESS NOTES
Klarissa Lester MD   [address] [place] [id number]   Phone: [phone]   Fax: [phone]   NPI: [id number]

## 2020-06-22 ENCOUNTER — HOME CARE VISIT (OUTPATIENT)
Dept: HOME HEALTH SERVICES | Facility: HOME HEALTH | Age: 60
End: 2020-06-22
Payer: COMMERCIAL

## 2020-06-22 ENCOUNTER — HOME CARE VISIT (OUTPATIENT)
Dept: HOME HEALTH SERVICES | Facility: HOSPICE | Age: 60
End: 2020-06-22
Payer: COMMERCIAL

## 2020-06-22 ENCOUNTER — TELEPHONE (OUTPATIENT)
Dept: FAMILY MEDICINE | Facility: CLINIC | Age: 60
End: 2020-06-22

## 2020-06-22 VITALS
SYSTOLIC BLOOD PRESSURE: 124 MMHG | BODY MASS INDEX: 25.58 KG/M2 | WEIGHT: 186 LBS | TEMPERATURE: 98.5 F | OXYGEN SATURATION: 98 % | HEART RATE: 88 BPM | DIASTOLIC BLOOD PRESSURE: 88 MMHG

## 2020-06-22 PROCEDURE — G0300 HHS/HOSPICE OF LPN EA 15 MIN: HCPCS

## 2020-06-22 PROCEDURE — G0157 HHC PT ASSISTANT EA 15: HCPCS

## 2020-06-22 SDOH — SOCIAL STABILITY: SOCIAL INSECURITY: RISK FACTORS RELATED TO PERSONAL SAFETY: 1

## 2020-06-22 ASSESSMENT — ENCOUNTER SYMPTOMS
DENIES PAIN: 1
LAST BOWEL MOVEMENT: 65551
PAIN: 1
FATIGUES EASILY: 1
PAIN LOCATION - PAIN DURATION: DAILY
PAIN LOCATION - PAIN FREQUENCY: INTERMITTENT
PAIN LOCATION - PAIN SEVERITY: 0/10
PERSON REPORTING PAIN: PATIENT
BOWEL PATTERN NORMAL: 1
PERSON REPORTING PAIN: PATIENT
PAIN LOCATION: HEAD
PAIN LOCATION - RELIEVING FACTORS: TYLENOL
FORGETFULNESS: 1

## 2020-06-22 NOTE — PROGRESS NOTES
Pt reported having a nose bleed 2 days ago; he said he was able to get it stopped and ever since then he hasns't been taking his aspirin. Sherry PT visited with pt this AM and he told her that it took a long time for the bleed to stop, and that he was having some pain in the back of his head that day. He also told Sherry that he wants to take his aspirin every other day from now on. He denied any pain in his head this AM when asked, but  said he did have a headache this AM that was relieved after he took a tylenol. Writer called PCP's office and message left with her nurse re: this info and left phone number for a call back.  Assessment:

## 2020-06-22 NOTE — PROGRESS NOTES
"Pt on home health d/t Ataxia following cerebral infarction. Pt met writer downstairs at entry, using walker with a slow unsteady gait. Pt had to make 2-3 stops to pull up pants/adjust belt. Pt denies any pain or discomfort. Discussed diabetes education at length with pt, including reinforcing BGL checking every day, diet, hyper/hypoglycemia. Pt said he has difficulty seeing the end of the test strip to apply the blood. Writer noted he d oes almost everything in the dark, so encouraged him to turn on lights. Writer assisted him with checking BGL and gave tips to obtain reading (135). Encouraged him to sit down while checking BGL, and maybe having a lamp right there so he can see better. He says he has an eye MD appt tmrw and will hopefully be getting cataracts removed. Pt also has a hard time drawing up his insulin d/t the small numbers on the syringe. Again, pt does th is task in very poor lighting; writer reminded of using good lighting so he can see better, which he agreed. Reinforced writing down daily BGL readings which he said he would do. Also instructed him to take BP daily and to also record this. Also instructed on daily weights, but pt said he's unable to see the scale d/t poor vision. Writer suggested an electric scale, pt stated \"well hopefully after they take the cataracts then maybe I ca n see it better.\" Pt was willing to make some lifestyle changes, but will need reinforcement. Education provided on fall prevention which pt will need reinforcement d/t compliance issues, but verbalizes measures to take to avoid falls."

## 2020-06-22 NOTE — TELEPHONE ENCOUNTER
Caller would like to discuss (a) questions Writer has advised caller of a callback from within 24 hours.    Patient: Del L Patton    Caller Name (Last and first, relation/role): Elba    Name of Facility: Home health nurse    Callback Number: [phone]    Best Availability: anytime    Fax Number: na    Additional Info: patient told home health nurse today that he had a nose bleed a couple days ago. Took a while to get it to stop but did.  Since then patient has stopped taking the aspirin.  No pain in the back of his head today like he has in the past, but did have headache today, took some tylenol and it did help.  Wanting to know what Dr. Lester wants to do wit the aspirin. Patient wants to take it every other day.     Did you confirm the message with the caller: Yes    Is it okay that the nurse communicates your response through [employer]? No

## 2020-06-23 ENCOUNTER — HOME CARE VISIT (OUTPATIENT)
Dept: HOME HEALTH SERVICES | Facility: HOSPICE | Age: 60
End: 2020-06-23
Payer: COMMERCIAL

## 2020-06-23 PROCEDURE — G0153 HHCP-SVS OF S/L PATH,EA 15MN: HCPCS

## 2020-06-23 ASSESSMENT — ENCOUNTER SYMPTOMS
DENIES PAIN: 1
LOWEST PAIN SEVERITY IN PAST 24 HOURS: 0/10
PAIN SEVERITY GOAL: 0/10
HIGHEST PAIN SEVERITY IN PAST 24 HOURS: 3/10
PERSON REPORTING PAIN: PATIENT

## 2020-06-23 ASSESSMENT — MONTREAL COGNITIVE ASSESSMENT (MOCA)
13E. WHAT IS THE NAME OF THIS PLACE: 0
WHAT IS THE TOTAL SCORE (OUT OF 30): 0
9. REPEAT EACH SENTENCE: 0
ABSTRACTION SUBSCORE: 0
11. FOR EACH PAIR OF WORDS, WHAT CATEGORY DO THEY BELONG TO (OUT OF 2): 0
2. COPY DRAWING: 0
LANGUAGE SUBSCORE: 0
13D. WHAT DAY: 0
CATEGORY CUE (IF APPLICABLE): 0
13A. WHAT DATE: 0
6B. READ LIST OF DIGITS [BACKWARD]: 0
VISUOSPATIAL/EXECUTIVE SUBSCORE: 0
6. READ LIST OF DIGITS [FORWARD/BACKWARD]: 0
9. REPEAT EACH SENTENCE: 0
13B. WHAT MONTH: 0
ORIENTATION SUBSCORE: 0
NAMING SUBSCORE: 0
ADD 1 POINT IF LESS THAN OR EQUAL TO 12 YR EDUCATION LEVEL: 0
13F. WHAT CITY: 0
SHOW THE PATIENT THE PICTURE OF THE ANIMAL AND ASK THEM TO NAME IT.: 0
8. SERIAL SUBTRACTION OF 7S: 0
3. DRAW A CLOCK: CONTOUR, NUMBERS, HANDS: 0
12. MEMORY INDEX SCORE: 0
13C. WHAT YEAR: 0

## 2020-06-23 NOTE — TELEPHONE ENCOUNTER
Del has been notified that he needs to continue to Aspirin since he had a recent stroke. He was able to verbalize his understanding and had no questions at this time.

## 2020-06-23 NOTE — TELEPHONE ENCOUNTER
Recommendations are that he take his ASA daily as stroke prevention, since he just had a stroke. So I recommend continuing it. Thanks.

## 2020-06-24 ENCOUNTER — HOME CARE VISIT (OUTPATIENT)
Dept: HOME HEALTH SERVICES | Facility: HOME HEALTH | Age: 60
End: 2020-06-24
Payer: COMMERCIAL

## 2020-06-24 ENCOUNTER — HOME CARE VISIT (OUTPATIENT)
Dept: HOME HEALTH SERVICES | Facility: HOSPICE | Age: 60
End: 2020-06-24
Payer: COMMERCIAL

## 2020-06-24 VITALS — SYSTOLIC BLOOD PRESSURE: 114 MMHG | DIASTOLIC BLOOD PRESSURE: 88 MMHG

## 2020-06-24 PROCEDURE — G0157 HHC PT ASSISTANT EA 15: HCPCS

## 2020-06-24 SDOH — SOCIAL STABILITY: SOCIAL INSECURITY: RISK FACTORS RELATED TO PERSONAL SAFETY: 1

## 2020-06-24 ASSESSMENT — ENCOUNTER SYMPTOMS
PAIN LOCATION - PAIN FREQUENCY: CONSTANT
PAIN LOCATION - PAIN SEVERITY: 1/10
PAIN: 1
PAIN LOCATION: HEAD
PAIN LOCATION - PAIN DURATION: DAILY
PERSON REPORTING PAIN: PATIENT

## 2020-06-24 NOTE — PROGRESS NOTES
"Pt reported he is coming to see you tomorrow. I just wanted to let you know of c/o the last week. I'm not sure if they are related or not, but wanted you to know.   Pt's first c/o at the end of last week was posterior lateral left head pain, kind of behind ear. Pt denied any vision changes or dizziness. Pt then at first visit this week c/o HA and the post. pain still present. Pt also reported that he had a significant nose bleed over th e weekend in right nostril. Today pt reports posterior head pain still present. No head ache, but left runny nose has started and a small nose bleed after taking aspirin again this morning of which he hadn't done for a couple of days. He also mentioned a sound in his ear which started yesterday or so. Sounded like, \"drumming\" per pt report. Thank you for your time. Please let us know if there is anything specific you would like us to do  with/for the pt in regards to latest sxs. "

## 2020-06-25 ENCOUNTER — ANCILLARY PROCEDURE (OUTPATIENT)
Dept: NEUROLOGY | Facility: CLINIC | Age: 60
End: 2020-06-25
Payer: COMMERCIAL

## 2020-06-25 VITALS — TEMPERATURE: 98.6 F | DIASTOLIC BLOOD PRESSURE: 94 MMHG | SYSTOLIC BLOOD PRESSURE: 130 MMHG

## 2020-06-25 PROCEDURE — 95886 MUSC TEST DONE W/N TEST COMP: CPT | Performed by: PHYSICAL MEDICINE & REHABILITATION

## 2020-06-25 PROCEDURE — 95909 NRV CNDJ TST 5-6 STUDIES: CPT | Performed by: PHYSICAL MEDICINE & REHABILITATION

## 2020-06-25 ASSESSMENT — PAIN SCALES - GENERAL: PAINLEVEL: 0-NO PAIN

## 2020-06-25 NOTE — PROGRESS NOTES
REHABILITATION MEDICINE CLINIC       Subjective      Del Mckeon is a 60 y.o. male who presents for:     Chief Complaint   Patient presents with   • Consult     EMG; Numbness/tingling right hand       1. Onset: 2 months.  2. Location: R hand all fingers.  3. Duration: constant  4. Character: N/T/weakness.  5. Exacerbating: nothing  6. Relieving: nothing  7. Timing: no temp assoc.  8. Severity: Impairs fine motor dexterity, gross strength.      59 yo M admitted to [facility] 5/21/2020 after approximately 4 days of left-sided weakness, gait instability. MRI demonstrated acute right pontine CVA, and aspirin, Plavix, statin were started. Sinus rhythm on EKG/telemetry.  His acute stay was noted to have hematuria for which a renal bladder ultrasound was obtained and was negative for any evidence of bleeding or other abnormality.  VFSS was performed and negative for aspiration. Residual deficits of Left-sided weakness, gait ataxia, and disequilibrium.  Inpatient rehab 5/27 to 6/5. Discharged home and doing well.         Except as noted in HPI, no dizziness, vision changes, balance changes, headache, neck pain, trouble breathing, N/V, Fever/chills, bleeding, bowel bladder dysfunction. 10 systems reviewed and remainder as in HPI and otherwise negative.      I reviewed everything in this note with the patient and verified it was correct to the best of the patient's recollection.      Allergies   Allergen Reactions   • Penicillins      Tested as a child; showed positive     Current Outpatient Medications   Medication Sig Dispense Refill   • acetaminophen (Tylenol 8 Hour) 650 mg 8 hr tablet Take 650 mg by mouth every 8 (eight) hours as needed for pain scale 1-3/10.     • blood-glucose meter kit Use as instructed 1 each 0   • blood sugar diagnostic (glucose blood) strip Use to check blood sugar levels up to three times daily. 200 strip 1   • aspirin 81 mg EC tablet Take 1 tablet (81 mg total) by mouth daily 30 tablet 3  "  • clopidogreL (PLAVIX) 75 mg tablet Take 1 tablet (75 mg total) by mouth daily 30 tablet 3   • insulin NPH isoph U-100 human (NovoLIN N Flexpen) 100 unit/mL insulin pen pen Inject 0.36 mL (36 Units total) under the skin daily 4 pen 3   • insulin syringe-needle U-100 1 mL 29 gauge x 1/2\" syringe Inject 1 application under the skin daily 200 each 1   • losartan (COZAAR) 100 mg tablet Take 1 tablet (100 mg total) by mouth daily 30 tablet 3   • rosuvastatin (CRESTOR) 40 mg tablet Take 1 tablet (40 mg total) by mouth nightly 30 tablet 3   • sertraline (ZOLOFT) 25 mg tablet Take 1 tablet (25 mg total) by mouth daily 30 tablet 3   • amLODIPine (NORVASC) 5 mg tablet Take 1 tablet (5 mg total) by mouth nightly 30 tablet 0   • cholecalciferol, vitamin D3, (Vitamin D3) 25 mcg (1,000 unit) capsule Take 1 capsule (1,000 Units total) by mouth daily 30 capsule 3   • multivitamin with minerals tablet Take 1 tablet by mouth daily     • melatonin 5 mg capsule Take 5 mg by mouth nightly as needed (insomnia)      • potassium gluconate 595 mg (99 mg) tablet Take 1 tablet by mouth 2 (two) times a day     • diphenhydramine-acetaminophen (TYLENOL PM EXTRA STRENGTH)  mg tablet Take by mouth nightly as needed         No current facility-administered medications for this visit.      Past Medical History:   Diagnosis Date   • Anemia    • Asthma    • Depressive disorder    • Heart murmur    • Hyperlipidemia    • Hypertension    • Obesity    • Obstructive sleep apnea    • Type 2 diabetes mellitus (CMS/HCC) (HCC)      Past Surgical History:   Procedure Laterality Date   • CRYOTHERAPY      dermatology     Family History   Problem Relation Age of Onset   • Alzheimer's disease Father    • Diabetes type II Father    • Diabetes Sister        Family history reviewed and non-contributory to this visit.    Social History     Occupational History   • Not on file   Tobacco Use   • Smoking status: Never Smoker   • Smokeless tobacco: Never Used "   Substance and Sexual Activity   • Alcohol use: No   • Drug use: Not Currently     Types: Hydrocodone     Comment: 3 times daily, low back/sciatica pain   • Sexual activity: Not Currently   Social History Narrative   • Not on file         Social history reviewed and no pertinent changes recommended.      Objective     PHYSICAL EXAM:  /94   Temp 37 °C (98.6 °F)   GEN: well nourished. No apparent distress.  PSYCH: normal affect.  HEENT: normocephalic. Sclerae anicteric. Mucous membranes moist.  NECK: supple.  CARD: distal pulses intact.  PULM: non-labored.  ABD: non-distended.  EXTREM: no gross deformities  SKIN: warm and dry, normal turgor.  NEURO: CN grossly intact.  MSK: normal muscle bulk and tone. No thenar/hypothenar/IO wasting.        Sodium   Date Value Ref Range Status   06/10/2020 138 135 - 145 mmol/L Final     Potassium   Date Value Ref Range Status   06/10/2020 3.9 3.5 - 5.1 mmol/L Final     Chloride   Date Value Ref Range Status   06/10/2020 105 98 - 107 mmol/L Final     CO2   Date Value Ref Range Status   06/10/2020 25 21 - 32 mmol/L Final     BUN   Date Value Ref Range Status   06/10/2020 17 7 - 25 mg/dL Final     Creatinine   Date Value Ref Range Status   06/10/2020 0.76 0.70 - 1.30 mg/dL Final     Glucose   Date Value Ref Range Status   06/10/2020 120 (H) 70 - 105 mg/dL Final     Calcium   Date Value Ref Range Status   06/10/2020 9.4 8.6 - 10.3 mg/dL Final     Lab Results   Component Value Date    TSH 1.304 02/24/2016     Lab Results   Component Value Date    MG 1.7 (L) 06/10/2020     Lab Results   Component Value Date    WBC 8.5 06/10/2020    HGB 14.9 06/10/2020    HCT 43.1 06/10/2020     06/10/2020       Us Carotid Duplex Bilateral    Result Date: 5/28/2020  Narrative: Exam: Duplex ultrasound of the bilateral carotid arteries 05/28/2020 Clinical History:  CVA Comparison(s): There are no comparable previous exams Technique: Grayscale, color, and pulsed Doppler evaluation of the  bilateral carotid arteries was performed. Findings: RIGHT CAROTID ICA peak systolic velocity 50 cm/s. The ICA to CCA ratio on the right is 1.0. There is antegrade flow seen in the right vertebral artery. LEFT CAROTID ICA peak systolic velocity 77 cm/s. The ICA to CCA ratio on the right is 2.2. There is antegrade flow seen in the left vertebral artery. Bilateral mild plaque     Impression: IMPRESSION: Less than 50% stenosis bilateral ICA US- Based on the Consensus Panel Gray-Scale and Doppler US Criteria for Diagnosis of ICA Stenosis.  Radiology 2003; 229: 340-346.           Assessment/Plan   ASSESSMENT AND PLAN     R pontine CVA.  - follow-up in 2 months for functional reassessment.      R hand weakness and paresthesias.  - EMG/NCS today, see procedure note and scanned report for traces, nerve conduction values, and EMG summary.      RTC 2 months.      A voice recognition program was used to aid in documentation of this record. Sometimes words are not presented exactly as they were spoken.  While efforts were made to carefully edit and correct any inaccuracies, some errors may be present.  Please take this into context.  Please contact the provider if errors are identified.      Derick Small, DO  6/25/2020

## 2020-06-26 ENCOUNTER — HOME CARE VISIT (OUTPATIENT)
Dept: HOME HEALTH SERVICES | Facility: HOSPICE | Age: 60
End: 2020-06-26
Payer: COMMERCIAL

## 2020-06-26 PROCEDURE — G0152 HHCP-SERV OF OT,EA 15 MIN: HCPCS

## 2020-06-28 VITALS — DIASTOLIC BLOOD PRESSURE: 76 MMHG | HEART RATE: 83 BPM | SYSTOLIC BLOOD PRESSURE: 128 MMHG

## 2020-06-28 SDOH — SOCIAL STABILITY: SOCIAL INSECURITY: RISK FACTORS RELATED TO PERSONAL SAFETY: 1

## 2020-06-28 ASSESSMENT — ENCOUNTER SYMPTOMS
PERSON REPORTING PAIN: PATIENT
DENIES PAIN: 1

## 2020-06-29 ENCOUNTER — HOME CARE VISIT (OUTPATIENT)
Dept: HOME HEALTH SERVICES | Facility: HOSPICE | Age: 60
End: 2020-06-29
Payer: COMMERCIAL

## 2020-06-29 VITALS — SYSTOLIC BLOOD PRESSURE: 148 MMHG | DIASTOLIC BLOOD PRESSURE: 89 MMHG | HEART RATE: 85 BPM | OXYGEN SATURATION: 97 %

## 2020-06-29 DIAGNOSIS — I63.9 ISCHEMIC CEREBROVASCULAR ACCIDENT (CVA) (CMS/HCC): Primary | ICD-10-CM

## 2020-06-29 PROCEDURE — G0180 MD CERTIFICATION HHA PATIENT: HCPCS | Performed by: FAMILY MEDICINE

## 2020-06-29 PROCEDURE — G0151 HHCP-SERV OF PT,EA 15 MIN: HCPCS

## 2020-06-29 PROCEDURE — G0299 HHS/HOSPICE OF RN EA 15 MIN: HCPCS

## 2020-06-29 RX ORDER — ASPIRIN 81 MG/1
81 TABLET ORAL EVERY OTHER DAY
Start: 2020-06-29 | End: 2020-10-08 | Stop reason: ALTCHOICE

## 2020-06-29 SDOH — SOCIAL STABILITY: SOCIAL INSECURITY: RISK FACTORS RELATED TO PERSONAL SAFETY: 1

## 2020-06-29 ASSESSMENT — ENCOUNTER SYMPTOMS
PAIN LOCATION - PAIN SEVERITY: 1/10
PAIN: 1
PERSON REPORTING PAIN: PATIENT
PAIN LOCATION - RELIEVING FACTORS: TYLENOL.
LOWEST PAIN SEVERITY IN PAST 24 HOURS: 1/10
PAIN LOCATION - PAIN FREQUENCY: CONSTANT
HIGHEST PAIN SEVERITY IN PAST 24 HOURS: 2/10
PAIN LOCATION: HEAD

## 2020-06-30 ENCOUNTER — HOME CARE VISIT (OUTPATIENT)
Dept: HOME HEALTH SERVICES | Facility: HOSPICE | Age: 60
End: 2020-06-30
Payer: COMMERCIAL

## 2020-06-30 ENCOUNTER — HOME CARE VISIT (OUTPATIENT)
Dept: HOME HEALTH SERVICES | Facility: HOME HEALTH | Age: 60
End: 2020-06-30
Payer: COMMERCIAL

## 2020-06-30 VITALS
RESPIRATION RATE: 18 BRPM | HEART RATE: 83 BPM | OXYGEN SATURATION: 92 % | TEMPERATURE: 96.5 F | SYSTOLIC BLOOD PRESSURE: 118 MMHG | DIASTOLIC BLOOD PRESSURE: 76 MMHG

## 2020-06-30 PROCEDURE — G0153 HHCP-SVS OF S/L PATH,EA 15MN: HCPCS

## 2020-06-30 ASSESSMENT — ENCOUNTER SYMPTOMS
FATIGUE: 1
DRY SKIN: 1
SUBJECTIVE PAIN PROGRESSION: UNCHANGED
DENIES PAIN: 1
STOOL FREQUENCY: DAILY
PERSON REPORTING PAIN: PATIENT
LAST BOWEL MOVEMENT: 65559
HIGHEST PAIN SEVERITY IN PAST 24 HOURS: 0/10
FORGETFULNESS: 1
DENIES PAIN: 1
PERSON REPORTING PAIN: PATIENT
BOWEL PATTERN NORMAL: 1
LOWEST PAIN SEVERITY IN PAST 24 HOURS: 0/10
DEPRESSED MOOD: 1
PAIN SEVERITY GOAL: 0/10

## 2020-06-30 ASSESSMENT — PATIENT HEALTH QUESTIONNAIRE - PHQ9
5. POOR APPETITE OR OVEREATING: 0 - NOT AT ALL
SUM OF ALL RESPONSES TO PHQ QUESTIONS 1-9: 10
CLINICAL INTERPRETATION OF PHQ2 SCORE: 3

## 2020-06-30 NOTE — PROGRESS NOTES
Dr. Lester,    Patient receiving home health services for Ataxia following cerebral infarction. He greeted this writer in the lobby, ambulating with FWW and demonstrating a slow, semi-steady gait. Reviewed Telehealth Delivery ~ Patient is able to use his phone to contact people, but unable to see to use a computer or laptop for appointments; goal removed. Reviewed risk for internal injury from hypertension with patient. He is now taking hi s anti-hypertensives as ordered, obtains and records his blood pressure daily, and maintains a diabetic diet; goal met. Reviewed COVID-19 and patient recognizes the s/s, strategies to decrease risk and to contact his PCP if suspecting that he has symptoms; goals met. Reviewed depression with patient, he scaled 10/27 on the PHQ scale, indicating moderate depression. Patient relays being upset about his overall situation as he was success ful in losing weight, maintaining blood glucose levels within recommended parameters, and was feeling healthier than he had in years. He further relays feeling frustration with the limitation due to vision. Patient is gradually meeting goals, yet continues to need a device to help him with monitoring blood glucose levels, to which he agrees. Advised patient that physician would be sent update regarding today's visit and he agreed.    Pam Bhatti, RN, BSN

## 2020-06-30 NOTE — Clinical Note
Thank you so much!   ----- Message -----  From: Cesar Bhatti RN  Sent: 6/30/2020   3:43 PM MDT  To: Dennise Reddy, PT      Team,    Patient receiving home health services for Ataxia following cerebral infarction. He greeted this writer in the lobby, ambulating with FWW and demonstrating a slow, semi-steady gait. Reviewed Telehealth Delivery ~ Patient is able to use his phone to contact people, but unable to see to use a computer or laptop for appointments; goal removed. Reviewed risk for internal injury from hypertension with patient. He is now taking his anti-hypertensives as ordered, obtains and records his blood pressure daily, and maintains a diabetic diet; goal met. Reviewed COVID-19 and patient recognizes the s/s, strategies to decrease risk and to contact his PCP if suspecting that he has symptoms; goals met. Reviewed depression with patient, he scaled 10/27 on the PHQ scale, indicating moderate depression. Patient relays being upset about his overall situation as he was successful in losing weight, maintaining blood glucose levels within recommended parameters, and was feeling healthier than he had in years. He further relays feeling frustration with the limitation due to vision. Patient is gradually meeting goals, yet continues to need a device to help him with monitoring blood glucose levels, to which he agrees. Advised patient that physician would be sent update regarding today's visit and he agreed.    Cesar Bhatti RN, BSN

## 2020-06-30 NOTE — PROGRESS NOTES
Team,    Patient receiving home health services for Ataxia following cerebral infarction. He greeted this writer in the lobby, ambulating with FWW and demonstrating a slow, semi-steady gait. Reviewed Telehealth Delivery ~ Patient is able to use his phone to contact people, but unable to see to use a computer or laptop for appointments; goal removed. Reviewed risk for internal injury from hypertension with patient. He is now taking his a nti-hypertensives as ordered, obtains and records his blood pressure daily, and maintains a diabetic diet; goal met. Reviewed COVID-19 and patient recognizes the s/s, strategies to decrease risk and to contact his PCP if suspecting that he has symptoms; goals met. Reviewed depression with patient, he scaled 10/27 on the PHQ scale, indicating moderate depression. Patient relays being upset about his overall situation as he was successful  in losing weight, maintaining blood glucose levels within recommended parameters, and was feeling healthier than he had in years. He further relays feeling frustration with the limitation due to vision. Patient is gradually meeting goals, yet continues to need a device to help him with monitoring blood glucose levels, to which he agrees. Advised patient that physician would be sent update regarding today's visit and he agreed.    Rogers Bhatti, RN, BSN

## 2020-07-01 ENCOUNTER — HOME CARE VISIT (OUTPATIENT)
Dept: HOME HEALTH SERVICES | Facility: HOSPICE | Age: 60
End: 2020-07-01
Payer: COMMERCIAL

## 2020-07-01 VITALS — SYSTOLIC BLOOD PRESSURE: 122 MMHG | DIASTOLIC BLOOD PRESSURE: 86 MMHG

## 2020-07-01 PROCEDURE — G0157 HHC PT ASSISTANT EA 15: HCPCS

## 2020-07-01 PROCEDURE — G0152 HHCP-SERV OF OT,EA 15 MIN: HCPCS

## 2020-07-01 SDOH — SOCIAL STABILITY: SOCIAL INSECURITY: RISK FACTORS RELATED TO PERSONAL SAFETY: 1

## 2020-07-01 ASSESSMENT — ENCOUNTER SYMPTOMS
PERSON REPORTING PAIN: PATIENT
DENIES PAIN: 1
DENIES PAIN: 1
HIGHEST PAIN SEVERITY IN PAST 24 HOURS: 1/10
PERSON REPORTING PAIN: PATIENT

## 2020-07-02 ENCOUNTER — OFFICE VISIT (OUTPATIENT)
Dept: FAMILY MEDICINE | Facility: CLINIC | Age: 60
End: 2020-07-02
Payer: COMMERCIAL

## 2020-07-02 ENCOUNTER — PATIENT OUTREACH (OUTPATIENT)
Dept: FAMILY MEDICINE | Facility: CLINIC | Age: 60
End: 2020-07-02

## 2020-07-02 VITALS
DIASTOLIC BLOOD PRESSURE: 80 MMHG | OXYGEN SATURATION: 97 % | RESPIRATION RATE: 16 BRPM | WEIGHT: 193 LBS | HEIGHT: 71 IN | HEART RATE: 72 BPM | TEMPERATURE: 98.1 F | BODY MASS INDEX: 27.02 KG/M2 | SYSTOLIC BLOOD PRESSURE: 128 MMHG

## 2020-07-02 DIAGNOSIS — I63.9 ISCHEMIC CEREBROVASCULAR ACCIDENT (CVA) (CMS/HCC): Primary | ICD-10-CM

## 2020-07-02 DIAGNOSIS — E83.42 HYPOMAGNESEMIA: ICD-10-CM

## 2020-07-02 DIAGNOSIS — R31.29 MICROSCOPIC HEMATURIA: ICD-10-CM

## 2020-07-02 DIAGNOSIS — F43.21 ADJUSTMENT DISORDER WITH DEPRESSED MOOD: ICD-10-CM

## 2020-07-02 DIAGNOSIS — Z79.4 TYPE 2 DIABETES MELLITUS WITHOUT COMPLICATION, WITH LONG-TERM CURRENT USE OF INSULIN (CMS/HCC): ICD-10-CM

## 2020-07-02 DIAGNOSIS — I10 BENIGN ESSENTIAL HYPERTENSION: ICD-10-CM

## 2020-07-02 DIAGNOSIS — Z09 NEED FOR CASE MANAGEMENT FOLLOW-UP: Primary | ICD-10-CM

## 2020-07-02 DIAGNOSIS — G47.33 OBSTRUCTIVE SLEEP APNEA SYNDROME: ICD-10-CM

## 2020-07-02 DIAGNOSIS — E11.9 TYPE 2 DIABETES MELLITUS WITHOUT COMPLICATION, WITH LONG-TERM CURRENT USE OF INSULIN (CMS/HCC): ICD-10-CM

## 2020-07-02 DIAGNOSIS — E78.2 MIXED HYPERLIPIDEMIA: ICD-10-CM

## 2020-07-02 PROCEDURE — 99214 OFFICE O/P EST MOD 30 MIN: CPT | Performed by: FAMILY MEDICINE

## 2020-07-02 RX ORDER — ROSUVASTATIN CALCIUM 40 MG/1
40 TABLET, COATED ORAL NIGHTLY
Qty: 90 TABLET | Refills: 3 | Status: SHIPPED | OUTPATIENT
Start: 2020-07-02 | End: 2022-07-27 | Stop reason: ALTCHOICE

## 2020-07-02 RX ORDER — SERTRALINE HYDROCHLORIDE 50 MG/1
50 TABLET, FILM COATED ORAL DAILY
Qty: 90 TABLET | Refills: 3 | Status: SHIPPED | OUTPATIENT
Start: 2020-07-02 | End: 2021-07-23

## 2020-07-02 RX ORDER — AMLODIPINE BESYLATE 10 MG/1
10 TABLET ORAL NIGHTLY
Qty: 90 TABLET | Refills: 3 | Status: SHIPPED | OUTPATIENT
Start: 2020-07-02 | End: 2021-07-13

## 2020-07-02 RX ORDER — PEN NEEDLE, DIABETIC 29 G X1/2"
NEEDLE, DISPOSABLE MISCELLANEOUS
COMMUNITY
Start: 2020-06-16 | End: 2020-08-25 | Stop reason: ALTCHOICE

## 2020-07-02 RX ORDER — CLOPIDOGREL BISULFATE 75 MG/1
75 TABLET ORAL DAILY
Qty: 90 TABLET | Refills: 3 | Status: SHIPPED | OUTPATIENT
Start: 2020-07-02 | End: 2021-08-06

## 2020-07-02 RX ORDER — LOSARTAN POTASSIUM 100 MG/1
100 TABLET ORAL DAILY
Qty: 90 TABLET | Refills: 3 | Status: SHIPPED | OUTPATIENT
Start: 2020-07-02 | End: 2021-07-30

## 2020-07-02 ASSESSMENT — PAIN SCALES - GENERAL: PAINLEVEL: 0-NO PAIN

## 2020-07-02 NOTE — ASSESSMENT & PLAN NOTE
Blood pressures are currently pretty high in the morning.  It is time to start bringing his blood pressures back down into a normal range.  Based off of this, I am going to continue his losartan 100 mg in the morning and increase his amlodipine to 10 mg at night.  I did discuss with him hypotension and blood pressures less than 90/60 and to let me know if these or symptoms develop.

## 2020-07-02 NOTE — PROGRESS NOTES
"Pt established care with Dr. Lester on 6/10/2020 after hospital stay from CVA.   met with pt briefly and discussed the role of  and Chronic Care Management.  Dr. Lester recommended Case Management.  Pt was given business card at this time.      Met with pt prior to appointment with Dr. Lester.  Pt stated he is doing pretty good.  Continues to work with therapy PT/OT/ST.  Pt goal is to ride bike and go golfing.  Pt had a CVA and was DC from rehab hospital on 6/5/2020 and has had Home Health since then.     Pt stated that he uses his walker but still tires easily.     Discussed blood sugars as this is a problem related to weakness in left arm.  Pt stated he is managing and his sugar was 101.  Pt also is on insulin and uses a pen.  Pt has problems seeing related to cataracts.  On 5/21/2020 Pt A1c was 5.8.    On 6/29/2020 therapy did a PHQ9 and pt scored a 10.  When pt was asked if there are any issues with depression, pt stated, \"I'm doing the best I can\". Dr. Lester also addressed during visit and increased sertraline to 50 mg daily.      Dr. Lester stated pt has lost weight,  to follow up next week and assess nutrition status with pt.      Encouraged pt to call with questions, concerns, or need for same day appointment.  Pt stated understanding.   "

## 2020-07-02 NOTE — ASSESSMENT & PLAN NOTE
I did place a referral to sleep medicine last time.  He was not able to tolerate a CPAP in 2013 when he was originally diagnosed with sleep apnea.  However, I think it would be beneficial to have him rechecked and they are in the process of reviewing his case.  When he was in the hospital recently, he did have some desaturations at night.

## 2020-07-02 NOTE — ASSESSMENT & PLAN NOTE
He continues to struggle with some depressive symptoms.  He was recently screened with a PHQ 9 and had a score of 10 out of 27 with moderate symptoms.  He is not suicidal.  I did increase his sertraline to 50 mg daily from 25.  I do want to follow-up with him in 6 weeks to make sure he is still doing well.

## 2020-07-02 NOTE — ASSESSMENT & PLAN NOTE
His blood sugars are pretty well controlled actually.  I want him to continue with the regular checks that he is doing as well as continue his 36 units of NPH.  He will be due for labs at his next appointment with me in about 6 weeks.

## 2020-07-02 NOTE — ASSESSMENT & PLAN NOTE
This is currently stable.  There have been some improvements with speech therapy, occupational therapy and physical therapy.  However I do think it is too early to see if we have reached the max benefit and I would strongly recommend these therapies continue as he is benefiting from it.  I also recommend he continue to do this through home health as that he is still not cleared to drive and does have limited gait which keeps him homebound.

## 2020-07-02 NOTE — ASSESSMENT & PLAN NOTE
I recommend he continue his rosuvastatin 40 mg.  His LDL goal will be less than 70 due to his history of stroke as well as his concurrent type 2 diabetes.  We will repeat this at his next lab appointment.

## 2020-07-02 NOTE — PROGRESS NOTES
Outpatient Progress Note    SUBJECTIVE:   ID/CC: Del Mckeon is a 60 y.o. male presenting to clinic today for   Chief Complaint   Patient presents with   • 2 week follow up       HPI:   Del is a 60-year-old male with a past medical history of her cerebrovascular accident about 2 months ago, obstructive sleep apnea not treated with his CPAP as he did not tolerate it in the past, hypertension, vitamin D deficiency, type 2 diabetes and hyperlipidemia.  Since his stroke, he has also been struggling with depressed mood.    In regards to his recent ischemic stroke, he does have some residual left-sided weakness on his arm and leg.  He has been working with physical therapy, speech therapy and occupational therapy and I have seen improvement today compared to his previous examination.  He does feel like things are very slow going however and has not been feeling like he is improving all that much.  However he has been faithful with this.  He is walking with a front wheeled walker.  He has not had any falls.    In regards to his blood pressures, these are running mostly in the 120s to 150s over 70s to 80s.  He does notice that his blood pressure first thing in the morning is very high usually in the 1 50-1 60 range.  He is currently on losartan 100 mg in the morning and then amlodipine 5 mg at night.  Once he gets up and moves around and has taken his losartan, his blood pressure does start to come down and by the time physical therapy is there in the morning at about 10, his blood pressures in the normal range.  He is not having any headaches, blurry vision, chest pain, shortness of breath, weakness or fatigue.    In regards to his blood sugars, his fasting checks are usually the only ones that he will check and they are mostly in the 80s to 100s.  He has checked a couple postprandial sugars and these have been as high as 150 but nothing higher.  He is currently on NPH over-the-counter insulin and is taking 36 units  "daily.  He is tolerating this well.  He has not had any low blood sugars and denies any tremors, weakness, sweating spells.    He has been struggling to adjust to his new normal with the residual realities of his stroke.  He has been feeling sad and casey.  He is not eating very well.  He has had 8 pound decrease in weight since I saw him last.  He also has been struggling with sleeping through the night and is having early morning wakening.  He has not had any thoughts of suicide or homicidal ideation.  He does feel like the sertraline helped a little bit but is wondering if an increase might help some too.  He does not want to do any counseling right now because he has quite a few people coming in to see him.      BGs fasting are in the 100s. Post prandials are 110s-150.     BP is high first thing in the morning 150/90s. Improves throughout the day as he moves and does his exercises.       Review of Systems  12 point ROS reviewed and negative except as in HPI.     Past Medical History:   Diagnosis Date   • Anemia    • Asthma    • Depressive disorder    • Heart murmur    • Hyperlipidemia    • Hypertension    • Obesity    • Obstructive sleep apnea    • Type 2 diabetes mellitus (CMS/HCC) (HCC)        Past Surgical History:   Procedure Laterality Date   • CRYOTHERAPY      dermatology         Current Outpatient Medications:   •  BD Insulin Syringe Ultra-Fine 0.5 mL 31 gauge x 5/16\" syringe, , Disp: , Rfl:   •  amLODIPine (NORVASC) 10 mg tablet, Take 1 tablet (10 mg total) by mouth nightly, Disp: 90 tablet, Rfl: 3  •  clopidogreL (PLAVIX) 75 mg tablet, Take 1 tablet (75 mg total) by mouth daily, Disp: 90 tablet, Rfl: 3  •  losartan (COZAAR) 100 mg tablet, Take 1 tablet (100 mg total) by mouth daily, Disp: 90 tablet, Rfl: 3  •  rosuvastatin (CRESTOR) 40 mg tablet, Take 1 tablet (40 mg total) by mouth nightly, Disp: 90 tablet, Rfl: 3  •  sertraline (ZOLOFT) 50 mg tablet, Take 1 tablet (50 mg total) by mouth daily, Disp: " "90 tablet, Rfl: 3  •  aspirin 81 mg EC tablet, Take 1 tablet (81 mg total) by mouth every other day Patient taking the tablet every alternate day d/t c/o nose bleeds., Disp: , Rfl:   •  acetaminophen (Tylenol 8 Hour) 650 mg 8 hr tablet, Take 650 mg by mouth every 8 (eight) hours as needed for pain scale 1-3/10., Disp: , Rfl:   •  blood-glucose meter kit, Use as instructed, Disp: 1 each, Rfl: 0  •  blood sugar diagnostic (glucose blood) strip, Use to check blood sugar levels up to three times daily., Disp: 200 strip, Rfl: 1  •  insulin NPH isoph U-100 human (NovoLIN N Flexpen) 100 unit/mL insulin pen pen, Inject 0.36 mL (36 Units total) under the skin daily, Disp: 4 pen, Rfl: 3  •  insulin syringe-needle U-100 1 mL 29 gauge x 1/2\" syringe, Inject 1 application under the skin daily, Disp: 200 each, Rfl: 1  •  cholecalciferol, vitamin D3, (Vitamin D3) 25 mcg (1,000 unit) capsule, Take 1 capsule (1,000 Units total) by mouth daily, Disp: 30 capsule, Rfl: 3  •  multivitamin with minerals tablet, Take 1 tablet by mouth daily, Disp: , Rfl:   •  melatonin 5 mg capsule, Take 5 mg by mouth nightly as needed (insomnia) , Disp: , Rfl:   •  potassium gluconate 595 mg (99 mg) tablet, Take 1 tablet by mouth 2 (two) times a day, Disp: , Rfl:   •  diphenhydramine-acetaminophen (TYLENOL PM EXTRA STRENGTH)  mg tablet, Take by mouth nightly as needed  , Disp: , Rfl:     Allergies   Allergen Reactions   • Penicillins      Tested as a child; showed positive       Social History     Socioeconomic History   • Marital status:      Spouse name: Not on file   • Number of children: Not on file   • Years of education: Not on file   • Highest education level: Not on file   Occupational History   • Not on file   Social Needs   • Financial resource strain: Not on file   • Food insecurity     Worry: Not on file     Inability: Not on file   • Transportation needs     Medical: Not on file     Non-medical: Not on file   Tobacco Use   • " Smoking status: Never Smoker   • Smokeless tobacco: Never Used   Substance and Sexual Activity   • Alcohol use: No   • Drug use: Not Currently     Types: Hydrocodone     Comment: 3 times daily, low back/sciatica pain   • Sexual activity: Not Currently   Lifestyle   • Physical activity     Days per week: Not on file     Minutes per session: Not on file   • Stress: Not on file   Relationships   • Social connections     Talks on phone: Not on file     Gets together: Not on file     Attends Islam service: Not on file     Active member of club or organization: Not on file     Attends meetings of clubs or organizations: Not on file     Relationship status: Not on file   • Intimate partner violence     Fear of current or ex partner: Not on file     Emotionally abused: Not on file     Physically abused: Not on file     Forced sexual activity: Not on file   Other Topics Concern   • Not on file   Social History Narrative   • Not on file       Family History   Problem Relation Age of Onset   • Alzheimer's disease Father    • Diabetes type II Father    • Diabetes Sister        OBJECTIVE:   Vitals:   Vitals:    07/02/20 1425   BP: 128/80   Pulse: 72   Resp: 16   Temp: 36.7 °C (98.1 °F)   SpO2: 97%     Weights (last 14 days)     Date/Time   Weight    07/02/20 1425   87.5 kg (193 lb)              Physical Exam:   General: Alert and oriented. In no acute distress.   Head:normocephalic and atraumatic. No tenderness palpated  Eyes:Pupils equal equal and reactive, no corneal or scleral injection.  ENT: TMs visible without bulging or erythema. No nasal lesions. No oral lesions.   Cardio: S1 S2 without extra sounds or murmurs.  No edema. PP intact. Cap refill <2s.  No JVD  Pulm: CTAB without wheezing or crackles. Symmetrical air exchange.   Gi: BS present x4. Soft, nontender and non distended.  No masses palpated.  Ext: Full range of motion of all extremities.  No joint tenderness or erythema or swelling noted.  Neuro: Continued  left-sided weakness though this is mildly improving.  Mentation and speech is much clearer today than previous.  No focal lesions.  Cranial nerves II through XII are intact and symmetrical.  Deep tendon reflexes 2 throughout.  Skin: No rashes or lesions noted.  Psych: Denies SI/HI/AH/VH      Labs/Imaging/Micro:  Previous labs reviewed    ASSESSMENT AND PLAN:   Del Mckeon is a 60 y.o. male presenting to clinic today for:     Ischemic cerebrovascular accident (CVA) (CMS/[facility]) ([facility])  This is currently stable.  There have been some improvements with speech therapy, occupational therapy and physical therapy.  However I do think it is too early to see if we have reached the max benefit and I would strongly recommend these therapies continue as he is benefiting from it.  I also recommend he continue to do this through home health as that he is still not cleared to drive and does have limited gait which keeps him homebound.    Obstructive sleep apnea syndrome  I did place a referral to sleep medicine last time.  He was not able to tolerate a CPAP in 2013 when he was originally diagnosed with sleep apnea.  However, I think it would be beneficial to have him rechecked and they are in the process of reviewing his case.  When he was in the hospital recently, he did have some desaturations at night.    Benign essential hypertension  Blood pressures are currently pretty high in the morning.  It is time to start bringing his blood pressures back down into a normal range.  Based off of this, I am going to continue his losartan 100 mg in the morning and increase his amlodipine to 10 mg at night.  I did discuss with him hypotension and blood pressures less than 90/60 and to let me know if these or symptoms develop.    Type 2 diabetes mellitus without complication, with long-term current use of insulin (CMS/[facility]) ([facility])  His blood sugars are pretty well controlled actually.  I want him to continue with the regular checks that he is  doing as well as continue his 36 units of NPH.  He will be due for labs at his next appointment with me in about 6 weeks.    Mixed hyperlipidemia  I recommend he continue his rosuvastatin 40 mg.  His LDL goal will be less than 70 due to his history of stroke as well as his concurrent type 2 diabetes.  We will repeat this at his next lab appointment.    Adjustment disorder with depressed mood  He continues to struggle with some depressive symptoms.  He was recently screened with a PHQ 9 and had a score of 10 out of 27 with moderate symptoms.  He is not suicidal.  I did increase his sertraline to 50 mg daily from 25.  I do want to follow-up with him in 6 weeks to make sure he is still doing well.     Diagnosis Plan   1. Ischemic cerebrovascular accident (CVA) (CMS/[facility]) ([facility])  clopidogreL (PLAVIX) 75 mg tablet    rosuvastatin (CRESTOR) 40 mg tablet   2. Obstructive sleep apnea syndrome     3. Benign essential hypertension  amLODIPine (NORVASC) 10 mg tablet    losartan (COZAAR) 100 mg tablet   4. Type 2 diabetes mellitus without complication, with long-term current use of insulin (CMS/[facility]) ([facility])  Hemoglobin A1c (glycosylated) Blood, Venous    Basic metabolic panel Blood, Venous   5. Mixed hyperlipidemia  rosuvastatin (CRESTOR) 40 mg tablet   6. Adjustment disorder with depressed mood  sertraline (ZOLOFT) 50 mg tablet   7. Microscopic hematuria     8. Hypomagnesemia  Magnesium Blood, Venous       Return in about 6 weeks (around 8/13/2020) for Chronic care follow up - 40 min.    Total time face to face spent with patient was 25 minutes with 25 minutes in counseling and coordination of care regarding the above which constitutes 100% of our face to face time.       Klarissa Lester MD  07/02/20  4:07 PM

## 2020-07-03 ENCOUNTER — HOME CARE VISIT (OUTPATIENT)
Dept: HOME HEALTH SERVICES | Facility: HOSPICE | Age: 60
End: 2020-07-03
Payer: COMMERCIAL

## 2020-07-03 PROCEDURE — G0152 HHCP-SERV OF OT,EA 15 MIN: HCPCS

## 2020-07-05 VITALS — DIASTOLIC BLOOD PRESSURE: 81 MMHG | HEART RATE: 71 BPM | OXYGEN SATURATION: 96 % | SYSTOLIC BLOOD PRESSURE: 133 MMHG

## 2020-07-05 SDOH — SOCIAL STABILITY: SOCIAL INSECURITY: RISK FACTORS RELATED TO PERSONAL SAFETY: 1

## 2020-07-05 ASSESSMENT — ENCOUNTER SYMPTOMS
PERSON REPORTING PAIN: PATIENT
DENIES PAIN: 1

## 2020-07-06 ENCOUNTER — HOME CARE VISIT (OUTPATIENT)
Dept: HOME HEALTH SERVICES | Facility: HOSPICE | Age: 60
End: 2020-07-06
Payer: COMMERCIAL

## 2020-07-06 ENCOUNTER — HOME CARE VISIT (OUTPATIENT)
Dept: HOME HEALTH SERVICES | Facility: HOME HEALTH | Age: 60
End: 2020-07-06
Payer: COMMERCIAL

## 2020-07-06 VITALS
DIASTOLIC BLOOD PRESSURE: 68 MMHG | HEART RATE: 85 BPM | TEMPERATURE: 96.5 F | OXYGEN SATURATION: 93 % | SYSTOLIC BLOOD PRESSURE: 115 MMHG | RESPIRATION RATE: 18 BRPM

## 2020-07-06 PROCEDURE — G0299 HHS/HOSPICE OF RN EA 15 MIN: HCPCS

## 2020-07-06 PROCEDURE — G0157 HHC PT ASSISTANT EA 15: HCPCS

## 2020-07-06 ASSESSMENT — ENCOUNTER SYMPTOMS
DYSPNEA ACTIVITY LEVEL: AFTER AMBULATING MORE THAN 20 FT
DRY SKIN: 1
SHORTNESS OF BREATH: 1
MUSCLE WEAKNESS: 1
PAIN LOCATION - PAIN QUALITY: STIFFNESS
PAIN: 1
LAST BOWEL MOVEMENT: 65565
PAIN LOCATION - PAIN SEVERITY: 0/10
PERSON REPORTING PAIN: PATIENT
HIGHEST PAIN SEVERITY IN PAST 24 HOURS: 1/10
PERSON REPORTING PAIN: PATIENT
SUBJECTIVE PAIN PROGRESSION: UNCHANGED
DENIES PAIN: 1
PAIN LOCATION - PAIN DURATION: COMES AND GOES
BOWEL PATTERN NORMAL: 1
PAIN LOCATION - PAIN FREQUENCY: INTERMITTENT
PAIN LOCATION - RELIEVING FACTORS: STANDING UP
PAIN LOCATION: BACK
LOWEST PAIN SEVERITY IN PAST 24 HOURS: 0/10
FORGETFULNESS: 1
PAIN SEVERITY GOAL: 0/10
FATIGUES EASILY: 1

## 2020-07-06 NOTE — PROGRESS NOTES
"Team,    Patient receiving home health services for Ataxia following cerebral infarction. He greeted this writer at [facility], ambulated with FWW, and demonstrated a slow, semi-steady gait. Medications reviewed, patient reports that he will be picking up refills of medication tomorrow. His Zoloft was increased to 50mg PO daily, and he hasn't been taking the increased dose for seven days. Patient reports fatigue, BS WDL at 103. He repo rts that he only eats between [id number], and fasts the remainder of the day, to regulate BG and weight. Explained to patient that fasting may be R/T his fatigue and he denies this, relaying, \"I've been doing this for a long time [fasting], and I think I'm just tired\". Reviewed Nutrition with patient and he relays eating a healthy diet, and that BG levels have been WDL, so he is not checking the BG regularly. Patient does have the knowle dge base on diabetes and the effect of disease on his body; knowledge base is adequate for discharge. Reviewed infection prevention, patient is able to teachback strategies to prevent infection; goals met.    Cesar Bhatti, RN, BSN"

## 2020-07-07 ENCOUNTER — HOME CARE VISIT (OUTPATIENT)
Dept: HOME HEALTH SERVICES | Facility: HOSPICE | Age: 60
End: 2020-07-07
Payer: COMMERCIAL

## 2020-07-07 VITALS — DIASTOLIC BLOOD PRESSURE: 83 MMHG | HEART RATE: 78 BPM | SYSTOLIC BLOOD PRESSURE: 120 MMHG

## 2020-07-07 PROCEDURE — G0152 HHCP-SERV OF OT,EA 15 MIN: HCPCS

## 2020-07-07 PROCEDURE — G0153 HHCP-SVS OF S/L PATH,EA 15MN: HCPCS

## 2020-07-07 SDOH — SOCIAL STABILITY: SOCIAL INSECURITY: RISK FACTORS RELATED TO PERSONAL SAFETY: 1

## 2020-07-07 ASSESSMENT — ENCOUNTER SYMPTOMS
PAIN LOCATION - PAIN FREQUENCY: INFREQUENT
PAIN: 1
LOWEST PAIN SEVERITY IN PAST 24 HOURS: 0/10
PAIN LOCATION: BACK
PAIN LOCATION - PAIN SEVERITY: 1/10
PERSON REPORTING PAIN: PATIENT
PAIN LOCATION - RELIEVING FACTORS: SITTING
PAIN LOCATION - PAIN SEVERITY: 1/10
PAIN LOCATION - EXACERBATING FACTORS: SITTING TOO LONG
HIGHEST PAIN SEVERITY IN PAST 24 HOURS: 1/10
PAIN LOCATION - EXACERBATING FACTORS: STANDING
PAIN LOCATION - PAIN QUALITY: STIFFNESS
PAIN: 1
PAIN SEVERITY GOAL: 0/10
PAIN LOCATION: BACK
PERSON REPORTING PAIN: PATIENT

## 2020-07-08 ENCOUNTER — HOME CARE VISIT (OUTPATIENT)
Dept: HOME HEALTH SERVICES | Facility: HOSPICE | Age: 60
End: 2020-07-08
Payer: COMMERCIAL

## 2020-07-08 PROCEDURE — G0157 HHC PT ASSISTANT EA 15: HCPCS

## 2020-07-09 VITALS — DIASTOLIC BLOOD PRESSURE: 82 MMHG | OXYGEN SATURATION: 97 % | HEART RATE: 83 BPM | SYSTOLIC BLOOD PRESSURE: 110 MMHG

## 2020-07-09 SDOH — SOCIAL STABILITY: SOCIAL INSECURITY: RISK FACTORS RELATED TO PERSONAL SAFETY: 1

## 2020-07-09 ASSESSMENT — ENCOUNTER SYMPTOMS
PAIN LOCATION - EXACERBATING FACTORS: SINUS PRESSURE
HIGHEST PAIN SEVERITY IN PAST 24 HOURS: 3/10
PAIN LOCATION - PAIN FREQUENCY: CONSTANT
LOWEST PAIN SEVERITY IN PAST 24 HOURS: 0/10
PAIN LOCATION: HEAD
PAIN LOCATION - PAIN DURATION: THIS MORNING
PAIN LOCATION - PAIN SEVERITY: 3/10
PAIN: 1
PERSON REPORTING PAIN: PATIENT
PAIN LOCATION - PAIN QUALITY: STIFFNESS
PAIN LOCATION - PAIN SEVERITY: 3/10
PAIN LOCATION - PAIN DURATION: LAST 3 DAYS
PAIN LOCATION: BACK
PAIN LOCATION - PAIN FREQUENCY: INTERMITTENT

## 2020-07-10 ENCOUNTER — HOME CARE VISIT (OUTPATIENT)
Dept: HOME HEALTH SERVICES | Facility: HOSPICE | Age: 60
End: 2020-07-10
Payer: COMMERCIAL

## 2020-07-10 VITALS — SYSTOLIC BLOOD PRESSURE: 132 MMHG | OXYGEN SATURATION: 97 % | DIASTOLIC BLOOD PRESSURE: 84 MMHG | HEART RATE: 96 BPM

## 2020-07-10 PROCEDURE — G0152 HHCP-SERV OF OT,EA 15 MIN: HCPCS

## 2020-07-10 ASSESSMENT — ENCOUNTER SYMPTOMS
DENIES PAIN: 1
PERSON REPORTING PAIN: PATIENT

## 2020-07-13 ENCOUNTER — HOME CARE VISIT (OUTPATIENT)
Dept: HOME HEALTH SERVICES | Facility: HOME HEALTH | Age: 60
End: 2020-07-13
Payer: COMMERCIAL

## 2020-07-13 ENCOUNTER — HOME CARE VISIT (OUTPATIENT)
Dept: HOME HEALTH SERVICES | Facility: HOSPICE | Age: 60
End: 2020-07-13
Payer: COMMERCIAL

## 2020-07-13 VITALS — DIASTOLIC BLOOD PRESSURE: 83 MMHG | HEART RATE: 68 BPM | SYSTOLIC BLOOD PRESSURE: 125 MMHG

## 2020-07-13 VITALS — HEART RATE: 68 BPM | DIASTOLIC BLOOD PRESSURE: 83 MMHG | SYSTOLIC BLOOD PRESSURE: 125 MMHG | OXYGEN SATURATION: 99 %

## 2020-07-13 VITALS — DIASTOLIC BLOOD PRESSURE: 73 MMHG | HEART RATE: 75 BPM | SYSTOLIC BLOOD PRESSURE: 130 MMHG | OXYGEN SATURATION: 99 %

## 2020-07-13 PROCEDURE — G0299 HHS/HOSPICE OF RN EA 15 MIN: HCPCS

## 2020-07-13 PROCEDURE — G0152 HHCP-SERV OF OT,EA 15 MIN: HCPCS

## 2020-07-13 PROCEDURE — G0151 HHCP-SERV OF PT,EA 15 MIN: HCPCS

## 2020-07-13 SDOH — SOCIAL STABILITY: SOCIAL INSECURITY: RISK FACTORS RELATED TO PERSONAL SAFETY: 1

## 2020-07-13 ASSESSMENT — BALANCE ASSESSMENTS
REACHING FORWARD WITH OUTSTRETCHED ARM WHILE STANDING: 3 - CAN REACH FORWARD 12 CM (5 INCHES)
STANDING UNSUPPORTED WITH FEET TOGETHER: 0
STANDING ON ONE LEG: 0
SITTING TO STANDING: 3 - ABLE TO STAND INDEPENDENTLY USING HANDS
STANDING TO SITTING: 4 - SITS SAFELY WITH MINIMAL USE OF HANDS
PICK UP OBJECT FROM THE FLOOR FROM A STANDING POSITION: 2 - UNABLE TO PICK UP BUT REACHES 2-5 CM (1-2 INCHES) FROM SLIPPER AND KEEPS BALANCE INDEPENDENTLY
STANDING UNSUPPORTED ONE FOOT IN FRONT: 0
STANDING UNSUPPORTED: 1 - NEEDS SEVERAL TRIES TO STAND 30 SECONDS UNSUPPORTED
STANDING UNSUPPORTED ONE FOOT IN FRONT: 0 - LOSES BALANCE WHILE STEPPING OR STANDING
STANDING UNSUPPORTED: 1
STANDING UNSUPPORTED WITH EYES CLOSED: 0 - NEEDS HELP TO KEEP FROM FALLING
LONG VERSION TOTAL SCORE (MAX 56): 25
STANDING TO SITTING: 4
TRANSFERS: 4 - ABLE TO TRANSFER SAFELY WITH MINOR USE OF HANDS
STANDING UNSUPPORTED WITH EYES CLOSED: 0
TRANSFERS: 4
STANDING UNSUPPORTED WITH FEET TOGETHER: 0 - NEEDS HELP TO ATTAIN POSITION AND UNABLE TO HOLD FOR 15 SECONDS
SITTING TO STANDING: 3
TURN 360 DEGREES: 1 - NEEDS CLOSE SUPERVISION OR VERBAL CUING
STANDING ON ONE LEG: 0 - UNABLE TO TRY OR NEEDS ASSIST TO PREVENT FALL
TURN 360 DEGREES: 1

## 2020-07-13 ASSESSMENT — ENCOUNTER SYMPTOMS
PERSON REPORTING PAIN: PATIENT
DRY SKIN: 1
PAIN LOCATION: BACK
PAIN LOCATION - RELIEVING FACTORS: STRETCHING
PAIN LOCATION - PAIN QUALITY: ACHE
PAIN LOCATION - PAIN FREQUENCY: INTERMITTENT
SUBJECTIVE PAIN PROGRESSION: UNCHANGED
PERSON REPORTING PAIN: PATIENT
PAIN SEVERITY GOAL: 0/10
LOWEST PAIN SEVERITY IN PAST 24 HOURS: 0/10
STOOL FREQUENCY: EVERY OTHER DAY
DENIES PAIN: 1
HIGHEST PAIN SEVERITY IN PAST 24 HOURS: 3/10
LAST BOWEL MOVEMENT: 65572
PAIN: 1
PAIN LOCATION - PAIN QUALITY: STIFFNESS
PAIN LOCATION - PAIN DURATION: COMES AND GOES
PAIN LOCATION - PAIN FREQUENCY: INTERMITTENT
MUSCLE WEAKNESS: 1
PAIN LOCATION: BACK
PAIN LOCATION - PAIN SEVERITY: 0/10
PAIN LOCATION - EXACERBATING FACTORS: NOT MOVING
BOWEL PATTERN NORMAL: 1
PERSON REPORTING PAIN: PATIENT
HIGHEST PAIN SEVERITY IN PAST 24 HOURS: 3/10
DENIES PAIN: 1
DEPRESSED MOOD: 1
FATIGUES EASILY: 1
PAIN LOCATION - PAIN SEVERITY: 1/10
PAIN LOCATION - RELIEVING FACTORS: STRETCHING

## 2020-07-13 ASSESSMENT — PATIENT HEALTH QUESTIONNAIRE - PHQ9
SUM OF ALL RESPONSES TO PHQ QUESTIONS 1-9: 3
CLINICAL INTERPRETATION OF PHQ2 SCORE: 1
5. POOR APPETITE OR OVEREATING: 1 - SEVERAL DAYS

## 2020-07-13 NOTE — PROGRESS NOTES
PT reassessment completed today. Patient is progressing well with PT goals. Potential plan of DC on 7/22/2020 as per original POC.     I talked to the patient regarding initiation of cane ambulation. Patient reported that he does not Have a cane and feels rather safer with walker. PT is in agreement at this point.  Considering patient's  recent brain injury and poor vision, PT  has recommended continuing with FWW on all surfaces to maxi mise safety and fall prevntion. Patient in agreement . Let me know your thoughts.     Also, could you please initiate with Resistive LE strength excs with T band for the patient and work on uneven surface ambulation, next visit ?    Call if you have any questions.     Thank you!

## 2020-07-13 NOTE — Clinical Note
I completed my PT reassessment for the patient yesterday as well. Patient progressing really well with PT goals. PT plan of DC on 7/22/2020 as per Original POC.     THank you.    ----- Message -----  From: Cesar Bhatti RN  Sent: 7/13/2020   4:36 PM MDT  To: Dennise Reddy, PT      Team,    Patient receiving home health services for Ataxia following cerebral infarction. He reports that he is checking his blood glucose levels every other day, secondary to poor vision, and his last three readings were 130, 116, and 131. He is able to demonstrate drawing up insulin and administering, however, he reports it is helpful that the insulin is cloudy.  Depression scale completed and he scored 3/27, indicating mild depression. Patient is reaching SN goals and was advised that discipline discharge would occur next week; patient agrees.    Cesar Bhatti RN, BSN

## 2020-07-13 NOTE — PROGRESS NOTES
Team,    Patient receiving home health services for Ataxia following cerebral infarction. He reports that he is checking his blood glucose levels every other day, secondary to poor vision, and his last three readings were 130, 116, and 131. He is able to demonstrate drawing up insulin and administering, however, he reports it is helpful that the insulin is cloudy.  Depression scale completed and he scored 3/27, indicating mild depressio n. Patient is reaching SN goals and was advised that discipline discharge would occur next week; patient agrees.    Cesar Bhatti, RN, BSN

## 2020-07-13 NOTE — PROGRESS NOTES
Dr. Lester,    Patient receiving home health services for Ataxia following cerebral infarction. He reports that he is checking his blood glucose levels every other day, secondary to poor vision, and his last three readings were 130, 116, and 131. He is able to demonstrate drawing up insulin and administering, however, he reports it is helpful that the insulin is cloudy.  Depression scale completed and he scored 3/27, indicating mild depres mei. Patient is reaching SN goals and was advised that discipline discharge would occur next week; patient agrees.    Cesar Bhatti, RN, BSN

## 2020-07-14 ENCOUNTER — HOME CARE VISIT (OUTPATIENT)
Dept: HOME HEALTH SERVICES | Facility: HOSPICE | Age: 60
End: 2020-07-14
Payer: COMMERCIAL

## 2020-07-14 PROCEDURE — G0153 HHCP-SVS OF S/L PATH,EA 15MN: HCPCS

## 2020-07-14 ASSESSMENT — ENCOUNTER SYMPTOMS
PAIN LOCATION - PAIN FREQUENCY: INTERMITTENT
PAIN LOCATION: BACK
PAIN LOCATION - EXACERBATING FACTORS: REST
PERSON REPORTING PAIN: PATIENT
PAIN LOCATION - RELIEVING FACTORS: WALKING
LOWEST PAIN SEVERITY IN PAST 24 HOURS: 0/10
PAIN SEVERITY GOAL: 0/10
HIGHEST PAIN SEVERITY IN PAST 24 HOURS: 2/10
PAIN LOCATION - PAIN SEVERITY: 2/10
PAIN: 1

## 2020-07-14 ASSESSMENT — MONTREAL COGNITIVE ASSESSMENT (MOCA)
CATEGORY CUE (IF APPLICABLE): 0
ABSTRACTION SUBSCORE: 2
WHAT IS THE TOTAL SCORE (OUT OF 30): 27
13D. WHAT DAY: 1
4. NAME EACH OF THE THREE ANIMALS SHOWN: LION
ORIENTATION SUBSCORE: DATE
8. SERIAL SUBTRACTION OF 7S: 3
2. COPY DRAWING: 1
ADD 1 POINT IF LESS THAN OR EQUAL TO 12 YR EDUCATION LEVEL: 0
SHOW THE PATIENT THE PICTURE OF THE ANIMAL AND ASK THEM TO NAME IT.: 1
VISUOSPATIAL/EXECUTIVE SUBSCORE: HANDS ON CLOCK
LANGUAGE SUBSCORE: 1
10. [FLUENCY] NAME WORDS STARTING WITH DESIGNATED LETTER: GREATER THAN OR EQUAL TO 11 WORDS
ORIENTATION SUBSCORE: 6
11. FOR EACH PAIR OF WORDS, WHAT CATEGORY DO THEY BELONG TO (OUT OF 2): TRAIN - BICYCLE
9. REPEAT EACH SENTENCE: FIRST SENTENCE REPEATED
9. REPEAT EACH SENTENCE: 1
13E. WHAT IS THE NAME OF THIS PLACE: 1
13B. WHAT MONTH: 1
12. MEMORY INDEX SCORE: FACE
11. FOR EACH PAIR OF WORDS, WHAT CATEGORY DO THEY BELONG TO (OUT OF 2): WATCH - RULER
4. NAME EACH OF THE THREE ANIMALS SHOWN: RHINO
13C. WHAT YEAR: 1
VISUOSPATIAL/EXECUTIVE SUBSCORE: CONTOUR OF CLOCK
8. SERIAL SUBTRACTION OF 7S: 4 - 5 CORRECT
VISUOSPATIAL/EXECUTIVE SUBSCORE: CONNECT CIRCLES
7. [VIGILENCE] TAP WHEN HEARING DESIGNATED LETTER: < 2 ERRORS
9. REPEAT EACH SENTENCE: 0
12. MEMORY INDEX SCORE: VELVET
6B. READ LIST OF DIGITS [BACKWARD]: 0
12. MEMORY INDEX SCORE: CHURCH
6. READ LIST OF DIGITS [FORWARD/BACKWARD]: 1
CATEGORY CUE (IF APPLICABLE): 1
12. MEMORY INDEX SCORE: 4
13A. WHAT DATE: 1
13F. WHAT CITY: 1
VISUOSPATIAL/EXECUTIVE SUBSCORE: NUMBERS ON CLOCK
VISUOSPATIAL/EXECUTIVE SUBSCORE: 5
11. FOR EACH PAIR OF WORDS, WHAT CATEGORY DO THEY BELONG TO (OUT OF 2): 1
VISUOSPATIAL/EXECUTIVE SUBSCORE: 1
ORIENTATION SUBSCORE: PLACE
ORIENTATION SUBSCORE: MONTH
VISUOSPATIAL/EXECUTIVE SUBSCORE: COPY CUBE
6. READ LIST OF DIGITS [FORWARD/BACKWARD]: DIGITS REPEATED IN FORWARD ORDER
ORIENTATION SUBSCORE: YEAR
ORIENTATION SUBSCORE: CITY
3. DRAW A CLOCK: CONTOUR, NUMBERS, HANDS: 1
NAMING SUBSCORE: 3

## 2020-07-15 ENCOUNTER — HOME CARE VISIT (OUTPATIENT)
Dept: HOME HEALTH SERVICES | Facility: HOSPICE | Age: 60
End: 2020-07-15
Payer: COMMERCIAL

## 2020-07-15 PROCEDURE — G0157 HHC PT ASSISTANT EA 15: HCPCS

## 2020-07-16 ENCOUNTER — HOME CARE VISIT (OUTPATIENT)
Dept: HOME HEALTH SERVICES | Facility: HOSPICE | Age: 60
End: 2020-07-16
Payer: COMMERCIAL

## 2020-07-16 VITALS — DIASTOLIC BLOOD PRESSURE: 70 MMHG | SYSTOLIC BLOOD PRESSURE: 110 MMHG | OXYGEN SATURATION: 95 % | HEART RATE: 72 BPM

## 2020-07-16 PROCEDURE — G0152 HHCP-SERV OF OT,EA 15 MIN: HCPCS

## 2020-07-16 SDOH — SOCIAL STABILITY: SOCIAL INSECURITY: RISK FACTORS RELATED TO PERSONAL SAFETY: 1

## 2020-07-16 ASSESSMENT — ENCOUNTER SYMPTOMS
PAIN LOCATION - PAIN FREQUENCY: CONSTANT
PERSON REPORTING PAIN: PATIENT
PAIN LOCATION - PAIN DURATION: DAILY
PAIN LOCATION - PAIN SEVERITY: 2/10
PAIN LOCATION - RELIEVING FACTORS: STRETCHES
PAIN LOCATION: BACK
PAIN: 1

## 2020-07-19 VITALS — HEART RATE: 74 BPM | DIASTOLIC BLOOD PRESSURE: 68 MMHG | OXYGEN SATURATION: 96 % | SYSTOLIC BLOOD PRESSURE: 112 MMHG

## 2020-07-19 SDOH — SOCIAL STABILITY: SOCIAL INSECURITY: RISK FACTORS RELATED TO PERSONAL SAFETY: 1

## 2020-07-19 ASSESSMENT — ENCOUNTER SYMPTOMS
PERSON REPORTING PAIN: PATIENT
PAIN: 1
HIGHEST PAIN SEVERITY IN PAST 24 HOURS: 6/10
PAIN LOCATION - PAIN QUALITY: ACHE
PAIN LOCATION - PAIN SEVERITY: 6/10
PAIN LOCATION: BACK
PAIN LOCATION - RELIEVING FACTORS: MEDICATION
PAIN LOCATION - PAIN FREQUENCY: INTERMITTENT

## 2020-07-20 ENCOUNTER — HOME CARE VISIT (OUTPATIENT)
Dept: HOME HEALTH SERVICES | Facility: HOSPICE | Age: 60
End: 2020-07-20
Payer: COMMERCIAL

## 2020-07-20 ENCOUNTER — HOME CARE VISIT (OUTPATIENT)
Dept: HOME HEALTH SERVICES | Facility: HOME HEALTH | Age: 60
End: 2020-07-20
Payer: COMMERCIAL

## 2020-07-20 VITALS
SYSTOLIC BLOOD PRESSURE: 120 MMHG | RESPIRATION RATE: 16 BRPM | DIASTOLIC BLOOD PRESSURE: 84 MMHG | TEMPERATURE: 95.9 F | HEART RATE: 93 BPM | OXYGEN SATURATION: 99 %

## 2020-07-20 VITALS — SYSTOLIC BLOOD PRESSURE: 131 MMHG | HEART RATE: 72 BPM | OXYGEN SATURATION: 99 % | DIASTOLIC BLOOD PRESSURE: 88 MMHG

## 2020-07-20 PROCEDURE — G0299 HHS/HOSPICE OF RN EA 15 MIN: HCPCS

## 2020-07-20 PROCEDURE — G0151 HHCP-SERV OF PT,EA 15 MIN: HCPCS

## 2020-07-20 PROCEDURE — G0152 HHCP-SERV OF OT,EA 15 MIN: HCPCS

## 2020-07-20 SDOH — SOCIAL STABILITY: SOCIAL INSECURITY: RISK FACTORS RELATED TO PERSONAL SAFETY: 1

## 2020-07-20 ASSESSMENT — ACTIVITIES OF DAILY LIVING (ADL)
BATHING_CURRENT_FUNCTION: INDEPENDENT
TOILETING: 1
DRESSING_LB_CURRENT_FUNCTION: INDEPENDENT
GROOMING ASSESSED: 1
FEEDING: INDEPENDENT
TRANSPORTATION: INDEPENDENT
SHOPPING ASSESSED: 1
LIGHT HOUSEKEEPING: NEEDS ASSISTANCE
DRESSING_UB_CURRENT_FUNCTION: INDEPENDENT
TRANSPORTATION ASSESSED: 1
LAUNDRY: INDEPENDENT
PREPARING MEALS: INDEPENDENT
SHOPPING: INDEPENDENT
TELEPHONE USE ASSESSED: 1
HOUSEKEEPING ASSESSED: 1
LAUNDRY ASSESSED: 1
MONEY MANAGEMENT (EXPENSES/BILLS): INDEPENDENT
GROOMING_CURRENT_FUNCTION: INDEPENDENT
TOILETING: INDEPENDENT
BATHING ASSESSED: 1
FEEDING ASSESSED: 1
USING THE TELPHONE: INDEPENDENT

## 2020-07-20 ASSESSMENT — ENCOUNTER SYMPTOMS
PERSON REPORTING PAIN: PATIENT
MUSCLE WEAKNESS: 1
DENIES PAIN: 1
PERSON REPORTING PAIN: PATIENT
PERSON REPORTING PAIN: PATIENT
LAST BOWEL MOVEMENT: 65579
DENIES PAIN: 1
DENIES PAIN: 1

## 2020-07-20 NOTE — PROGRESS NOTES
Patient was given 48 hour notice for discharge, and document signed. SN discipline discharge complete this day. Patient anticipates discharge from agency by week's end.

## 2020-07-21 ENCOUNTER — HOME CARE VISIT (OUTPATIENT)
Dept: HOME HEALTH SERVICES | Facility: HOSPICE | Age: 60
End: 2020-07-21
Payer: COMMERCIAL

## 2020-07-21 ENCOUNTER — HOME CARE VISIT (OUTPATIENT)
Dept: HOME HEALTH SERVICES | Facility: HOME HEALTH | Age: 60
End: 2020-07-21
Payer: COMMERCIAL

## 2020-07-21 PROCEDURE — G0153 HHCP-SVS OF S/L PATH,EA 15MN: HCPCS

## 2020-07-21 ASSESSMENT — ENCOUNTER SYMPTOMS
DENIES PAIN: 1
PERSON REPORTING PAIN: PATIENT

## 2020-07-22 ENCOUNTER — HOME CARE VISIT (OUTPATIENT)
Dept: HOME HEALTH SERVICES | Facility: HOSPICE | Age: 60
End: 2020-07-22
Payer: COMMERCIAL

## 2020-07-22 VITALS — OXYGEN SATURATION: 98 % | SYSTOLIC BLOOD PRESSURE: 115 MMHG | HEART RATE: 82 BPM | DIASTOLIC BLOOD PRESSURE: 72 MMHG

## 2020-07-22 PROCEDURE — G0151 HHCP-SERV OF PT,EA 15 MIN: HCPCS

## 2020-07-22 SDOH — SOCIAL STABILITY: SOCIAL INSECURITY: RISK FACTORS RELATED TO PERSONAL SAFETY: 1

## 2020-07-22 ASSESSMENT — BALANCE ASSESSMENTS
STANDING UNSUPPORTED WITH EYES CLOSED: 2 - ABLE TO STAND 3 SECONDS
STANDING ON ONE LEG: 0 - UNABLE TO TRY OR NEEDS ASSIST TO PREVENT FALL
STANDING UNSUPPORTED ONE FOOT IN FRONT: 0
PICK UP OBJECT FROM THE FLOOR FROM A STANDING POSITION: 3 - ABLE TO PICK UP SLIPPER BUT NEEDS SUPERVISION
STANDING ON ONE LEG: 0
TURN 360 DEGREES: 2 - ABLE TO TURN 360 DEGREES SAFELY BUT SLOWLY
STANDING TO SITTING: 4
STANDING TO SITTING: 4 - SITS SAFELY WITH MINIMAL USE OF HANDS
STANDING UNSUPPORTED: 2 - ABLE TO STAND 30 SECONDS UNSUPPORTED
STANDING UNSUPPORTED: 2
TRANSFERS: 4
STANDING UNSUPPORTED WITH EYES CLOSED: 2
LONG VERSION TOTAL SCORE (MAX 56): 33
SITTING TO STANDING: 3
STANDING UNSUPPORTED ONE FOOT IN FRONT: 0 - LOSES BALANCE WHILE STEPPING OR STANDING
TRANSFERS: 4 - ABLE TO TRANSFER SAFELY WITH MINOR USE OF HANDS
STANDING UNSUPPORTED WITH FEET TOGETHER: 2
STANDING UNSUPPORTED WITH FEET TOGETHER: 2 - ABLE TO PLACE FEET TOGETHER INDEPENDENTLY BUT UNABLE TO HOLD FOR 30 SECONDS
REACHING FORWARD WITH OUTSTRETCHED ARM WHILE STANDING: 3 - CAN REACH FORWARD 12 CM (5 INCHES)
SITTING TO STANDING: 3 - ABLE TO STAND INDEPENDENTLY USING HANDS
TURN 360 DEGREES: 2

## 2020-07-22 ASSESSMENT — ENCOUNTER SYMPTOMS
DENIES PAIN: 1
PERSON REPORTING PAIN: PATIENT

## 2020-07-23 ENCOUNTER — HOME CARE VISIT (OUTPATIENT)
Dept: HOME HEALTH SERVICES | Facility: HOSPICE | Age: 60
End: 2020-07-23
Payer: COMMERCIAL

## 2020-07-23 PROCEDURE — G0152 HHCP-SERV OF OT,EA 15 MIN: HCPCS

## 2020-07-23 SDOH — SOCIAL STABILITY: SOCIAL INSECURITY: RISK FACTORS RELATED TO PERSONAL SAFETY: 1

## 2020-07-23 ASSESSMENT — ENCOUNTER SYMPTOMS
PERSON REPORTING PAIN: PATIENT
DENIES PAIN: 1

## 2020-07-23 ASSESSMENT — ACTIVITIES OF DAILY LIVING (ADL)
HOME_HEALTH_OASIS: 00
OASIS_M1830: 00

## 2020-08-17 ENCOUNTER — TELEPHONE (OUTPATIENT)
Dept: FAMILY MEDICINE | Facility: CLINIC | Age: 60
End: 2020-08-17

## 2020-08-17 NOTE — TELEPHONE ENCOUNTER
Called and spoke with Del and advised that the labs are ordered and he just needed to scheduled a lab appointment. This has be scheduled.

## 2020-08-17 NOTE — TELEPHONE ENCOUNTER
Caller would like to discuss (a) appointment Writer has advised caller of a callback from within 24 hours.    Patient: Del L Patton    Caller Name (Last and first, relation/role): self    Name of Facility: [facility]    Callback Number: [phone]    Best Availability: anytime    Fax Number: na    Additional Info: caller wanted to have labs ordered so that they can discuss @appt.     Did you confirm the message with the caller: Yes    Is it okay that the nurse communicates your response through [employer]? No

## 2020-08-19 ENCOUNTER — APPOINTMENT (OUTPATIENT)
Dept: LAB | Facility: CLINIC | Age: 60
End: 2020-08-19
Payer: COMMERCIAL

## 2020-08-19 DIAGNOSIS — E11.9 TYPE 2 DIABETES MELLITUS WITHOUT COMPLICATION, WITH LONG-TERM CURRENT USE OF INSULIN (CMS/HCC): ICD-10-CM

## 2020-08-19 DIAGNOSIS — E83.42 HYPOMAGNESEMIA: ICD-10-CM

## 2020-08-19 DIAGNOSIS — Z79.4 TYPE 2 DIABETES MELLITUS WITHOUT COMPLICATION, WITH LONG-TERM CURRENT USE OF INSULIN (CMS/HCC): ICD-10-CM

## 2020-08-19 LAB
ANION GAP SERPL CALC-SCNC: 11 MMOL/L (ref 3–11)
BUN SERPL-MCNC: 19 MG/DL (ref 7–25)
CALCIUM SERPL-MCNC: 9 MG/DL (ref 8.6–10.3)
CHLORIDE SERPL-SCNC: 104 MMOL/L (ref 98–107)
CO2 SERPL-SCNC: 25 MMOL/L (ref 21–32)
CREAT SERPL-MCNC: 0.73 MG/DL (ref 0.7–1.3)
EST. AVERAGE GLUCOSE BLD GHB EST-MCNC: 85.3 MG/DL
GFR SERPL CREATININE-BSD FRML MDRD: 101 ML/MIN/1.73M*2
GLUCOSE SERPL-MCNC: 96 MG/DL (ref 70–105)
HBA1C MFR BLD: 4.6 % (ref 4–6)
MAGNESIUM SERPL-MCNC: 1.6 MG/DL (ref 1.8–2.4)
POTASSIUM SERPL-SCNC: 3.2 MMOL/L (ref 3.5–5.1)
SODIUM SERPL-SCNC: 140 MMOL/L (ref 135–145)

## 2020-08-19 PROCEDURE — 80048 BASIC METABOLIC PNL TOTAL CA: CPT | Performed by: FAMILY MEDICINE

## 2020-08-19 PROCEDURE — 36415 COLL VENOUS BLD VENIPUNCTURE: CPT | Performed by: FAMILY MEDICINE

## 2020-08-19 PROCEDURE — 83735 ASSAY OF MAGNESIUM: CPT | Performed by: FAMILY MEDICINE

## 2020-08-19 PROCEDURE — 83036 HEMOGLOBIN GLYCOSYLATED A1C: CPT | Performed by: FAMILY MEDICINE

## 2020-08-20 ENCOUNTER — OFFICE VISIT (OUTPATIENT)
Dept: FAMILY MEDICINE | Facility: CLINIC | Age: 60
End: 2020-08-20
Payer: COMMERCIAL

## 2020-08-20 ENCOUNTER — PATIENT OUTREACH (OUTPATIENT)
Dept: FAMILY MEDICINE | Facility: CLINIC | Age: 60
End: 2020-08-20

## 2020-08-20 VITALS
HEART RATE: 99 BPM | WEIGHT: 186 LBS | BODY MASS INDEX: 26.04 KG/M2 | TEMPERATURE: 98.1 F | DIASTOLIC BLOOD PRESSURE: 60 MMHG | RESPIRATION RATE: 16 BRPM | SYSTOLIC BLOOD PRESSURE: 110 MMHG | HEIGHT: 71 IN | OXYGEN SATURATION: 98 %

## 2020-08-20 DIAGNOSIS — Z09 NEED FOR CASE MANAGEMENT FOLLOW-UP: Primary | ICD-10-CM

## 2020-08-20 DIAGNOSIS — J30.1 SEASONAL ALLERGIC RHINITIS DUE TO POLLEN: ICD-10-CM

## 2020-08-20 DIAGNOSIS — Z79.4 TYPE 2 DIABETES MELLITUS WITHOUT COMPLICATION, WITH LONG-TERM CURRENT USE OF INSULIN (CMS/HCC): ICD-10-CM

## 2020-08-20 DIAGNOSIS — G56.03 BILATERAL CARPAL TUNNEL SYNDROME: ICD-10-CM

## 2020-08-20 DIAGNOSIS — E11.9 TYPE 2 DIABETES MELLITUS WITHOUT COMPLICATION, WITH LONG-TERM CURRENT USE OF INSULIN (CMS/HCC): ICD-10-CM

## 2020-08-20 PROCEDURE — 99214 OFFICE O/P EST MOD 30 MIN: CPT | Mod: 25 | Performed by: FAMILY MEDICINE

## 2020-08-20 PROCEDURE — 90471 IMMUNIZATION ADMIN: CPT | Performed by: FAMILY MEDICINE

## 2020-08-20 PROCEDURE — 90750 HZV VACC RECOMBINANT IM: CPT | Performed by: FAMILY MEDICINE

## 2020-08-20 RX ORDER — LORATADINE 10 MG/1
10 TABLET ORAL DAILY
Qty: 30 TABLET | Refills: 11 | Status: CANCELLED | OUTPATIENT
Start: 2020-08-20 | End: 2021-08-20

## 2020-08-20 RX ORDER — KETOROLAC TROMETHAMINE 5 MG/ML
1 SOLUTION OPHTHALMIC 4 TIMES DAILY
COMMUNITY
Start: 2020-07-27 | End: 2021-01-06 | Stop reason: ALTCHOICE

## 2020-08-20 ASSESSMENT — PAIN SCALES - GENERAL: PAINLEVEL: 0-NO PAIN

## 2020-08-20 NOTE — PROGRESS NOTES
CM met with pt prior to appointment with Dr. Lester.  Pt has history of stroke in the recent past.  Pt was cleared to drive and is happy about that he not as secluded and needing to depend on people.  Pt was also using a walker at appointment and CM commended pt for using walker and increasing his safety.     Pt had no questions or concerns for CM at this time.    Encouraged pt to call with questions, concerns, or need for same day appointment.  Pt stated understanding.

## 2020-08-20 NOTE — PROGRESS NOTES
Outpatient Progress Note    SUBJECTIVE:   ID/CC: Del Mckeon is a 60 y.o. male presenting to clinic today for   Chief Complaint   Patient presents with   • chronic care   • Diabetes     Wants to go on metformin and discontiue insulin   • Carpal Tunnel     right wrist, was doing PT and wants to know if he should continue or see orthopedics    • Allergies     Having alot of sinus drainage, more from the left side of nose        HPI:   Del is a 60-year-old male with a recent stroke as well as a history of obstructive sleep apnea, essential hypertension, vitamin D deficiency, hyperlipidemia and type 2 diabetes.He is here today to follow-up on his chronic medical conditions.    In regards to his diabetes, he is checking his blood sugars at home and is sometimes having lows in the 60s.  He states that most of his fasting blood sugars are between 70 and 80.  His postprandial sugars are running about 1 20-1 30.  He is not having any headaches or blurry vision but does sometimes have lightheadedness or irritability especially when his blood sugar is in the 60s.  He is noticing that he is having to eat more in order to keep his blood sugar up.  Prior to his stroke, he was not on insulin but only on oral agents and is wondering if he could go back to that especially considering that he is following his diabetic diet a lot more strictly now.  He is able to check his blood sugars and bring a log in.  He is currently on 36 units of insulin daily.  He would like to go back to metformin.  He is also been on glipizide and sitagliptin in the past.      He is also struggling with increased nasal drainage.  He has had allergies in the past and has not been in anything for his allergies recently.  He is wondering what antihistamine would be best to take.  He has nasal congestion that is especially worse in the morning or if he goes outside.  He has not had any fevers but does have a little bit of a frontal headache if his  "allergies get bad.  And he has taken Benadryl in the evenings and that does help.    Finally, he has struggled with bilateral carpal tunnel for several years.  He complains about numbness and tingling in his median nerve distribution but also complains about numbness and tingling in his fourth and fifth digits as well.  Though the fourth and fifth digits feel little different than his first 3 digits.  He was doing physical therapy after his stroke and that really seemed to help but he did feel like he reached a plateau and nothing was getting any better.  He does sleep with braces at night.  That seems to help.  He would like to be evaluated by hand surgery for potential carpal tunnel release.      Review of Systems  12 point ROS reviewed and negative except as in HPI.     Past Medical History:   Diagnosis Date   • Anemia    • Asthma    • Depressive disorder    • Heart murmur    • Hyperlipidemia    • Hypertension    • Obesity    • Obstructive sleep apnea    • Type 2 diabetes mellitus (CMS/HCC) (HCC)        Past Surgical History:   Procedure Laterality Date   • CRYOTHERAPY      dermatology         Current Outpatient Medications:   •  ketorolac (ACULAR) 0.5 % ophthalmic solution, Administer 1 drop into the left eye 4 (four) times a day, Disp: , Rfl:   •  ibuprofen (AdviL) 200 mg tablet, Take 400 mg by mouth every 6 (six) hours as needed for pain scale 1-3/10 or pain scale 8-10/10., Disp: , Rfl:   •  BD Insulin Syringe Ultra-Fine 0.5 mL 31 gauge x 5/16\" syringe, , Disp: , Rfl:   •  amLODIPine (NORVASC) 10 mg tablet, Take 1 tablet (10 mg total) by mouth nightly, Disp: 90 tablet, Rfl: 3  •  clopidogreL (PLAVIX) 75 mg tablet, Take 1 tablet (75 mg total) by mouth daily, Disp: 90 tablet, Rfl: 3  •  losartan (COZAAR) 100 mg tablet, Take 1 tablet (100 mg total) by mouth daily, Disp: 90 tablet, Rfl: 3  •  rosuvastatin (CRESTOR) 40 mg tablet, Take 1 tablet (40 mg total) by mouth nightly, Disp: 90 tablet, Rfl: 3  •  sertraline " (ZOLOFT) 50 mg tablet, Take 1 tablet (50 mg total) by mouth daily, Disp: 90 tablet, Rfl: 3  •  aspirin 81 mg EC tablet, Take 1 tablet (81 mg total) by mouth every other day Patient taking the tablet every alternate day d/t c/o nose bleeds., Disp: , Rfl:   •  acetaminophen (Tylenol 8 Hour) 650 mg 8 hr tablet, Take 650 mg by mouth every 8 (eight) hours as needed for pain scale 1-3/10., Disp: , Rfl:   •  blood-glucose meter kit, Use as instructed, Disp: 1 each, Rfl: 0  •  blood sugar diagnostic (glucose blood) strip, Use to check blood sugar levels up to three times daily., Disp: 200 strip, Rfl: 1  •  multivitamin with minerals tablet, Take 1 tablet by mouth daily, Disp: , Rfl:   •  melatonin 5 mg capsule, Take 5 mg by mouth nightly as needed (insomnia) , Disp: , Rfl:   •  potassium gluconate 595 mg (99 mg) tablet, Take 1 tablet by mouth 2 (two) times a day, Disp: , Rfl:   •  diphenhydramine-acetaminophen (TYLENOL PM EXTRA STRENGTH)  mg tablet, Take by mouth nightly as needed  , Disp: , Rfl:   •  cholecalciferol, vitamin D3, (Vitamin D3) 25 mcg (1,000 unit) capsule, Take 1 capsule (1,000 Units total) by mouth daily, Disp: 30 capsule, Rfl: 3    Allergies   Allergen Reactions   • Penicillins      Tested as a child; showed positive       Social History     Socioeconomic History   • Marital status:      Spouse name: Not on file   • Number of children: Not on file   • Years of education: Not on file   • Highest education level: Not on file   Occupational History   • Not on file   Social Needs   • Financial resource strain: Not on file   • Food insecurity     Worry: Not on file     Inability: Not on file   • Transportation needs     Medical: Not on file     Non-medical: Not on file   Tobacco Use   • Smoking status: Never Smoker   • Smokeless tobacco: Never Used   Substance and Sexual Activity   • Alcohol use: No   • Drug use: Not Currently     Types: Hydrocodone     Comment: 3 times daily, low back/sciatica  pain   • Sexual activity: Not Currently   Lifestyle   • Physical activity     Days per week: Not on file     Minutes per session: Not on file   • Stress: Not on file   Relationships   • Social connections     Talks on phone: Not on file     Gets together: Not on file     Attends Christianity service: Not on file     Active member of club or organization: Not on file     Attends meetings of clubs or organizations: Not on file     Relationship status: Not on file   • Intimate partner violence     Fear of current or ex partner: Not on file     Emotionally abused: Not on file     Physically abused: Not on file     Forced sexual activity: Not on file   Other Topics Concern   • Not on file   Social History Narrative   • Not on file       Family History   Problem Relation Age of Onset   • Alzheimer's disease Father    • Diabetes type II Father    • Diabetes Sister        OBJECTIVE:   Vitals:   Vitals:    08/20/20 0950   BP: 110/60   Pulse: 99   Resp: 16   Temp: 36.7 °C (98.1 °F)   SpO2: 98%     Weights (last 14 days)     Date/Time   Weight    08/20/20 0950   84.4 kg (186 lb)              Physical Exam:   General: Alert and oriented. In no acute distress.   Head:normocephalic and atraumatic. No tenderness palpated  Eyes:Pupils equal equal and reactive, no corneal or scleral injection.  ENT: TMs visible without bulging or erythema. No nasal lesions. No oral lesions.   Cardio: S1 S2 without extra sounds or murmurs.  No edema. PP intact. Cap refill <2s.  No JVD  Pulm: CTAB without wheezing or crackles. Symmetrical air exchange.   Gi: BS present x4. Soft, nontender and non distended.  No masses palpated.  Ext: Full range of motion of all extremities.  No joint tenderness or erythema or swelling noted.  Neuro: Motor and sensation intact bilaterally. No focal lesions.  Cranial nerves II through XII are intact and symmetrical.  Deep tendon reflexes 2 throughout.  Skin: No rashes or lesions noted.  Psych: Denies  SI/HI//      Labs/Imaging/Micro:  Previous labs reviewed.     ASSESSMENT AND PLAN:   Del Mckeon is a 60 y.o. male presenting to clinic today for:      Type 2 diabetes mellitus without complication, with long-term current use of insulin (CMS/[facility]) ([facility])  I am a little nervous stopping his insulin but he really struggles with giving himself the injections after his stroke.  Taking pills are much easier for him.  His A1c was just checked and at 4.6%.  2 months ago was at 5.8%.  I do have record from 2017 that shows that he had an A1c at 12.7% at 1 point in time.  He does seem very motivated to continue his diabetic diet and so I think we could try stopping the insulin and try oral agents.  However, if his blood sugars start to run higher then we will need to switch back.    I am going to start him on 500 mg of metformin daily and increasing by 500 mg a week to a goal of 1000 mg 2 times a day.  I want to see him back in 1 month with his blood sugar log.    Seasonal allergic rhinitis due to pollen  We did discuss his allergy symptoms today.  He can try loratadine 10 mg daily and see if this helps.  If there is no improvement he can let me know.    Bilateral carpal tunnel syndrome  He definitely has symptoms of carpal tunnel bilaterally but I do think that there may also be ulnar nerve entrapment as well considering he gets numbness and tingling along his fourth and fifth digits as well.  I am going to go ahead and place a referral to hand surgery.  I did talk to him about an EMG but he states that he has had one previously and will go ahead and get those records for us.     Diagnosis Plan   1. Type 2 diabetes mellitus without complication, with long-term current use of insulin (CMS/[facility]) ([facility])     2. Seasonal allergic rhinitis due to pollen     3. Bilateral carpal tunnel syndrome         Return in about 1 month (around 9/20/2020) for DM follow up.    Total time face to face spent with patient was 25 minutes with  25 minutes in counseling and coordination of care regarding the above which constitutes 100% of our face to face time.       Klarissa Lester MD  08/25/20  6:12 AM

## 2020-08-24 ENCOUNTER — TELEPHONE (OUTPATIENT)
Dept: FAMILY MEDICINE | Facility: CLINIC | Age: 60
End: 2020-08-24

## 2020-08-24 NOTE — TELEPHONE ENCOUNTER
Caller would like to discuss (a) return call Writer has advised caller of a callback from within 24 hours.    Patient: Del L Patton    Caller Name (Last and first, relation/role): self    Name of Facility: n/a    Callback Number: [phone]    Best Availability: anytime    Fax Number: n/a     Additional Info: per pt Metformin was supposed to be sent to [facility] on [address] after appt on 8/20/2020    Did you confirm the message with the caller: Yes    Is it okay that the nurse communicates your response through [facility]? No

## 2020-08-25 PROBLEM — J30.1 SEASONAL ALLERGIC RHINITIS DUE TO POLLEN: Status: ACTIVE | Noted: 2020-08-25

## 2020-08-25 PROBLEM — G56.03 BILATERAL CARPAL TUNNEL SYNDROME: Status: ACTIVE | Noted: 2020-08-25

## 2020-08-25 PROBLEM — E83.42 HYPOMAGNESEMIA: Status: RESOLVED | Noted: 2020-05-21 | Resolved: 2020-08-25

## 2020-08-25 RX ORDER — LORATADINE 10 MG/1
10 TABLET ORAL DAILY
Qty: 30 TABLET | Refills: 11 | Status: SHIPPED | OUTPATIENT
Start: 2020-08-25 | End: 2021-01-06 | Stop reason: ALTCHOICE

## 2020-08-25 RX ORDER — METFORMIN HYDROCHLORIDE 500 MG/1
TABLET ORAL
Qty: 120 TABLET | Refills: 1 | Status: SHIPPED | OUTPATIENT
Start: 2020-08-25 | End: 2020-09-21 | Stop reason: SDUPTHER

## 2020-08-25 NOTE — ASSESSMENT & PLAN NOTE
We did discuss his allergy symptoms today.  He can try loratadine 10 mg daily and see if this helps.  If there is no improvement he can let me know.

## 2020-08-25 NOTE — TELEPHONE ENCOUNTER
Called and spoke with Del and advised that the Metformin was sent to [facility] on [address]. He was able to verbalize his understanding and had no questions or concerns at this time.

## 2020-08-25 NOTE — ASSESSMENT & PLAN NOTE
He definitely has symptoms of carpal tunnel bilaterally but I do think that there may also be ulnar nerve entrapment as well considering he gets numbness and tingling along his fourth and fifth digits as well.  I am going to go ahead and place a referral to hand surgery.  I did talk to him about an EMG but he states that he has had one previously and will go ahead and get those records for us.

## 2020-08-25 NOTE — ASSESSMENT & PLAN NOTE
I am a little nervous stopping his insulin but he really struggles with giving himself the injections after his stroke.  Taking pills are much easier for him.  His A1c was just checked and at 4.6%.  2 months ago was at 5.8%.  I do have record from 2017 that shows that he had an A1c at 12.7% at 1 point in time.  He does seem very motivated to continue his diabetic diet and so I think we could try stopping the insulin and try oral agents.  However, if his blood sugars start to run higher then we will need to switch back.    I am going to start him on 500 mg of metformin daily and increasing by 500 mg a week to a goal of 1000 mg 2 times a day.  I want to see him back in 1 month with his blood sugar log.

## 2020-08-25 NOTE — TELEPHONE ENCOUNTER
Pt calling back asking where his prescription is getting sent to.  He is wanting a call back right now

## 2020-09-14 ENCOUNTER — CONSULT (OUTPATIENT)
Dept: ORTHOPEDIC SURGERY | Facility: CLINIC | Age: 60
End: 2020-09-14
Payer: COMMERCIAL

## 2020-09-14 ENCOUNTER — HOSPITAL ENCOUNTER (OUTPATIENT)
Dept: RADIOLOGY | Facility: HOSPITAL | Age: 60
Discharge: HOME | End: 2020-09-14
Payer: COMMERCIAL

## 2020-09-14 VITALS — HEART RATE: 99 BPM | SYSTOLIC BLOOD PRESSURE: 110 MMHG | DIASTOLIC BLOOD PRESSURE: 60 MMHG

## 2020-09-14 DIAGNOSIS — G56.21 ULNAR NEUROPATHY OF RIGHT UPPER EXTREMITY: Primary | ICD-10-CM

## 2020-09-14 DIAGNOSIS — G56.03 BILATERAL CARPAL TUNNEL SYNDROME: ICD-10-CM

## 2020-09-14 DIAGNOSIS — R29.898 WEAKNESS OF BOTH HANDS: ICD-10-CM

## 2020-09-14 PROCEDURE — 99204 OFFICE O/P NEW MOD 45 MIN: CPT | Performed by: STUDENT IN AN ORGANIZED HEALTH CARE EDUCATION/TRAINING PROGRAM

## 2020-09-14 NOTE — PROGRESS NOTES
ORTHOPEDIC HAND SURGERY  NEW PATIENT CLINIC VISIT    ASSESSMENT:  1. Ulnar neuropathy of right upper extremity    2. Weakness of both hands    3. Bilateral carpal tunnel syndrome      60-year-old male who presents with right hand weakness consistent with an ulnar nerve neuropathy that is likely central in origin; minimal evidence of peripheral nerve compression syndrome at elbow or guyons canal upon clinical exam today    PLAN:  -Patient has moderate atrophy of the first dorsal interosseous as well as a positive Wartenberg sign to the right hand.  He has preserved  strength as well as minimal clawing to the right ring and small fingers.  I believe a full maximum course of physical therapy may be in his best interest as he is recovering from a recent stroke in May 2020.  A therapy order has been placed and patient will follow-up with me in 3 months time.     CHIEF COMPLAINT:  Chief Complaint   Patient presents with   • Left Wrist - Carpal Tunnel   • Right Wrist - Carpal Tunnel       HISTORY OF PRESENT ILLNESS:  60-year-old male right-hand-dominant who sustained a cerebrovascular accident with a left sided hemiparesis in May 2020.  He states that he is here today due to right hand weakness that started shortly before the stroke.  He states that his small finger tends to deviate outwards.  He states that he has weakness with gripping objects.  His neurologist worked him up and obtained an EMG which is in the chart today.  Patient states that he has been steadily improving with home health therapy.. Patient is able to drive and care for himself as well as perform independent ADLs.  He denies any numbness or tingling in either hand.  He denies fevers, chills, nausea, and vomiting.    ALLERGIES:  Allergies   Allergen Reactions   • Penicillins      Tested as a child; showed positive       CURRENT MEDICATIONS:  Current Outpatient Medications on File Prior to Visit   Medication Sig Dispense Refill   • loratadine  (Claritin) 10 mg tablet Take 1 tablet (10 mg total) by mouth daily 30 tablet 11   • metFORMIN (GLUCOPHAGE) 500 mg tablet Take 500mg daily for 7 days. Then take 500mg BID for 7 days. Then take 1000mg in the morning and 500mg at night for 7 days. Then take 1000mg BID indefinitely. 120 tablet 1   • ketorolac (ACULAR) 0.5 % ophthalmic solution Administer 1 drop into the left eye 4 (four) times a day     • ibuprofen (AdviL) 200 mg tablet Take 400 mg by mouth every 6 (six) hours as needed for pain scale 1-3/10 or pain scale 8-10/10.     • amLODIPine (NORVASC) 10 mg tablet Take 1 tablet (10 mg total) by mouth nightly 90 tablet 3   • clopidogreL (PLAVIX) 75 mg tablet Take 1 tablet (75 mg total) by mouth daily 90 tablet 3   • losartan (COZAAR) 100 mg tablet Take 1 tablet (100 mg total) by mouth daily 90 tablet 3   • rosuvastatin (CRESTOR) 40 mg tablet Take 1 tablet (40 mg total) by mouth nightly 90 tablet 3   • acetaminophen (Tylenol 8 Hour) 650 mg 8 hr tablet Take 650 mg by mouth every 8 (eight) hours as needed for pain scale 1-3/10.     • blood-glucose meter kit Use as instructed 1 each 0   • blood sugar diagnostic (glucose blood) strip Use to check blood sugar levels up to three times daily. 200 strip 1   • cholecalciferol, vitamin D3, (Vitamin D3) 25 mcg (1,000 unit) capsule Take 1 capsule (1,000 Units total) by mouth daily 30 capsule 3   • multivitamin with minerals tablet Take 1 tablet by mouth daily     • melatonin 5 mg capsule Take 5 mg by mouth nightly as needed (insomnia)      • potassium gluconate 595 mg (99 mg) tablet Take 1 tablet by mouth 2 (two) times a day     • diphenhydramine-acetaminophen (TYLENOL PM EXTRA STRENGTH)  mg tablet Take by mouth nightly as needed       • sertraline (ZOLOFT) 50 mg tablet Take 1 tablet (50 mg total) by mouth daily 90 tablet 3   • aspirin 81 mg EC tablet Take 1 tablet (81 mg total) by mouth every other day Patient taking the tablet every alternate day d/t c/o nose bleeds.        No current facility-administered medications on file prior to visit.        ACTIVE PROBLEMS:  Patient Active Problem List   Diagnosis   • Benign essential hypertension   • Mixed hyperlipidemia   • Obstructive sleep apnea syndrome   • Mild aortic sclerosis (CMS/[facility]) ([facility])   • Ischemic cerebrovascular accident (CVA) (CMS/[facility]) ([facility])   • Type 2 diabetes mellitus without complication, with long-term current use of insulin (CMS/[facility]) ([facility])   • Microscopic hematuria   • Adjustment disorder with depressed mood   • Vitamin D deficiency   • Seasonal allergic rhinitis due to pollen   • Bilateral carpal tunnel syndrome       PAST MEDICAL HISTORY:  Past Medical History:   Diagnosis Date   • Anemia    • Asthma    • Depressive disorder    • Heart murmur    • Hyperlipidemia    • Hypertension    • Obesity    • Obstructive sleep apnea    • Type 2 diabetes mellitus (CMS/[facility]) ([facility])        PAST SURGICAL HISTORY:  Past Surgical History:   Procedure Laterality Date   • CRYOTHERAPY      dermatology       FAMILY HISTORY:  Family History   Problem Relation Age of Onset   • Alzheimer's disease Father    • Diabetes type II Father    • Diabetes Sister        SOCIAL HISTORY:  Social History     Tobacco Use   • Smoking status: Never Smoker   • Smokeless tobacco: Never Used   Substance Use Topics   • Alcohol use: No   • Drug use: Not Currently     Types: Hydrocodone     Comment: 3 times daily, low back/sciatica pain       REVIEW OF SYSTEMS:  General: denies fever, chills, night sweats, nausea, or vomiting  Neurologic: denies numbness, tingling, weakness, or problems with balance  Remainder of ROS negative besides HPI findings     VITAL SIGNS:  /60   Pulse 99   There is no height or weight on file to calculate BMI.    PHYSICAL EXAM:  Constitutional:  awake, alert, follows commands, and in no acute distress, slight dysarthria  Cv: RRR  Pulm: no SOB or dyspnea  Skin: intact without abrasions, rashes, erythema, or draining  sinuses  Neurologic: normal affect, slight antalgic gait, L sided hemiparesis  Musculoskeletal:   Right hand  Atrophy noted in first dorsal webspace, positive Wartenberg sign, positive Froment sign, 5/5 thumb abduction,  strength strength preserved compared to contralateral hand, 4/5 interossei   Negative Tinel's/Phalen's/Hardy's at the wrist, negative Tinel's at the Guyon's canal, negative Tinel's at the elbow, negative elbow flexion test  Sensation intact to median, ulnar, radial nerve distributions  Fingers are warm and well-perfused    IMAGING / LABORATORY FINDINGS:  EMG/[facility] REPORT 6/25/2020     ELECTROMYOGRAPHY:     No positive sharp waves or fibrillations were observed in the following muscles: Right deltoid, triceps, biceps, pronator teres, abductor policis brevis, FDP (ulnar portion). Normal insertional activity, recruitment, MUAP morphology were observed in these muscles.  PSW/Fibs and neurogenic recruitment noted in the first dorsal interosseus.        SUMMARY:     There IS electrodiagnostic evidence of a Right sensorimotor median neuropathy at or about the wrist. Primarily demyelinating in nature. No evidence of denervation on needle examination of the thenar eminence.     There IS electrodiagnostic evidence of a Right ulnar neuropathy at or about the wrist. Both demyelinating and axonal in nature.     There is NO electrodiagnostic evidence of a Right cervical radiculopathy affecting the C5 to T1 nerve roots.      There IS borderline electrodiagnostic evidence of a sensory peripheral polyneuropathy affecting the tested limb.    Reggie Borrero M.D.  Orthopedic Hand Surgery  9/14/2020  2:13 PM

## 2020-09-18 ENCOUNTER — HOSPITAL ENCOUNTER (OUTPATIENT)
Dept: OCCUPATIONAL THERAPY | Facility: HOSPITAL | Age: 60
Setting detail: THERAPIES SERIES
Discharge: STILL ADMITTED OR ADMINISTRATIVE | End: 2020-09-18
Payer: COMMERCIAL

## 2020-09-18 DIAGNOSIS — G56.03 BILATERAL CARPAL TUNNEL SYNDROME: Primary | ICD-10-CM

## 2020-09-18 DIAGNOSIS — R29.898 WEAKNESS OF BOTH HANDS: ICD-10-CM

## 2020-09-18 PROCEDURE — 97165 OT EVAL LOW COMPLEX 30 MIN: CPT | Mod: GO

## 2020-09-18 PROCEDURE — 97530 THERAPEUTIC ACTIVITIES: CPT | Mod: GO

## 2020-09-18 NOTE — INTERDISCIPLINARY/THERAPY
[address] [facility] [id number] [phone]    Occupational Therapy Initial Evaluation    Patient Name: Del Mckeon  Referring Doctor: Dr. Borrero  Date of Service: 9/18/2020  Provider name and number: Sara Mccray, MS, OTR/L, CLT  Medicare Onset Date: N/a  Medicare SOC Date: 9/18/2020  Medicare Prior Hospitalizations: N/a  Medicare Certification Period: 9/18/2020-12/11/2020  HICN: [id number]      Primary Medical Diagnosis: Bilateral carpal tunnel syndrome [G56.03]     Treatment Diagnosis. Decreased bilateral  and pinch strength, functional bilateral hand use, decreased functional activity tolerance in bilateral hands.     Visit Number: 1    Subjective:    Profile and History: Del is a 60 y.o. male who presents to outpatient occupational therapy with complaints of right hand weakness, especially in the thumb, pointer finger, and pinky. He reports he has left hand weakness from a stroke that occurred in May 2020. Del reports his strength is slowly improving in his left hand through exercises he received from home health OT but is not at his baseline. Reports his dominant right hand has progressively gotten more weak over the last year.He reports he is independent with ADLs but does admit to having difficulty gripping objects and completing fine motor tasks with bilateral hands.               Past Medical History:   Diagnosis Date   • Anemia    • Asthma    • Depressive disorder    • Heart murmur    • Hyperlipidemia    • Hypertension    • Obesity    • Obstructive sleep apnea    • Type 2 diabetes mellitus (CMS/[facility]) ([facility])          Current Outpatient Medications:   •  loratadine (Claritin) 10 mg tablet, Take 1 tablet (10 mg total) by mouth daily, Disp: 30 tablet, Rfl: 11  •  metFORMIN (GLUCOPHAGE) 500 mg tablet, Take 500mg daily for 7 days. Then take 500mg BID for 7 days. Then take 1000mg in the morning and 500mg at night for 7 days. Then take 1000mg BID indefinitely., Disp: 120 tablet,  Rfl: 1  •  ketorolac (ACULAR) 0.5 % ophthalmic solution, Administer 1 drop into the left eye 4 (four) times a day, Disp: , Rfl:   •  ibuprofen (AdviL) 200 mg tablet, Take 400 mg by mouth every 6 (six) hours as needed for pain scale 1-3/10 or pain scale 8-10/10., Disp: , Rfl:   •  amLODIPine (NORVASC) 10 mg tablet, Take 1 tablet (10 mg total) by mouth nightly, Disp: 90 tablet, Rfl: 3  •  clopidogreL (PLAVIX) 75 mg tablet, Take 1 tablet (75 mg total) by mouth daily, Disp: 90 tablet, Rfl: 3  •  losartan (COZAAR) 100 mg tablet, Take 1 tablet (100 mg total) by mouth daily, Disp: 90 tablet, Rfl: 3  •  rosuvastatin (CRESTOR) 40 mg tablet, Take 1 tablet (40 mg total) by mouth nightly, Disp: 90 tablet, Rfl: 3  •  sertraline (ZOLOFT) 50 mg tablet, Take 1 tablet (50 mg total) by mouth daily, Disp: 90 tablet, Rfl: 3  •  aspirin 81 mg EC tablet, Take 1 tablet (81 mg total) by mouth every other day Patient taking the tablet every alternate day d/t c/o nose bleeds., Disp: , Rfl:   •  acetaminophen (Tylenol 8 Hour) 650 mg 8 hr tablet, Take 650 mg by mouth every 8 (eight) hours as needed for pain scale 1-3/10., Disp: , Rfl:   •  blood-glucose meter kit, Use as instructed, Disp: 1 each, Rfl: 0  •  blood sugar diagnostic (glucose blood) strip, Use to check blood sugar levels up to three times daily., Disp: 200 strip, Rfl: 1  •  cholecalciferol, vitamin D3, (Vitamin D3) 25 mcg (1,000 unit) capsule, Take 1 capsule (1,000 Units total) by mouth daily, Disp: 30 capsule, Rfl: 3  •  multivitamin with minerals tablet, Take 1 tablet by mouth daily, Disp: , Rfl:   •  melatonin 5 mg capsule, Take 5 mg by mouth nightly as needed (insomnia) , Disp: , Rfl:   •  potassium gluconate 595 mg (99 mg) tablet, Take 1 tablet by mouth 2 (two) times a day, Disp: , Rfl:   •  diphenhydramine-acetaminophen (TYLENOL PM EXTRA STRENGTH)  mg tablet, Take by mouth nightly as needed  , Disp: , Rfl:     Penicillins    Social History     Socioeconomic History   •  Marital status:      Spouse name: Not on file   • Number of children: Not on file   • Years of education: Not on file   • Highest education level: Not on file   Occupational History   • Not on file   Social Needs   • Financial resource strain: Not on file   • Food insecurity     Worry: Not on file     Inability: Not on file   • Transportation needs     Medical: Not on file     Non-medical: Not on file   Tobacco Use   • Smoking status: Never Smoker   • Smokeless tobacco: Never Used   Substance and Sexual Activity   • Alcohol use: No   • Drug use: Not Currently     Types: Hydrocodone     Comment: 3 times daily, low back/sciatica pain   • Sexual activity: Not Currently   Lifestyle   • Physical activity     Days per week: Not on file     Minutes per session: Not on file   • Stress: Not on file   Relationships   • Social connections     Talks on phone: Not on file     Gets together: Not on file     Attends Restoration service: Not on file     Active member of club or organization: Not on file     Attends meetings of clubs or organizations: Not on file     Relationship status: Not on file   • Intimate partner violence     Fear of current or ex partner: Not on file     Emotionally abused: Not on file     Physically abused: Not on file     Forced sexual activity: Not on file   Other Topics Concern   • Not on file   Social History Narrative   • Not on file        Pt lives at home, independent with self cares. No assistive device. Driving.     Whitten Fall Risk  History of Falling: No  Secondary Diagnosis: No  Ambulatory Aids: None/bedrest/nurse assist  Intravenous Therapy/Heparin/Saline Lock: No  Gait/Transferring: Normal/bedrest/wheelchair  Mental Status: Oriented to own ability  Whitten Fall Risk Score: 0  Whitten Fall Risk Score: 0  Fall Risk Interventions: N/a     Pain Assessment  Pain Assessment: 0-10    Activities limited by Patient's compliant: 1: none listed      Objective:      Hand Function  /Pinch Strength:  Right hand, Left hand  Right Hand  Strength (lbs): 61 lbs  Left Hand  Strength (lbs): 75 lbs  Right Lateral Pinch Strength (lbs): 5 lbs  left Lateral Pinch Strength (lbs): 13 lbs  Right Palmar Pinch Strength (lbs): 3 lbs  Left Palmar Pinch Strength (lbs): 14 lbs  Nine Hole Peg Test  Nine Hole Peg Test Dominant Hand Score (in seconds): 39 seconds  Nine Hole Peg Test Non-Dominant Hand Score (in seconds): 38 seconds     -9 Hole peg test indicating 3 SD below norm bilaterally.   -Pinch strength testing WNL L hand and 1 SD below norm R hand.            RUE strength: shoulder flexion/extension 4+/5  LUE strength: shoulder flexion/extension 4+/5    RUE Assessment  RUE Assessment: Within Functional Limits  LUE Assessment  LUE Assessment: (limited due to previous CVA)        Initial Treatment:    Engaged pt in nerve glide HEP for median nerve. See chart for further details. Pt required min verbal cues for technique of exercises. Plans to engage pt in HEP with ulnar nerve glides in addition, next treatment session. Pt completed 10 reps/1 set. No c/o pain.     Completed FOTO with score of 54/100.     -Educated patient on Pil-O Splint for night time wear for right hand. Pt reports he has a splint (describing as resting hand splint). Pt was resistive to the idea of purchasing a new splint even though extensive education was provided on difference of splints and role of each. Pt was encouraged to bring in his current splint next treatment session for further assessment.          Mental Status: Oriented to own ability         Rehab Goals/Potential/Assessment/Plan:  1. Pt will be independent in HEP for bilateral hands by end of treatment.   2. Pt will demo increased right hand  strength by 5# or greater within 6 weeks.   3. Pt will demo improved fine motor coordination with a decrease in 9 hole peg time to 33 seconds or less within 6 weeks for functional self care tasks.   4. Pt will demo understanding and compliance of  right hand splint within 5 weeks for increased strength and coordination for functional self care tasks.      5. Del will be able to play golf within 6 weeks due to increased  strength and fine motor coordination.              Assessment: Del presents with impairment in right hand pinch and  strength and fine motor coordination. He is testing 3 SD below the norm when assessed with 9 hole peg test and 1 SD below the norm in right hand  strength. Del denies sensory changes or pain in right hand. He reports his decreased strength and coordination affects his ability to complete grooming, cooking, and IADL tasks without compensation/modification or asking for assistance. Del will benefit from continued outpatient occupational therapy treatment for further education and treatment for bilateral hand weakness and decreased coordination to increase functional self care and IADL abilities.   Rehab Potential: Good  Recommendations for Therapy: Continue skilled therapy         Plan  Plan Comment: [facility] HEP, THeraputty, pinky strength  Treatment Interventions: Therapeutic activity, Therapeutic exercise, Manual therapy, UE strengthening/ROM, Endurance training, Patient/family training, Equipment evaluation/education, Fine motor coordination activities, Compensatory technique education, Electrical stimulation  OT Frequency: 1x/wk  Duration of Therapy: 4-6 weeks  Date POC Due: 12/11/20         Additional Comments: N/a  Thank you for allowing us to share in the care of this patient. Please be advised the order we received does not include a specified duration of treatment, therefore, this order will only be good for 30 days. If you have any questions, recommendations, or further concerns regarding this patient, please feel free to contact me at [phone].  Signed by: Sara Mccray OTR  9/18/2020  4:09 PM    * I have reviewed the plan of care and certify a continuing need for medically necessary  services.

## 2020-09-21 ENCOUNTER — OFFICE VISIT (OUTPATIENT)
Dept: FAMILY MEDICINE | Facility: CLINIC | Age: 60
End: 2020-09-21
Payer: COMMERCIAL

## 2020-09-21 VITALS
HEIGHT: 71 IN | BODY MASS INDEX: 25.93 KG/M2 | TEMPERATURE: 96.6 F | RESPIRATION RATE: 16 BRPM | OXYGEN SATURATION: 99 % | DIASTOLIC BLOOD PRESSURE: 72 MMHG | SYSTOLIC BLOOD PRESSURE: 118 MMHG | HEART RATE: 91 BPM | WEIGHT: 185.2 LBS

## 2020-09-21 DIAGNOSIS — I10 BENIGN ESSENTIAL HYPERTENSION: ICD-10-CM

## 2020-09-21 DIAGNOSIS — E78.2 MIXED HYPERLIPIDEMIA: Primary | ICD-10-CM

## 2020-09-21 DIAGNOSIS — Z79.4 TYPE 2 DIABETES MELLITUS WITHOUT COMPLICATION, WITH LONG-TERM CURRENT USE OF INSULIN (CMS/HCC): ICD-10-CM

## 2020-09-21 DIAGNOSIS — E11.9 TYPE 2 DIABETES MELLITUS WITHOUT COMPLICATION, WITH LONG-TERM CURRENT USE OF INSULIN (CMS/HCC): ICD-10-CM

## 2020-09-21 PROCEDURE — 99213 OFFICE O/P EST LOW 20 MIN: CPT | Performed by: FAMILY MEDICINE

## 2020-09-21 RX ORDER — METFORMIN HYDROCHLORIDE 500 MG/1
TABLET ORAL
Qty: 90 TABLET | Refills: 11 | Status: SHIPPED | OUTPATIENT
Start: 2020-09-21 | End: 2021-01-06 | Stop reason: SDUPTHER

## 2020-09-21 ASSESSMENT — PAIN SCALES - GENERAL: PAINLEVEL: 0-NO PAIN

## 2020-09-21 NOTE — PROGRESS NOTES
Outpatient Progress Note    SUBJECTIVE:   ID/CC: Del Mckeon is a 60 y.o. male presenting to clinic today for   Chief Complaint   Patient presents with   • Diabetes       HPI:   Del is a 60-year-old male with a past medical history of obstructive sleep apnea, hypertension, hyperlipidemia and type 2 diabetesWho also just recently had an ischemic stroke.  He is here today to follow-up on his diabetes.    Last month, he was desperately wanting to get off of his insulin.  In the past, he had been a pretty well controlled diabetic with diet and metformin.  However, he stated he fell off of the train and had not been eating very well as well as has gained quite a bit of weight.  He then had his stroke and has gone back on the wagon with his lifestyle.  His A1c was 4.9% in August and based off of this, we decided to discontinue his insulin and start him on metformin.  He has worked himself to 1000 mg in the morning and 500 mg at night.  He has been exercising regularly and his diet has been very strict.  He states his fasting blood sugars are in the 90s.  The highest fasting sugar he has had has been 110.  The lowest has been 88.  He is postprandial blood sugars are not checked regularly but are mostly in the 1 10-1 40 range.  The highest being 148.  He is tolerating the Metformin well without any diarrhea or constipation.  He also denies any changes in urination.    Review of Systems  12 point ROS reviewed and negative except as in HPI.     Past Medical History:   Diagnosis Date   • Anemia    • Asthma    • Depressive disorder    • Heart murmur    • Hyperlipidemia    • Hypertension    • Obesity    • Obstructive sleep apnea    • Type 2 diabetes mellitus (CMS/HCC) (HCC)        Past Surgical History:   Procedure Laterality Date   • CRYOTHERAPY      dermatology         Current Outpatient Medications:   •  metFORMIN (GLUCOPHAGE) 500 mg tablet, Take 1000mg in the morning and 500mg in the evening, Disp: 90 tablet, Rfl:  11  •  loratadine (Claritin) 10 mg tablet, Take 1 tablet (10 mg total) by mouth daily, Disp: 30 tablet, Rfl: 11  •  ketorolac (ACULAR) 0.5 % ophthalmic solution, Administer 1 drop into the left eye 4 (four) times a day, Disp: , Rfl:   •  ibuprofen (AdviL) 200 mg tablet, Take 400 mg by mouth every 6 (six) hours as needed for pain scale 1-3/10 or pain scale 8-10/10., Disp: , Rfl:   •  amLODIPine (NORVASC) 10 mg tablet, Take 1 tablet (10 mg total) by mouth nightly, Disp: 90 tablet, Rfl: 3  •  clopidogreL (PLAVIX) 75 mg tablet, Take 1 tablet (75 mg total) by mouth daily, Disp: 90 tablet, Rfl: 3  •  losartan (COZAAR) 100 mg tablet, Take 1 tablet (100 mg total) by mouth daily, Disp: 90 tablet, Rfl: 3  •  rosuvastatin (CRESTOR) 40 mg tablet, Take 1 tablet (40 mg total) by mouth nightly, Disp: 90 tablet, Rfl: 3  •  sertraline (ZOLOFT) 50 mg tablet, Take 1 tablet (50 mg total) by mouth daily, Disp: 90 tablet, Rfl: 3  •  aspirin 81 mg EC tablet, Take 1 tablet (81 mg total) by mouth every other day Patient taking the tablet every alternate day d/t c/o nose bleeds., Disp: , Rfl:   •  acetaminophen (Tylenol 8 Hour) 650 mg 8 hr tablet, Take 650 mg by mouth every 8 (eight) hours as needed for pain scale 1-3/10., Disp: , Rfl:   •  blood-glucose meter kit, Use as instructed, Disp: 1 each, Rfl: 0  •  blood sugar diagnostic (glucose blood) strip, Use to check blood sugar levels up to three times daily., Disp: 200 strip, Rfl: 1  •  cholecalciferol, vitamin D3, (Vitamin D3) 25 mcg (1,000 unit) capsule, Take 1 capsule (1,000 Units total) by mouth daily, Disp: 30 capsule, Rfl: 3  •  multivitamin with minerals tablet, Take 1 tablet by mouth daily, Disp: , Rfl:   •  melatonin 5 mg capsule, Take 5 mg by mouth nightly as needed (insomnia) , Disp: , Rfl:   •  potassium gluconate 595 mg (99 mg) tablet, Take 1 tablet by mouth 2 (two) times a day, Disp: , Rfl:   •  diphenhydramine-acetaminophen (TYLENOL PM EXTRA STRENGTH)  mg tablet, Take  by mouth nightly as needed  , Disp: , Rfl:     Allergies   Allergen Reactions   • Penicillins      Tested as a child; showed positive       Social History     Socioeconomic History   • Marital status:      Spouse name: Not on file   • Number of children: Not on file   • Years of education: Not on file   • Highest education level: Not on file   Occupational History   • Not on file   Social Needs   • Financial resource strain: Not on file   • Food insecurity     Worry: Not on file     Inability: Not on file   • Transportation needs     Medical: Not on file     Non-medical: Not on file   Tobacco Use   • Smoking status: Never Smoker   • Smokeless tobacco: Never Used   Substance and Sexual Activity   • Alcohol use: No   • Drug use: Not Currently     Types: Hydrocodone     Comment: 3 times daily, low back/sciatica pain   • Sexual activity: Not Currently   Lifestyle   • Physical activity     Days per week: Not on file     Minutes per session: Not on file   • Stress: Not on file   Relationships   • Social connections     Talks on phone: Not on file     Gets together: Not on file     Attends Advent service: Not on file     Active member of club or organization: Not on file     Attends meetings of clubs or organizations: Not on file     Relationship status: Not on file   • Intimate partner violence     Fear of current or ex partner: Not on file     Emotionally abused: Not on file     Physically abused: Not on file     Forced sexual activity: Not on file   Other Topics Concern   • Not on file   Social History Narrative   • Not on file       Family History   Problem Relation Age of Onset   • Alzheimer's disease Father    • Diabetes type II Father    • Diabetes Sister        OBJECTIVE:   Vitals:   Vitals:    09/21/20 1310   BP: 118/72   Pulse: 91   Resp: 16   Temp: (!) 35.9 °C (96.6 °F)   SpO2: 99%     Weights (last 14 days)     Date/Time   Weight    09/21/20 1310   84 kg (185 lb 3.2 oz)              Physical Exam:    General: Alert and oriented. In no acute distress.   Head:normocephalic and atraumatic. No tenderness palpated  Eyes:Pupils equal equal and reactive, no corneal or scleral injection.  ENT: TMs visible without bulging or erythema. No nasal lesions. No oral lesions.   Cardio: S1 S2 without extra sounds or murmurs.  No edema. PP intact. Cap refill <2s.  No JVD  Pulm: CTAB without wheezing or crackles. Symmetrical air exchange.   Gi: BS present x4. Soft, nontender and non distended.  No masses palpated.  Ext: Full range of motion of all extremities.  No joint tenderness or erythema or swelling noted.  Neuro: Motor and sensation intact bilaterally. No focal lesions.  Cranial nerves II through XII are intact and symmetrical.  Deep tendon reflexes 2 throughout.  Skin: No rashes or lesions noted.  Psych: Denies SI/HI/AH/      Labs/Imaging/Micro:  Component      Latest Ref Rng & Units 6/10/2020 8/19/2020   Sodium      135 - 145 mmol/L 138 140   Potassium      3.5 - 5.1 mmol/L 3.9 3.2 (L)   Chloride      98 - 107 mmol/L 105 104   CO2      21 - 32 mmol/L 25 25   Anion Gap      3 - 11 mmol/L 8 11   BUN      7 - 25 mg/dL 17 19   Creatinine, Ser      0.70 - 1.30 mg/dL 0.76 0.73   Glucose      70 - 105 mg/dL 120 (H) 96   Calcium      8.6 - 10.3 mg/dL 9.4 9.0   eGFR      >60 mL/min/1.73m*2 100 101     Component      Latest Ref Rng & Units 5/22/2020 8/19/2020   Hemoglobin A1C      4.0 - 6.0 % 5.8 4.6   Estimated Average Glucose      mg/dL 119.8 85.3       ASSESSMENT AND PLAN:   Del Mckeon is a 60 y.o. male presenting to clinic today for:     Type 2 diabetes mellitus without complication, with long-term current use of insulin (CMS/[facility]) ([facility])  At this time, his blood sugars are pretty well controlled.  His most recent A1c last month was 4.6%.  I am going to continue him at metformin 1000mg in the morning and 500 mg at night.  He is tolerating this dose really well.  I would like to see him back in 3 months with a blood sugar  log as well as A1c and fasting electrolyte panel as well as urine microalbumin to creatinine ratio 1 to 2 days before appointment.  We will do this is a chronic Medicare appointment at that time.    He did decline influenza vaccination today.     Diagnosis Plan   1. Mixed hyperlipidemia  Lipid panel Blood, Venous   2. Type 2 diabetes mellitus without complication, with long-term current use of insulin (CMS/[facility]) ([facility])  metFORMIN (GLUCOPHAGE) 500 mg tablet    Basic metabolic panel Blood, Venous    CBC w/auto differential Blood, Venous    Hemoglobin A1c (glycosylated) Blood, Venous    Urine Albumin/Creatinine Ratio Urine, Clean Catch   3. Benign essential hypertension  CBC w/auto differential Blood, Venous    Urine Albumin/Creatinine Ratio Urine, Clean Catch       Return in about 3 months (around 12/21/2020) for Chronic care follow up - 40 min.    Total time face to face spent with patient was 20 minutes with 20 minutes in counseling and coordination of care regarding the above which constitutes 100% of our face to face time.       Klarissa Lester MD  09/21/20  1:59 PM

## 2020-09-21 NOTE — ASSESSMENT & PLAN NOTE
At this time, his blood sugars are pretty well controlled.  His most recent A1c last month was 4.6%.  I am going to continue him at metformin 1000mg in the morning and 500 mg at night.  He is tolerating this dose really well.  I would like to see him back in 3 months with a blood sugar log as well as A1c and fasting electrolyte panel as well as urine microalbumin to creatinine ratio 1 to 2 days before appointment.  We will do this is a chronic Medicare appointment at that time.

## 2020-09-25 ENCOUNTER — HOSPITAL ENCOUNTER (OUTPATIENT)
Dept: OCCUPATIONAL THERAPY | Facility: HOSPITAL | Age: 60
Setting detail: THERAPIES SERIES
Discharge: STILL ADMITTED OR ADMINISTRATIVE | End: 2020-09-25
Payer: COMMERCIAL

## 2020-09-25 PROCEDURE — 97032 APPL MODALITY 1+ESTIM EA 15: CPT | Mod: GO

## 2020-09-25 PROCEDURE — 97530 THERAPEUTIC ACTIVITIES: CPT | Mod: GO

## 2020-09-25 NOTE — INTERDISCIPLINARY/THERAPY
"  OT Daily Treatment Note     Patient Name: Del Mckeon  Date of Service: 9/25/2020  Medicare Re-certification period: 9/18/2020-12/11/2020    Visit Number: 2    Subjective:     Pt reports he has been doing his HEP. Reports he forgot to bring in his splint. Agreeable to treatment.        Has patient fallen since last visit?  No   Fall Risk Interventions: N/a     Pain:  Pain Assessment  Pain Assessment: No/denies pain    Has there been any changes in medications? No       Objective:        Sensory assessment:   RUE: Monofiliments 3.61 dorsum of hand 4.31 volar surface   LUE: Monofilimants 3.61 dorsum of hand and 4.31 volar surace   Pt denies concerns with changes in sharp/dull and hot/cold sensation bilaterally.     Engaged pt in NMES 20:10, intensity 15, run time of 10 minutes, ulnar nerve distribution RUE,  reviewed contraindications prior to treatment, skin prepped with no skin integrity concerns noted after treatment while engaging pt in below:       -Engaged pt in right hand 10 reps/2 sets :    -open fist power grasp make a \"c\" with right digits around 3 inch cylinder focusing on ab/adduction of digits and AROM.    -table top digit ab/adduction with AAROM required for little finger to achieve full adduction.     -due to limitations in strength and AROM, pt unable to achieve full finger isometric grasp around edge of table with right hand to strengthen lumbricales.         Time Calculation  Start Time: 1245  Stop Time: 1330  Time Calculation (min): 45 min          Education and Home Exercise Program: Provided pt with median nerve glide HEP (see chart for details). Provided pt with additional instruction this date of completing table top right hand digit ab and adduction with wrist and MCP's in neutral on table top as well as making \"table top\" with digits and then full extension of digits 10x/s 2 sets.       -Educated pt on \"position of recovery\" with hand and digits in neutral. Educated pt on ways to reduce " irritation on ulnar nerve due to excessive bending of elbow including when sleeping and using computer. Educated pt on making a home made splint for elbow extension for night time use as he does not want to purchase. Instructed to trial at night to decrease excessive bending of elbow at night.   Rehab Goals/Potential/Assessment/Plan:  Re-Assessment: Pt noted to have symmetrical sensation when assessed by Monofilaments. Education provided on reducing repetitive activity at elbow and excessive bending to reduce pressure on ulnar nerve. Pt demos significant limitations in right hand  little finger adduction and abduction as well as limitations in ring finger functional use. Pt denies pain and limited sensory changes. Encouraged neutral positioning  at wrist with splint at night  to address median nerve o decrease inflammation. Pt will benefit from continued OT treatment.            Thank you for allowing us to share in the care of this patient. Please be advised the order we received does not include a specified duration of treatment, therefore, this order will only be good for 30 days. If you have any questions, recommendations, or further concerns regarding this patient, please feel free to contact me at [phone].  Signed by: KAREL Hugo  9/25/2020  12:59 PM

## 2020-10-02 ENCOUNTER — HOSPITAL ENCOUNTER (OUTPATIENT)
Dept: OCCUPATIONAL THERAPY | Facility: HOSPITAL | Age: 60
Setting detail: THERAPIES SERIES
Discharge: STILL ADMITTED OR ADMINISTRATIVE | End: 2020-10-02
Payer: COMMERCIAL

## 2020-10-02 PROCEDURE — 97530 THERAPEUTIC ACTIVITIES: CPT | Mod: GO

## 2020-10-02 NOTE — INTERDISCIPLINARY/THERAPY
"  OT Daily Treatment Note     Patient Name: Del Mckeon  Date of Service: 10/2/2020  Medicare Re-certification period: 9/18/2020-12/11/2020    Visit Number: 3    Subjective:     Pt reports he has been doing his HEP. Denies pain. Agreeable to treatment.        Has patient fallen since last visit?  No   Fall Risk Interventions: N/a     Pain:  Pain Assessment  Pain Assessment: No/denies pain    Has there been any changes in medications? No       Objective:          Engaged pt in NMES 20:10, intensity 15-17, run time of 13 minutes, ulnar nerve distribution RUE,  reviewed contraindications prior to treatment, skin prepped with no skin integrity concerns noted after treatment while engaging pt in below:     Special tests:  -positive Froment's Sign right hand.       -Engaged pt in right hand 10 reps/2 sets :    -pt able to actively grasp table edge with right hand with wrist in extension, difficulty keeping wirst in neutral while grasping edge of table. Pt reported shooting pain in right thumb when completing with wrist in extension and decreased when completing when wrist was in neutral.    -table top digit ab/adduction with AAROM required for little finger to achieve full adduction.       -Pt brought in his Futuro splint for right upper extremity. Modified splint to place wrist in neutral and provided extended pad to support digits and place in neutral. Instructed pt to sleep with this splint on and take off during the day.     Time Calculation  Start Time: 1400  Stop Time: 1430  Time Calculation (min): 30 min          Education and Home Exercise Program: Provided pt with median nerve glide HEP (see chart for details). Provided pt with additional instruction this date of completing table top right hand digit ab and adduction with wrist and MCP's in neutral on table top as well as making \"table top\" with digits and then full extension of digits 10x/s 2 sets. Educated on splint wearing for right upper extremity: on at " night, off during the day while wearing extender pad for digits to be placed in neutral.         Rehab Goals/Potential/Assessment/Plan:  Re-Assessment: Pt noted to have positive Froment's sign in right hand. Modified right hand splint in order to place wrist and digits in neutral position for night time splinting due to carpal tunnel symptoms. Pt reports he is completing his HEP daily. Demo'd ability to grasp edge of table this date, unable to complete last session, with all digits. Pt will benefit from continued OT treatment.            Thank you for allowing us to share in the care of this patient. Please be advised the order we received does not include a specified duration of treatment, therefore, this order will only be good for 30 days. If you have any questions, recommendations, or further concerns regarding this patient, please feel free to contact me at [phone].  Signed by: KAREL Hugo  10/2/2020  2:22 PM

## 2020-10-08 ENCOUNTER — TELEMEDICINE (OUTPATIENT)
Dept: NEUROLOGY | Facility: CLINIC | Age: 60
End: 2020-10-08
Payer: COMMERCIAL

## 2020-10-08 DIAGNOSIS — I63.9: Primary | ICD-10-CM

## 2020-10-08 DIAGNOSIS — E08.41 DIABETIC MONONEUROPATHY ASSOCIATED WITH DIABETES MELLITUS DUE TO UNDERLYING CONDITION (CMS/HCC): ICD-10-CM

## 2020-10-08 DIAGNOSIS — I63.9 ISCHEMIC CEREBROVASCULAR ACCIDENT (CVA) (CMS/HCC): ICD-10-CM

## 2020-10-08 PROCEDURE — 99214 OFFICE O/P EST MOD 30 MIN: CPT | Performed by: PSYCHIATRY & NEUROLOGY

## 2020-10-08 NOTE — LETTER
"Count includes the [facility] NEUROLOGY & REHABILITATION  [address] [place] [address] [place] [address]  [phone]  Dept: [phone]    October 11, 2020     Klarissa Lester MD  [address] [facility] [place] [address]    Patient: Del Mckeon   YOB: 1960   Date of Visit: 10/8/2020       Dear Dr. Lester:    Thank you for referring Del Mckeon to me for evaluation. Below are my notes for this consultation.    If you have questions, please do not hesitate to call me. I look forward to following your patient along with you.         Sincerely,        Shital Chavez MD        CC: No Recipients  Shital Chavez MD  10/11/2020  1:01 PM  Signed  Referring provider:  Klarissa Lester MD    Date of evaluation: 10/8/2020    Chief Complaint   Patient presents with   • Cerebrovascular Accident     Reason for consult: stroke    History of present illness:     This is a 60 y.o. gentleman here in the clinic. He presented 5/2020 with generalized weakness (more on the left side) of 4 days duration.  Blood pressure was noted to be 185/104 in the ED. EKG noted as showing sinus rhythm.  He was given aspirin 325 mg and clopidogrel 300 mg. He was admitted 5/21 - 5/27/2020 and was in rehab until 6/5.  He was placed on aspirin, clopidogrel and statin.  He has made slow improvement.    Patient has no new complaints today.  He said that prior to the stroke he was taking aspirin sporadically \"for prevention\".  He said when he combines this with Plavix on a regular basis he sometimes gets nose bleed.  He has had no recent bleeding.  He is a little weak on the left side but ambulatory without assistance.    REVIEW OF SYSTEMS:    CONSTITUTIONAL:  No fever, fatigue, malaise.  EYES:  No diplopia or loss of vision.   ENT:  No neck pain, tinnitus, hearing loss.  CARDIAC:  No dyspnea, chest pain or palpitations.  RESPIRATORY:  No cough, dyspnea or hemoptysis.  Mild cold symptoms - said he gets it in summer  GI:  No nausea, emesis, diarrhea, hematochezia.  : "  No urinary complaints or incontinence.  MUSCULOSKELETAL:  No arthralgias, muscle pain.  DERMATOLOGIC:  No skin rash or ecchymosis.  NEUROLOGIC:  Denies headache. No complaints   of altered sensorium or symptoms suggestive of seizures.  PSYCHIATRIC:  No complaints of depression or anxiety.  ENDOCRINE:  No extremes of body temperature.  LYMPH/HEME:  No generalized lymphadenopathy.   No bleeding or ecchymosis  ALLERGY/IMMUNOLOGIC: as noted.      Past Medical History:   Diagnosis Date   • Anemia    • Asthma    • Depressive disorder    • Heart murmur    • Hyperlipidemia    • Hypertension    • Obesity    • Obstructive sleep apnea    • Type 2 diabetes mellitus (CMS/HCC) (Formerly [facility])        Past Surgical History:   Procedure Laterality Date   • CRYOTHERAPY      dermatology       Social History     Socioeconomic History   • Marital status:      Spouse name: Not on file   • Number of children: Not on file   • Years of education: Not on file   • Highest education level: Not on file   Occupational History   • Not on file   Social Needs   • Financial resource strain: Not on file   • Food insecurity     Worry: Not on file     Inability: Not on file   • Transportation needs     Medical: Not on file     Non-medical: Not on file   Tobacco Use   • Smoking status: Never Smoker   • Smokeless tobacco: Never Used   Substance and Sexual Activity   • Alcohol use: No   • Drug use: Not Currently   • Sexual activity: Not Currently   Lifestyle   • Physical activity     Days per week: Not on file     Minutes per session: Not on file   • Stress: Not on file   Relationships   • Social connections     Talks on phone: Not on file     Gets together: Not on file     Attends Sikhism service: Not on file     Active member of club or organization: Not on file     Attends meetings of clubs or organizations: Not on file     Relationship status: Not on file   • Intimate partner violence     Fear of current or ex partner: Not on file     Emotionally  abused: Not on file     Physically abused: Not on file     Forced sexual activity: Not on file   Other Topics Concern   • Not on file   Social History Narrative   • Not on file       Family History   Problem Relation Age of Onset   • Alzheimer's disease Father    • Diabetes type II Father    • Diabetes Sister        Outpatient Medications Prior to Visit   Medication Sig Dispense Refill   • metFORMIN (GLUCOPHAGE) 500 mg tablet Take 1000mg in the morning and 500mg in the evening 90 tablet 11   • loratadine (Claritin) 10 mg tablet Take 1 tablet (10 mg total) by mouth daily 30 tablet 11   • ketorolac (ACULAR) 0.5 % ophthalmic solution Administer 1 drop into the left eye 4 (four) times a day     • ibuprofen (AdviL) 200 mg tablet Take 400 mg by mouth every 6 (six) hours as needed for pain scale 1-3/10 or pain scale 8-10/10.     • amLODIPine (NORVASC) 10 mg tablet Take 1 tablet (10 mg total) by mouth nightly 90 tablet 3   • clopidogreL (PLAVIX) 75 mg tablet Take 1 tablet (75 mg total) by mouth daily 90 tablet 3   • losartan (COZAAR) 100 mg tablet Take 1 tablet (100 mg total) by mouth daily 90 tablet 3   • rosuvastatin (CRESTOR) 40 mg tablet Take 1 tablet (40 mg total) by mouth nightly 90 tablet 3   • acetaminophen (Tylenol 8 Hour) 650 mg 8 hr tablet Take 650 mg by mouth every 8 (eight) hours as needed for pain scale 1-3/10.     • blood-glucose meter kit Use as instructed 1 each 0   • blood sugar diagnostic (glucose blood) strip Use to check blood sugar levels up to three times daily. 200 strip 1   • cholecalciferol, vitamin D3, (Vitamin D3) 25 mcg (1,000 unit) capsule Take 1 capsule (1,000 Units total) by mouth daily 30 capsule 3   • multivitamin with minerals tablet Take 1 tablet by mouth daily     • melatonin 5 mg capsule Take 5 mg by mouth nightly as needed (insomnia)      • potassium gluconate 595 mg (99 mg) tablet Take 1 tablet by mouth 2 (two) times a day     • diphenhydramine-acetaminophen (TYLENOL PM EXTRA  STRENGTH)  mg tablet Take by mouth nightly as needed       • sertraline (ZOLOFT) 50 mg tablet Take 1 tablet (50 mg total) by mouth daily 90 tablet 3   • aspirin 81 mg EC tablet Take 1 tablet (81 mg total) by mouth every other day Patient taking the tablet every alternate day d/t c/o nose bleeds. (Patient taking differently: Take 81 mg by mouth every other day Patient taking the tablet every other day )       No facility-administered medications prior to visit.        Allergies   Allergen Reactions   • Penicillins      Tested as a child; showed positive       EXAM:    Vitals: afebrile - pulse 75 - /86 - O2 sat. 100%    MENTAL STATUS:  Alert and oriented to person, place and time.   Short-term recall 2/3  Serial sevens 2/5  Fund of knowledge within normal limits  LANGUAGE: Speech is fluent and appropriate.     CRANIAL nerves:    Eyes conjugate. Disc margin sharp bilaterally. Pupils equal and reactive to light. No ptosis. Facial sensation normal. Mild left facial weakness. Extraocular movements intact. No nystagmus. Hearing to finger rub symmetrical bilaterally. Soft palate uniform and elevated normally. Normal shoulder shrug. Tongue minimally deviated to right. No dysarthria.      MOTOR:  Muscle bulk decreased in the right first dorsal interosseous  and extensor digitorum brevis bilaterally  Tone normal  Strength:  Right finger abduction 3+  Left upper extremity 4- and left lower 4+  Mild bilateral weakness of shoulder abduction  Right extension of great toe 4 and left 3  Otherwise good strength   Deep tendon reflexes: 0    Plantars: flexor right and extensor left    Co-ordination:  Finger-nose testing: normal right and mildly unsteady left  Mildly wide based, normal stride, fairly steady    SENSORY:    Pin: reduced lower 1/2 of legs and feet    HEART:  S1: normal  S2: normal  Rhythm: regular  Radials intact but feeble left    Neck bruit right: no  Neck bruit left: no  No peripheral cyanosis or  edema    Lab Results   Component Value Date    WBC 8.5 06/10/2020    HGB 14.9 06/10/2020    HCT 43.1 06/10/2020    MCV 92.8 06/10/2020     06/10/2020       Lab Results   Component Value Date     08/19/2020    K 3.2 (L) 08/19/2020     08/19/2020    CO2 25 08/19/2020    BUN 19 08/19/2020    CALCIUM 9.0 08/19/2020    ALBUMIN 3.8 06/03/2020    BILITOT 0.67 06/03/2020    AST 27 06/03/2020    ALT 30 06/03/2020    ALKPHOS 59 06/03/2020       Lab Results   Component Value Date    GLUCOSE 96 08/19/2020       Lab Results   Component Value Date    CREATININE 0.73 08/19/2020       Lab Results   Component Value Date    EGFR 101 08/19/2020       Lab Results   Component Value Date    PROT 6.7 06/03/2020       Lab Results   Component Value Date    TRIG 161 (H) 05/22/2020    HDL 30 (L) 05/22/2020    CHOL 217 (H) 05/22/2020    LDLCALC 155 (H) 05/22/2020       Lab Results   Component Value Date    TSH 1.304 02/24/2016 6/2020 EMG/NCS right upper extremity:  There IS electrodiagnostic evidence of a Right sensorimotor median neuropathy at or about the wrist. Primarily demyelinating in nature. No evidence of denervation on needle examination of the thenar eminence.     There IS electrodiagnostic evidence of a Right ulnar neuropathy at or about the wrist. Both demyelinating and axonal in nature.     There is NO electrodiagnostic evidence of a Right cervical radiculopathy affecting the C5 to T1 nerve roots.      There IS borderline electrodiagnostic evidence of a sensory peripheral polyneuropathy affecting the tested limb.    5/2020 carotid U/S:  Less than 50% stenosis bilateral ICA   (bilateral antegrade vertebral)    Echocardiogram:  · Limited study performed.  · Normal left ventricular size, wall thickness and low normal systolic function. EF 52%.  · Normal left ventricular wall motion.  · Diastolic function was not assessed.  · Mild biatrial enlargement.  · There is no patent foramen ovale detected by agitated  saline.  · Mildly dilated right ventricle with normal function.  · Calcified aortic valve with stenosis likely, though full Doppler was not performed. Mild regurgitation noted.  · Inadequate TR spectral Doppler to accurately assess right ventricular systolic pressure.  · I suggest a complete echocardiographic Doppler evaluation of the aortic valve    MRI brain WO:  1.   Small acute right inferior anterior pontine infarct.  2.  There are Small areas have abnormal signal intensity involving the left posterior periventricular white matter in the left posterior parietal white matter. Probably chronic ischemic change rather than acute acute infarcts.  3.  Areas of low density on the CT involving the right temporal lobe and left parietal lobe are normal and appear to have represented artifacts on the CT.            IMPRESSION:    1.  Right ponto-medullary infarct with mild residual -  overall improved. 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  Diabetic neuropathy  Right ulnar neuropathy    3.  Hypertension, hyperlipidemia    3.  Obstructive sleep apnea    4.  Vitamin D deficiency    5.  Bilateral carpal tunnel syndrome          PLAN:    1. MRA head  2. Stop aspirin, continue clopidogrel  3. B12 level  4. Repeat EMG/NCS (in 6 months) right upper extremity to   R/O right ulnar neuropathy at the elbow   5. Follow up in 6 months        Dictation done using voice recognition software to aid in this documentation. Sometimes words are not transcribed exactly as they were spoken. There may be uncorrected errors or inaccuracies within the document despite efforts made to avoid them. Please be aware of this and contact the provider if areas of questionable text are identified.

## 2020-10-09 ENCOUNTER — HOSPITAL ENCOUNTER (OUTPATIENT)
Dept: OCCUPATIONAL THERAPY | Facility: HOSPITAL | Age: 60
Setting detail: THERAPIES SERIES
Discharge: STILL ADMITTED OR ADMINISTRATIVE | End: 2020-10-09
Payer: COMMERCIAL

## 2020-10-09 PROCEDURE — 97530 THERAPEUTIC ACTIVITIES: CPT | Mod: GO

## 2020-10-09 NOTE — INTERDISCIPLINARY/THERAPY
"  OT Daily Treatment Note     Patient Name: Del Mckeon  Date of Service: 10/9/2020  Medicare Re-certification period: 9/18/2020-12/11/2020    Visit Number: 4    Subjective:     Pt reports he has been doing his HEP. Reports he is wearing his resting hand splint at night with no complaints. Denies pain. Agreeable to treatment.        Has patient fallen since last visit?  No   Fall Risk Interventions: N/a     Pain:  Pain Assessment  Pain Assessment: No/denies pain    Has there been any changes in medications? No       Objective:  -initiated treatment with moist heat for 5 minutes in preparation for functional treatment. Discussion of POC and HEP.         -Tan theraputty: intrinsic weave RUE, did not complete with 4th and 5th digit d/t weakness also completing Intrinsic bloom. 10 reps/1 set    -Educated on options to increase functional position when using computer to decrease elbow flexion and wrist extension. Pt reports he will sit at his desk to increase functional positioning.     - Reinforced \"position of recovery\" to decrease median nerve inflammation.       - Engaged pt in ulnar nerve glides completing 10 reps/1 set with min verbal cues for technique.     -Fit patient with \"vera strap\" for 5th digit right hand due to significant impairment of adduction of digit. Pt able to jose and doff strap I'lly. He was instructed to wear during the day and off at night. Wearing resting hand splint at night.   Time Calculation  Start Time: 1245  Stop Time: 1315  Time Calculation (min): 30 min       Therapeutic Interventions (Time Spent in Minutes)  Therapeutic Activity Dynamic: 30  Education and Home Exercise Program: Provided pt with median nerve glide HEP (see chart for details). Provided pt with additional instruction this date of completing table top right hand digit ab and adduction with wrist and MCP's in neutral on table top as well as making \"table top\" with digits and then full extension of digits 10x/s 2 sets. " Educated on splint wearing for right upper extremity: on at night, off during the day while wearing extender pad for digits to be placed in neutral.         Rehab Goals/Potential/Assessment/Plan:  Re-Assessment:Pt was fit with vera strap for 5th right digit d/t limited adduction. He reports he has not noticed a significant difference since starting to wear the resting hand splint right upper extremity. Pt reports he has been continuing to complete his home exercise programs. Will benefit from continued OT.         Thank you for allowing us to share in the care of this patient. Please be advised the order we received does not include a specified duration of treatment, therefore, this order will only be good for 30 days. If you have any questions, recommendations, or further concerns regarding this patient, please feel free to contact me at [phone].  Signed by: KAREL Hugo  10/9/2020  4:00 PM

## 2020-11-25 ENCOUNTER — NURSE TRIAGE (OUTPATIENT)
Dept: FAMILY MEDICINE | Facility: CLINIC | Age: 60
End: 2020-11-25

## 2020-11-25 NOTE — TELEPHONE ENCOUNTER
COVID-19 SCREENING ASSESSMENT     CRITERIA FOR TESTING  Yes to Any Symptoms or Asymptomatic Testing With Approved Criteria/MH Agreement  What symptoms are you experiencing? Cough, Chills, Sore Throat, Congestion and Fatigue  Asymptomatic testing group: N/A     ORDER QUESTIONS  Date of onset: 11/23/20  Are you a healthcare worker,  or active  or [employer]? No  Do you live in an institutional setting (long term care, assisted living, corrections, etc)? No  Are you a dialysis or current cancer patient? No  Are you currently pregnant? N/A  Do you work in an educational setting? No     OTHER QUESTIONS  Are you a [facility] employee? No  Have you had close contact with a lab confirmed case of COVID-19 in the last 14 days? no  Details on close contact: n/a    Reason for Disposition  • Patient has COVID-19 symptoms per CDC guidelines.    Protocols used: COVID-19 TRIAGE PROTOCOL MONUMENT HEALTH

## 2020-11-26 ENCOUNTER — APPOINTMENT (OUTPATIENT)
Dept: LAB | Facility: URGENT CARE | Age: 60
End: 2020-11-26
Payer: COMMERCIAL

## 2020-11-26 DIAGNOSIS — Z11.52 ENCOUNTER FOR SCREENING LABORATORY TESTING FOR COVID-19 VIRUS: ICD-10-CM

## 2020-11-26 LAB — SARS-COV-2 RNA RESP QL NAA+PROBE: NEGATIVE

## 2020-11-26 PROCEDURE — 99211 OFF/OP EST MAY X REQ PHY/QHP: CPT | Mod: CS,LAB | Performed by: FAMILY MEDICINE

## 2020-11-26 PROCEDURE — 87635 SARS-COV-2 COVID-19 AMP PRB: CPT | Performed by: FAMILY MEDICINE

## 2020-12-01 ENCOUNTER — TELEPHONE (OUTPATIENT)
Dept: FAMILY MEDICINE | Facility: CLINIC | Age: 60
End: 2020-12-01

## 2021-01-04 ENCOUNTER — APPOINTMENT (OUTPATIENT)
Dept: LAB | Facility: CLINIC | Age: 61
End: 2021-01-04
Payer: COMMERCIAL

## 2021-01-04 DIAGNOSIS — I10 BENIGN ESSENTIAL HYPERTENSION: ICD-10-CM

## 2021-01-04 DIAGNOSIS — Z79.4 TYPE 2 DIABETES MELLITUS WITHOUT COMPLICATION, WITH LONG-TERM CURRENT USE OF INSULIN (CMS/HCC): ICD-10-CM

## 2021-01-04 DIAGNOSIS — E78.2 MIXED HYPERLIPIDEMIA: ICD-10-CM

## 2021-01-04 DIAGNOSIS — E08.41 DIABETIC MONONEUROPATHY ASSOCIATED WITH DIABETES MELLITUS DUE TO UNDERLYING CONDITION (CMS/HCC): ICD-10-CM

## 2021-01-04 DIAGNOSIS — E11.9 TYPE 2 DIABETES MELLITUS WITHOUT COMPLICATION, WITH LONG-TERM CURRENT USE OF INSULIN (CMS/HCC): ICD-10-CM

## 2021-01-04 LAB
ANION GAP SERPL CALC-SCNC: 9 MMOL/L (ref 3–11)
BASOPHILS # BLD AUTO: 0.1 10*3/UL
BASOPHILS NFR BLD AUTO: 1 % (ref 0–2)
BUN SERPL-MCNC: 11 MG/DL (ref 7–25)
CALCIUM SERPL-MCNC: 9.6 MG/DL (ref 8.6–10.3)
CHLORIDE SERPL-SCNC: 101 MMOL/L (ref 98–107)
CHOLEST SERPL-MCNC: 153 MG/DL (ref 0–199)
CO2 SERPL-SCNC: 28 MMOL/L (ref 21–32)
CREAT SERPL-MCNC: 0.89 MG/DL (ref 0.7–1.3)
CREAT UR-MCNC: 126.7 MG/DL
EOSINOPHIL # BLD AUTO: 0.2 10*3/UL
EOSINOPHIL NFR BLD AUTO: 2 % (ref 0–3)
ERYTHROCYTE [DISTWIDTH] IN BLOOD BY AUTOMATED COUNT: 14.1 % (ref 11.5–15)
EST. AVERAGE GLUCOSE BLD GHB EST-MCNC: 180 MG/DL
FASTING STATUS PATIENT QL REPORTED: YES
GFR SERPL CREATININE-BSD FRML MDRD: 93 ML/MIN/1.73M*2
GLUCOSE SERPL-MCNC: 219 MG/DL (ref 70–105)
HBA1C MFR BLD: 7.9 % (ref 4–6)
HCT VFR BLD AUTO: 38.1 % (ref 38–50)
HDLC SERPL-MCNC: 34 MG/DL
HGB BLD-MCNC: 13.2 G/DL (ref 13.2–17.2)
LDLC SERPL CALC-MCNC: 76 MG/DL (ref 20–99)
LYMPHOCYTES # BLD AUTO: 2.5 10*3/UL
LYMPHOCYTES NFR BLD AUTO: 30 % (ref 15–47)
MCH RBC QN AUTO: 30.9 PG (ref 29–34)
MCHC RBC AUTO-ENTMCNC: 34.6 G/DL (ref 32–36)
MCV RBC AUTO: 89.1 FL (ref 82–97)
MICROALBUMIN UR-MCNC: 1307.2 MG/L (ref 0–30)
MICROALBUMIN/CREAT UR: 1031.7 MG/G CREAT (ref 0–34)
MONOCYTES # BLD AUTO: 0.4 10*3/UL
MONOCYTES NFR BLD AUTO: 5 % (ref 5–13)
NEUTROPHILS # BLD AUTO: 5.2 10*3/UL
NEUTROPHILS NFR BLD AUTO: 62 % (ref 46–70)
PLATELET # BLD AUTO: 191 10*3/UL (ref 130–350)
PMV BLD AUTO: 7.6 FL (ref 6.9–10.8)
POTASSIUM SERPL-SCNC: 3.5 MMOL/L (ref 3.5–5.1)
RBC # BLD AUTO: 4.28 10*6/ΜL (ref 4.1–5.8)
SODIUM SERPL-SCNC: 138 MMOL/L (ref 135–145)
TRIGL SERPL-MCNC: 213 MG/DL
VIT B12 SERPL-MCNC: 382 PG/ML (ref 180–914)
WBC # BLD AUTO: 8.4 10*3/UL (ref 3.7–9.6)

## 2021-01-04 PROCEDURE — 82043 UR ALBUMIN QUANTITATIVE: CPT | Performed by: FAMILY MEDICINE

## 2021-01-04 PROCEDURE — 80048 BASIC METABOLIC PNL TOTAL CA: CPT | Performed by: FAMILY MEDICINE

## 2021-01-04 PROCEDURE — 82570 ASSAY OF URINE CREATININE: CPT | Performed by: FAMILY MEDICINE

## 2021-01-04 PROCEDURE — 36415 COLL VENOUS BLD VENIPUNCTURE: CPT | Performed by: FAMILY MEDICINE

## 2021-01-04 PROCEDURE — 85025 COMPLETE CBC W/AUTO DIFF WBC: CPT | Performed by: FAMILY MEDICINE

## 2021-01-04 PROCEDURE — 82607 VITAMIN B-12: CPT | Performed by: FAMILY MEDICINE

## 2021-01-04 PROCEDURE — 80061 LIPID PANEL: CPT | Performed by: FAMILY MEDICINE

## 2021-01-04 PROCEDURE — 83036 HEMOGLOBIN GLYCOSYLATED A1C: CPT | Performed by: FAMILY MEDICINE

## 2021-01-06 ENCOUNTER — OFFICE VISIT (OUTPATIENT)
Dept: FAMILY MEDICINE | Facility: CLINIC | Age: 61
End: 2021-01-06
Payer: COMMERCIAL

## 2021-01-06 VITALS
HEIGHT: 71 IN | RESPIRATION RATE: 16 BRPM | OXYGEN SATURATION: 98 % | SYSTOLIC BLOOD PRESSURE: 118 MMHG | HEART RATE: 78 BPM | WEIGHT: 220.3 LBS | TEMPERATURE: 97.3 F | DIASTOLIC BLOOD PRESSURE: 72 MMHG | BODY MASS INDEX: 30.84 KG/M2

## 2021-01-06 DIAGNOSIS — Z23 IMMUNIZATION DUE: ICD-10-CM

## 2021-01-06 DIAGNOSIS — G47.33 OBSTRUCTIVE SLEEP APNEA SYNDROME: ICD-10-CM

## 2021-01-06 DIAGNOSIS — Z79.4 TYPE 2 DIABETES MELLITUS WITHOUT COMPLICATION, WITH LONG-TERM CURRENT USE OF INSULIN (CMS/HCC): Primary | ICD-10-CM

## 2021-01-06 DIAGNOSIS — I10 BENIGN ESSENTIAL HYPERTENSION: ICD-10-CM

## 2021-01-06 DIAGNOSIS — E78.2 MIXED HYPERLIPIDEMIA: ICD-10-CM

## 2021-01-06 DIAGNOSIS — I63.9 ISCHEMIC CEREBROVASCULAR ACCIDENT (CVA) (CMS/HCC): ICD-10-CM

## 2021-01-06 DIAGNOSIS — E11.9 TYPE 2 DIABETES MELLITUS WITHOUT COMPLICATION, WITH LONG-TERM CURRENT USE OF INSULIN (CMS/HCC): Primary | ICD-10-CM

## 2021-01-06 PROCEDURE — 90471 IMMUNIZATION ADMIN: CPT | Performed by: FAMILY MEDICINE

## 2021-01-06 PROCEDURE — 90732 PPSV23 VACC 2 YRS+ SUBQ/IM: CPT | Performed by: FAMILY MEDICINE

## 2021-01-06 PROCEDURE — 99214 OFFICE O/P EST MOD 30 MIN: CPT | Mod: 25 | Performed by: FAMILY MEDICINE

## 2021-01-06 PROCEDURE — 90472 IMMUNIZATION ADMIN EACH ADD: CPT | Performed by: FAMILY MEDICINE

## 2021-01-06 PROCEDURE — 90750 HZV VACC RECOMBINANT IM: CPT | Performed by: FAMILY MEDICINE

## 2021-01-06 RX ORDER — METFORMIN HYDROCHLORIDE 500 MG/1
1000 TABLET ORAL 2 TIMES DAILY WITH MEALS
Qty: 120 TABLET | Refills: 11 | Status: SHIPPED | OUTPATIENT
Start: 2021-01-06 | End: 2022-01-28

## 2021-01-06 ASSESSMENT — PAIN SCALES - GENERAL: PAINLEVEL: 0-NO PAIN

## 2021-01-06 NOTE — ASSESSMENT & PLAN NOTE
His cholesterol is currently well controlled.  His goal LDL should be less than 70 and he is right is 76.  His triglycerides however were high and this is likely related to his dietary changes.  He would like to work on  getting back into his diet and he will really focus on decreasing his processed sugars and increasing dietary fiber.  Continuing rosuvastatin 40 mg daily.  No renewal needed until July

## 2021-01-06 NOTE — ASSESSMENT & PLAN NOTE
Blood sugars are starting to creep up again.  His A1c in August was 4.6%.  Yesterday it has increased to 7.9%.  This is related to the fact that he has not been following his diet, his Metformin is not entirely at treatment goal and we discontinued his insulin as he was having some low blood sugars when he was following his diet very well.  We discussed this in depth today and he does want to get back onto his diet.  However we are also going to bump his Metformin to 1000 mg 2 times a day.  I also discussed with him starting Ozempic.  He had requested insulin but the Ozempic is a 1 time a week and has cardiovascular benefits as well as is about the same cost of his insulin.  I did ask him to keep us posted if he is unable to afford the medication.  He is starting at 0.25 mg once weekly for the next 6 weeks and I will see him back at that time with a blood sugar log.

## 2021-01-06 NOTE — ASSESSMENT & PLAN NOTE
Blood pressures are currently controlled.  Continuing amlodipine 10 mg daily and losartan 100 mg daily.  Recommend continuing to check blood pressures 1-3 times a week and bring this log with him at his next appointment.

## 2021-01-06 NOTE — PROGRESS NOTES
Outpatient Progress Note    SUBJECTIVE:   ID/CC: Del Mckeon is a 60 y.o. male presenting to clinic today for   Chief Complaint   Patient presents with   • Chronic care     no new concerns        HPI:  Del is a 60-year-old male with a past medical history of type 2 diabetes, hypertension, hyperlipidemia and history of a stroke as well as obstructive sleep apnea.  He is here today for his chronic care follow-up.    Regarding his blood pressures, he is checking these at home and they are running mostly in the 120s over 80s.  He has had several readings less than that but nothing less than 90/60.  He states he does not have any readings over 130 systolic.  He is not having any headache, blurry vision, chest pain, shortness of breath.  He is not currently doing any further exercise.  He states that over the last couple of months he has fallen off the wagon a little bit with both his diet and his exercise.    At his last appointment with me, he did end up stopping his insulin as he is A1c was very well controlled and he had been very compliant with his diet.  However, over the last couple of months he has kind of followed by the [facility] with his diet and is starting to eat a bit more processed foods.  He has noticed that his blood sugars have started to climb.  We checked his A1c yesterday and this has increased to 7.9% from 4.9% back in August.  He is currently only on Metformin 1000 mg in the morning and 500 mg at night.  He really wants to keep working on his diet but he is not optimistic that he will be successful as he has really struggled with motivation lately.  However, he would like to not be on insulin forever either.  His urine microalbumin to creatinine ratio has also increased significantly.    Regarding his cholesterol, his LDLs are at 76 just a tiny bit high but pretty close to goal.  His triglycerides are high however.  This is likely because his blood sugars are running high as well.  He is also  eating a lot more processed and simple sugars than he was previously.      Review of Systems  12 point ROS reviewed and negative except as in HPI.     Past Medical History:   Diagnosis Date   • Anemia    • Asthma    • Depressive disorder    • Heart murmur    • Hyperlipidemia    • Hypertension    • Obesity    • Obstructive sleep apnea    • Type 2 diabetes mellitus (CMS/HCC) (HCC)        Past Surgical History:   Procedure Laterality Date   • CRYOTHERAPY      dermatology         Current Outpatient Medications:   •  metFORMIN (GLUCOPHAGE) 500 mg tablet, Take 2 tablets (1,000 mg total) by mouth 2 (two) times a day with meals, Disp: 120 tablet, Rfl: 11  •  ibuprofen (AdviL) 200 mg tablet, Take 400 mg by mouth every 6 (six) hours as needed for pain scale 1-3/10 or pain scale 8-10/10., Disp: , Rfl:   •  amLODIPine (NORVASC) 10 mg tablet, Take 1 tablet (10 mg total) by mouth nightly, Disp: 90 tablet, Rfl: 3  •  clopidogreL (PLAVIX) 75 mg tablet, Take 1 tablet (75 mg total) by mouth daily, Disp: 90 tablet, Rfl: 3  •  losartan (COZAAR) 100 mg tablet, Take 1 tablet (100 mg total) by mouth daily, Disp: 90 tablet, Rfl: 3  •  rosuvastatin (CRESTOR) 40 mg tablet, Take 1 tablet (40 mg total) by mouth nightly, Disp: 90 tablet, Rfl: 3  •  sertraline (ZOLOFT) 50 mg tablet, Take 1 tablet (50 mg total) by mouth daily, Disp: 90 tablet, Rfl: 3  •  acetaminophen (Tylenol 8 Hour) 650 mg 8 hr tablet, Take 650 mg by mouth every 8 (eight) hours as needed for pain scale 1-3/10., Disp: , Rfl:   •  cholecalciferol, vitamin D3, (Vitamin D3) 25 mcg (1,000 unit) capsule, Take 1 capsule (1,000 Units total) by mouth daily, Disp: 30 capsule, Rfl: 3  •  multivitamin with minerals tablet, Take 1 tablet by mouth daily, Disp: , Rfl:   •  melatonin 5 mg capsule, Take 5 mg by mouth nightly as needed (insomnia) , Disp: , Rfl:   •  diphenhydramine-acetaminophen (TYLENOL PM EXTRA STRENGTH)  mg tablet, Take by mouth nightly as needed  , Disp: , Rfl:   •   semaglutide 1 mg/dose (2 mg/1.5 mL) pen injector, Inject 0.25 mg under the skin every 7 (seven) days, Disp: 1.5 mL, Rfl: 1  •  blood sugar diagnostic (glucose blood) strip, Use to check blood sugar levels up to three times daily. (Patient not taking: Reported on 1/6/2021 ), Disp: 200 strip, Rfl: 1    Allergies   Allergen Reactions   • Penicillins      Tested as a child; showed positive       Social History     Socioeconomic History   • Marital status:      Spouse name: Not on file   • Number of children: Not on file   • Years of education: Not on file   • Highest education level: Not on file   Occupational History   • Not on file   Social Needs   • Financial resource strain: Not on file   • Food insecurity     Worry: Not on file     Inability: Not on file   • Transportation needs     Medical: Not on file     Non-medical: Not on file   Tobacco Use   • Smoking status: Never Smoker   • Smokeless tobacco: Never Used   Substance and Sexual Activity   • Alcohol use: No   • Drug use: Not Currently   • Sexual activity: Not Currently   Lifestyle   • Physical activity     Days per week: Not on file     Minutes per session: Not on file   • Stress: Not on file   Relationships   • Social connections     Talks on phone: Not on file     Gets together: Not on file     Attends Che[employer] service: Not on file     Active member of club or organization: Not on file     Attends meetings of clubs or organizations: Not on file     Relationship status: Not on file   • Intimate partner violence     Fear of current or ex partner: Not on file     Emotionally abused: Not on file     Physically abused: Not on file     Forced sexual activity: Not on file   Other Topics Concern   • Not on file   Social History Narrative   • Not on file       Family History   Problem Relation Age of Onset   • Alzheimer's disease Father    • Diabetes type II Father    • Diabetes Sister        OBJECTIVE:   Vitals:   Vitals:    01/06/21 1255   BP: 118/72    Pulse: 78   Resp: 16   Temp: 36.3 °C (97.3 °F)   SpO2: 98%     Weights (last 14 days)     Date/Time   Weight    01/06/21 1255   99.9 kg (220 lb 4.8 oz)              Physical Exam:   General: Alert and oriented. In no acute distress.   Head:normocephalic and atraumatic. No tenderness palpated  Eyes:Pupils equal equal and reactive, no corneal or scleral injection.  ENT: TMs visible without bulging or erythema. No nasal lesions. No oral lesions.   Cardio: S1 S2 without extra sounds or murmurs.  No edema. PP intact. Cap refill <2s.  No JVD  Pulm: CTAB without wheezing or crackles. Symmetrical air exchange.   Gi: BS present x4. Soft, nontender and non distended.  No masses palpated.  Ext: Full range of motion of all extremities.  No joint tenderness or erythema or swelling noted.  Neuro: Motor and sensation intact bilaterally. No focal lesions.  Cranial nerves II through XII are intact and symmetrical.  Deep tendon reflexes 2 throughout.  Skin: No rashes or lesions noted.  Psych: Denies SI/HI/AH/VH      Labs/Imaging/Micro:  Component      Latest Ref Rng & Units 8/19/2020 1/4/2021   WBC      3.7 - 9.6 10*3/uL  8.4   RBC      4.10 - 5.80 10*6/µL  4.28   Hemoglobin      13.2 - 17.2 g/dL  13.2   Hematocrit      38.0 - 50.0 %  38.1   MCV      82.0 - 97.0 fL  89.1   MCH      29.0 - 34.0 pg  30.9   MCHC      32.0 - 36.0 g/dL  34.6   RDW      11.5 - 15.0 %  14.1   Platelets      130 - 350 10*3/uL  191   MPV      6.9 - 10.8 fL  7.6   Neutrophils%      46 - 70 %  62   Lymphocytes%      15 - 47 %  30   Monocytes%      5 - 13 %  5   Eosinophils%      0 - 3 %  2   Basophils%      0 - 2 %  1   Neutrophils Absolute      10*3/uL  5.20   Lymphs # (Absolute)      10*3/uL  2.50   Monocytes # (Absolute)      10*3/uL  0.40   Eos # (Absolute)      10*3/uL  0.20   Baso # (Absolute)      10*3/uL  0.10   Sodium      135 - 145 mmol/L 140 138   Potassium      3.5 - 5.1 mmol/L 3.2 (L) 3.5   Chloride      98 - 107 mmol/L 104 101   CO2      21 - 32  mmol/L 25 28   BUN      7 - 25 mg/dL 19 11   Creatinine, Ser      0.70 - 1.30 mg/dL 0.73 0.89   Glucose      70 - 105 mg/dL 96 219 (H)   Calcium      8.6 - 10.3 mg/dL 9.0 9.6   Anion Gap      3 - 11 mmol/L 11 9   eGFR      >60 mL/min/1.73m*2 101 93   Triglycerides      <=149 mg/dL  213 (H)   Cholesterol      0 - 199 mg/dL  153   HDL      >=40 mg/dL  34 (L)   LDL Calculated      20 - 99 mg/dL  76   Fasting?        Yes   Albumin, Urine      0.0 - 30.0 mg/L  1,307.2 (H)   Albumin/Creat Ratio      0.0 - 34.0 mg/g Creat  1,031.7 (H)   Creatinine, Ur      mg/dL  126.7   Hemoglobin A1C      4.0 - 6.0 % 4.6 7.9 (H)   Estimated Average Glucose      mg/dL 85.3 180.0   Vitamin B-12      180 - 914 pg/mL  382       ASSESSMENT AND PLAN:   Del Mckeon is a 60 y.o. male presenting to clinic today for:     Ischemic cerebrovascular accident (CVA) (CMS/HCC) (HCC)  He has had no new symptoms.  He does continue to have improvement in his weakness as well as his speech.  He stopped seeing physical therapy, Occupational Therapy and speech therapy in October.  He did just establish with neurology who recommended discontinuing his aspirin therapy as he was getting nosebleeds.  He is now currently only on Plavix 75 mg daily.  We also have him on rosuvastatin for cholesterol control and his blood pressure is to be controlled as below.  He is following up with neurology in March.    Obstructive sleep apnea syndrome  Following with neurology.  Currently stable.    Benign essential hypertension  Blood pressures are currently controlled.  Continuing amlodipine 10 mg daily and losartan 100 mg daily.  Recommend continuing to check blood pressures 1-3 times a week and bring this log with him at his next appointment.    Mixed hyperlipidemia  His cholesterol is currently well controlled.  His goal LDL should be less than 70 and he is right is 76.  His triglycerides however were high and this is likely related to his dietary changes.  He would like  to work on  getting back into his diet and he will really focus on decreasing his processed sugars and increasing dietary fiber.  Continuing rosuvastatin 40 mg daily.  No renewal needed until July    Type 2 diabetes mellitus without complication, with long-term current use of insulin (CMS/[facility]) ([facility])  Blood sugars are starting to creep up again.  His A1c in August was 4.6%.  Yesterday it has increased to 7.9%.  This is related to the fact that he has not been following his diet, his Metformin is not entirely at treatment goal and we discontinued his insulin as he was having some low blood sugars when he was following his diet very well.  We discussed this in depth today and he does want to get back onto his diet.  However we are also going to bump his Metformin to 1000 mg 2 times a day.  I also discussed with him starting Ozempic.  He had requested insulin but the Ozempic is a 1 time a week and has cardiovascular benefits as well as is about the same cost of his insulin.  I did ask him to keep us posted if he is unable to afford the medication.  He is starting at 0.25 mg once weekly for the next 6 weeks and I will see him back at that time with a blood sugar log.    Of note, he is also due for his PPSV and Shingrix No. 2 which we provided today.     Diagnosis Plan   1. Immunization due  Varicella-zoster vaccine (Shingrix) IM    Pneumococcal polysaccharide vaccine 23-valent greater than or equal to 1yo subcutaneous/IM   2. Type 2 diabetes mellitus without complication, with long-term current use of insulin (CMS/[facility]) ([facility])  semaglutide 1 mg/dose (2 mg/1.5 mL) pen injector    metFORMIN (GLUCOPHAGE) 500 mg tablet    Basic metabolic panel Blood, Venous   3. Ischemic cerebrovascular accident (CVA) (CMS/[facility]) ([facility])     4. Obstructive sleep apnea syndrome     5. Benign essential hypertension     6. Mixed hyperlipidemia         Return in about 6 weeks (around 2/17/2021).    Total time face to face spent with patient was 25  minutes with 25 minutes in counseling and coordination of care regarding the above which constitutes 100% of our face to face time.       Klarissa Lester MD  01/06/21  3:11 PM

## 2021-01-06 NOTE — ASSESSMENT & PLAN NOTE
He has had no new symptoms.  He does continue to have improvement in his weakness as well as his speech.  He stopped seeing physical therapy, Occupational Therapy and speech therapy in October.  He did just establish with neurology who recommended discontinuing his aspirin therapy as he was getting nosebleeds.  He is now currently only on Plavix 75 mg daily.  We also have him on rosuvastatin for cholesterol control and his blood pressure is to be controlled as below.  He is following up with neurology in March.

## 2021-02-17 ENCOUNTER — OFFICE VISIT (OUTPATIENT)
Dept: FAMILY MEDICINE | Facility: CLINIC | Age: 61
End: 2021-02-17
Payer: COMMERCIAL

## 2021-02-17 VITALS
WEIGHT: 216 LBS | HEIGHT: 71 IN | BODY MASS INDEX: 30.24 KG/M2 | TEMPERATURE: 97.6 F | SYSTOLIC BLOOD PRESSURE: 128 MMHG | OXYGEN SATURATION: 100 % | DIASTOLIC BLOOD PRESSURE: 70 MMHG | HEART RATE: 67 BPM | RESPIRATION RATE: 16 BRPM

## 2021-02-17 DIAGNOSIS — Z79.4 TYPE 2 DIABETES MELLITUS WITHOUT COMPLICATION, WITH LONG-TERM CURRENT USE OF INSULIN (CMS/HCC): Primary | ICD-10-CM

## 2021-02-17 DIAGNOSIS — Z23 IMMUNIZATION DUE: ICD-10-CM

## 2021-02-17 DIAGNOSIS — E11.9 TYPE 2 DIABETES MELLITUS WITHOUT COMPLICATION, WITH LONG-TERM CURRENT USE OF INSULIN (CMS/HCC): Primary | ICD-10-CM

## 2021-02-17 LAB — GLUCOSE BLDC GLUCOMTR-MCNC: 100 MG/DL (ref 70–105)

## 2021-02-17 PROCEDURE — 82947 ASSAY GLUCOSE BLOOD QUANT: CPT | Mod: QW,59 | Performed by: FAMILY MEDICINE

## 2021-02-17 PROCEDURE — 90715 TDAP VACCINE 7 YRS/> IM: CPT | Performed by: FAMILY MEDICINE

## 2021-02-17 PROCEDURE — 90471 IMMUNIZATION ADMIN: CPT | Performed by: FAMILY MEDICINE

## 2021-02-17 PROCEDURE — 99213 OFFICE O/P EST LOW 20 MIN: CPT | Mod: 25 | Performed by: FAMILY MEDICINE

## 2021-02-17 ASSESSMENT — PAIN SCALES - GENERAL: PAINLEVEL: 0-NO PAIN

## 2021-02-17 NOTE — PROGRESS NOTES
Outpatient Progress Note    SUBJECTIVE:   ID/CC: Del Mckeon is a 60 y.o. male presenting to clinic today for   Chief Complaint   Patient presents with   • Chronic care       HPI:   Del is a 60-year-old male with a past medical history of CVA, sleep apnea, hyperlipidemia, hypertension, vitamin D deficiency, and type 2 diabetes who is here today to follow-up on his diabetes.    Regarding his diabetes, in January, about 6 weeks ago, we had started Ozempic because he had been unable to be quite as diligent with his diet.  He has been able to use his diet to control his diabetes pretty well in the past.  However, when he had his stroke last summer he was started on insulin and we have slowly taper him off of this as his blood sugars have started to run a little bit low plus he was doing really well with his diet.  Once we got him off the insulin and then he kind of felt by the [place] with his diet and his A1c went back up.  Based off of this we started Ozempic which he seems to be tolerating pretty well.  His blood sugars are mostly running in the fasting range of 90 to about 120.  His postprandial blood sugars are always less than 150.  He is currently on semaglutide 0.25 mg once a week and Metformin at 1000 mg 2 times a day.  He has also tried to improve his diet a little bit but is not as intensely working on it as he was last summer.  He is not having any abdominal pain, diarrhea, nausea or vomiting, lightheadedness, chest pain, dizziness or fatigue.            Review of Systems  12 point ROS reviewed and negative except as in HPI.     Past Medical History:   Diagnosis Date   • Anemia    • Asthma    • Depressive disorder    • Heart murmur    • Hyperlipidemia    • Hypertension    • Obesity    • Obstructive sleep apnea    • Type 2 diabetes mellitus (CMS/HCC) (HCC)        Past Surgical History:   Procedure Laterality Date   • CRYOTHERAPY      dermatology         Current Outpatient Medications:   •  semaglutide  1 mg/dose (2 mg/1.5 mL) pen injector, Inject 0.25 mg under the skin every 7 (seven) days, Disp: 1.5 mL, Rfl: 1  •  metFORMIN (GLUCOPHAGE) 500 mg tablet, Take 2 tablets (1,000 mg total) by mouth 2 (two) times a day with meals, Disp: 120 tablet, Rfl: 11  •  ibuprofen (AdviL) 200 mg tablet, Take 400 mg by mouth every 6 (six) hours as needed for pain scale 1-3/10 or pain scale 8-10/10., Disp: , Rfl:   •  amLODIPine (NORVASC) 10 mg tablet, Take 1 tablet (10 mg total) by mouth nightly, Disp: 90 tablet, Rfl: 3  •  clopidogreL (PLAVIX) 75 mg tablet, Take 1 tablet (75 mg total) by mouth daily, Disp: 90 tablet, Rfl: 3  •  losartan (COZAAR) 100 mg tablet, Take 1 tablet (100 mg total) by mouth daily, Disp: 90 tablet, Rfl: 3  •  rosuvastatin (CRESTOR) 40 mg tablet, Take 1 tablet (40 mg total) by mouth nightly, Disp: 90 tablet, Rfl: 3  •  sertraline (ZOLOFT) 50 mg tablet, Take 1 tablet (50 mg total) by mouth daily, Disp: 90 tablet, Rfl: 3  •  acetaminophen (Tylenol 8 Hour) 650 mg 8 hr tablet, Take 650 mg by mouth every 8 (eight) hours as needed for pain scale 1-3/10., Disp: , Rfl:   •  blood sugar diagnostic (glucose blood) strip, Use to check blood sugar levels up to three times daily., Disp: 200 strip, Rfl: 1  •  cholecalciferol, vitamin D3, (Vitamin D3) 25 mcg (1,000 unit) capsule, Take 1 capsule (1,000 Units total) by mouth daily, Disp: 30 capsule, Rfl: 3  •  multivitamin with minerals tablet, Take 1 tablet by mouth daily, Disp: , Rfl:   •  melatonin 5 mg capsule, Take 5 mg by mouth nightly as needed (insomnia) , Disp: , Rfl:   •  diphenhydramine-acetaminophen (TYLENOL PM EXTRA STRENGTH)  mg tablet, Take by mouth nightly as needed  , Disp: , Rfl:     Allergies   Allergen Reactions   • Penicillins      Tested as a child; showed positive       Social History     Socioeconomic History   • Marital status:      Spouse name: Not on file   • Number of children: Not on file   • Years of education: Not on file   •  Highest education level: Not on file   Occupational History   • Not on file   Tobacco Use   • Smoking status: Never Smoker   • Smokeless tobacco: Never Used   Substance and Sexual Activity   • Alcohol use: No   • Drug use: Not Currently   • Sexual activity: Not Currently   Other Topics Concern   • Not on file   Social History Narrative   • Not on file     Social Determinants of Health     Financial Resource Strain:    • Difficulty of Paying Living Expenses:    Food Insecurity:    • Worried About Running Out of Food in the Last Year:    • Ran Out of Food in the Last Year:    Transportation Needs:    • Lack of Transportation (Medical):    • Lack of Transportation (Non-Medical):    Physical Activity:    • Days of Exercise per Week:    • Minutes of Exercise per Session:    Stress:    • Feeling of Stress :    Social Connections:    • Frequency of Communication with Friends and Family:    • Frequency of Social Gatherings with Friends and Family:    • Attends [employer] Services:    • Active Member of Clubs or Organizations:    • Attends Club or Organization Meetings:    • Marital Status:    Intimate Partner Violence:    • Fear of Current or Ex-Partner:    • Emotionally Abused:    • Physically Abused:    • Sexually Abused:        Family History   Problem Relation Age of Onset   • Alzheimer's disease Father    • Diabetes type II Father    • Diabetes Sister        OBJECTIVE:   Vitals:   Vitals:    02/17/21 1000   BP: 128/70   Pulse: 67   Resp: 16   Temp: 36.4 °C (97.6 °F)   SpO2: 100%     Weights (last 14 days)     Date/Time   Weight    02/17/21 1000   98 kg (216 lb)              Physical Exam:   General: Alert and oriented. In no acute distress.   Head:normocephalic and atraumatic. No tenderness palpated  Eyes:Pupils equal equal and reactive, no corneal or scleral injection.  ENT: TMs visible without bulging or erythema. No nasal lesions. No oral lesions.   Cardio: S1 S2 without extra sounds or murmurs.  No edema. PP intact.  Cap refill <2s.  No JVD  Pulm: CTAB without wheezing or crackles. Symmetrical air exchange.   Gi: BS present x4. Soft, nontender and non distended.  No masses palpated.  Ext: Full range of motion of all extremities.  No joint tenderness or erythema or swelling noted.  Neuro: Motor and sensation intact bilaterally. No focal lesions.  Cranial nerves II through XII are intact and symmetrical.  Deep tendon reflexes 2 throughout.  Skin: No rashes or lesions noted.  Psych: Denies SI/HI/AH/VH      Labs/Imaging/Micro:  Previous labs reviewed    ASSESSMENT AND PLAN:   Del Mckeon is a 60 y.o. male presenting to clinic today for:      Type 2 diabetes mellitus without complication, with long-term current use of insulin (CMS/[facility]) ([facility])  A1c in January was 7.9%.  Blood sugars are starting to improve also.  His fasting blood sugar today was normal.  I am going to continue his Metformin 1000 mg twice a day and the Ozempic at 0.25 mg weekly.  I get to see him back in another 6 weeks and repeat his A1c at that time to make sure there is been an improvement.  He will bring his blood sugar log and if needed we can increase his Ozempic or continue at the current dose.  Of note, he is due for his tetanus and we did boost this today.   Diagnosis Plan   1. Type 2 diabetes mellitus without complication, with long-term current use of insulin (CMS/HCC) ([facility])  POCT AMBULATORY GLUCOSE   2. Immunization due  Tdap vaccine greater than or equal to 8yo IM       Return in about 6 weeks (around 3/31/2021) for Next physical and DM check - 40 mins.    Total time face to face spent with patient was 20 minutes with 20 minutes in counseling and coordination of care regarding the above which constitutes 100% of our face to face time.       Klarissa Lester MD  02/22/21  10:02 AM

## 2021-02-22 NOTE — ASSESSMENT & PLAN NOTE
A1c in January was 7.9%.  Blood sugars are starting to improve also.  His fasting blood sugar today was normal.  I am going to continue his Metformin 1000 mg twice a day and the Ozempic at 0.25 mg weekly.  I get to see him back in another 6 weeks and repeat his A1c at that time to make sure there is been an improvement.  He will bring his blood sugar log and if needed we can increase his Ozempic or continue at the current dose.

## 2021-03-02 ENCOUNTER — TELEPHONE (OUTPATIENT)
Dept: FAMILY MEDICINE | Facility: CLINIC | Age: 61
End: 2021-03-02

## 2021-03-02 DIAGNOSIS — Z79.4 TYPE 2 DIABETES MELLITUS WITHOUT COMPLICATION, WITH LONG-TERM CURRENT USE OF INSULIN (CMS/HCC): Primary | ICD-10-CM

## 2021-03-02 DIAGNOSIS — E11.9 TYPE 2 DIABETES MELLITUS WITHOUT COMPLICATION, WITH LONG-TERM CURRENT USE OF INSULIN (CMS/HCC): Primary | ICD-10-CM

## 2021-03-02 DIAGNOSIS — Z12.5 PROSTATE CANCER SCREENING: ICD-10-CM

## 2021-03-04 NOTE — TELEPHONE ENCOUNTER
Can we scoot his appointment back a week and then his labs will be covered. Will just need A1c and PSA  Klarissa Lester MD  03/04/21  7:57 AM

## 2021-03-04 NOTE — TELEPHONE ENCOUNTER
Will you please call and reschedule Del's lab and physical with Dr. Lester back 1 week so his labs will be covered by insurance. Thank you

## 2021-04-05 ENCOUNTER — TELEPHONE (OUTPATIENT)
Dept: FAMILY MEDICINE | Facility: CLINIC | Age: 61
End: 2021-04-05

## 2021-04-05 NOTE — TELEPHONE ENCOUNTER
Caller would like to discuss (a) questions Writer has advised caller of a callback from within 24 hours.    Patient: Del L Patton    Caller Name (Last and first, relation/role): self    Name of Facility: [facility]    Callback Number: [phone]    Best Availability: anytime    Fax Number: [phone]    Additional Info: patient donated blood on the first.  Has labs scheduled for tomorrow. Wanting to know if he can get those done or if he needs to wait a certain amount of time after.     Did you confirm the message with the caller: Yes    Is it okay that the nurse communicates your response through [employer]? No

## 2021-04-07 ENCOUNTER — APPOINTMENT (OUTPATIENT)
Dept: LAB | Facility: CLINIC | Age: 61
End: 2021-04-07
Payer: COMMERCIAL

## 2021-04-07 DIAGNOSIS — Z79.4 TYPE 2 DIABETES MELLITUS WITHOUT COMPLICATION, WITH LONG-TERM CURRENT USE OF INSULIN (CMS/HCC): ICD-10-CM

## 2021-04-07 DIAGNOSIS — Z12.5 PROSTATE CANCER SCREENING: ICD-10-CM

## 2021-04-07 DIAGNOSIS — E11.9 TYPE 2 DIABETES MELLITUS WITHOUT COMPLICATION, WITH LONG-TERM CURRENT USE OF INSULIN (CMS/HCC): ICD-10-CM

## 2021-04-07 LAB
EST. AVERAGE GLUCOSE BLD GHB EST-MCNC: 122.6 MG/DL
HBA1C MFR BLD: 5.9 % (ref 4–6)
PSA SERPL-MCNC: 0.13 NG/ML (ref 0–4)

## 2021-04-07 PROCEDURE — 84153 ASSAY OF PSA TOTAL: CPT | Performed by: FAMILY MEDICINE

## 2021-04-07 PROCEDURE — 36415 COLL VENOUS BLD VENIPUNCTURE: CPT | Performed by: FAMILY MEDICINE

## 2021-04-07 PROCEDURE — 83036 HEMOGLOBIN GLYCOSYLATED A1C: CPT | Performed by: FAMILY MEDICINE

## 2021-04-08 ENCOUNTER — OFFICE VISIT (OUTPATIENT)
Dept: FAMILY MEDICINE | Facility: CLINIC | Age: 61
End: 2021-04-08
Payer: COMMERCIAL

## 2021-04-08 VITALS
OXYGEN SATURATION: 97 % | HEART RATE: 76 BPM | RESPIRATION RATE: 16 BRPM | HEIGHT: 71 IN | BODY MASS INDEX: 28.87 KG/M2 | WEIGHT: 206.2 LBS | TEMPERATURE: 97.1 F | SYSTOLIC BLOOD PRESSURE: 126 MMHG | DIASTOLIC BLOOD PRESSURE: 64 MMHG

## 2021-04-08 DIAGNOSIS — E11.9 TYPE 2 DIABETES MELLITUS WITHOUT COMPLICATION, WITH LONG-TERM CURRENT USE OF INSULIN (CMS/HCC): ICD-10-CM

## 2021-04-08 DIAGNOSIS — L60.2 HYPERTROPHIC TOENAIL: Primary | ICD-10-CM

## 2021-04-08 DIAGNOSIS — Z12.11 COLON CANCER SCREENING: ICD-10-CM

## 2021-04-08 DIAGNOSIS — Z79.4 TYPE 2 DIABETES MELLITUS WITHOUT COMPLICATION, WITH LONG-TERM CURRENT USE OF INSULIN (CMS/HCC): ICD-10-CM

## 2021-04-08 PROCEDURE — 99396 PREV VISIT EST AGE 40-64: CPT | Performed by: FAMILY MEDICINE

## 2021-04-08 ASSESSMENT — PAIN SCALES - GENERAL: PAINLEVEL: 0-NO PAIN

## 2021-04-08 NOTE — PATIENT INSTRUCTIONS
Eye Doctor Referral  [facility]  [address] [place] [address].   [place] [phone].

## 2021-04-08 NOTE — PROGRESS NOTES
Outpatient Progress Note    SUBJECTIVE:   ID/CC: Del Mckeon is a 60 y.o. male presenting to clinic today for   Chief Complaint   Patient presents with   • Annual Exam   • Nail Problem     cut skin on great toe when trying to trim his nail, also nail is at angle       HPI:   Del is a 60-year-old male with a past medical history of stroke, obstructive sleep apnea, hyperlipidemia, hypertension, type 2 diabetes and adjustment disorder with depressed mood who is here today for his annual physical.  I saw him just 2 months ago for his chronic care appointment.    Regarding his diabetes, he has been taking his Metformin 1000 mg 2 times a day faithfully.  He did about a month of semaglutide 0.25 mg weekly.  However, when he did that in addition to changing his diet, he started having some lower blood sugars in the 60s.  He usually felt okay with these sugars but he stopped this somatically tied and continued with his diet and his blood sugars have remained between 90 and 120 both fasting and postprandial.  He has been doing really well with his diet and has actually lost about another 10 pounds.  We did recheck his A1c yesterday and it is down to 5.9% from 7.9%.      Tobacco: Denies, and never has   Alcohol: denies   IDU: denies     Preventive Review:   Last colonoscopy: 2004, normal, would like to do cologuard    Vaccines:  Most Recent Immunizations   Administered Date(s) Administered   • Fluarix/Flulaval/Fluzone Quad 01/20/2016   • Pneumococcal Polysaccharide - PPSV23 01/06/2021   • Shingrix IM 01/06/2021   • Tdap 02/17/2021   Does not want to get covid currently     Last eye doc appt: needs to go   Last Dentist appt: goes about once year    Hep C screening ab: non reactive in 11/17.   Lipid, A1c, CBC, CMP: up to date.       Review of Systems  12 point ROS reviewed and negative except as in HPI.     Past Medical History:   Diagnosis Date   • Anemia    • Asthma    • Depressive disorder    • Heart murmur    •  Hyperlipidemia    • Hypertension    • Obesity    • Obstructive sleep apnea    • Type 2 diabetes mellitus (CMS/HCC) (HCC)        Past Surgical History:   Procedure Laterality Date   • CRYOTHERAPY      dermatology         Current Outpatient Medications:   •  semaglutide 1 mg/dose (2 mg/1.5 mL) pen injector, Inject 0.25 mg under the skin every 7 (seven) days, Disp: 1.5 mL, Rfl: 1  •  metFORMIN (GLUCOPHAGE) 500 mg tablet, Take 2 tablets (1,000 mg total) by mouth 2 (two) times a day with meals, Disp: 120 tablet, Rfl: 11  •  ibuprofen (AdviL) 200 mg tablet, Take 400 mg by mouth every 6 (six) hours as needed for pain scale 1-3/10 or pain scale 8-10/10., Disp: , Rfl:   •  amLODIPine (NORVASC) 10 mg tablet, Take 1 tablet (10 mg total) by mouth nightly, Disp: 90 tablet, Rfl: 3  •  clopidogreL (PLAVIX) 75 mg tablet, Take 1 tablet (75 mg total) by mouth daily, Disp: 90 tablet, Rfl: 3  •  losartan (COZAAR) 100 mg tablet, Take 1 tablet (100 mg total) by mouth daily, Disp: 90 tablet, Rfl: 3  •  rosuvastatin (CRESTOR) 40 mg tablet, Take 1 tablet (40 mg total) by mouth nightly, Disp: 90 tablet, Rfl: 3  •  sertraline (ZOLOFT) 50 mg tablet, Take 1 tablet (50 mg total) by mouth daily, Disp: 90 tablet, Rfl: 3  •  acetaminophen (Tylenol 8 Hour) 650 mg 8 hr tablet, Take 650 mg by mouth every 8 (eight) hours as needed for pain scale 1-3/10., Disp: , Rfl:   •  blood sugar diagnostic (glucose blood) strip, Use to check blood sugar levels up to three times daily., Disp: 200 strip, Rfl: 1  •  cholecalciferol, vitamin D3, (Vitamin D3) 25 mcg (1,000 unit) capsule, Take 1 capsule (1,000 Units total) by mouth daily, Disp: 30 capsule, Rfl: 3  •  multivitamin with minerals tablet, Take 1 tablet by mouth daily, Disp: , Rfl:   •  melatonin 5 mg capsule, Take 5 mg by mouth nightly as needed (insomnia) , Disp: , Rfl:   •  diphenhydramine-acetaminophen (TYLENOL PM EXTRA STRENGTH)  mg tablet, Take by mouth nightly as needed  , Disp: , Rfl:      Allergies   Allergen Reactions   • Penicillins      Tested as a child; showed positive       Social History     Socioeconomic History   • Marital status:      Spouse name: Not on file   • Number of children: Not on file   • Years of education: Not on file   • Highest education level: Not on file   Occupational History   • Not on file   Tobacco Use   • Smoking status: Never Smoker   • Smokeless tobacco: Never Used   Substance and Sexual Activity   • Alcohol use: No   • Drug use: Not Currently   • Sexual activity: Not Currently   Other Topics Concern   • Not on file   Social History Narrative   • Not on file     Social Determinants of Health     Financial Resource Strain:    • Difficulty of Paying Living Expenses:    Food Insecurity:    • Worried About Running Out of Food in the Last Year:    • Ran Out of Food in the Last Year:    Transportation Needs:    • Lack of Transportation (Medical):    • Lack of Transportation (Non-Medical):    Physical Activity:    • Days of Exercise per Week:    • Minutes of Exercise per Session:    Stress:    • Feeling of Stress :    Social Connections:    • Frequency of Communication with Friends and Family:    • Frequency of Social Gatherings with Friends and Family:    • Attends Rastafari Services:    • Active Member of Clubs or Organizations:    • Attends Club or Organization Meetings:    • Marital Status:    Intimate Partner Violence:    • Fear of Current or Ex-Partner:    • Emotionally Abused:    • Physically Abused:    • Sexually Abused:        Family History   Problem Relation Age of Onset   • Alzheimer's disease Father    • Diabetes type II Father    • Diabetes Sister        OBJECTIVE:   Vitals:   Vitals:    04/08/21 1126   BP: 126/64   Pulse: 76   Resp: 16   Temp: 36.2 °C (97.1 °F)   SpO2: 97%     Weights (last 180 days)     Date/Time   Weight    04/08/21 1126   93.5 kg (206 lb 3.2 oz)              Physical Exam:   General: Alert and oriented. In no acute distress.    Head:normocephalic and atraumatic. No tenderness palpated  Eyes:Pupils equal equal and reactive, no corneal or scleral injection.  ENT: TMs visible without bulging or erythema. No nasal lesions. No oral lesions.   Cardio: S1 S2 without extra sounds or murmurs.  No edema. PP intact. Cap refill <2s.  No JVD  Pulm: CTAB without wheezing or crackles. Symmetrical air exchange.   Gi: BS present x4. Soft, nontender and non distended.  No masses palpated.  Ext: Full range of motion of all extremities.  No joint tenderness or erythema or swelling noted.  Diabetic foot exam:   Left: Pulses Dorsalis Pedis:  present   Reflexes 1+    Vibratory sensation normal   Proprioception normal   Sharp/dull discrimination normal   Filament test present    Right: Pulses Dorsalis Pedis:  present   Reflexes 1+    Vibratory sensation normal   Proprioception normal   Sharp/dull discrimination normal   Filament test present    Neuro: Motor and sensation intact bilaterally. No focal lesions.  Cranial nerves II through XII are intact and symmetrical.  Deep tendon reflexes 2 throughout.  Skin: No rashes or lesions noted.  Psych: Denies SI/HI/AH/VH      Labs/Imaging/Micro:  Previous labs reviewed     ASSESSMENT AND PLAN:   Del Mckeon is a 60 y.o. male presenting to clinic today for:     Well adult health check   Regarding his health maintenance, he is due for his colonoscopy but since he does not have anybody to drive him nor to help him with the prep, plus he does not have any immediate family history of colon cancer or inflammatory bowel disease, we did discuss the Cologuard and he is going to try this.  Vaccinations are up-to-date and he declines for now the Covid vaccine.  His labs are also up-to-date.  He is currently doing well eating a healthy diet and limiting his processed sugars.  He is also starting to get some more exercise as well.  The remainder of his health maintenance is up-to-date.     Type 2 diabetes mellitus without  complication, with long-term current use of insulin (CMS/HCC) ([facility])  A1c has improved.  It is at 5.9% now.  Continue Metformin 1000 mg 2 times a day.  For now hold semaglutide 0.25 mg weekly.  If blood sugars start to increase again, we can restart this medication.  Continue with the good dietary changes he has made.  Encouraged checking blood sugars once daily as a mixture of fasting and 2-hour postprandial.    She does have some hypertrophic toenails as well as irregular borders along them and due to this, I do want him to see podiatry for diabetic foot care and we have placed a referral to Dr. Jensen with the foot clinic.  His toe does not look infected at all and is doing well.    He is also due for his diabetic eye exam.  He does not want to return to Dr. Collins's and Thom's office as he had a bad experience there before.  We agreed to start with optometry and if needed they will refer him to ophthalmology.  I gave him the phone number of Dr. Brissa Blount and Anand.     Diagnosis Plan   1. Hypertrophic toenail  Ambulatory referral to Podiatry   2. Type 2 diabetes mellitus without complication, with long-term current use of insulin (CMS/[facility]) ([facility])  Ambulatory referral to Podiatry   3. Colon cancer screening  Cologuard Cancer Screen Stool       Return in about 4 months (around 8/8/2021) for Chronic care follow up - 40 min.    Klarissa Lester MD  04/08/21  12:04 PM

## 2021-04-08 NOTE — ASSESSMENT & PLAN NOTE
A1c has improved.  It is at 5.9% now.  Continue Metformin 1000 mg 2 times a day.  For now hold semaglutide 0.25 mg weekly.  If blood sugars start to increase again, we can restart this medication.  Continue with the good dietary changes he has made.  Encouraged checking blood sugars once daily as a mixture of fasting and 2-hour postprandial.    She does have some hypertrophic toenails as well as irregular borders along them and due to this, I do want him to see podiatry for diabetic foot care and we have placed a referral to Dr. Jensen with the foot clinic.  His toe does not look infected at all and is doing well.    He is also due for his diabetic eye exam.  He does not want to return to Dr. Collins's and Thom's office as he had a bad experience there before.  We agreed to start with optometry and if needed they will refer him to ophthalmology.  I gave him the phone number of Dr. Brissa Blount and Anand.

## 2021-04-18 DIAGNOSIS — F43.21 ADJUSTMENT DISORDER WITH DEPRESSED MOOD: ICD-10-CM

## 2021-04-18 NOTE — TELEPHONE ENCOUNTER
Care Due:                  Date            Visit Type   Department     Provider  --------------------------------------------------------------------------------                                           [facility]  Last Visit: 04-      PHYSICAL     MEDICINE       SHERWIN LOPES                              ESTABLISHED   [facility]  Next Visit: 08-      PATIENT      MEDICINE       SHERWIN LOPES                                                            Last  Test          Frequency    Reason                     Performed    Due Date  --------------------------------------------------------------------------------  ALT.........  6 months...  clopidogreL..............  06- 11-    Powered by [employer]. Reference number: [id number]. 4/18/2021 3:41:54 PM NIRMALA

## 2021-04-20 DIAGNOSIS — F43.21 ADJUSTMENT DISORDER WITH DEPRESSED MOOD: ICD-10-CM

## 2021-04-20 RX ORDER — SERTRALINE HYDROCHLORIDE 50 MG/1
50 TABLET, FILM COATED ORAL DAILY
Status: CANCELLED | OUTPATIENT
Start: 2021-04-20 | End: 2021-05-20

## 2021-04-20 RX ORDER — SERTRALINE HYDROCHLORIDE 50 MG/1
TABLET, FILM COATED ORAL
Qty: 90 TABLET | Refills: 0 | OUTPATIENT
Start: 2021-04-20

## 2021-04-20 NOTE — TELEPHONE ENCOUNTER
No new care gaps identified.  Powered by [employer]. Reference number: [id number]. 4/20/2021 10:41:30 AM NIRMALA

## 2021-04-20 NOTE — TELEPHONE ENCOUNTER
Call to patient, he is quite confused about his medications. He did not recall getting this Rx on 4/12/2021.  He is going to look through his pills to see if he has them. No further action is needed at this time.

## 2021-04-23 ENCOUNTER — HOSPITAL ENCOUNTER (OUTPATIENT)
Dept: ULTRASOUND IMAGING | Facility: HOSPITAL | Age: 61
End: 2021-04-23
Payer: COMMERCIAL

## 2021-04-23 ENCOUNTER — OFFICE VISIT (OUTPATIENT)
Dept: FAMILY MEDICINE | Facility: CLINIC | Age: 61
End: 2021-04-23
Payer: COMMERCIAL

## 2021-04-23 ENCOUNTER — HOSPITAL ENCOUNTER (OUTPATIENT)
Dept: ULTRASOUND IMAGING | Facility: HOSPITAL | Age: 61
Discharge: HOME | End: 2021-04-23
Payer: COMMERCIAL

## 2021-04-23 ENCOUNTER — APPOINTMENT (OUTPATIENT)
Dept: LAB | Facility: CLINIC | Age: 61
End: 2021-04-23
Payer: COMMERCIAL

## 2021-04-23 ENCOUNTER — TELEPHONE (OUTPATIENT)
Dept: FAMILY MEDICINE | Facility: CLINIC | Age: 61
End: 2021-04-23

## 2021-04-23 VITALS
TEMPERATURE: 97.2 F | OXYGEN SATURATION: 99 % | WEIGHT: 204.2 LBS | RESPIRATION RATE: 16 BRPM | BODY MASS INDEX: 28.48 KG/M2 | SYSTOLIC BLOOD PRESSURE: 102 MMHG | DIASTOLIC BLOOD PRESSURE: 70 MMHG | HEART RATE: 82 BPM

## 2021-04-23 DIAGNOSIS — R10.31 RIGHT LOWER QUADRANT ABDOMINAL PAIN: ICD-10-CM

## 2021-04-23 DIAGNOSIS — R10.31 RIGHT LOWER QUADRANT ABDOMINAL PAIN: Primary | ICD-10-CM

## 2021-04-23 LAB
BACTERIA #/AREA URNS HPF: NORMAL /HPF
BASOPHILS # BLD AUTO: 0.1 10*3/UL
BASOPHILS NFR BLD AUTO: 1 % (ref 0–2)
BILIRUB UR QL: ABNORMAL
CLARITY UR: CLEAR
COLOR UR: YELLOW
EOSINOPHIL # BLD AUTO: 0.2 10*3/UL
EOSINOPHIL NFR BLD AUTO: 3 % (ref 0–3)
ERYTHROCYTE [DISTWIDTH] IN BLOOD BY AUTOMATED COUNT: 15.3 % (ref 11.5–15)
GLUCOSE UR QL: NEGATIVE MG/DL
HCT VFR BLD AUTO: 36.9 % (ref 38–50)
HGB BLD-MCNC: 12.4 G/DL (ref 13.2–17.2)
HGB UR QL: NEGATIVE
KETONES UR-MCNC: 15 MG/DL
LEUKOCYTE ESTERASE UR QL STRIP: NEGATIVE
LYMPHOCYTES # BLD AUTO: 2.5 10*3/UL
LYMPHOCYTES NFR BLD AUTO: 31 % (ref 15–47)
MCH RBC QN AUTO: 31.7 PG (ref 29–34)
MCHC RBC AUTO-ENTMCNC: 33.7 G/DL (ref 32–36)
MCV RBC AUTO: 93.8 FL (ref 82–97)
MONOCYTES # BLD AUTO: 0.5 10*3/UL
MONOCYTES NFR BLD AUTO: 7 % (ref 5–13)
NEUTROPHILS # BLD AUTO: 4.8 10*3/UL
NEUTROPHILS NFR BLD AUTO: 59 % (ref 46–70)
NITRITE UR QL: NEGATIVE
OTHER CRYSTALS (#/HPF) IN URINE: NORMAL /HPF
PH UR: 5 PH
PLATELET # BLD AUTO: 189 10*3/UL (ref 130–350)
PMV BLD AUTO: 7.9 FL (ref 6.9–10.8)
PROT UR STRIP-MCNC: 100 MG/DL
RBC # BLD AUTO: 3.93 10*6/ΜL (ref 4.1–5.8)
RBC #/AREA URNS HPF: NORMAL /HPF
SP GR UR: >=1.03 (ref 1–1.03)
SQUAMOUS #/AREA URNS HPF: NORMAL /HPF
UROBILINOGEN UR-MCNC: 0.2 E.U./DL
WBC # BLD AUTO: 8.1 10*3/UL (ref 3.7–9.6)
WBC #/AREA URNS HPF: NORMAL /HPF

## 2021-04-23 PROCEDURE — 36415 COLL VENOUS BLD VENIPUNCTURE: CPT | Performed by: NURSE PRACTITIONER

## 2021-04-23 PROCEDURE — 85025 COMPLETE CBC W/AUTO DIFF WBC: CPT | Performed by: NURSE PRACTITIONER

## 2021-04-23 PROCEDURE — 99213 OFFICE O/P EST LOW 20 MIN: CPT | Performed by: NURSE PRACTITIONER

## 2021-04-23 PROCEDURE — 81001 URINALYSIS AUTO W/SCOPE: CPT | Performed by: NURSE PRACTITIONER

## 2021-04-23 PROCEDURE — 76705 ECHO EXAM OF ABDOMEN: CPT

## 2021-04-23 ASSESSMENT — PAIN SCALES - GENERAL: PAINLEVEL: 3
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
## 2021-04-29 PROBLEM — R10.31 RIGHT LOWER QUADRANT ABDOMINAL PAIN: Status: ACTIVE | Noted: 2021-04-29

## 2021-04-29 ASSESSMENT — ENCOUNTER SYMPTOMS
RESPIRATORY NEGATIVE: 1
ALLERGIC/IMMUNOLOGIC NEGATIVE: 1
PSYCHIATRIC NEGATIVE: 1
EYES NEGATIVE: 1
MUSCULOSKELETAL NEGATIVE: 1
CONSTITUTIONAL NEGATIVE: 1
HEMATOLOGIC/LYMPHATIC NEGATIVE: 1
CARDIOVASCULAR NEGATIVE: 1
ENDOCRINE NEGATIVE: 1
ABDOMINAL PAIN: 1
NEUROLOGICAL NEGATIVE: 1

## 2021-06-16 ENCOUNTER — APPOINTMENT (OUTPATIENT)
Dept: CT IMAGING | Facility: HOSPITAL | Age: 61
End: 2021-06-16
Payer: COMMERCIAL

## 2021-06-16 ENCOUNTER — ANCILLARY PROCEDURE (OUTPATIENT)
Dept: RADIOLOGY | Facility: CLINIC | Age: 61
End: 2021-06-16
Payer: COMMERCIAL

## 2021-06-16 ENCOUNTER — LAB (OUTPATIENT)
Dept: LAB | Facility: CLINIC | Age: 61
End: 2021-06-16
Payer: COMMERCIAL

## 2021-06-16 ENCOUNTER — OFFICE VISIT (OUTPATIENT)
Dept: FAMILY MEDICINE | Facility: CLINIC | Age: 61
End: 2021-06-16
Payer: COMMERCIAL

## 2021-06-16 ENCOUNTER — HOSPITAL ENCOUNTER (EMERGENCY)
Facility: HOSPITAL | Age: 61
Discharge: HOME | End: 2021-06-16
Attending: EMERGENCY MEDICINE
Payer: COMMERCIAL

## 2021-06-16 VITALS
HEART RATE: 67 BPM | BODY MASS INDEX: 26.33 KG/M2 | HEIGHT: 71 IN | OXYGEN SATURATION: 98 % | SYSTOLIC BLOOD PRESSURE: 163 MMHG | WEIGHT: 188.05 LBS | TEMPERATURE: 99 F | DIASTOLIC BLOOD PRESSURE: 77 MMHG | RESPIRATION RATE: 18 BRPM

## 2021-06-16 VITALS
HEIGHT: 71 IN | BODY MASS INDEX: 26.21 KG/M2 | DIASTOLIC BLOOD PRESSURE: 78 MMHG | RESPIRATION RATE: 16 BRPM | OXYGEN SATURATION: 97 % | SYSTOLIC BLOOD PRESSURE: 110 MMHG | HEART RATE: 84 BPM | WEIGHT: 187.2 LBS | TEMPERATURE: 98.1 F

## 2021-06-16 DIAGNOSIS — E83.42 HYPOMAGNESEMIA: ICD-10-CM

## 2021-06-16 DIAGNOSIS — R10.31 RIGHT LOWER QUADRANT ABDOMINAL PAIN: ICD-10-CM

## 2021-06-16 DIAGNOSIS — M54.50 ACUTE LEFT-SIDED LOW BACK PAIN WITHOUT SCIATICA: ICD-10-CM

## 2021-06-16 DIAGNOSIS — N20.0 KIDNEY STONE: Primary | ICD-10-CM

## 2021-06-16 DIAGNOSIS — N28.9 RENAL INSUFFICIENCY: ICD-10-CM

## 2021-06-16 DIAGNOSIS — R11.2 NAUSEA AND VOMITING, INTRACTABILITY OF VOMITING NOT SPECIFIED, UNSPECIFIED VOMITING TYPE: Primary | ICD-10-CM

## 2021-06-16 DIAGNOSIS — N17.9 ACUTE KIDNEY INJURY (CMS/HCC): ICD-10-CM

## 2021-06-16 DIAGNOSIS — E86.0 DEHYDRATION: ICD-10-CM

## 2021-06-16 DIAGNOSIS — R11.2 NAUSEA AND VOMITING, INTRACTABILITY OF VOMITING NOT SPECIFIED, UNSPECIFIED VOMITING TYPE: ICD-10-CM

## 2021-06-16 DIAGNOSIS — R19.7 DIARRHEA, UNSPECIFIED TYPE: ICD-10-CM

## 2021-06-16 LAB
ALBUMIN SERPL-MCNC: 4.3 G/DL (ref 3.5–5.3)
ALP SERPL-CCNC: 63 U/L (ref 45–115)
ALT SERPL-CCNC: 9 U/L (ref 7–52)
ANION GAP SERPL CALC-SCNC: 14 MMOL/L (ref 3–11)
ANION GAP SERPL CALC-SCNC: 15 MMOL/L (ref 3–11)
AST SERPL-CCNC: 12 U/L
BACTERIA #/AREA URNS AUTO: ABNORMAL /HPF
BASOPHILS # BLD AUTO: 0 10*3/UL
BASOPHILS NFR BLD AUTO: 0 % (ref 0–2)
BILIRUB SERPL-MCNC: 0.68 MG/DL (ref 0.2–1.4)
BILIRUB UR QL STRIP.AUTO: NEGATIVE
BUN SERPL-MCNC: 26 MG/DL (ref 7–25)
BUN SERPL-MCNC: 28 MG/DL (ref 7–25)
CALCIUM ALBUM COR SERPL-MCNC: 9 MG/DL (ref 8.6–10.3)
CALCIUM SERPL-MCNC: 9.2 MG/DL (ref 8.6–10.3)
CALCIUM SERPL-MCNC: 9.3 MG/DL (ref 8.6–10.3)
CHLORIDE SERPL-SCNC: 100 MMOL/L (ref 98–107)
CHLORIDE SERPL-SCNC: 100 MMOL/L (ref 98–107)
CLARITY UR: CLEAR
CO2 SERPL-SCNC: 19 MMOL/L (ref 21–32)
CO2 SERPL-SCNC: 20 MMOL/L (ref 21–32)
COLOR UR: YELLOW
CREAT BLD-MCNC: 1.8 MG/DL (ref 0.7–1.3)
CREAT SERPL-MCNC: 1.58 MG/DL (ref 0.7–1.3)
CREAT SERPL-MCNC: 1.66 MG/DL (ref 0.7–1.3)
EOSINOPHIL # BLD AUTO: 0 10*3/UL
EOSINOPHIL NFR BLD AUTO: 0 % (ref 0–3)
ERYTHROCYTE [DISTWIDTH] IN BLOOD BY AUTOMATED COUNT: 14.6 % (ref 11.5–15)
GFR SERPL CREATININE-BSD FRML MDRD: 44 ML/MIN/1.73M*2
GFR SERPL CREATININE-BSD FRML MDRD: 47 ML/MIN/1.73M*2
GLUCOSE SERPL-MCNC: 178 MG/DL (ref 70–105)
GLUCOSE SERPL-MCNC: 183 MG/DL (ref 70–105)
GLUCOSE UR STRIP.AUTO-MCNC: 50 MG/DL
HCT VFR BLD AUTO: 36.9 % (ref 38–50)
HGB BLD-MCNC: 12.5 G/DL (ref 13.2–17.2)
HGB UR QL STRIP.AUTO: ABNORMAL
KETONES UR STRIP.AUTO-MCNC: 80 MG/DL
LEUKOCYTE ESTERASE UR QL STRIP: NEGATIVE
LIPASE SERPL-CCNC: 80 U/L (ref 11–82)
LYMPHOCYTES # BLD AUTO: 1.7 10*3/UL
LYMPHOCYTES NFR BLD AUTO: 13 % (ref 15–47)
MAGNESIUM SERPL-MCNC: 1.7 MG/DL (ref 1.8–2.4)
MCH RBC QN AUTO: 32.2 PG (ref 29–34)
MCHC RBC AUTO-ENTMCNC: 33.8 G/DL (ref 32–36)
MCV RBC AUTO: 95.5 FL (ref 82–97)
MONOCYTES # BLD AUTO: 1.2 10*3/UL
MONOCYTES NFR BLD AUTO: 9 % (ref 5–13)
NEUTROPHILS # BLD AUTO: 10.3 10*3/UL
NEUTROPHILS NFR BLD AUTO: 78 % (ref 46–70)
NITRITE UR QL STRIP.AUTO: NEGATIVE
PH UR STRIP.AUTO: 5 PH
PLATELET # BLD AUTO: 225 10*3/UL (ref 130–350)
PMV BLD AUTO: 8 FL (ref 6.9–10.8)
POCT EGFR, VENOUS (CALCULATED): 38 ML/M/1.73M*2
POTASSIUM SERPL-SCNC: 3.7 MMOL/L (ref 3.5–5.1)
POTASSIUM SERPL-SCNC: 3.9 MMOL/L (ref 3.5–5.1)
PROT SERPL-MCNC: 7.3 G/DL (ref 6–8.3)
PROT UR STRIP.AUTO-MCNC: >=500 MG/DL
RBC # BLD AUTO: 3.87 10*6/ΜL (ref 4.1–5.8)
RBC #/AREA URNS AUTO: >100 /HPF
SODIUM SERPL-SCNC: 133 MMOL/L (ref 135–145)
SODIUM SERPL-SCNC: 135 MMOL/L (ref 135–145)
SP GR UR STRIP.AUTO: 1.02 (ref 1–1.03)
SQUAMOUS #/AREA URNS AUTO: ABNORMAL /HPF
UROBILINOGEN UR STRIP.AUTO-MCNC: 2 E.U./DL
WBC # BLD AUTO: 13.3 10*3/UL (ref 3.7–9.6)
WBC #/AREA URNS AUTO: ABNORMAL /HPF

## 2021-06-16 PROCEDURE — 99284 EMERGENCY DEPT VISIT MOD MDM: CPT | Performed by: EMERGENCY MEDICINE

## 2021-06-16 PROCEDURE — 96365 THER/PROPH/DIAG IV INF INIT: CPT

## 2021-06-16 PROCEDURE — 6360000200 HC RX 636 W HCPCS (ALT 250 FOR IP): Performed by: EMERGENCY MEDICINE

## 2021-06-16 PROCEDURE — 74176 CT ABD & PELVIS W/O CONTRAST: CPT | Mod: ME

## 2021-06-16 PROCEDURE — 96361 HYDRATE IV INFUSION ADD-ON: CPT

## 2021-06-16 PROCEDURE — 80053 COMPREHEN METABOLIC PANEL: CPT | Performed by: FAMILY MEDICINE

## 2021-06-16 PROCEDURE — 82565 ASSAY OF CREATININE: CPT | Performed by: NURSE PRACTITIONER

## 2021-06-16 PROCEDURE — 83735 ASSAY OF MAGNESIUM: CPT | Performed by: NURSE PRACTITIONER

## 2021-06-16 PROCEDURE — 74019 RADEX ABDOMEN 2 VIEWS: CPT | Mod: TC | Performed by: FAMILY MEDICINE

## 2021-06-16 PROCEDURE — 99214 OFFICE O/P EST MOD 30 MIN: CPT | Performed by: FAMILY MEDICINE

## 2021-06-16 PROCEDURE — 81001 URINALYSIS AUTO W/SCOPE: CPT | Performed by: NURSE PRACTITIONER

## 2021-06-16 PROCEDURE — 2580000300 HC RX 258: Performed by: EMERGENCY MEDICINE

## 2021-06-16 PROCEDURE — 80048 BASIC METABOLIC PNL TOTAL CA: CPT | Mod: NCB | Performed by: EMERGENCY MEDICINE

## 2021-06-16 PROCEDURE — 2580000300 HC RX 258: Performed by: NURSE PRACTITIONER

## 2021-06-16 PROCEDURE — 85025 COMPLETE CBC W/AUTO DIFF WBC: CPT | Performed by: FAMILY MEDICINE

## 2021-06-16 PROCEDURE — 36415 COLL VENOUS BLD VENIPUNCTURE: CPT | Performed by: FAMILY MEDICINE

## 2021-06-16 PROCEDURE — 83690 ASSAY OF LIPASE: CPT | Performed by: FAMILY MEDICINE

## 2021-06-16 RX ORDER — HYDROCODONE BITARTRATE AND ACETAMINOPHEN 5; 325 MG/1; MG/1
1 TABLET ORAL ONCE
Status: COMPLETED | OUTPATIENT
Start: 2021-06-16 | End: 2021-06-16

## 2021-06-16 RX ORDER — SODIUM CHLORIDE 9 MG/ML
1000 INJECTION, SOLUTION INTRAVENOUS ONCE
Status: COMPLETED | OUTPATIENT
Start: 2021-06-16 | End: 2021-06-16

## 2021-06-16 RX ORDER — ONDANSETRON HCL 4 MG
1 TABLET ORAL ONCE
Status: COMPLETED | OUTPATIENT
Start: 2021-06-16 | End: 2021-06-16

## 2021-06-16 RX ORDER — ONDANSETRON 4 MG/1
4 TABLET, ORALLY DISINTEGRATING ORAL EVERY 8 HOURS PRN
Qty: 20 TABLET | Refills: 0 | Status: SHIPPED | OUTPATIENT
Start: 2021-06-16 | End: 2021-06-23

## 2021-06-16 RX ORDER — TAMSULOSIN HYDROCHLORIDE 0.4 MG/1
0.4 CAPSULE ORAL DAILY
Qty: 14 CAPSULE | Refills: 0 | Status: SHIPPED | OUTPATIENT
Start: 2021-06-16 | End: 2021-06-30

## 2021-06-16 RX ORDER — ONDANSETRON 4 MG/1
4 TABLET, ORALLY DISINTEGRATING ORAL ONCE
Status: COMPLETED | OUTPATIENT
Start: 2021-06-16 | End: 2021-06-16

## 2021-06-16 RX ADMIN — HYDROCODONE BITARTRATE AND ACETAMINOPHEN 1 PACKAGE: 5; 325 TABLET ORAL at 22:15

## 2021-06-16 RX ADMIN — MAGNESIUM SULFATE HEPTAHYDRATE 1 G: 500 INJECTION, SOLUTION INTRAMUSCULAR; INTRAVENOUS at 19:50

## 2021-06-16 RX ADMIN — SODIUM CHLORIDE 1000 ML: 9 INJECTION, SOLUTION INTRAVENOUS at 18:40

## 2021-06-16 RX ADMIN — ONDANSETRON 4 MG: 4 TABLET, ORALLY DISINTEGRATING ORAL at 13:48

## 2021-06-16 RX ADMIN — Medication 1 PACKAGE: at 22:15

## 2021-06-16 ASSESSMENT — ENCOUNTER SYMPTOMS
BLOOD IN STOOL: 0
CHILLS: 0
FREQUENCY: 0
DIARRHEA: 1
COUGH: 0
HEADACHES: 0
VOMITING: 1
PSYCHIATRIC NEGATIVE: 1
MUSCULOSKELETAL NEGATIVE: 1
DIFFICULTY URINATING: 0
ABDOMINAL PAIN: 0
DIARRHEA: 1
HEMATOLOGIC/LYMPHATIC NEGATIVE: 1
BACK PAIN: 1
NAUSEA: 1
SHORTNESS OF BREATH: 0
SORE THROAT: 0
DYSURIA: 0
VOMITING: 1
FEVER: 0
LIGHT-HEADEDNESS: 1
DIZZINESS: 0

## 2021-06-16 ASSESSMENT — PAIN SCALES - GENERAL: PAINLEVEL: 0-NO PAIN

## 2021-06-16 NOTE — PROGRESS NOTES
Your kidneys are showing significant stress from your dehydration.  This is significant enough that you do need IV fluids today.  I am sorry but I recommend that you proceed to the emergency department.  X-ray of the abdomen showed no obstruction.

## 2021-06-16 NOTE — PROGRESS NOTES
Subjective      Del Mckeon is a 60 y.o. male who presents for diarrhea, vomiting, and diarrhea.    HPI     He reports that he has been experiencing vomiting, diarrhea and back pain since Sunday.  After he eats he experiencing diarrhea and the vomiting seems to be random.  He states that his bowel movements are watery and yesterday he had five bowel movements and he vomited once.  He has not had any diarrhea today but he states that he has not eaten today.  He denies blood in his stool, fever/chills abdominal pain, or cramping.  He does have intermittent light headedness.  He denies any sick family members or close friends that are having the same symptoms. He denies traveling out of the [place] recently. He has not been on antibiotics in the last six months.  He has not drank any surface water or drinking from streams or rivers.  He reports that he has been on Metformin for the last ten years.  He is also complaining of lower back pain that seems to radiate to different locations.  One day he had lower left back pain, one day he had left hip pain, and he has been experiencing left groin pain.  He has been taking Tylenol for his pain and reports that this seems to help resolve the pain.      The following have been reviewed and updated as appropriate in this visit:         Allergies   Allergen Reactions   • Penicillins      Tested as a child; showed positive     Current Outpatient Medications   Medication Sig Dispense Refill   • metFORMIN (GLUCOPHAGE) 500 mg tablet Take 2 tablets (1,000 mg total) by mouth 2 (two) times a day with meals 120 tablet 11   • amLODIPine (NORVASC) 10 mg tablet Take 1 tablet (10 mg total) by mouth nightly 90 tablet 3   • clopidogreL (PLAVIX) 75 mg tablet Take 1 tablet (75 mg total) by mouth daily 90 tablet 3   • losartan (COZAAR) 100 mg tablet Take 1 tablet (100 mg total) by mouth daily 90 tablet 3   • rosuvastatin (CRESTOR) 40 mg tablet Take 1 tablet (40 mg total) by mouth  nightly 90 tablet 3   • acetaminophen (Tylenol 8 Hour) 650 mg 8 hr tablet Take 650 mg by mouth every 8 (eight) hours as needed for pain scale 1-3/10.     • cholecalciferol, vitamin D3, (Vitamin D3) 25 mcg (1,000 unit) capsule Take 1 capsule (1,000 Units total) by mouth daily 30 capsule 3   • multivitamin with minerals tablet Take 1 tablet by mouth daily     • melatonin 5 mg capsule Take 5 mg by mouth nightly as needed (insomnia)      • diphenhydramine-acetaminophen (TYLENOL PM EXTRA STRENGTH)  mg tablet Take by mouth nightly as needed       • ondansetron ODT (ZOFRAN-ODT) 4 mg disintegrating tablet Take 1 tablet (4 mg total) by mouth every 8 (eight) hours as needed for nausea or vomiting for up to 7 days 20 tablet 0   • semaglutide 1 mg/dose (2 mg/1.5 mL) pen injector Inject 0.25 mg under the skin every 7 (seven) days (Patient not taking: Reported on 4/23/2021 ) 1.5 mL 1   • ibuprofen (AdviL) 200 mg tablet Take 400 mg by mouth every 6 (six) hours as needed for pain scale 1-3/10 or pain scale 8-10/10.     • sertraline (ZOLOFT) 50 mg tablet Take 1 tablet (50 mg total) by mouth daily 90 tablet 3   • blood sugar diagnostic (glucose blood) strip Use to check blood sugar levels up to three times daily. 200 strip 1     No current facility-administered medications for this visit.     Past Medical History:   Diagnosis Date   • Anemia    • Asthma    • Depressive disorder    • Heart murmur    • Hyperlipidemia    • Hypertension    • Obesity    • Obstructive sleep apnea    • Type 2 diabetes mellitus (CMS/HCC) (HCC)      Past Surgical History:   Procedure Laterality Date   • BACK SURGERY     • CRYOTHERAPY      dermatology     Family History   Problem Relation Age of Onset   • Alzheimer's disease Father    • Diabetes type II Father    • Diabetes Sister      Social History     Occupational History   • Not on file   Tobacco Use   • Smoking status: Never Smoker   • Smokeless tobacco: Never Used   Substance and Sexual Activity  "  • Alcohol use: No   • Drug use: Not Currently   • Sexual activity: Not Currently   Social History Narrative   • Not on file       Review of Systems   Gastrointestinal: Positive for diarrhea and vomiting.   Musculoskeletal: Positive for back pain.   Neurological: Positive for light-headedness.   All other systems reviewed and are negative.      Objective   /78 (BP Location: Right arm, Patient Position: Sitting, Cuff Size: Large Long Adult)   Pulse 84   Temp 36.7 °C (98.1 °F) (Temporal)   Resp 16   Ht 1.803 m (5' 11\")   Wt 84.9 kg (187 lb 3.2 oz)   SpO2 97%   BMI 26.11 kg/m²     Physical Exam   General: Alert, NAD  HEENT: Normocephalic, atraumatic. MM dry. Lips are dry and skin turgor is poor. No enlarged lymph, no JVD.  CV: Grade 2 systolic murmur second right intercostal space  Abdomen: No CVA tenderness  Groin: No hernia, normal circumcised male  Psych: Mood is good    ASSESSMENT AND PLAN   Diagnoses and all orders for this visit:    Nausea and vomiting, intractability of vomiting not specified, unspecified vomiting type  -     CBC w/auto differential Blood, Venous; Future  -     Comprehensive metabolic panel Blood, Venous; Future  -     Lipase Blood, Venous; Future  -     X-ray abdomen 2 views AP with upright and or decubitus  -     ondansetron ODT (ZOFRAN-ODT) disintegrating tablet 4 mg  -     ondansetron ODT (ZOFRAN-ODT) 4 mg disintegrating tablet; Take 1 tablet (4 mg total) by mouth every 8 (eight) hours as needed for nausea or vomiting for up to 7 days    Diarrhea, unspecified type  -     CBC w/auto differential Blood, Venous; Future  -     Comprehensive metabolic panel Blood, Venous; Future  -     Lipase Blood, Venous; Future  -     X-ray abdomen 2 views AP with upright and or decubitus  -     ondansetron ODT (ZOFRAN-ODT) 4 mg disintegrating tablet; Take 1 tablet (4 mg total) by mouth every 8 (eight) hours as needed for nausea or vomiting for up to 7 days    Acute left-sided low back pain " without sciatica  -     Urinalysis, dipstick Urine, Clean Catch; Future  -     ondansetron ODT (ZOFRAN-ODT) 4 mg disintegrating tablet; Take 1 tablet (4 mg total) by mouth every 8 (eight) hours as needed for nausea or vomiting for up to 7 days    Acute kidney injury (CMS/HCC) ([facility])    Right lower quadrant abdominal pain        Problem List Items Addressed This Visit     Acute kidney injury (CMS/HCC) ([facility])     Sodium 135 - 145 mmol/L 135    Potassium 3.5 - 5.1 mmol/L 3.7    Chloride 98 - 107 mmol/L 100    CO2 21 - 32 mmol/L 20Low     Anion Gap 3 - 11 mmol/L 15High     BUN 7 - 25 mg/dL 26High     Creatinine 0.70 - 1.30 mg/dL 1.58High     Glucose 70 - 105 mg/dL 178High     Calcium 8.6 - 10.3 mg/dL 9.2    AST <40 U/L 12    ALT (SGPT) 7 - 52 U/L 9    Alkaline Phosphatase 45 - 115 U/L 63    Total Protein 6.0 - 8.3 g/dL 7.3    Albumin 3.5 - 5.3 g/dL 4.3    Total Bilirubin 0.20 - 1.40 mg/dL 0.68    eGFR >60 mL/min/1.73m*2 47Low     Corrected Calcium 8.6 - 10.3 mg/dL 9.0      Baseline creatinine 0.7 - .8 current creatinine 1.5.  Secondary to gastroenteritis.  Interestingly it looks like the patient has an anion gap acidosis which is unexpected.  Patient advised to present to the emergency department for further evaluation and treatment of acute kidney injury and acidosis         Acute left-sided low back pain without sciatica    Relevant Medications    ondansetron ODT (ZOFRAN-ODT) 4 mg disintegrating tablet    Other Relevant Orders    Urinalysis, dipstick Urine, Clean Catch    Diarrhea     Patient has been having watery stools since Sunday which increase in frequency depending on what he eats. Yesterday he reports five bowel movements but none today due to not eating.  Await results from lab work-up for further evaluation.         Relevant Medications    ondansetron ODT (ZOFRAN-ODT) 4 mg disintegrating tablet    Other Relevant Orders    CBC w/auto differential Blood, Venous (Completed)    Comprehensive metabolic panel Blood,  Venous (Completed)    Lipase Blood, Venous (Completed)    X-ray abdomen 2 views AP with upright and or decubitus (Completed)    Nausea and vomiting - Primary     Patient has had nausea for the past four days with intermittent vomiting. He was given 4 mg ondansetron ODT disintegrating tablet with no relief of nausea. Abdominal x-ray along with a CBC, CMP, Lipase, and UA dip will be obtained today.         Relevant Medications    ondansetron ODT (ZOFRAN-ODT) 4 mg disintegrating tablet    Other Relevant Orders    CBC w/auto differential Blood, Venous (Completed)    Comprehensive metabolic panel Blood, Venous (Completed)    Lipase Blood, Venous (Completed)    X-ray abdomen 2 views AP with upright and or decubitus (Completed)    Right lower quadrant abdominal pain     Patient reports abdominal pain that is intermittent and not in one specific location. We will obtain an x-ray of his abdomen for further evaluation, await results.           1.  Acute kidney injury: Baseline creatinine 0.7 - .8 current creatinine 1.5.  Secondary to gastroenteritis.  Interestingly it looks like the patient has an anion gap acidosis which is unexpected.  Patient advised to present to the emergency department for further evaluation and treatment of acute kidney injury and acidosis  2.  Gastroenteritis: Patient has nausea vomiting and diarrhea.  Abdominal exam benign.  White blood cell count does return elevated with neutrophilia however exam suggests gastroenteritis.  X-ray abdomen negative, patient was given oral Zofran and had improvement in her nausea.  Labs returned after patient left clinic patient was contacted to proceed to the emergency department.  3.  Dehydration: Secondary to #2      No follow-ups on file.     Kelley Hatfield RN    Portions of this note were documented by Kelley Hatfield RN as I performed the exam and collected the information from Del Mckeon. I attest that I have reviewed the information as documented, verified  the accuracy of the documentation, and added additional information as needed.       MANISH PHAM MD

## 2021-06-17 ENCOUNTER — TELEPHONE (OUTPATIENT)
Dept: FAMILY MEDICINE | Facility: CLINIC | Age: 61
End: 2021-06-17

## 2021-06-17 NOTE — MEDICATION HISTORY SPECIALIST NOTES
[facility] [id number]    Information sources:  EPIC  Complete Dispense Report      Patient home medications updated per resources. Multiple attempts made to contact patient and/or family were unsuccessful. Therefore, at this time I am unable to verify information provided by resources and in Epic, along with last doses and compliance of home medications. Medication history specialist will follow up with patient or family  tomorrow morning to continue to attempt to obtain this information. Pharmacist notified that epic is updated to the best of my ability at this time for this patient, however is subject to change after verification with patient and/or family.

## 2021-06-17 NOTE — TELEPHONE ENCOUNTER
Spoke with Del in regards to his questions about the medication. He was wanting to know what the tamsulosin was for. I advised it is a diuretic to help him urinate to pass the stones that are in his ureter. He also stated that the print on his Hydrocodone is so small that he can't read the directions. There are no directions in the chart on this so I advised 1 tablet as needed every 4 to 6 hours for pain. I also advised that this medication can be very sedating and should not be taken if he needs to drive anywhere. He was able to verbalize his understanding and all questions were answered prior to the end of the call.

## 2021-06-17 NOTE — ASSESSMENT & PLAN NOTE
Patient has been having watery stools since Sunday which increase in frequency depending on what he eats. Yesterday he reports five bowel movements but none today due to not eating.  Await results from lab work-up for further evaluation.

## 2021-06-17 NOTE — ASSESSMENT & PLAN NOTE
Sodium 135 - 145 mmol/L 135    Potassium 3.5 - 5.1 mmol/L 3.7    Chloride 98 - 107 mmol/L 100    CO2 21 - 32 mmol/L 20Low     Anion Gap 3 - 11 mmol/L 15High     BUN 7 - 25 mg/dL 26High     Creatinine 0.70 - 1.30 mg/dL 1.58High     Glucose 70 - 105 mg/dL 178High     Calcium 8.6 - 10.3 mg/dL 9.2    AST <40 U/L 12    ALT (SGPT) 7 - 52 U/L 9    Alkaline Phosphatase 45 - 115 U/L 63    Total Protein 6.0 - 8.3 g/dL 7.3    Albumin 3.5 - 5.3 g/dL 4.3    Total Bilirubin 0.20 - 1.40 mg/dL 0.68    eGFR >60 mL/min/1.73m*2 47Low     Corrected Calcium 8.6 - 10.3 mg/dL 9.0      Baseline creatinine 0.7 - .8 current creatinine 1.5.  Secondary to gastroenteritis.  Interestingly it looks like the patient has an anion gap acidosis which is unexpected.  Patient advised to present to the emergency department for further evaluation and treatment of acute kidney injury and acidosis

## 2021-06-17 NOTE — ED ATTESTATION NOTE
I performed a history and physical examination of Del Mckeon and discussed his management with ZACK Garcia  I agree with the history, physical, assessment, and plan of care.  60-year-old male sent from Dr. Cody's office for concerns for acute renal insufficiency.  Creatinine increased from 0.9-1.58.  CAT scan was performed which demonstrates 2 mm stone with minimal hydro at sacrum.  Patient was given a liter of fluid in the emergency department.  Patient will have rechecked BMP.  Patient's magnesium will be repleted due to level 1.7 from his vomiting and diarrhea.  Patient will be referred to urology for follow-up.    DO Huang Nunez DO  06/16/21 1941

## 2021-06-17 NOTE — ASSESSMENT & PLAN NOTE
Patient reports abdominal pain that is intermittent and not in one specific location. We will obtain an x-ray of his abdomen for further evaluation, await results.

## 2021-06-17 NOTE — ASSESSMENT & PLAN NOTE
Patient has had nausea for the past four days with intermittent vomiting. He was given 4 mg ondansetron ODT disintegrating tablet with no relief of nausea. Abdominal x-ray along with a CBC, CMP, Lipase, and UA dip will be obtained today.

## 2021-06-17 NOTE — ED PROVIDER NOTES
HPI:  Chief Complaint   Patient presents with   • Dehydration     Patient states he was sent by Dr. Cody's office for dehydration and acute kidney failure. He states he has had nausea and diarrhea since Sunday.        60-year-old male patient presents to the emergency room sent by his primary care provider due to concerns for dehydration.  Patient reports for the past 4 days he has been vomiting and having diarrhea.  He has had 4-5 loose stools yesterday and vomited once yesterday as well.  Patient denies any fever or known exposure to illness, no blood in vomit or stool.  He denies any pain.  Patient denies any dysuria.  He was seen at his primary care provider's office today and found that he had an acute kidney injury he was sent here for treatment of dehydration.          HISTORY:  Past Medical History:   Diagnosis Date   • Anemia    • Asthma    • Depressive disorder    • Heart murmur    • Hyperlipidemia    • Hypertension    • Obesity    • Obstructive sleep apnea    • Type 2 diabetes mellitus (CMS/HCC) ([facility])        Past Surgical History:   Procedure Laterality Date   • BACK SURGERY     • CRYOTHERAPY      dermatology       Family History   Problem Relation Age of Onset   • Alzheimer's disease Father    • Diabetes type II Father    • Diabetes Sister        Social History     Tobacco Use   • Smoking status: Never Smoker   • Smokeless tobacco: Never Used   Substance Use Topics   • Alcohol use: No   • Drug use: Not Currently         ROS:  Review of Systems   Constitutional: Negative for chills and fever.   HENT: Negative for ear pain, postnasal drip and sore throat.    Respiratory: Negative for cough and shortness of breath.    Cardiovascular: Negative for chest pain.   Gastrointestinal: Positive for diarrhea, nausea and vomiting. Negative for abdominal pain and blood in stool.   Genitourinary: Negative for difficulty urinating, dysuria and frequency.   Musculoskeletal: Negative.    Skin: Negative.     Neurological: Negative for dizziness and headaches.   Hematological: Negative.    Psychiatric/Behavioral: Negative.        PE:  ED Triage Vitals   Temp Heart Rate Resp BP SpO2   06/16/21 1632 06/16/21 1632 06/16/21 1632 06/16/21 1632 06/16/21 1632   37.1 °C (98.8 °F) 79 16 150/89 95 %      Temp Source Heart Rate Source Patient Position BP Location FiO2 (%)   06/16/21 1632 -- 06/16/21 1948 -- --   Oral  Sitting         Physical Exam  Constitutional:       Appearance: He is ill-appearing.   HENT:      Head: Normocephalic and atraumatic.      Mouth/Throat:      Mouth: Mucous membranes are dry.   Cardiovascular:      Rate and Rhythm: Normal rate and regular rhythm.      Heart sounds: Murmur (grade 3/6 systolic mumur best heard left sternal border) heard.     Pulmonary:      Effort: Pulmonary effort is normal.      Breath sounds: Normal breath sounds.   Abdominal:      General: Abdomen is flat. Bowel sounds are normal.      Palpations: Abdomen is soft.      Tenderness: There is no right CVA tenderness or left CVA tenderness.   Musculoskeletal:         General: Normal range of motion.   Skin:     General: Skin is warm and dry.   Neurological:      General: No focal deficit present.      Mental Status: He is alert and oriented to person, place, and time.   Psychiatric:         Mood and Affect: Mood normal.         Behavior: Behavior normal.         Thought Content: Thought content normal.         Judgment: Judgment normal.         ED LABS:  Labs Reviewed   URINALYSIS, MICROSCOPIC ONLY - Abnormal       Result Value    RBC, Urine >100 (*)     WBC, Urine 5-9 (*)     Squamous Epithelial, Urine 0-4      Bacteria, Urine None seen     URINALYSIS, DIPSTICK ONLY, FOR USE WITH MICROSCOPIC PANEL - Abnormal    Color, Urine Yellow      Clarity, Urine Clear      Specific Gravity, Urine 1.022      Leukocytes, Urine Negative      Nitrite, Urine Negative      Protein, Urine >=500 (*)     Ketones, Urine 80  (*)     Urobilinogen, Urine 2.0  (*)     Bilirubin, Urine Negative      Blood, Urine Large (*)     Glucose, Urine 50  (*)     pH, Urine 5.0     MAGNESIUM - Abnormal    Magnesium 1.7 (*)    BASIC METABOLIC PANEL - Abnormal    Sodium 133 (*)     Potassium 3.9      Chloride 100      CO2 19 (*)     BUN 28 (*)     Creatinine 1.66 (*)     Glucose 183 (*)     Calcium 9.3      Anion Gap 14 (*)     eGFR 44 (*)     Narrative:     Estimated GFR calculated using the 2009 CKD-EPI creatinine equation.   POCT CREATININE VENOUS - Abnormal    POC Creatinine 1.8 (*)     POC eGFR (calculated) 38 (*)    URINALYSIS WITH MICROSCOPIC    Narrative:     The following orders were created for panel order Urinalysis w/microscopic Urine, Clean Catch.  Procedure                               Abnormality         Status                     ---------                               -----------         ------                     Urinalysis, microscopic U...[[id number]]  Abnormal            Final result               Urinalysis, dipstick Urin...[[id number]]  Abnormal            Final result                 Please view results for these tests on the individual orders.   ED POC    Narrative:     The following orders were created for panel order ED POC Blood, Venous.  Procedure                               Abnormality         Status                     ---------                               -----------         ------                     POCT creatinine Blood, Ve...[[id number]]  Abnormal            Final result                 Please view results for these tests on the individual orders.         ED IMAGES:  CT ABDOMEN PELVIS WO IV CONTRAST   Final Result   Impression:   1. Moderate left-sided hydronephrosis due to 2 small obstructing stones in the left ureter at the level of the sacrum. One measures 4 mm and one measures 2 mm.   2. Calcified granuloma right lower lobe.          ED PROCEDURES:  Procedures    ED COURSE:          Sepsis Quality Bundle     MDM:  MDM  Number of Diagnoses or  Management Options  Dehydration  Hypomagnesemia  Kidney stone  Renal insufficiency  Diagnosis management comments: 60-year-old male patient presents to the emergency room with complaints of vomiting and diarrhea and abdominal pain.  The symptoms have been present for the past 4 days.  He was evaluated by his primary care provider today was found to have elevated renal function, likely secondary to dehydration.  Patient was sent here for further evaluation.  On exam patient is alert and oriented he appears in no acute distress.  He denies any abdominal complaints presently but reports that he had the symptoms as recent as yesterday.  His abdomen was soft and nondistended nontender.  Patient's renal function was rechecked and his creatinine is found to be 1.6 with a BUN of 28.  He was given IV fluids.  Patient was also found to have a low magnesium level which was treated with IV magnesium.  Urinalysis showed ketones as well as red blood cells were consistent with the finding of kidney stones on his CT exam.  Patient was treated with IV fluids and antiemetics.  He was discharged home with pain meds tamsulosin antiemetics and encouraged to increase his fluid intake.  He is advised that he will need to have his kidney function rechecked in the next 1 to 2 weeks.  Return precautions to the ER were discussed.  Patient was discharged home in stable condition.       Amount and/or Complexity of Data Reviewed  Clinical lab tests: reviewed  Tests in the radiology section of CPT®: reviewed        Final diagnoses:   [N20.0] Kidney stone   [N28.9] Renal insufficiency   [E86.0] Dehydration   [E83.42] Hypomagnesemia        Aline Garcia, JIHAN  06/17/21 0407

## 2021-06-18 ENCOUNTER — TELEPHONE (OUTPATIENT)
Dept: UROLOGY | Facility: CLINIC | Age: 61
End: 2021-06-18

## 2021-06-18 NOTE — TELEPHONE ENCOUNTER
Caller would like to discuss (a) Needs ED F/u appt     Patient: Del L Patton    Callback Number: [phone]    Additional Info:  Pt called needs ED follow up with Carmen.    I advised caller of a callback by 24-48 hours.

## 2021-06-22 ENCOUNTER — TELEPHONE (OUTPATIENT)
Dept: FAMILY MEDICINE | Facility: CLINIC | Age: 61
End: 2021-06-22

## 2021-06-22 NOTE — TELEPHONE ENCOUNTER
Called and spoke with Del, and he stated that he was returning a call from when he saw . Per Dr. Cody's nurses they do not need to speak with Del at this time as he went to the ER. No further questions or concerns at this time.

## 2021-07-02 ENCOUNTER — ANCILLARY PROCEDURE (OUTPATIENT)
Dept: RADIOLOGY | Facility: CLINIC | Age: 61
End: 2021-07-02
Payer: COMMERCIAL

## 2021-07-02 ENCOUNTER — APPOINTMENT (OUTPATIENT)
Dept: LAB | Facility: CLINIC | Age: 61
End: 2021-07-02
Payer: COMMERCIAL

## 2021-07-02 ENCOUNTER — CONSULT (OUTPATIENT)
Dept: UROLOGY | Facility: CLINIC | Age: 61
End: 2021-07-02
Payer: COMMERCIAL

## 2021-07-02 VITALS
SYSTOLIC BLOOD PRESSURE: 118 MMHG | BODY MASS INDEX: 26.32 KG/M2 | HEIGHT: 71 IN | WEIGHT: 188 LBS | OXYGEN SATURATION: 96 % | HEART RATE: 72 BPM | TEMPERATURE: 98.3 F | DIASTOLIC BLOOD PRESSURE: 75 MMHG

## 2021-07-02 DIAGNOSIS — N20.0 CALCULUS OF KIDNEY: ICD-10-CM

## 2021-07-02 DIAGNOSIS — N17.9 ACUTE KIDNEY INJURY (CMS/HCC): ICD-10-CM

## 2021-07-02 DIAGNOSIS — N20.0 CALCULUS OF KIDNEY: Primary | ICD-10-CM

## 2021-07-02 DIAGNOSIS — N13.2 HYDRONEPHROSIS WITH URINARY OBSTRUCTION DUE TO URETERAL CALCULUS: ICD-10-CM

## 2021-07-02 LAB
ANION GAP SERPL CALC-SCNC: 12 MMOL/L (ref 3–11)
BACTERIA #/AREA URNS HPF: ABNORMAL /HPF
BILIRUB UR QL: ABNORMAL
BUN SERPL-MCNC: 40 MG/DL (ref 7–25)
CALCIUM SERPL-MCNC: 9.9 MG/DL (ref 8.6–10.3)
CHLORIDE SERPL-SCNC: 105 MMOL/L (ref 98–107)
CLARITY UR: CLEAR
CO2 SERPL-SCNC: 23 MMOL/L (ref 21–32)
COLOR UR: YELLOW
CREAT SERPL-MCNC: 1.79 MG/DL (ref 0.7–1.3)
GFR SERPL CREATININE-BSD FRML MDRD: 40 ML/MIN/1.73M*2
GLUCOSE SERPL-MCNC: 107 MG/DL (ref 70–105)
GLUCOSE UR QL: NEGATIVE MG/DL
HGB UR QL: NEGATIVE
HYALINE CASTS #/AREA URNS AUTO: ABNORMAL /LPF
KETONES UR-MCNC: ABNORMAL MG/DL
LEUKOCYTE ESTERASE UR QL STRIP: NEGATIVE
NITRITE UR QL: NEGATIVE
PH UR: 5.5 PH
POTASSIUM SERPL-SCNC: 4.3 MMOL/L (ref 3.5–5.1)
PROT UR STRIP-MCNC: 100 MG/DL
RBC #/AREA URNS HPF: ABNORMAL /HPF
SODIUM SERPL-SCNC: 140 MMOL/L (ref 135–145)
SP GR UR: 1.02 (ref 1–1.03)
SQUAMOUS #/AREA URNS HPF: ABNORMAL /HPF
URATE CRY #/AREA URNS HPF: PRESENT /HPF
UROBILINOGEN UR-MCNC: 2 E.U./DL
WBC #/AREA URNS HPF: ABNORMAL /HPF

## 2021-07-02 PROCEDURE — 87088 URINE BACTERIA CULTURE: CPT | Performed by: UROLOGY

## 2021-07-02 PROCEDURE — 74018 RADEX ABDOMEN 1 VIEW: CPT | Mod: TC | Performed by: NURSE PRACTITIONER

## 2021-07-02 PROCEDURE — 80048 BASIC METABOLIC PNL TOTAL CA: CPT | Performed by: NURSE PRACTITIONER

## 2021-07-02 PROCEDURE — 99203 OFFICE O/P NEW LOW 30 MIN: CPT | Performed by: UROLOGY

## 2021-07-02 PROCEDURE — 36415 COLL VENOUS BLD VENIPUNCTURE: CPT | Performed by: NURSE PRACTITIONER

## 2021-07-02 PROCEDURE — 81001 URINALYSIS AUTO W/SCOPE: CPT | Performed by: NURSE PRACTITIONER

## 2021-07-02 RX ORDER — HYDROCODONE BITARTRATE AND ACETAMINOPHEN 5; 325 MG/1; MG/1
1-2 TABLET ORAL EVERY 8 HOURS PRN
Qty: 25 TABLET | Refills: 0 | Status: SHIPPED | OUTPATIENT
Start: 2021-07-02 | End: 2021-08-23 | Stop reason: ALTCHOICE

## 2021-07-02 RX ORDER — TAMSULOSIN HYDROCHLORIDE 0.4 MG/1
0.4 CAPSULE ORAL DAILY
Qty: 60 CAPSULE | Refills: 0 | Status: SHIPPED | OUTPATIENT
Start: 2021-07-02 | End: 2021-08-18 | Stop reason: SDUPTHER

## 2021-07-02 ASSESSMENT — PAIN SCALES - GENERAL: PAINLEVEL: 0-NO PAIN

## 2021-07-02 NOTE — PATIENT INSTRUCTIONS
The best way to prevent most stones is to:   -drink enough water to keep your urine very light yellow or clear all day. Average of 2000mL/day  -avoid salt, try to follow a low sodium diet  -avoid a high protein diet, protein is typically high in oxalate  -increase your dietary citrate (a stone growth inhibitor) by adding lemon juice to your water or adding lemon juice from concentrate to ice cubes and adding those to what you drink  -you do NOT need to avoid calcium

## 2021-07-02 NOTE — PROGRESS NOTES
"[facility]  Urology      HPI:  Del Mckeon is a 61 y.o. male sent for evaluation of renal stones. On 6/16/21 he presented to the ED for vomiting and diarrhea X4 days. He was found to be dehydrated, Creatinine elevated to 1.6, and CT performed which demonstrates left 2 mm and 4mm stone at the level of the sacrum with moderate hydro. He was given IV hydration, Flomax, Zofran, and Hydrocodone.  Today he reports intermittent flank pain over the last several weeks as recently as yesterday. He is nauseous today but does not have pain. He does not think he has passed his stones. He has finished the Flomax and Hydrocodone. Reports drinking approx 1/2 gallon of fluid/day. Denies dysuria, hematuria, urgency, and fever.   History significant for a kidney stone 9 years ago which he was able to pass on his own.         The following have been reviewed and updated as appropriate for this visit:  Allergies, Medications, Past Medical, Surgical and Social History, Problem List    Physical Exam:      /75 (BP Location: Right arm, Patient Position: Sitting, Cuff Size: Long Adult)   Pulse 72   Temp 36.8 °C (98.3 °F) (Temporal)   Ht 1.803 m (5' 11\")   Wt 85.3 kg (188 lb)   SpO2 96%   BMI 26.22 kg/m²       Results:  CBC:   WBC   Date Value Ref Range Status   06/16/2021 13.3 (H) 3.7 - 9.6 10*3/uL Final     RBC   Date Value Ref Range Status   06/16/2021 3.87 (L) 4.10 - 5.80 10*6/µL Final     Hemoglobin   Date Value Ref Range Status   06/16/2021 12.5 (L) 13.2 - 17.2 g/dL Final     Hematocrit   Date Value Ref Range Status   06/16/2021 36.9 (L) 38.0 - 50.0 % Final     Platelets   Date Value Ref Range Status   06/16/2021 225 130 - 350 10*3/uL Final     CMP:   Sodium   Date Value Ref Range Status   06/16/2021 133 (L) 135 - 145 mmol/L Final     Potassium   Date Value Ref Range Status   06/16/2021 3.9 3.5 - 5.1 mmol/L Final     BUN   Date Value Ref Range Status   06/16/2021 28 (H) 7 - 25 mg/dL Final     Creatinine   Date Value " Ref Range Status   06/16/2021 1.66 (H) 0.70 - 1.30 mg/dL Final     Calcium   Date Value Ref Range Status   06/16/2021 9.3 8.6 - 10.3 mg/dL Final     Alkaline Phosphatase   Date Value Ref Range Status   06/16/2021 63 45 - 115 U/L Final     Total Bilirubin   Date Value Ref Range Status   06/16/2021 0.68 0.20 - 1.40 mg/dL Final     ALT (SGPT)   Date Value Ref Range Status   06/16/2021 9 7 - 52 U/L Final     AST   Date Value Ref Range Status   06/16/2021 12 <40 U/L Final     BMP:   Sodium   Date Value Ref Range Status   06/16/2021 133 (L) 135 - 145 mmol/L Final     Potassium   Date Value Ref Range Status   06/16/2021 3.9 3.5 - 5.1 mmol/L Final     BUN   Date Value Ref Range Status   06/16/2021 28 (H) 7 - 25 mg/dL Final     Creatinine   Date Value Ref Range Status   06/16/2021 1.66 (H) 0.70 - 1.30 mg/dL Final     Calcium   Date Value Ref Range Status   06/16/2021 9.3 8.6 - 10.3 mg/dL Final       Creatinine   Date Value Ref Range Status   06/16/2021 1.66 (H) 0.70 - 1.30 mg/dL Final   06/16/2021 1.58 (H) 0.70 - 1.30 mg/dL Final   01/04/2021 0.89 0.70 - 1.30 mg/dL Final     Lab Results   Component Value Date    PSA 0.13 04/07/2021     Rad-  -CT 6/16/21  1. Moderate left-sided hydronephrosis due to 2 small obstructing stones in the left ureter at the level of the sacrum. One measures 4 mm and one measures 2 mm.  2. Calcified granuloma right lower lobe.      Assessment & Plan:  1. Left ureteral stones 2mm and 4mm  2. Left moderate hydronephrosis  3. Elevated BMP  -PSA 4/7/21 personally reviewed  -CT 6/16/21 images and report personally reviewed  -Urine micro and culture ordered today  -KUB ordered today  -BMP ordered today  -Flomax 0.4mg ordered  -Hydrocodone 5/325 #25 ordered  -Discussed trial of passage as these are 2 and 4mm stones at the level of the sacrum vs ureteroscopy with laser lithotripsy. Opting for trial of passage for now, if not passed within the next 4 weeks will plan for laser lithotripsy  -Discussed dietary  recommendations for stone prevention  -Increase fluid intake to 80-100oz/day  -Briefly discussed metabolic stone workup to be completed at a later date  -Patient is in agreement the above plan, questions answered  -Follow up in 2 weeks with ERA          ---------------------------------  Ann Marie Mcdaniels, CNP  07/02/21 11:25 AM

## 2021-07-04 LAB — BACTERIA UR CULT: NORMAL

## 2021-07-05 ENCOUNTER — OFFICE VISIT (OUTPATIENT)
Dept: NEUROLOGY | Facility: CLINIC | Age: 61
End: 2021-07-05
Payer: COMMERCIAL

## 2021-07-05 ENCOUNTER — TELEPHONE (OUTPATIENT)
Dept: UROLOGY | Facility: CLINIC | Age: 61
End: 2021-07-05

## 2021-07-05 VITALS
BODY MASS INDEX: 27.16 KG/M2 | HEART RATE: 72 BPM | WEIGHT: 194 LBS | RESPIRATION RATE: 16 BRPM | SYSTOLIC BLOOD PRESSURE: 100 MMHG | DIASTOLIC BLOOD PRESSURE: 70 MMHG | HEIGHT: 71 IN

## 2021-07-05 DIAGNOSIS — E08.42 DIABETIC POLYNEUROPATHY ASSOCIATED WITH DIABETES MELLITUS DUE TO UNDERLYING CONDITION (CMS/HCC): ICD-10-CM

## 2021-07-05 DIAGNOSIS — G56.21 ULNAR NEUROPATHY OF RIGHT UPPER EXTREMITY: ICD-10-CM

## 2021-07-05 DIAGNOSIS — I63.9: Primary | ICD-10-CM

## 2021-07-05 PROCEDURE — 99214 OFFICE O/P EST MOD 30 MIN: CPT | Performed by: PSYCHIATRY & NEUROLOGY

## 2021-07-05 NOTE — TELEPHONE ENCOUNTER
Caller would like to discuss (a) return call     Patient: Del L Patton      Callback Number: [phone]     Best Availability: anytime    I advised caller of a callback by 24-48 hours.

## 2021-07-05 NOTE — TELEPHONE ENCOUNTER
Caller would like to discuss (a) return call     Patient: Del L Patton    Callback Number: [phone]    Additional Info: Patient is returning a call to [facility].    I advised caller of a callback by 24-48 hours.

## 2021-07-12 DIAGNOSIS — N20.0 CALCULUS OF KIDNEY: Primary | ICD-10-CM

## 2021-07-12 NOTE — TELEPHONE ENCOUNTER
He does need a CT renal still. I did somehow miss putting the order in for it when we talked to him last week, but it is in now. Thank you

## 2021-07-12 NOTE — TELEPHONE ENCOUNTER
Called and spoke with patient. He states that he was told he would need a follow up CT scan however the hospital has not called him yet with this appointment. Does he need a CT renal stone? Patient also states he has had no pain for the last couple of days so he is unsure if this is needed. Please advise, thank you!

## 2021-07-12 NOTE — TELEPHONE ENCOUNTER
Called and notified patient that he does still need to have the CT done. Patient appointment center will contact patient to schedule this. Patient verbalizes understanding and reports no questions or concerns.

## 2021-07-12 NOTE — TELEPHONE ENCOUNTER
Caller would like to discuss (a) CT questions      Patient: Del L Patton    Callback Number: [phone]    Additional Info: Patient is returning a call ot Nona. He stated that he also has a few questions regarding a CT scan. He was advised that the hospital would contact him regarding it. He stated that no one has.     I advised caller of a callback by 24-48 hours.

## 2021-07-13 DIAGNOSIS — I10 BENIGN ESSENTIAL HYPERTENSION: ICD-10-CM

## 2021-07-13 RX ORDER — AMLODIPINE BESYLATE 10 MG/1
TABLET ORAL
Qty: 90 TABLET | Refills: 2 | Status: SHIPPED | OUTPATIENT
Start: 2021-07-13 | End: 2021-08-12 | Stop reason: HOSPADM

## 2021-07-21 ENCOUNTER — OFFICE VISIT (OUTPATIENT)
Dept: UROLOGY | Facility: CLINIC | Age: 61
End: 2021-07-21
Payer: COMMERCIAL

## 2021-07-21 ENCOUNTER — LAB (OUTPATIENT)
Dept: LAB | Facility: CLINIC | Age: 61
End: 2021-07-21
Payer: COMMERCIAL

## 2021-07-21 ENCOUNTER — ANCILLARY PROCEDURE (OUTPATIENT)
Dept: RADIOLOGY | Facility: CLINIC | Age: 61
End: 2021-07-21
Payer: COMMERCIAL

## 2021-07-21 VITALS
TEMPERATURE: 97.1 F | OXYGEN SATURATION: 98 % | WEIGHT: 190 LBS | HEART RATE: 84 BPM | HEIGHT: 71 IN | SYSTOLIC BLOOD PRESSURE: 116 MMHG | DIASTOLIC BLOOD PRESSURE: 72 MMHG | BODY MASS INDEX: 26.6 KG/M2

## 2021-07-21 DIAGNOSIS — N20.0 CALCULUS OF KIDNEY: Primary | ICD-10-CM

## 2021-07-21 DIAGNOSIS — D64.9 ANEMIA, UNSPECIFIED TYPE: ICD-10-CM

## 2021-07-21 DIAGNOSIS — N20.0 CALCULUS OF KIDNEY: ICD-10-CM

## 2021-07-21 LAB
ALBUMIN SERPL-MCNC: 4.3 G/DL (ref 3.5–5.3)
ALP SERPL-CCNC: 75 U/L (ref 45–115)
ALT SERPL-CCNC: 11 U/L (ref 7–52)
ANION GAP SERPL CALC-SCNC: 12 MMOL/L (ref 3–11)
AST SERPL-CCNC: 15 U/L
BASOPHILS # BLD AUTO: 0.1 10*3/UL
BASOPHILS NFR BLD AUTO: 1 % (ref 0–2)
BILIRUB SERPL-MCNC: 0.36 MG/DL (ref 0.2–1.4)
BUN SERPL-MCNC: 54 MG/DL (ref 7–25)
CALCIUM ALBUM COR SERPL-MCNC: 9.8 MG/DL (ref 8.6–10.3)
CALCIUM SERPL-MCNC: 10 MG/DL (ref 8.6–10.3)
CHLORIDE SERPL-SCNC: 104 MMOL/L (ref 98–107)
CO2 SERPL-SCNC: 19 MMOL/L (ref 21–32)
CREAT SERPL-MCNC: 1.87 MG/DL (ref 0.7–1.3)
EOSINOPHIL # BLD AUTO: 0.2 10*3/UL
EOSINOPHIL NFR BLD AUTO: 2 % (ref 0–3)
ERYTHROCYTE [DISTWIDTH] IN BLOOD BY AUTOMATED COUNT: 13.6 % (ref 11.5–15)
GFR SERPL CREATININE-BSD FRML MDRD: 38 ML/MIN/1.73M*2
GLUCOSE SERPL-MCNC: 100 MG/DL (ref 70–105)
HCT VFR BLD AUTO: 33.1 % (ref 38–50)
HGB BLD-MCNC: 11.5 G/DL (ref 13.2–17.2)
LYMPHOCYTES # BLD AUTO: 2.3 10*3/UL
LYMPHOCYTES NFR BLD AUTO: 29 % (ref 15–47)
MCH RBC QN AUTO: 32 PG (ref 29–34)
MCHC RBC AUTO-ENTMCNC: 34.6 G/DL (ref 32–36)
MCV RBC AUTO: 92.5 FL (ref 82–97)
MONOCYTES # BLD AUTO: 0.5 10*3/UL
MONOCYTES NFR BLD AUTO: 6 % (ref 5–13)
NEUTROPHILS # BLD AUTO: 4.8 10*3/UL
NEUTROPHILS NFR BLD AUTO: 62 % (ref 46–70)
PLATELET # BLD AUTO: 231 10*3/UL (ref 130–350)
PMV BLD AUTO: 8.6 FL (ref 6.9–10.8)
POTASSIUM SERPL-SCNC: 4.7 MMOL/L (ref 3.5–5.1)
PROT SERPL-MCNC: 7.5 G/DL (ref 6–8.3)
PTH-INTACT SERPL-MCNC: 11.4 PG/ML (ref 9–59)
RBC # BLD AUTO: 3.58 10*6/ΜL (ref 4.1–5.8)
SODIUM SERPL-SCNC: 135 MMOL/L (ref 135–145)
WBC # BLD AUTO: 7.8 10*3/UL (ref 3.7–9.6)

## 2021-07-21 PROCEDURE — 74018 RADEX ABDOMEN 1 VIEW: CPT | Mod: TC | Performed by: UROLOGY

## 2021-07-21 PROCEDURE — 80053 COMPREHEN METABOLIC PANEL: CPT | Performed by: UROLOGY

## 2021-07-21 PROCEDURE — 99213 OFFICE O/P EST LOW 20 MIN: CPT | Performed by: UROLOGY

## 2021-07-21 PROCEDURE — 36415 COLL VENOUS BLD VENIPUNCTURE: CPT | Performed by: UROLOGY

## 2021-07-21 PROCEDURE — 84550 ASSAY OF BLOOD/URIC ACID: CPT | Performed by: UROLOGY

## 2021-07-21 PROCEDURE — 85025 COMPLETE CBC W/AUTO DIFF WBC: CPT | Performed by: UROLOGY

## 2021-07-21 PROCEDURE — 83970 ASSAY OF PARATHORMONE: CPT | Performed by: UROLOGY

## 2021-07-21 ASSESSMENT — PAIN SCALES - GENERAL: PAINLEVEL: 0-NO PAIN

## 2021-07-21 NOTE — PROGRESS NOTES
Subjective     Patient ID: Del Mckeon is a 61 y.o. male.    HPI  The patient is a 61-year-old male previously evaluated for renal stones here for follow-up on the above.  On 6/16/2001 he noted emesis and diarrhea.  Work-up included CT with 2 mm and 4 mm mid left ureteral stones with moderate hydronephrosis.  KUB 7/2/2021 without definite stones.  He currently denies flank pain, gross hematuria, dysuria, nausea, vomiting, fever or chills.  He believes he passed his stones.   The patient states their symptoms are constant, bothersome, they are unable to identify other aggravating, alleviating or associated factors.      PMH, medications, allergies, social history, family history reviewed per chart    Review of Systems    Objective     Physical Exam    Vitals:    07/21/21 1522   BP:    Pulse:    Temp:    SpO2: 98%       Assessment/Plan     Diagnoses and all orders for this visit:    Calculus of kidney  -     Comprehensive metabolic panel Blood, Venous; Future  -     PTH, intact Blood, Venous; Future  -     Uric acid Blood, Venous; Future  -     Calcium 24 Hour Urine Urine, Timed Collection; Future  -     Citrate, urine, 24 hour Urine, Timed Collection; Future  -     Sodium, urine, 24 hour Urine, Timed Collection; Future  -     Uric acid, urine, 24 hour Urine, Timed Collection; Future  -     Phosphorus, urine, 24hr Urine, Timed Collection; Future  -     Oxalate, urine, 24 hour Urine, Timed Collection; Future    Anemia, unspecified type  -     CBC w/auto differential Blood, Venous; Future    1.  Left ureteral stones with probable spontaneous passage  2.  Moderate left hydronephrosis due to above  3.  Right lower lobe lung granuloma  4.  Anemia  -The patient will undergo 24 hour urine to evaluate metabolic causes of stone disease  -the patient was encouraged to increase fluid intake for goal UOP 2000 ml daily, add lemon or orange juice to diet to increase urinary citrate levels, decrease salt and protein in diet to  decrease overall stone risk  -KUB today  -CBC to follow-up previous anemia  -PSA due April 2022  -PSA result 4/7/2021 personally reviewed  -CT report 6/16/2021 personally reviewed  -KUB report 7/2/2021 personally reviewed  -CT images 6/16/2021 reviewed with interpretation to mid to distal left ureteral stones with moderate hydronephrosis  -The patient is in agreement with the above plan, questions answered    25 min total time spent on today's encounter

## 2021-07-22 LAB — URATE SERPL-MCNC: 6 MG/DL (ref 4.4–7.6)

## 2021-07-22 NOTE — PROGRESS NOTES
Labs show worsening anemia and kidney function  Possible slight dehydration    Plan:  will forward to Dr. Lester for review also  Please call Dr. Lester to arrange follow-up appt  Drink at least 60 ounces of water daily

## 2021-07-23 ENCOUNTER — LAB (OUTPATIENT)
Dept: LAB | Facility: CLINIC | Age: 61
End: 2021-07-23
Payer: COMMERCIAL

## 2021-07-23 DIAGNOSIS — N20.0 CALCULUS OF KIDNEY: ICD-10-CM

## 2021-07-23 DIAGNOSIS — F43.21 ADJUSTMENT DISORDER WITH DEPRESSED MOOD: ICD-10-CM

## 2021-07-23 LAB
COLLECT DURATION TIME UR: 24 H
COLOGUARD RESULT: POSITIVE
PHOSPHATE UR-MCNC: 48.5 MG/DL
PHOSPHORUS INTAKE 24H MEASURED: 0.8 G/24 H (ref 0.4–1.3)
SODIUM 24H UR-SCNC: 76 MMOL/L
SODIUM 24H UR-SRATE: 133 MMOL/24 H (ref 40–220)
SPECIMEN VOL 24H UR: 1750 ML
URATE 24H UR-MRATE: 700 MG/24HR (ref 150–990)
URATE UR-MCNC: 40 MG/DL

## 2021-07-23 PROCEDURE — 82507 ASSAY OF CITRATE: CPT | Performed by: UROLOGY

## 2021-07-23 PROCEDURE — 84100 ASSAY OF PHOSPHORUS: CPT | Performed by: UROLOGY

## 2021-07-23 PROCEDURE — 84560 ASSAY OF URINE/URIC ACID: CPT | Performed by: UROLOGY

## 2021-07-23 PROCEDURE — 84300 ASSAY OF URINE SODIUM: CPT | Performed by: UROLOGY

## 2021-07-23 PROCEDURE — 82340 ASSAY OF CALCIUM IN URINE: CPT | Performed by: UROLOGY

## 2021-07-23 PROCEDURE — 83945 ASSAY OF OXALATE: CPT | Performed by: UROLOGY

## 2021-07-23 RX ORDER — SERTRALINE HYDROCHLORIDE 50 MG/1
TABLET, FILM COATED ORAL
Qty: 90 TABLET | Refills: 3 | Status: SHIPPED | OUTPATIENT
Start: 2021-07-23 | End: 2022-07-27 | Stop reason: SDUPTHER

## 2021-07-23 NOTE — TELEPHONE ENCOUNTER
Care Due:                  Date            Visit Type   Department     Provider  --------------------------------------------------------------------------------                                           [facility]  Last Visit: 06-      SAME DAY     MEDICINE       MANISH PHAM                              ESTABLISHED   [facility]  Next Visit: 08-      PATIENT      MEDICINE       SHERWIN LOPES                                                            Last  Test          Frequency    Reason                     Performed    Due Date  --------------------------------------------------------------------------------  Vitamin D...  12 months..  cholecalciferol,.........  06- 05-    Powered by [employer]. Reference number: [id number]. 7/23/2021 11:22:54 AM NIRMALA

## 2021-07-24 LAB
CALCIUM 24H UR-MRATE: 175 MG/24 H
CITRATE 24H UR-MRATE: <35 MG/24 H
COLLECT DURATION TIME UR: 24 H
OXALATE 24H UR-MRATE: 34.3 MG/24 H (ref 9.7–40.5)
OXALATE 24H UR-SRATE: 0.39 MMOL/24 H (ref 0.11–0.46)
SPECIMEN VOL 24H UR: 1750 ML

## 2021-07-24 NOTE — PROGRESS NOTES
24 hour urine volume slightly decreased at 1750 ml  Goal:  2580-8311 ml per day  Plan: drink additional 20 ounces of water daily  Please call with any questions

## 2021-07-28 ENCOUNTER — HOSPITAL ENCOUNTER (OUTPATIENT)
Dept: CT IMAGING | Facility: HOSPITAL | Age: 61
Discharge: HOME | End: 2021-07-28
Payer: COMMERCIAL

## 2021-07-28 DIAGNOSIS — N20.0 CALCULUS OF KIDNEY: ICD-10-CM

## 2021-07-28 PROCEDURE — G1004 CDSM NDSC: HCPCS

## 2021-07-30 DIAGNOSIS — I10 BENIGN ESSENTIAL HYPERTENSION: ICD-10-CM

## 2021-07-30 RX ORDER — LOSARTAN POTASSIUM 100 MG/1
TABLET ORAL
Qty: 90 TABLET | Refills: 3 | Status: SHIPPED | OUTPATIENT
Start: 2021-07-30 | End: 2021-08-12 | Stop reason: HOSPADM

## 2021-07-30 NOTE — TELEPHONE ENCOUNTER
No new care gaps identified.  Powered by [employer]. Reference number: [id number]. 7/30/2021 9:45:29 AM NIRMALA

## 2021-08-05 ENCOUNTER — TELEPHONE (OUTPATIENT)
Dept: FAMILY MEDICINE | Facility: CLINIC | Age: 61
End: 2021-08-05

## 2021-08-05 ENCOUNTER — TELEPHONE (OUTPATIENT)
Dept: UROLOGY | Facility: CLINIC | Age: 61
End: 2021-08-05

## 2021-08-05 DIAGNOSIS — Z79.4 TYPE 2 DIABETES MELLITUS WITHOUT COMPLICATION, WITH LONG-TERM CURRENT USE OF INSULIN (CMS/HCC): ICD-10-CM

## 2021-08-05 DIAGNOSIS — E11.9 TYPE 2 DIABETES MELLITUS WITHOUT COMPLICATION, WITH LONG-TERM CURRENT USE OF INSULIN (CMS/HCC): ICD-10-CM

## 2021-08-05 DIAGNOSIS — N17.9 ACUTE KIDNEY INJURY (CMS/HCC): Primary | ICD-10-CM

## 2021-08-05 DIAGNOSIS — E78.2 MIXED HYPERLIPIDEMIA: ICD-10-CM

## 2021-08-05 DIAGNOSIS — I63.9 ISCHEMIC CEREBROVASCULAR ACCIDENT (CVA) (CMS/HCC): ICD-10-CM

## 2021-08-05 DIAGNOSIS — I10 BENIGN ESSENTIAL HYPERTENSION: ICD-10-CM

## 2021-08-05 RX ORDER — OMEGA-3 FATTY ACIDS/FISH OIL 340-1000MG
1 CAPSULE ORAL EVERY OTHER DAY
COMMUNITY
End: 2023-04-27 | Stop reason: ALTCHOICE

## 2021-08-05 NOTE — TELEPHONE ENCOUNTER
No new care gaps identified.  Powered by [employer]. Reference number: [id number]. 8/05/2021 11:13:02 AM NIRMALA

## 2021-08-05 NOTE — TELEPHONE ENCOUNTER
Called and spoke with patient. He states that he does not have anyone able to drive him home after surgery. Patient is wondering if we can admit him overnight. Please advise, thank you!

## 2021-08-05 NOTE — TELEPHONE ENCOUNTER
Caller would like to discuss (a) proc questions     Patient: Del L Patton    Callback Number: [phone]    Best Availability: as soon as possible    Additional Info: Pt needs call from nurse. Pt would like to discuss procedure.     I advised caller of a callback by 24-48 hours.

## 2021-08-06 ENCOUNTER — APPOINTMENT (OUTPATIENT)
Dept: LAB | Facility: CLINIC | Age: 61
End: 2021-08-06
Payer: COMMERCIAL

## 2021-08-06 ENCOUNTER — OFFICE VISIT (OUTPATIENT)
Dept: FAMILY MEDICINE | Facility: CLINIC | Age: 61
End: 2021-08-06
Payer: COMMERCIAL

## 2021-08-06 VITALS
HEART RATE: 64 BPM | OXYGEN SATURATION: 97 % | BODY MASS INDEX: 26.1 KG/M2 | RESPIRATION RATE: 18 BRPM | DIASTOLIC BLOOD PRESSURE: 74 MMHG | SYSTOLIC BLOOD PRESSURE: 124 MMHG | HEIGHT: 71 IN | WEIGHT: 186.4 LBS | TEMPERATURE: 97.4 F

## 2021-08-06 DIAGNOSIS — D64.9 ANEMIA, UNSPECIFIED TYPE: ICD-10-CM

## 2021-08-06 DIAGNOSIS — I10 BENIGN ESSENTIAL HYPERTENSION: ICD-10-CM

## 2021-08-06 DIAGNOSIS — Z01.818 ENCOUNTER FOR PRE-OPERATIVE EXAMINATION: Primary | ICD-10-CM

## 2021-08-06 DIAGNOSIS — I63.9 ISCHEMIC CEREBROVASCULAR ACCIDENT (CVA) (CMS/HCC): ICD-10-CM

## 2021-08-06 DIAGNOSIS — E78.2 MIXED HYPERLIPIDEMIA: ICD-10-CM

## 2021-08-06 DIAGNOSIS — Z79.4 TYPE 2 DIABETES MELLITUS WITHOUT COMPLICATION, WITH LONG-TERM CURRENT USE OF INSULIN (CMS/HCC): ICD-10-CM

## 2021-08-06 DIAGNOSIS — N13.2 HYDRONEPHROSIS WITH URINARY OBSTRUCTION DUE TO URETERAL CALCULUS: ICD-10-CM

## 2021-08-06 DIAGNOSIS — N17.9 ACUTE KIDNEY INJURY (CMS/HCC): ICD-10-CM

## 2021-08-06 DIAGNOSIS — E11.9 TYPE 2 DIABETES MELLITUS WITHOUT COMPLICATION, WITH LONG-TERM CURRENT USE OF INSULIN (CMS/HCC): ICD-10-CM

## 2021-08-06 LAB
ALBUMIN SERPL-MCNC: 4.3 G/DL (ref 3.5–5.3)
ALP SERPL-CCNC: 62 U/L (ref 45–115)
ALT SERPL-CCNC: 14 U/L (ref 7–52)
ANION GAP SERPL CALC-SCNC: 10 MMOL/L (ref 3–11)
AST SERPL-CCNC: 16 U/L
BASOPHILS # BLD AUTO: 0.1 10*3/UL
BASOPHILS NFR BLD AUTO: 1 % (ref 0–2)
BILIRUB SERPL-MCNC: 0.44 MG/DL (ref 0.2–1.4)
BUN SERPL-MCNC: 39 MG/DL (ref 7–25)
CALCIUM ALBUM COR SERPL-MCNC: 9.4 MG/DL (ref 8.6–10.3)
CALCIUM SERPL-MCNC: 9.6 MG/DL (ref 8.6–10.3)
CHLORIDE SERPL-SCNC: 106 MMOL/L (ref 98–107)
CO2 SERPL-SCNC: 23 MMOL/L (ref 21–32)
CREAT SERPL-MCNC: 1.48 MG/DL (ref 0.7–1.3)
EOSINOPHIL # BLD AUTO: 0.1 10*3/UL
EOSINOPHIL NFR BLD AUTO: 2 % (ref 0–3)
ERYTHROCYTE [DISTWIDTH] IN BLOOD BY AUTOMATED COUNT: 13.8 % (ref 11.5–15)
EST. AVERAGE GLUCOSE BLD GHB EST-MCNC: 108.3 MG/DL
GFR SERPL CREATININE-BSD FRML MDRD: 50 ML/MIN/1.73M*2
GLUCOSE SERPL-MCNC: 115 MG/DL (ref 70–105)
HBA1C MFR BLD: 5.4 % (ref 4–6)
HCT VFR BLD AUTO: 34.6 % (ref 38–50)
HGB BLD-MCNC: 11.6 G/DL (ref 13.2–17.2)
LYMPHOCYTES # BLD AUTO: 2 10*3/UL
LYMPHOCYTES NFR BLD AUTO: 30 % (ref 15–47)
MCH RBC QN AUTO: 31.4 PG (ref 29–34)
MCHC RBC AUTO-ENTMCNC: 33.6 G/DL (ref 32–36)
MCV RBC AUTO: 93.6 FL (ref 82–97)
MONOCYTES # BLD AUTO: 0.4 10*3/UL
MONOCYTES NFR BLD AUTO: 5 % (ref 5–13)
NEUTROPHILS # BLD AUTO: 4.1 10*3/UL
NEUTROPHILS NFR BLD AUTO: 61 % (ref 46–70)
PLATELET # BLD AUTO: 183 10*3/UL (ref 130–350)
PMV BLD AUTO: 8.2 FL (ref 6.9–10.8)
POTASSIUM SERPL-SCNC: 3.9 MMOL/L (ref 3.5–5.1)
PROT SERPL-MCNC: 7.2 G/DL (ref 6–8.3)
RBC # BLD AUTO: 3.7 10*6/ΜL (ref 4.1–5.8)
SARS-COV-2 RNA RESP QL NAA+PROBE: NEGATIVE
SODIUM SERPL-SCNC: 139 MMOL/L (ref 135–145)
WBC # BLD AUTO: 6.7 10*3/UL (ref 3.7–9.6)

## 2021-08-06 PROCEDURE — 85025 COMPLETE CBC W/AUTO DIFF WBC: CPT | Performed by: FAMILY MEDICINE

## 2021-08-06 PROCEDURE — 83036 HEMOGLOBIN GLYCOSYLATED A1C: CPT | Performed by: FAMILY MEDICINE

## 2021-08-06 PROCEDURE — 99214 OFFICE O/P EST MOD 30 MIN: CPT | Mod: 25 | Performed by: FAMILY MEDICINE

## 2021-08-06 PROCEDURE — U0005 INFEC AGEN DETEC AMPLI PROBE: HCPCS | Performed by: FAMILY MEDICINE

## 2021-08-06 PROCEDURE — 87635 SARS-COV-2 COVID-19 AMP PRB: CPT | Performed by: FAMILY MEDICINE

## 2021-08-06 PROCEDURE — C9803 HOPD COVID-19 SPEC COLLECT: HCPCS | Mod: NCP | Performed by: FAMILY MEDICINE

## 2021-08-06 PROCEDURE — 80053 COMPREHEN METABOLIC PANEL: CPT | Performed by: FAMILY MEDICINE

## 2021-08-06 PROCEDURE — 36415 COLL VENOUS BLD VENIPUNCTURE: CPT | Performed by: FAMILY MEDICINE

## 2021-08-06 ASSESSMENT — PAIN SCALES - GENERAL: PAINLEVEL: 0-NO PAIN

## 2021-08-06 NOTE — H&P (VIEW-ONLY)
PRE-OP EVALUATION:    Chief Complaint   Patient presents with   • Pre-op Exam     surgery is on 8/10/21.        Del Mckeon  : 1960    HPI:  Del Mckeon is a 61 y.o. male presents for pre-operative evaluation as requested by Dr. Leiva.  He requires evaluation and anesthesia clearance prior to undergoing surgery/procedure for treatment of calculus of kidney.  Proposed procedure: Cystoscopy left ureteroscopy with laser lithotripsy with stent placement.  Due to his chronic medical problems, I have been asked to see the patient for a pre-operative evaluation.      Date of Surgery/Procedure: 8/10/2021  Hospital/Surgical Facility: [facility] OR  Primary Care Physician:  Klarissa Lester MD  Type of Anesthesia Anticipated: Local with Monitored Anestheesia Care (MAC)  Surgical/anesthesia risks:   Comments: No history of anesthetic complications including delayed emergence, N/N, DVT/PE.  He is managed on Plavix due to history of CVA        Problems addressed today:  Benign essential hypertension  Hypertension is well controlled on losartan 100 mg daily, amlodipine 10 mg nightly.  He does monitor his blood pressure at home.  Reports his numbers typically run around 120/70. Normal reading in clinic today at 124/74.     Ischemic cerebrovascular accident (CVA) (CMS/[facility]) ([facility])  CVA 2020. Thought to be secondary to uncontrolled hypertension.  Continues to have some residual left leg weakness.  Chronically managed on Plavix 75 mg daily.    Type 2 diabetes mellitus without complication, with long-term current use of insulin (CMS/[facility]) ([facility])  Diabetes has been well controlled on Metformin 1000 mg twice daily. His last A1c from 4 months ago was 5.9%. Repeat A1c today as part of his preoperative work-up returned at 5.4%.    Anemia  Stable normocytic anemia noted on lab work dating back to April. He has had a fairly slow downtrend over the last year upon further review of his lab work.  Hemoglobin on CBC today 11.6 which appears to  be stable from 3 weeks ago.  Suspect chronic blood loss through GI urinary tract. Noted positive Cologuard from 7/14/2021.  Additionally also has a history of microscopic hematuria.  Urologic procedure hopefully will provide some insight on hematuria.  Referral to GI for diagnostic colonoscopy has been placed.       Patient Active Problem List   Diagnosis   • Benign essential hypertension   • Mixed hyperlipidemia   • Obstructive sleep apnea syndrome   • Mild aortic sclerosis (CMS/[facility]) ([facility])   • Ischemic cerebrovascular accident (CVA) (CMS/[facility]) ([facility])   • Type 2 diabetes mellitus without complication, with long-term current use of insulin (CMS/[facility]) ([facility])   • Microscopic hematuria   • Adjustment disorder with depressed mood   • Vitamin D deficiency   • Seasonal allergic rhinitis due to pollen   • Bilateral carpal tunnel syndrome   • Right lower quadrant abdominal pain   • Nausea and vomiting   • Diarrhea   • Acute left-sided low back pain without sciatica   • Acute kidney injury (CMS/[facility]) ([facility])   • Hydronephrosis with urinary obstruction due to ureteral calculus   • Calculus of kidney   • Anemia       PAST MEDICAL HISTORY:  Past Medical History:   Diagnosis Date   • Anemia    • Asthma     no attacks as long as away from hay fever allergens in [place]   • Cardiovascular disease     known bicuspid aortic valve, mild AVS, murmur   • Depressive disorder    • Endocrine disorder    • Heart murmur    • Hyperlipidemia    • Kidney disease 2021, 2011    calculi   • Obesity    • Peripheral neuropathy     L arm    • Stroke (CMS/[facility]) ([facility]) 05/21/2020    ischemic, L sided weakness; L leg weakness remains       IMMUNIZATION HISTORY:  Immunization History   Administered Date(s) Administered   • Fluarix/Flulaval/Fluzone Quad 01/20/2016   • Pneumococcal Polysaccharide - PPSV23 01/06/2021   • Shingrix IM 08/20/2020, 01/06/2021   • Tdap 02/17/2021       PAST SURGICAL HISTORY:  Past Surgical History:   Procedure Laterality Date   • BACK  SURGERY  2018    lumbar discectomy   • CRYOTHERAPY      dermatology   • EYE SURGERY Bilateral 08/2020    cataracts       FAMILY MEDICAL HISTORY:  Family Status   Relation Name Status   • Father  (Not Specified)   • Sister  (Not Specified)      Family History   Problem Relation Age of Onset   • Alzheimer's disease Father    • Diabetes type II Father    • Diabetes Sister          SOCIAL HISTORY:  Social History     Tobacco Use   • Smoking status: Never Smoker   • Smokeless tobacco: Never Used   Substance Use Topics   • Alcohol use: No     Comment: had 1 beer in 2010       DRUG HISTORY:  Social History     Substance and Sexual Activity   Drug Use Never       HOME MEDICATIONS:  Current Outpatient Medications   Medication Sig Dispense Refill   • potassium 99 mg tablet Take 1 tab/cap by mouth daily     • omega-3 fatty acids-fish oil (Fish OiL) 340-1,000 mg capsule Take 1 tab/cap by mouth daily     • losartan (COZAAR) 100 mg tablet Take 1 tablet by mouth once daily 90 tablet 3   • sertraline (ZOLOFT) 50 mg tablet Take 1 tablet by mouth once daily 90 tablet 3   • amLODIPine (NORVASC) 10 mg tablet Take 1 tablet by mouth nightly 90 tablet 2   • tamsulosin (FLOMAX) 0.4 mg capsule Take 1 capsule (0.4 mg total) by mouth daily 60 capsule 0   • HYDROcodone-acetaminophen (NORCO) 5-325 mg per tablet Take 1-2 tablets by mouth every 8 (eight) hours as needed for pain scale 8-10/10 (pain) for up to 25 doses No more than 4 tablets per day Max Daily Amount: 6 tablets 25 tablet 0   • metFORMIN (GLUCOPHAGE) 500 mg tablet Take 2 tablets (1,000 mg total) by mouth 2 (two) times a day with meals 120 tablet 11   • ibuprofen (AdviL) 200 mg tablet Take 400 mg by mouth every 6 (six) hours as needed for pain scale 1-3/10 or pain scale 8-10/10.     • rosuvastatin (CRESTOR) 40 mg tablet Take 1 tablet (40 mg total) by mouth nightly 90 tablet 3   • acetaminophen (Tylenol 8 Hour) 650 mg 8 hr tablet Take 650-1,300 mg by mouth every 8 (eight) hours as  "needed for pain scale 1-3/10       • cholecalciferol, vitamin D3, (Vitamin D3) 25 mcg (1,000 unit) capsule Take 1 capsule (1,000 Units total) by mouth daily 30 capsule 3   • multivitamin with minerals tablet Take 1 tablet by mouth every other day       • melatonin 5 mg capsule Take 5 mg by mouth nightly as needed (insomnia)      • diphenhydramine-acetaminophen (TYLENOL PM EXTRA STRENGTH)  mg tablet Take by mouth nightly as needed       • clopidogreL (PLAVIX) 75 mg tablet Take 1 tablet by mouth once daily 90 tablet 3   • semaglutide 1 mg/dose (2 mg/1.5 mL) pen injector Inject 0.25 mg under the skin every 7 (seven) days (Patient not taking: Reported on 4/23/2021 ) 1.5 mL 1     No current facility-administered medications for this visit.        ALLERGIES:  Allergies   Allergen Reactions   • Penicillins      Tested as a child; showed positive       Latex Allergy: No    REVIEW OF SYSTEMS:  General ROS: weight is stable; no recent fevers or other illness.  ENT ROS: denies sore throat or any loose/chipped teeth.  Respiratory ROS: no cough, shortness of breath, or wheezing  Cardiovascular ROS: no chest pain or dyspnea on exertion  Gastrointestinal ROS: no abdominal pain, change in bowel habits, or black or bloody stools  Neurological ROS: negative for headaches  Dermatological ROS: no rash, open skin areas, jaundice.  Musculoskeletal: no arthralgias or myalgias  Psychological ROS: negative for - anxiety, hostility, irritability, mood swings or sleep disturbances      VITALS:   /74 (BP Location: Left arm, Patient Position: Sitting, Cuff Size: Regular Adult)   Pulse 64   Temp 36.3 °C (97.4 °F) (Temporal)   Resp 18   Ht 1.803 m (5' 11\")   Wt 84.6 kg (186 lb 6.4 oz)   SpO2 97%   BMI 26.00 kg/m²    Body mass index is 26 kg/m².    PHYSICAL EXAM:  General appearance: in no apparent distress, well developed and well nourished, alert, oriented times 3, cooperative and well hydrated.  Psych:  Affect/mood " normal.  Eye exam - conjunctiva clear.  ENT exam reveals - ENT exam normal, no neck nodes or sinus tenderness.  Neck exam - supple and no adenopathy.  Thyroid exam-thyroid is normal in size without nodules or tenderness.  Respiratory exam reveals: normal inspiratory/expiratory effort; no wheezes or rhonchi.  CVS exam: regular rate and rhythm, S1, S2 normal, no murmur, click, rub or gallop and RRR with no murmur audible; no JVD.  Abdominal exam: soft, non-tender without hepatosplenomegaly or mass  Exam of extremities: peripheral pulses normal, no pedal edema, no clubbing or cyanosis  Skin exam - normal coloration and turgor, no rashes, no suspicious skin lesions noted.      DIAGNOSTICS:  EKG: normal EKG, normal sinus rhythm.  Lab Results   Component Value Date    WBC 6.7 08/06/2021    HGB 11.6 (L) 08/06/2021    HCT 34.6 (L) 08/06/2021    MCV 93.6 08/06/2021     08/06/2021     Lab Results   Component Value Date    HGBA1C 5.4 08/06/2021     Lab Results   Component Value Date    CREATININE 1.48 (H) 08/06/2021    BUN 39 (H) 08/06/2021     08/06/2021    K 3.9 08/06/2021     08/06/2021    CO2 23 08/06/2021       IMPRESSION:   Diagnosis Plan   1. Encounter for pre-operative examination  COVID-19 PCR   2. Benign essential hypertension  ECG 12 lead -Normal, Today   3. Ischemic cerebrovascular accident (CVA) (CMS/HCC) (HCC)     4. Type 2 diabetes mellitus without complication, with long-term current use of insulin (CMS/HCC) (HCC)     5. Hydronephrosis with urinary obstruction due to ureteral calculus     6. Anemia, unspecified type       Today's preoperative cardiac clearance is based on the Revised [place] cardiac risk index (RCRI).     RCRI:  RCI RISK CLASS III (2 risk factors, risk of major cardiac compl. appr. 3.6%)    This score has been based on the following risk factors for this patient:   - High-risk type of surgery = 0   - ischemic heart disease = 0   - CHF = 0    - CVA = 1    - Diabetes mellitus  requiring insulin = 1    - creatinine >2.0 mg/dL = 0    No risk factors - 0.4 percent (95% CI: 0.1-0.8)   One risk factor - 1.0 percent (95% CI: 0.5-1.4)   Two risk factors - 2.4 percent (95% CI: 1.3-3.5)   Three or more risk factors - 5.4 percent (95% CI: 2.8-7.9)     OVERALL EVALUATION: Patient is moderate risk for low risk surgery. He is medically optimized  and cleared for the above operation with the risk stratification profile as above. Patient expressed understanding and recognition of these risks.     Plavix to be held 5 days prior to surgery  No food or liquids the morning of surgery.  Call surgeon if develops respiratory illness, fever, or other illness.    A copy of this dictation is forwarded to the requesting physician.    Return for Next scheduled follow-up.      Britni Cody MD

## 2021-08-06 NOTE — PROGRESS NOTES
PRE-OP EVALUATION:    Chief Complaint   Patient presents with   • Pre-op Exam     surgery is on 8/10/21.        Del Mckeon  : 1960    HPI:  Del Mckeon is a 61 y.o. male presents for pre-operative evaluation as requested by Dr. Leiva.  He requires evaluation and anesthesia clearance prior to undergoing surgery/procedure for treatment of calculus of kidney.  Proposed procedure: Cystoscopy left ureteroscopy with laser lithotripsy with stent placement.  Due to his chronic medical problems, I have been asked to see the patient for a pre-operative evaluation.      Date of Surgery/Procedure: 8/10/2021  Hospital/Surgical Facility: [facility] OR  Primary Care Physician:  Klarissa Lester MD  Type of Anesthesia Anticipated: Local with Monitored Anestheesia Care (MAC)  Surgical/anesthesia risks:   Comments: No history of anesthetic complications including delayed emergence, N/N, DVT/PE.  He is managed on Plavix due to history of CVA        Problems addressed today:  Benign essential hypertension  Hypertension is well controlled on losartan 100 mg daily, amlodipine 10 mg nightly.  He does monitor his blood pressure at home.  Reports his numbers typically run around 120/70. Normal reading in clinic today at 124/74.     Ischemic cerebrovascular accident (CVA) (CMS/[facility]) ([facility])  CVA 2020. Thought to be secondary to uncontrolled hypertension.  Continues to have some residual left leg weakness.  Chronically managed on Plavix 75 mg daily.    Type 2 diabetes mellitus without complication, with long-term current use of insulin (CMS/[facility]) ([facility])  Diabetes has been well controlled on Metformin 1000 mg twice daily. His last A1c from 4 months ago was 5.9%. Repeat A1c today as part of his preoperative work-up returned at 5.4%.    Anemia  Stable normocytic anemia noted on lab work dating back to April. He has had a fairly slow downtrend over the last year upon further review of his lab work.  Hemoglobin on CBC today 11.6 which appears to  be stable from 3 weeks ago.  Suspect chronic blood loss through GI urinary tract. Noted positive Cologuard from 7/14/2021.  Additionally also has a history of microscopic hematuria.  Urologic procedure hopefully will provide some insight on hematuria.  Referral to GI for diagnostic colonoscopy has been placed.       Patient Active Problem List   Diagnosis   • Benign essential hypertension   • Mixed hyperlipidemia   • Obstructive sleep apnea syndrome   • Mild aortic sclerosis (CMS/[facility]) ([facility])   • Ischemic cerebrovascular accident (CVA) (CMS/[facility]) ([facility])   • Type 2 diabetes mellitus without complication, with long-term current use of insulin (CMS/[facility]) ([facility])   • Microscopic hematuria   • Adjustment disorder with depressed mood   • Vitamin D deficiency   • Seasonal allergic rhinitis due to pollen   • Bilateral carpal tunnel syndrome   • Right lower quadrant abdominal pain   • Nausea and vomiting   • Diarrhea   • Acute left-sided low back pain without sciatica   • Acute kidney injury (CMS/[facility]) ([facility])   • Hydronephrosis with urinary obstruction due to ureteral calculus   • Calculus of kidney   • Anemia       PAST MEDICAL HISTORY:  Past Medical History:   Diagnosis Date   • Anemia    • Asthma     no attacks as long as away from hay fever allergens in [place]   • Cardiovascular disease     known bicuspid aortic valve, mild AVS, murmur   • Depressive disorder    • Endocrine disorder    • Heart murmur    • Hyperlipidemia    • Kidney disease 2021, 2011    calculi   • Obesity    • Peripheral neuropathy     L arm    • Stroke (CMS/[facility]) ([facility]) 05/21/2020    ischemic, L sided weakness; L leg weakness remains       IMMUNIZATION HISTORY:  Immunization History   Administered Date(s) Administered   • Fluarix/Flulaval/Fluzone Quad 01/20/2016   • Pneumococcal Polysaccharide - PPSV23 01/06/2021   • Shingrix IM 08/20/2020, 01/06/2021   • Tdap 02/17/2021       PAST SURGICAL HISTORY:  Past Surgical History:   Procedure Laterality Date   • BACK  SURGERY  2018    lumbar discectomy   • CRYOTHERAPY      dermatology   • EYE SURGERY Bilateral 08/2020    cataracts       FAMILY MEDICAL HISTORY:  Family Status   Relation Name Status   • Father  (Not Specified)   • Sister  (Not Specified)      Family History   Problem Relation Age of Onset   • Alzheimer's disease Father    • Diabetes type II Father    • Diabetes Sister          SOCIAL HISTORY:  Social History     Tobacco Use   • Smoking status: Never Smoker   • Smokeless tobacco: Never Used   Substance Use Topics   • Alcohol use: No     Comment: had 1 beer in 2010       DRUG HISTORY:  Social History     Substance and Sexual Activity   Drug Use Never       HOME MEDICATIONS:  Current Outpatient Medications   Medication Sig Dispense Refill   • potassium 99 mg tablet Take 1 tab/cap by mouth daily     • omega-3 fatty acids-fish oil (Fish OiL) 340-1,000 mg capsule Take 1 tab/cap by mouth daily     • losartan (COZAAR) 100 mg tablet Take 1 tablet by mouth once daily 90 tablet 3   • sertraline (ZOLOFT) 50 mg tablet Take 1 tablet by mouth once daily 90 tablet 3   • amLODIPine (NORVASC) 10 mg tablet Take 1 tablet by mouth nightly 90 tablet 2   • tamsulosin (FLOMAX) 0.4 mg capsule Take 1 capsule (0.4 mg total) by mouth daily 60 capsule 0   • HYDROcodone-acetaminophen (NORCO) 5-325 mg per tablet Take 1-2 tablets by mouth every 8 (eight) hours as needed for pain scale 8-10/10 (pain) for up to 25 doses No more than 4 tablets per day Max Daily Amount: 6 tablets 25 tablet 0   • metFORMIN (GLUCOPHAGE) 500 mg tablet Take 2 tablets (1,000 mg total) by mouth 2 (two) times a day with meals 120 tablet 11   • ibuprofen (AdviL) 200 mg tablet Take 400 mg by mouth every 6 (six) hours as needed for pain scale 1-3/10 or pain scale 8-10/10.     • rosuvastatin (CRESTOR) 40 mg tablet Take 1 tablet (40 mg total) by mouth nightly 90 tablet 3   • acetaminophen (Tylenol 8 Hour) 650 mg 8 hr tablet Take 650-1,300 mg by mouth every 8 (eight) hours as  "needed for pain scale 1-3/10       • cholecalciferol, vitamin D3, (Vitamin D3) 25 mcg (1,000 unit) capsule Take 1 capsule (1,000 Units total) by mouth daily 30 capsule 3   • multivitamin with minerals tablet Take 1 tablet by mouth every other day       • melatonin 5 mg capsule Take 5 mg by mouth nightly as needed (insomnia)      • diphenhydramine-acetaminophen (TYLENOL PM EXTRA STRENGTH)  mg tablet Take by mouth nightly as needed       • clopidogreL (PLAVIX) 75 mg tablet Take 1 tablet by mouth once daily 90 tablet 3   • semaglutide 1 mg/dose (2 mg/1.5 mL) pen injector Inject 0.25 mg under the skin every 7 (seven) days (Patient not taking: Reported on 4/23/2021 ) 1.5 mL 1     No current facility-administered medications for this visit.        ALLERGIES:  Allergies   Allergen Reactions   • Penicillins      Tested as a child; showed positive       Latex Allergy: No    REVIEW OF SYSTEMS:  General ROS: weight is stable; no recent fevers or other illness.  ENT ROS: denies sore throat or any loose/chipped teeth.  Respiratory ROS: no cough, shortness of breath, or wheezing  Cardiovascular ROS: no chest pain or dyspnea on exertion  Gastrointestinal ROS: no abdominal pain, change in bowel habits, or black or bloody stools  Neurological ROS: negative for headaches  Dermatological ROS: no rash, open skin areas, jaundice.  Musculoskeletal: no arthralgias or myalgias  Psychological ROS: negative for - anxiety, hostility, irritability, mood swings or sleep disturbances      VITALS:   /74 (BP Location: Left arm, Patient Position: Sitting, Cuff Size: Regular Adult)   Pulse 64   Temp 36.3 °C (97.4 °F) (Temporal)   Resp 18   Ht 1.803 m (5' 11\")   Wt 84.6 kg (186 lb 6.4 oz)   SpO2 97%   BMI 26.00 kg/m²    Body mass index is 26 kg/m².    PHYSICAL EXAM:  General appearance: in no apparent distress, well developed and well nourished, alert, oriented times 3, cooperative and well hydrated.  Psych:  Affect/mood " normal.  Eye exam - conjunctiva clear.  ENT exam reveals - ENT exam normal, no neck nodes or sinus tenderness.  Neck exam - supple and no adenopathy.  Thyroid exam-thyroid is normal in size without nodules or tenderness.  Respiratory exam reveals: normal inspiratory/expiratory effort; no wheezes or rhonchi.  CVS exam: regular rate and rhythm, S1, S2 normal, no murmur, click, rub or gallop and RRR with no murmur audible; no JVD.  Abdominal exam: soft, non-tender without hepatosplenomegaly or mass  Exam of extremities: peripheral pulses normal, no pedal edema, no clubbing or cyanosis  Skin exam - normal coloration and turgor, no rashes, no suspicious skin lesions noted.      DIAGNOSTICS:  EKG: normal EKG, normal sinus rhythm.  Lab Results   Component Value Date    WBC 6.7 08/06/2021    HGB 11.6 (L) 08/06/2021    HCT 34.6 (L) 08/06/2021    MCV 93.6 08/06/2021     08/06/2021     Lab Results   Component Value Date    HGBA1C 5.4 08/06/2021     Lab Results   Component Value Date    CREATININE 1.48 (H) 08/06/2021    BUN 39 (H) 08/06/2021     08/06/2021    K 3.9 08/06/2021     08/06/2021    CO2 23 08/06/2021       IMPRESSION:   Diagnosis Plan   1. Encounter for pre-operative examination  COVID-19 PCR   2. Benign essential hypertension  ECG 12 lead -Normal, Today   3. Ischemic cerebrovascular accident (CVA) (CMS/HCC) (HCC)     4. Type 2 diabetes mellitus without complication, with long-term current use of insulin (CMS/HCC) (HCC)     5. Hydronephrosis with urinary obstruction due to ureteral calculus     6. Anemia, unspecified type       Today's preoperative cardiac clearance is based on the Revised [place] cardiac risk index (RCRI).     RCRI:  RCI RISK CLASS III (2 risk factors, risk of major cardiac compl. appr. 3.6%)    This score has been based on the following risk factors for this patient:   - High-risk type of surgery = 0   - ischemic heart disease = 0   - CHF = 0    - CVA = 1    - Diabetes mellitus  requiring insulin = 1    - creatinine >2.0 mg/dL = 0    No risk factors - 0.4 percent (95% CI: 0.1-0.8)   One risk factor - 1.0 percent (95% CI: 0.5-1.4)   Two risk factors - 2.4 percent (95% CI: 1.3-3.5)   Three or more risk factors - 5.4 percent (95% CI: 2.8-7.9)     OVERALL EVALUATION: Patient is moderate risk for low risk surgery. He is medically optimized  and cleared for the above operation with the risk stratification profile as above. Patient expressed understanding and recognition of these risks.     Plavix to be held 5 days prior to surgery  No food or liquids the morning of surgery.  Call surgeon if develops respiratory illness, fever, or other illness.    A copy of this dictation is forwarded to the requesting physician.    Return for Next scheduled follow-up.      Britni Cody MD

## 2021-08-07 NOTE — TELEPHONE ENCOUNTER
Medication refill request:  Medication(s):  Plavix not filled due to (route to Nurse Pool) Drug-Drug Interaction, please review for refill

## 2021-08-09 ENCOUNTER — TELEPHONE (OUTPATIENT)
Dept: UROLOGY | Facility: CLINIC | Age: 61
End: 2021-08-09

## 2021-08-09 RX ORDER — CLOPIDOGREL BISULFATE 75 MG/1
TABLET ORAL
Qty: 90 TABLET | Refills: 3 | Status: SHIPPED | OUTPATIENT
Start: 2021-08-09 | End: 2022-07-27 | Stop reason: SDUPTHER

## 2021-08-09 NOTE — ASSESSMENT & PLAN NOTE
Hypertension is well controlled on losartan 100 mg daily, amlodipine 10 mg nightly.  He does monitor his blood pressure at home.  Reports his numbers typically run around 120/70. Normal reading in clinic today at 124/74.

## 2021-08-09 NOTE — ASSESSMENT & PLAN NOTE
CVA 5/2020. Thought to be secondary to uncontrolled hypertension.  Continues to have some residual left leg weakness.  Chronically managed on Plavix 75 mg daily.

## 2021-08-09 NOTE — TELEPHONE ENCOUNTER
This nurse called this patient at this time to advise of time change to procedure tomorrow 08/10/2021 to 1120 advised patient to be there by 0950 and NPO after midnight. Patient acknowledged and accepted information given.

## 2021-08-09 NOTE — TELEPHONE ENCOUNTER
Called and spoke with pre admissions and they state patient can take an uber or taxi home from the hospital. Called and notified patient of this. Patient verbalizes understanding and reports no questions or concerns. He is also wondering if he should wear deodorant tomorrow. Advised patient not to wear deodorant day of surgery.

## 2021-08-09 NOTE — TELEPHONE ENCOUNTER
VM at  1036am ON 8-9-21    Caller would like to discuss (a) QUESTIONS     Patient: Del L Patton    Callback Number: [phone]    Additional Info: Pt left vm with questions regarding lotions and transportation post surgery.

## 2021-08-10 ENCOUNTER — HOSPITAL ENCOUNTER (OUTPATIENT)
Facility: HOSPITAL | Age: 61
Setting detail: OBSERVATION
LOS: 1 days | Discharge: HOME | End: 2021-08-12
Attending: UROLOGY | Admitting: UROLOGY
Payer: COMMERCIAL

## 2021-08-10 ENCOUNTER — APPOINTMENT (OUTPATIENT)
Dept: RADIOLOGY | Facility: HOSPITAL | Age: 61
End: 2021-08-10
Payer: COMMERCIAL

## 2021-08-10 ENCOUNTER — APPOINTMENT (OUTPATIENT)
Dept: FLUOROSCOPY | Facility: HOSPITAL | Age: 61
End: 2021-08-10
Payer: COMMERCIAL

## 2021-08-10 ENCOUNTER — ANESTHESIA (OUTPATIENT)
Dept: OPERATING ROOM | Facility: HOSPITAL | Age: 61
End: 2021-08-10
Payer: COMMERCIAL

## 2021-08-10 ENCOUNTER — ANESTHESIA EVENT (OUTPATIENT)
Dept: OPERATING ROOM | Facility: HOSPITAL | Age: 61
End: 2021-08-10
Payer: COMMERCIAL

## 2021-08-10 DIAGNOSIS — R33.9 URINARY RETENTION: ICD-10-CM

## 2021-08-10 DIAGNOSIS — N20.1 CALCULUS OF LEFT URETER: Primary | ICD-10-CM

## 2021-08-10 DIAGNOSIS — I10 BENIGN ESSENTIAL HYPERTENSION: ICD-10-CM

## 2021-08-10 DIAGNOSIS — I63.9 ISCHEMIC CEREBROVASCULAR ACCIDENT (CVA) (CMS/HCC): ICD-10-CM

## 2021-08-10 PROBLEM — D64.9 ANEMIA: Status: ACTIVE | Noted: 2021-08-10

## 2021-08-10 PROBLEM — Z48.89 POSTOPERATIVE OBSERVATION: Status: ACTIVE | Noted: 2021-08-10

## 2021-08-10 LAB
GLUCOSE BLDC GLUCOMTR-SCNC: 107 MG/DL (ref 70–105)
GLUCOSE BLDC GLUCOMTR-SCNC: 131 MG/DL (ref 70–105)
GLUCOSE BLDC GLUCOMTR-SCNC: 146 MG/DL (ref 70–105)
GLUCOSE BLDC GLUCOMTR-SCNC: 163 MG/DL (ref 70–105)

## 2021-08-10 PROCEDURE — 51702 INSERT TEMP BLADDER CATH: CPT | Performed by: UROLOGY

## 2021-08-10 PROCEDURE — (BLANK) HC GENERAL ANESTHESIA FACILITY CHARGE 1ST 15 MIN: Performed by: UROLOGY

## 2021-08-10 PROCEDURE — 87088 URINE BACTERIA CULTURE: CPT | Performed by: UROLOGY

## 2021-08-10 PROCEDURE — 99213 OFFICE O/P EST LOW 20 MIN: CPT | Performed by: FAMILY MEDICINE

## 2021-08-10 PROCEDURE — (BLANK) HC GENERAL ANESTHESIA FACILITY CHARGE EACH ADDITIONAL MIN: Performed by: UROLOGY

## 2021-08-10 PROCEDURE — 00918 ANES TRURL PX URTRL CAL RMVL: CPT | Performed by: NURSE ANESTHETIST, CERTIFIED REGISTERED

## 2021-08-10 PROCEDURE — (BLANK) HC RECOVERY PHASE-1 1ST  HOUR ACUITY LEVEL 2: Performed by: UROLOGY

## 2021-08-10 PROCEDURE — (BLANK) HC ROOM PRIVATE

## 2021-08-10 PROCEDURE — 2720000000 HC SUPP 272 WO HCPCS: Performed by: UROLOGY

## 2021-08-10 PROCEDURE — 76000 FLUOROSCOPY <1 HR PHYS/QHP: CPT | Mod: NCP

## 2021-08-10 PROCEDURE — 51798 US URINE CAPACITY MEASURE: CPT

## 2021-08-10 PROCEDURE — 6370000100 HC RX 637 (ALT 250 FOR IP): Performed by: NURSE PRACTITIONER

## 2021-08-10 PROCEDURE — 2550000100 HC RX 255: Performed by: UROLOGY

## 2021-08-10 PROCEDURE — 2580000300 HC RX 258: Performed by: NURSE PRACTITIONER

## 2021-08-10 PROCEDURE — (BLANK) HC OR LEVEL 3 PROC 1ST 15MIN: Performed by: UROLOGY

## 2021-08-10 PROCEDURE — 2580000300 HC RX 258: Performed by: ANESTHESIOLOGY

## 2021-08-10 PROCEDURE — C1769 GUIDE WIRE: HCPCS | Performed by: UROLOGY

## 2021-08-10 PROCEDURE — 6360000200 HC RX 636 W HCPCS (ALT 250 FOR IP): Performed by: NURSE ANESTHETIST, CERTIFIED REGISTERED

## 2021-08-10 PROCEDURE — (BLANK) HC OR LEVEL 3 PROC EACH ADDITIONAL MIN: Performed by: UROLOGY

## 2021-08-10 PROCEDURE — 6360000200 HC RX 636 W HCPCS (ALT 250 FOR IP): Performed by: NURSE PRACTITIONER

## 2021-08-10 PROCEDURE — C1751 CATH, INF, PER/CENT/MIDLINE: HCPCS | Performed by: UROLOGY

## 2021-08-10 PROCEDURE — 74018 RADEX ABDOMEN 1 VIEW: CPT

## 2021-08-10 PROCEDURE — 82947 ASSAY GLUCOSE BLOOD QUANT: CPT | Mod: QW

## 2021-08-10 PROCEDURE — C2617 STENT, NON-COR, TEM W/O DEL: HCPCS | Performed by: UROLOGY

## 2021-08-10 DEVICE — STENT CONTOUR VL 6FX22-30CM: Type: IMPLANTABLE DEVICE | Site: URETER | Status: FUNCTIONAL

## 2021-08-10 RX ORDER — HYDROCODONE BITARTRATE AND ACETAMINOPHEN 5; 325 MG/1; MG/1
1-2 TABLET ORAL EVERY 4 HOURS PRN
Status: DISCONTINUED | OUTPATIENT
Start: 2021-08-10 | End: 2021-08-12 | Stop reason: HOSPADM

## 2021-08-10 RX ORDER — LABETALOL HCL 20 MG/4 ML
5 SYRINGE (ML) INTRAVENOUS EVERY 5 MIN PRN
Status: DISCONTINUED | OUTPATIENT
Start: 2021-08-10 | End: 2021-08-10 | Stop reason: HOSPADM

## 2021-08-10 RX ORDER — PHENAZOPYRIDINE HYDROCHLORIDE 200 MG/1
200 TABLET, FILM COATED ORAL 3 TIMES DAILY PRN
Status: DISCONTINUED | OUTPATIENT
Start: 2021-08-10 | End: 2021-08-12 | Stop reason: HOSPADM

## 2021-08-10 RX ORDER — HYDROCODONE BITARTRATE AND ACETAMINOPHEN 10; 325 MG/1; MG/1
1 TABLET ORAL EVERY 4 HOURS PRN
Status: DISCONTINUED | OUTPATIENT
Start: 2021-08-10 | End: 2021-08-10

## 2021-08-10 RX ORDER — SODIUM CHLORIDE 9 MG/ML
100 INJECTION, SOLUTION INTRAVENOUS CONTINUOUS
Status: DISCONTINUED | OUTPATIENT
Start: 2021-08-10 | End: 2021-08-12

## 2021-08-10 RX ORDER — PROPOFOL 10 MG/ML
INJECTION, EMULSION INTRAVENOUS AS NEEDED
Status: DISCONTINUED | OUTPATIENT
Start: 2021-08-10 | End: 2021-08-10 | Stop reason: SURG

## 2021-08-10 RX ORDER — FENTANYL CITRATE/PF 50 MCG/ML
50 PLASTIC BAG, INJECTION (ML) INTRAVENOUS EVERY 5 MIN PRN
Status: DISCONTINUED | OUTPATIENT
Start: 2021-08-10 | End: 2021-08-10 | Stop reason: HOSPADM

## 2021-08-10 RX ORDER — PHENAZOPYRIDINE HYDROCHLORIDE 200 MG/1
200 TABLET, FILM COATED ORAL 3 TIMES DAILY PRN
Status: DISCONTINUED | OUTPATIENT
Start: 2021-08-10 | End: 2021-08-10 | Stop reason: SDUPTHER

## 2021-08-10 RX ORDER — SERTRALINE HYDROCHLORIDE 50 MG/1
50 TABLET, FILM COATED ORAL DAILY
Status: DISCONTINUED | OUTPATIENT
Start: 2021-08-11 | End: 2021-08-12 | Stop reason: HOSPADM

## 2021-08-10 RX ORDER — DEXAMETHASONE SODIUM PHOSPHATE 4 MG/ML
INJECTION, SOLUTION INTRA-ARTICULAR; INTRALESIONAL; INTRAMUSCULAR; INTRAVENOUS; SOFT TISSUE AS NEEDED
Status: DISCONTINUED | OUTPATIENT
Start: 2021-08-10 | End: 2021-08-10 | Stop reason: SURG

## 2021-08-10 RX ORDER — CIPROFLOXACIN 2 MG/ML
400 INJECTION, SOLUTION INTRAVENOUS ONCE
Status: COMPLETED | OUTPATIENT
Start: 2021-08-10 | End: 2021-08-10

## 2021-08-10 RX ORDER — ONDANSETRON HYDROCHLORIDE 2 MG/ML
4 INJECTION, SOLUTION INTRAVENOUS ONCE AS NEEDED
Status: DISCONTINUED | OUTPATIENT
Start: 2021-08-10 | End: 2021-08-10 | Stop reason: HOSPADM

## 2021-08-10 RX ORDER — METFORMIN HYDROCHLORIDE 500 MG/1
1000 TABLET ORAL 2 TIMES DAILY WITH MEALS
Status: DISCONTINUED | OUTPATIENT
Start: 2021-08-10 | End: 2021-08-12 | Stop reason: HOSPADM

## 2021-08-10 RX ORDER — ONDANSETRON 4 MG/1
4 TABLET, FILM COATED ORAL EVERY 6 HOURS PRN
Status: DISCONTINUED | OUTPATIENT
Start: 2021-08-10 | End: 2021-08-12 | Stop reason: HOSPADM

## 2021-08-10 RX ORDER — FENTANYL CITRATE/PF 50 MCG/ML
PLASTIC BAG, INJECTION (ML) INTRAVENOUS AS NEEDED
Status: DISCONTINUED | OUTPATIENT
Start: 2021-08-10 | End: 2021-08-10 | Stop reason: SURG

## 2021-08-10 RX ORDER — INSULIN ASPART 100 [IU]/ML
0-15 INJECTION, SOLUTION INTRAVENOUS; SUBCUTANEOUS
Status: DISCONTINUED | OUTPATIENT
Start: 2021-08-10 | End: 2021-08-11

## 2021-08-10 RX ORDER — AMLODIPINE BESYLATE 10 MG/1
10 TABLET ORAL NIGHTLY
Status: DISCONTINUED | OUTPATIENT
Start: 2021-08-10 | End: 2021-08-10

## 2021-08-10 RX ORDER — TALC
3 POWDER (GRAM) TOPICAL NIGHTLY PRN
Status: DISCONTINUED | OUTPATIENT
Start: 2021-08-10 | End: 2021-08-12 | Stop reason: HOSPADM

## 2021-08-10 RX ORDER — SODIUM CHLORIDE, SODIUM LACTATE, POTASSIUM CHLORIDE, CALCIUM CHLORIDE 600; 310; 30; 20 MG/100ML; MG/100ML; MG/100ML; MG/100ML
100 INJECTION, SOLUTION INTRAVENOUS CONTINUOUS
Status: DISCONTINUED | OUTPATIENT
Start: 2021-08-10 | End: 2021-08-10

## 2021-08-10 RX ORDER — ACETAMINOPHEN 325 MG/1
325-650 TABLET ORAL EVERY 4 HOURS PRN
Status: DISCONTINUED | OUTPATIENT
Start: 2021-08-10 | End: 2021-08-12 | Stop reason: HOSPADM

## 2021-08-10 RX ORDER — TAMSULOSIN HYDROCHLORIDE 0.4 MG/1
0.4 CAPSULE ORAL DAILY
Status: DISCONTINUED | OUTPATIENT
Start: 2021-08-11 | End: 2021-08-12 | Stop reason: HOSPADM

## 2021-08-10 RX ORDER — SODIUM CHLORIDE 0.9 % (FLUSH) 0.9 %
2 SYRINGE (ML) INJECTION AS NEEDED
Status: DISCONTINUED | OUTPATIENT
Start: 2021-08-10 | End: 2021-08-10 | Stop reason: HOSPADM

## 2021-08-10 RX ORDER — ONDANSETRON HYDROCHLORIDE 2 MG/ML
INJECTION, SOLUTION INTRAVENOUS AS NEEDED
Status: DISCONTINUED | OUTPATIENT
Start: 2021-08-10 | End: 2021-08-10 | Stop reason: SURG

## 2021-08-10 RX ORDER — ONDANSETRON HYDROCHLORIDE 2 MG/ML
4 INJECTION, SOLUTION INTRAVENOUS EVERY 6 HOURS PRN
Status: DISCONTINUED | OUTPATIENT
Start: 2021-08-10 | End: 2021-08-12 | Stop reason: HOSPADM

## 2021-08-10 RX ORDER — IOPAMIDOL 755 MG/ML
INJECTION, SOLUTION INTRAVASCULAR AS NEEDED
Status: DISCONTINUED | OUTPATIENT
Start: 2021-08-10 | End: 2021-08-10 | Stop reason: HOSPADM

## 2021-08-10 RX ORDER — LIDOCAINE HYDROCHLORIDE 20 MG/ML
INJECTION, SOLUTION EPIDURAL; INFILTRATION; INTRACAUDAL; PERINEURAL AS NEEDED
Status: DISCONTINUED | OUTPATIENT
Start: 2021-08-10 | End: 2021-08-10 | Stop reason: SURG

## 2021-08-10 RX ORDER — CLOPIDOGREL BISULFATE 75 MG/1
75 TABLET ORAL DAILY
Status: DISCONTINUED | OUTPATIENT
Start: 2021-08-11 | End: 2021-08-12 | Stop reason: HOSPADM

## 2021-08-10 RX ORDER — ROSUVASTATIN CALCIUM 20 MG/1
40 TABLET, COATED ORAL NIGHTLY
Status: DISCONTINUED | OUTPATIENT
Start: 2021-08-10 | End: 2021-08-12 | Stop reason: HOSPADM

## 2021-08-10 RX ORDER — ONDANSETRON 4 MG/1
4 TABLET, ORALLY DISINTEGRATING ORAL EVERY 6 HOURS PRN
Status: DISCONTINUED | OUTPATIENT
Start: 2021-08-10 | End: 2021-08-12 | Stop reason: HOSPADM

## 2021-08-10 RX ORDER — SODIUM CHLORIDE 0.9 % (FLUSH) 0.9 %
2 SYRINGE (ML) INJECTION EVERY 8 HOURS SCHEDULED
Status: DISCONTINUED | OUTPATIENT
Start: 2021-08-10 | End: 2021-08-10 | Stop reason: HOSPADM

## 2021-08-10 RX ORDER — LOSARTAN POTASSIUM 50 MG/1
100 TABLET ORAL NIGHTLY
Status: DISCONTINUED | OUTPATIENT
Start: 2021-08-10 | End: 2021-08-11

## 2021-08-10 RX ORDER — SODIUM CHLORIDE 0.9 % (FLUSH) 0.9 %
3 SYRINGE (ML) INJECTION AS NEEDED
Status: DISCONTINUED | OUTPATIENT
Start: 2021-08-10 | End: 2021-08-12 | Stop reason: HOSPADM

## 2021-08-10 RX ADMIN — FENTANYL CITRATE 50 MCG: 0.05 INJECTION, SOLUTION INTRAMUSCULAR; INTRAVENOUS at 12:53

## 2021-08-10 RX ADMIN — FENTANYL CITRATE 50 MCG: 0.05 INJECTION, SOLUTION INTRAMUSCULAR; INTRAVENOUS at 12:46

## 2021-08-10 RX ADMIN — FENTANYL CITRATE 25 MCG: 0.05 INJECTION, SOLUTION INTRAMUSCULAR; INTRAVENOUS at 13:16

## 2021-08-10 RX ADMIN — FENTANYL CITRATE 25 MCG: 0.05 INJECTION, SOLUTION INTRAMUSCULAR; INTRAVENOUS at 13:28

## 2021-08-10 RX ADMIN — ONDANSETRON 4 MG: 2 INJECTION INTRAMUSCULAR; INTRAVENOUS at 12:56

## 2021-08-10 RX ADMIN — FENTANYL CITRATE 25 MCG: 0.05 INJECTION, SOLUTION INTRAMUSCULAR; INTRAVENOUS at 13:25

## 2021-08-10 RX ADMIN — PROPOFOL 20 MG: 10 INJECTION, EMULSION INTRAVENOUS at 12:53

## 2021-08-10 RX ADMIN — FENTANYL CITRATE 25 MCG: 0.05 INJECTION, SOLUTION INTRAMUSCULAR; INTRAVENOUS at 13:08

## 2021-08-10 RX ADMIN — FENTANYL CITRATE 25 MCG: 0.05 INJECTION, SOLUTION INTRAMUSCULAR; INTRAVENOUS at 13:23

## 2021-08-10 RX ADMIN — DEXAMETHASONE SODIUM PHOSPHATE 4 MG: 4 INJECTION, SOLUTION INTRAMUSCULAR; INTRAVENOUS at 12:42

## 2021-08-10 RX ADMIN — FENTANYL CITRATE 25 MCG: 0.05 INJECTION, SOLUTION INTRAMUSCULAR; INTRAVENOUS at 13:39

## 2021-08-10 RX ADMIN — Medication 3 MG: at 21:33

## 2021-08-10 RX ADMIN — CIPROFLOXACIN 400 MG: 2 INJECTION, SOLUTION INTRAVENOUS at 12:39

## 2021-08-10 RX ADMIN — PROPOFOL 20 MG: 10 INJECTION, EMULSION INTRAVENOUS at 13:25

## 2021-08-10 RX ADMIN — HYDROCODONE BITARTRATE AND ACETAMINOPHEN 1 TABLET: 10; 325 TABLET ORAL at 20:12

## 2021-08-10 RX ADMIN — PROPOFOL 50 MG: 10 INJECTION, EMULSION INTRAVENOUS at 12:37

## 2021-08-10 RX ADMIN — SODIUM CHLORIDE 100 ML/HR: 9 INJECTION, SOLUTION INTRAVENOUS at 15:11

## 2021-08-10 RX ADMIN — ROSUVASTATIN CALCIUM 40 MG: 20 TABLET, FILM COATED ORAL at 20:11

## 2021-08-10 RX ADMIN — SODIUM CHLORIDE, POTASSIUM CHLORIDE, SODIUM LACTATE AND CALCIUM CHLORIDE: 600; 310; 30; 20 INJECTION, SOLUTION INTRAVENOUS at 12:37

## 2021-08-10 RX ADMIN — PROPOFOL 20 MG: 10 INJECTION, EMULSION INTRAVENOUS at 13:28

## 2021-08-10 RX ADMIN — LOSARTAN POTASSIUM 100 MG: 50 TABLET, FILM COATED ORAL at 20:11

## 2021-08-10 RX ADMIN — LIDOCAINE HYDROCHLORIDE 100 MG: 20 INJECTION, SOLUTION EPIDURAL; INFILTRATION; INTRACAUDAL; PERINEURAL at 12:37

## 2021-08-10 ASSESSMENT — ENCOUNTER SYMPTOMS: CVA RESIDUAL SYMPTOMS: RESIDUAL SYMPTOMS

## 2021-08-10 ASSESSMENT — ACTIVITIES OF DAILY LIVING (ADL): ASSISTIVE_DEVICES: WALKER

## 2021-08-10 NOTE — INTERDISCIPLINARY/THERAPY
"Case Management Admission Note    Phone # [phone]    Living Situation: Alone Private residence            ADLs: Independent  Stairs: No    HME/CPAP: Front Wheeled [employer] Equipment Vendor: na    Oxygen: No      Home Health:No     Current Resources: None      Diabetes/supplies: Do you have Diabetes?: No  PCP: Klarissa Lester MD  Funding: [facility]  Pharmacy:[facility] [address] [place] [address], [place] [address]    Support Person: Primary Emergency Contact: Khushi Mckeon, Home Phone: [phone], Mobile Phone: [phone], Relation: Friend  Needs transportation assistance at DC: No (drove to [employer] [place] in [address] parking lot.)     Discharge Needs/Barriers:    Narrative: Asked to visit with pt in PreOp as no 24hr support postop.  Introduced myself to pt, explained CM role.  He had asked a friend to drive him to [facility] today but that person was a no call/no show so Del drove himself.  No one available to stay with him nor call him to check up.  Further questioning and conversation revealed that he's been falling more since his 4/2021 office visit with Dr. Lester, he fell over on the bike path and required 3 bystanders to help him get up but was dizzzy and had loss of balance during the process, that he tries to get down on his living room floor to do exercises as he wants to build muscle to make sure he can continue getting up after falling, he has started using a FWW when out of the house, furniture \"surfs\" when in his house to maintain his balance, has to have a chair beside him when getting up off the floor from his exercises, feels very dizzy/woozy when changing positions from laying to sitting to standing, feels normal when just sitting which is why he continues to drive, gets his own groceries and runs errands, elevator in his appt building, wants to keep his appt with Dr. Lester in 2 weeks because he wants to talk to her about his b/p medicine as he thinks he's getting low b/p, takes his b/p frequently " with his own machine, had his machine checked against the HH RN's last year after his CVA and it was accurate, the lowest reading he remembers is 100/56, he'd like to know if he could be more stable with a cane or bilateral walking sticks than a FWW.  With having anesthesia today, with not having anyone to visit/call him at home today and with his recent LOB/dizziness/falling, I recommend he be admitted postop.  I asked if I could call Dr. Lester and update her on the above and he agreed.  I suggested a consult for PT/OT while hospitalized to determine stability and possible DME rec's- he would agree to PT but states he didn't really think that OT benefited him post-CVA.  I asked if he would consider  again to monitor b/p and/or med changes; he doesn't think he could benefit from SN because he has a med planner, demonstrated knowledge of it's use and takes his b/p currently.  He also wouldn't be able to maintain homebound status as he doesn't have a support system to run his errands.  He agreed that he could manage closer f/u in the clinic with Dr. Lester.  He told me he drove a brown [employer] [place] with [place] 2 license plates, put it in the [address] Parking lot and agreed for me to call Security to let them know.  Sarah, RN present for conversation.  Update to Cheri Preop NING.    Received call from Ann Marie, MINERV[facility] for Dr. Leiva.  Informed of the above.  Later, Ann Marie called back stating OR would continue for today.  Ann Marie is available to talk to Dr. Lester if needed.  Pt will be admitted postop with Hospitalist consult.  Asked for PT/OT eval.    Called Dr. Trevon Avendaño's nurse.  Informed of the above.  Later, received a call from Dr. Lester.  Updated on the above.  Offered to coordinate a call between Dr. Lester and Hospitalist today and/or tomorrow as she has ideas about what kind of work up Del will need while admitted and as an outpt.     Spoke with Dr. Acosta.  Briefly updated on the above with the request to call Dr. Lester.   Provided her cell number.  Updated Dr. Lester that if she's not able to connect with Dr. Acosta today, then we'll attempt tomorrow's Hospitalist.     Dispo:

## 2021-08-10 NOTE — ANESTHESIA POSTPROCEDURE EVALUATION
Patient: Del Mckeon    Procedure Summary     Date: 08/10/21 Room / Location: [facility] OR 04 / [facility] OR    Anesthesia Start: 1234 Anesthesia Stop: 1347    Procedure: CYSTOSCOPY LEFT URETEROSCOPY WITH LASER LITHOTRIPSY WITH STENT PLACEMENT (Left Ureter) Diagnosis:       Calculus of ureter      Hydronephrosis with urinary obstruction due to ureteral calculus      (Calculus of ureter [N20.1])      (Hydronephrosis with urinary obstruction due to ureteral calculus [N13.2])    Surgeons: Kaylen Leiva MD Responsible Provider: Adán Marie MD    Anesthesia Type: general ASA Status: 3          Anesthesia Type: general    Last vitals  Vitals Value Taken Time   /80 08/10/21 1415   Temp 36.7 °C (98.1 °F) 08/10/21 1350   Pulse 64 08/10/21 1415   Resp 14 08/10/21 1415   SpO2 94 % 08/10/21 1415   0-10 Pain Score 0 - No pain 08/10/21 1355       Anesthesia Post Evaluation    Patient location during evaluation: PACU  Patient participation: complete - patient participated  Level of consciousness: awake and alert  Pain management: adequate  Airway patency: patent  Anesthetic complications: no  Cardiovascular status: acceptable  Respiratory status: acceptable  Hydration status: acceptable  May dismiss recovered patient based on consultation with the appropriate physicians and/or meeting appropriate discharge criteria      Cosmetic?  This procedure is not cosmetic.

## 2021-08-10 NOTE — OP NOTE
Pre-operative diagnosis:  Left ureteral stones  Post-operative diagnosis:  same  Procedure:  Cytoscopy, left retrograde pyelogram, left ureteroscopy with laser of stones, left ureteral stent placement  Surgeon: Cali  Anesthesia:  general    Procedure:  The procedure, usual risks and benefits were explained and the patient wishes to proceed.  The patient was taken to the operating room and placed supine in the operating room bed.  General anesthesia was induced.  IV antibiotics were given.  He was transferred to dorsolithotomy position and prepped and draped in the usual sterile fashion.   Cystourethroscopy was performed using the 22 Fr cystoscope.  Inspection of the urethra demonstrated no abnormalities except minimally enlarged prostate and elevated bladder neck.  Inspection of the bladder demonstrated no abnormalities.  Clear efflux was noted from both ureteral orifices.  Urine was obtained for culture.  A ureteral access catheter was placed in the left ureteral orifice in preparation for retrograde pyelogram.  Contrast was injected in a retrograde fashion.    Interpretation:  2 small  filling defects were present in the mid to distal left ureter consistent with his known stones with mild hydronephrosis.      A 0.035 Glidewire was passed through the ureteral access catheter and into the left collecting system under fluoroscopic and cystoscopic guidance.  The ureteral access catheter was removed.  The cystoscope was removed.   The Cristina semirigid ureteroscope was then advanced through the urethra and into the left ureter.  The stones were identified.  The holmium laser was used with complete treatment.  Only small fragments remained not allowing removal.  The ureteroscope was removed.  The cystoscope was backloaded on the wire.  A 6 Arabic multi-length left ureteral stent was then placed under fluoroscopic and cystoscopic guidance.  The strings were left attached.  The patient's bladder was drained.  The cystoscope  was removed.  He tolerated the procedure well and was transferred to the recovery room in stable condition.    He may remove the stent in 4-5 days.

## 2021-08-10 NOTE — PERIOPERATIVE NURSING NOTE
The patient has stated that he lives alone and intends on taking a taxi home after surgery. Cheri GU RN notified. Dr. Leiva will be notified ASAP.

## 2021-08-10 NOTE — ANESTHESIA PREPROCEDURE EVALUATION
"Pre-Procedure Assessment    Patient: Del Mckeon, male, 61 y.o.    Ht Readings from Last 1 Encounters:   08/06/21 1.803 m (5' 11\")     Wt Readings from Last 1 Encounters:   08/10/21 79.5 kg (175 lb 4.3 oz)       Last Vitals  /77 (08/10/21 1015)    Temp 36.4 °C (97.5 °F) (08/10/21 1015)    Pulse 68 (08/10/21 1015)   Resp 14 (08/10/21 1015)    SpO2 100 % (08/10/21 1015)    Pain Score 0 - No pain (08/10/21 1015)       Problem list reviewed and Medical history reviewed    No history of anesthetic complications:    No family history of anesthetic complications:      Airway   Mallampati: I  TM distance: >3 FB  Neck ROM: full      Dental - normal exam     Pulmonary - normal exam    breath sounds clear to auscultation  (+) asthma, sleep apnea,   Cardiovascular - normal exam  (+) hypertension well controlled, valvular problems/murmurs AS,     Rhythm: regular  Rate: normal    Mental Status/Neuro/Psych    Pt is alert.      (+) CVA residual symptoms,     Comments: CVA 5/2020. Thought to be secondary to uncontrolled hypertension.  Continues to have some residual left leg weakness.  Chronically managed on Plavix 75 mg daily.    GI/Hepatic/Renal - negative ROS   (-) GERD    Endo/Other    (+) diabetes mellitus type 2 well controlled,     Comments: Diabetes has been well controlled on Metformin 1000 mg twice daily. His last A1c from 4 months ago was 5.9%.  Abdominal           Social History     Tobacco Use   • Smoking status: Never Smoker   • Smokeless tobacco: Never Used   Substance Use Topics   • Alcohol use: No     Comment: had 1 beer in 2010      Hematology   WBC   Date Value Ref Range Status   08/06/2021 6.7 3.7 - 9.6 10*3/uL Final     RBC   Date Value Ref Range Status   08/06/2021 3.70 (L) 4.10 - 5.80 10*6/µL Final     MCV   Date Value Ref Range Status   08/06/2021 93.6 82.0 - 97.0 fL Final     Hemoglobin   Date Value Ref Range Status   08/06/2021 11.6 (L) 13.2 - 17.2 g/dL Final     Hematocrit   Date Value Ref Range " Status   08/06/2021 34.6 (L) 38.0 - 50.0 % Final     Platelets   Date Value Ref Range Status   08/06/2021 183 130 - 350 10*3/uL Final      Coagulation No results found for: PT, APTT, INR   General Chemistry   POC Glucose   Date Value Ref Range Status   08/10/2021 107 (H) 70 - 105 mg/dL Final     Calcium   Date Value Ref Range Status   08/06/2021 9.6 8.6 - 10.3 mg/dL Final     BUN   Date Value Ref Range Status   08/06/2021 39 (H) 7 - 25 mg/dL Final     Creatinine   Date Value Ref Range Status   08/06/2021 1.48 (H) 0.70 - 1.30 mg/dL Final     Glucose   Date Value Ref Range Status   08/06/2021 115 (H) 70 - 105 mg/dL Final     Sodium   Date Value Ref Range Status   08/06/2021 139 135 - 145 mmol/L Final     Potassium   Date Value Ref Range Status   08/06/2021 3.9 3.5 - 5.1 mmol/L Final     Magnesium   Date Value Ref Range Status   06/16/2021 1.7 (L) 1.8 - 2.4 mg/dL Final     CO2   Date Value Ref Range Status   08/06/2021 23 21 - 32 mmol/L Final     Chloride   Date Value Ref Range Status   08/06/2021 106 98 - 107 mmol/L Final     Anesthesia Plan    ASA 3   NPO status reviewed: > 8 hours    General         Induction: intravenous   Airway Planning: LMA          Plan for postoperative opioid use.    Anesthetic plan and risks discussed with patient.      Plan discussed with CRNA.

## 2021-08-10 NOTE — ASSESSMENT & PLAN NOTE
Diabetes has been well controlled on Metformin 1000 mg twice daily. His last A1c from 4 months ago was 5.9%. Repeat A1c today as part of his preoperative work-up returned at 5.4%.

## 2021-08-10 NOTE — INTERVAL H&P NOTE
H&P reviewed. The patient was examined and there are no changes to the H&P.    Patient seen and examined by anesthesia today.  No changes from last urology exam.

## 2021-08-10 NOTE — BRIEF OP NOTE
Brief Op Note:    Del Mckeon  8/10/2021    Pre-op Diagnosis     * Calculus of ureter [N20.1]     * Hydronephrosis with urinary obstruction due to ureteral calculus [N13.2]       Post-op Diagnosis     * Calculus of ureter [N20.1]     * Hydronephrosis with urinary obstruction due to ureteral calculus [N13.2]    Procedures:    * CYSTOSCOPY LEFT URETEROSCOPY WITH LASER LITHOTRIPSY WITH STENT PLACEMENT    Surgeon(s):  Kaylen Leiva MD    Anesthesia:  Anesthesiologist: Adán Marie MD  CRNA: Jeremiah Torres CRNA  General      Pain Block:  Not requested    Staff:   Circulator: Genesis Dave RN  Laser Staff: Xi Arroyo  Scrub Person: Jeremy Davis    Estimated Blood Loss:  No blood loss documented.    Specimens:  Order Name Source Comment Collection Info Order Time   URINE CULTURE Urine, Straight Catheter  Collected By: Kaylen Leiva MD 8/10/2021 12:59 PM         Drains:  * No LDAs found *    Complications:  None    SURGICAL SITE INFECTION PRECAUTIONS:  Were wound protectors used?No  Was a new tray used for closing? No  Were gloves changed prior to closing? No  Was Aseptic Technique ever compromised? No  Was there an issue or concern during the procedure? No  Document issues or concerns in the comments below.  Comments:none      Kaylen Leiva MD  Phone Number: [phone]    Date: 8/10/2021  Time: 1:58 PM

## 2021-08-10 NOTE — ASSESSMENT & PLAN NOTE
Stable normocytic anemia noted on lab work dating back to April. He has had a fairly slow downtrend over the last year upon further review of his lab work.  Hemoglobin on CBC today 11.6 which appears to be stable from 3 weeks ago.  Suspect chronic blood loss through GI urinary tract. Noted positive Cologuard from 7/14/2021.  Additionally also has a history of microscopic hematuria.  Urologic procedure hopefully will provide some insight on hematuria.  Referral to GI for diagnostic colonoscopy has been placed.

## 2021-08-10 NOTE — PLAN OF CARE
Problem: Knowledge Deficit  Goal: Patient/family/caregiver demonstrates understanding of disease process, treatment plan, medications, and discharge instructions  Description: INTERVENTIONS:   1. Complete learning assessment and assess knowledge base  2. Provide teaching at level of understanding   3. Provide teaching via preferred learning methods  Outcome: Progressing     Problem: Potential for Compromised Skin Integrity  Goal: Skin Integrity is Maintained or Improved  Description: INTERVENTIONS:  1. Assess and monitor skin integrity  2. Collaborate with interdisciplinary team and initiate plans and interventions as needed  3. Alternate a full bath with partial baths for elderly   4. Monitor patient's hygiene practices   5. Collaborate with wound, ostomy, and continence nurse  Outcome: Progressing  Goal: Nutritional status is improving  Description: INTERVENTIONS:  1. Monitor and assess patient for malnutrition (ex- brittle hair, bruises, dry skin, pale skin and conjunctiva, muscle wasting, smooth red tongue, and disorientation)  2. Monitor patient's weight and dietary intake as ordered or per policy  3. Determine patient's food preferences and provide high-protein, high-caloric foods as appropriate  4. Assist patient with eating   5. Allow adequate time for meals   6. Encourage patient to take dietary supplement as ordered   7. Collaborate with dietitian  8. Include patient/family/caregiver in decisions related to nutrition  Outcome: Progressing  Goal: MOBILITY IS MAINTAINED OR IMPROVED  Description: INTERVENTIONS  1. Collaborate with interdisciplinary team and initiate plan and interventions as ordered (PT/OT)  2. Encourage ambulation  3. Up to chair for meals  4. Monitor for signs of deconditioning  Outcome: Progressing     Problem: Urinary Incontinence  Goal: Perineal skin integrity is maintained or improved  Description: INTERVENTIONS:  1. Assess genitourinary system, perineal skin, labs (urinalysis), and  history of incontinence to include past management, aggravating, and alleviating factors  2. Collaborate with interdisciplinary team including wound, ostomy, and continence nurse and initiate plans and interventions as needed  4. Consider urine containment device  5. Apply skin protectant   6. Develop skin care regimen  7. Provide privacy when changing patient's incontinence device to maintain their dignity  Outcome: Progressing     Problem: Safety Adult - Fall  Goal: Free from fall injury  Description: INTERVENTIONS:    Inpatient - Please reference Cares/Safety Flowsheet under [place] Fall Risk for interventions.  Pediatrics - Please reference Peds Daily Cares/Safety Flowsheet under [place] Pediatric Fall Assessment Fall Bundle for interventions  LD/OB - Please reference OB Shift Screening Flowsheet under OB Fall Risk for interventions.  Outcome: Progressing

## 2021-08-10 NOTE — ANESTHESIA PROCEDURE NOTES
Airway  Date/Time: 8/10/2021 12:39 PM  Urgency: elective    Airway not difficult    General Information and Staff    Patient location during procedure: OR  Anesthesiologist: Adán Marie MD  CRNA: Jeremiah Torres CRNA  Performed: CRNA     Indications and Patient Condition  Indications for airway management: anesthesia  Spontaneous Ventilation: absent  Sedation level: deep  Preoxygenated: yes  MILS maintained throughout  Mask difficulty assessment: 0 - not attempted    Final Airway Details  Final airway type: supraglottic airway      Successful airway: unique  Size 4    Placement verified by: chest auscultation, capnometry and palpation of cuff   Number of attempts at approach: 1
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
## 2021-08-11 ENCOUNTER — APPOINTMENT (OUTPATIENT)
Dept: MRI IMAGING | Facility: HOSPITAL | Age: 61
End: 2021-08-11
Payer: COMMERCIAL

## 2021-08-11 PROBLEM — Z86.73 HISTORY OF LACUNAR CEREBROVASCULAR ACCIDENT (CVA): Status: ACTIVE | Noted: 2020-05-21

## 2021-08-11 PROBLEM — I95.1 ORTHOSTATIC HYPOTENSION: Status: ACTIVE | Noted: 2021-08-11

## 2021-08-11 PROBLEM — N20.1 CALCULUS OF LEFT URETER: Status: ACTIVE | Noted: 2021-07-02

## 2021-08-11 LAB
GLUCOSE BLDC GLUCOMTR-SCNC: 101 MG/DL (ref 70–105)
GLUCOSE BLDC GLUCOMTR-SCNC: 122 MG/DL (ref 70–105)
GLUCOSE BLDC GLUCOMTR-SCNC: 126 MG/DL (ref 70–105)
GLUCOSE BLDC GLUCOMTR-SCNC: 149 MG/DL (ref 70–105)

## 2021-08-11 PROCEDURE — 51702 INSERT TEMP BLADDER CATH: CPT | Performed by: UROLOGY

## 2021-08-11 PROCEDURE — 99225 *NO LONGER ACTIVE 2023 DO NOT USE*  PR SBSQ OBSERVATION CARE/DAY 25 MIN: CPT | Performed by: INTERNAL MEDICINE

## 2021-08-11 PROCEDURE — 51798 US URINE CAPACITY MEASURE: CPT

## 2021-08-11 PROCEDURE — 2580000300 HC RX 258: Performed by: NURSE PRACTITIONER

## 2021-08-11 PROCEDURE — 82947 ASSAY GLUCOSE BLOOD QUANT: CPT | Mod: QW

## 2021-08-11 PROCEDURE — 97161 PT EVAL LOW COMPLEX 20 MIN: CPT | Mod: GP

## 2021-08-11 PROCEDURE — 6370000100 HC RX 637 (ALT 250 FOR IP): Performed by: NURSE PRACTITIONER

## 2021-08-11 PROCEDURE — 52356 CYSTO/URETERO W/LITHOTRIPSY: CPT | Mod: LT | Performed by: UROLOGY

## 2021-08-11 PROCEDURE — 6370000100 HC RX 637 (ALT 250 FOR IP): Performed by: UROLOGY

## 2021-08-11 PROCEDURE — 51702 INSERT TEMP BLADDER CATH: CPT

## 2021-08-11 PROCEDURE — 70551 MRI BRAIN STEM W/O DYE: CPT | Mod: MG

## 2021-08-11 PROCEDURE — G0378 HOSPITAL OBSERVATION PER HR: HCPCS

## 2021-08-11 PROCEDURE — 97116 GAIT TRAINING THERAPY: CPT | Mod: GP

## 2021-08-11 RX ORDER — INSULIN ASPART 100 [IU]/ML
0-15 INJECTION, SOLUTION INTRAVENOUS; SUBCUTANEOUS
Status: DISCONTINUED | OUTPATIENT
Start: 2021-08-11 | End: 2021-08-12 | Stop reason: HOSPADM

## 2021-08-11 RX ORDER — SODIUM CHLORIDE 9 MG/ML
500 INJECTION, SOLUTION INTRAVENOUS ONCE
Status: COMPLETED | OUTPATIENT
Start: 2021-08-11 | End: 2021-08-11

## 2021-08-11 RX ADMIN — TAMSULOSIN HYDROCHLORIDE 0.4 MG: 0.4 CAPSULE ORAL at 00:14

## 2021-08-11 RX ADMIN — HYDROCODONE BITARTRATE AND ACETAMINOPHEN 2 TABLET: 5; 325 TABLET ORAL at 00:14

## 2021-08-11 RX ADMIN — SODIUM CHLORIDE 100 ML/HR: 9 INJECTION, SOLUTION INTRAVENOUS at 12:30

## 2021-08-11 RX ADMIN — SODIUM CHLORIDE 100 ML/HR: 9 INJECTION, SOLUTION INTRAVENOUS at 00:15

## 2021-08-11 RX ADMIN — SODIUM CHLORIDE 100 ML/HR: 9 INJECTION, SOLUTION INTRAVENOUS at 16:34

## 2021-08-11 RX ADMIN — SODIUM CHLORIDE 500 ML: 9 INJECTION, SOLUTION INTRAVENOUS at 10:47

## 2021-08-11 RX ADMIN — SODIUM CHLORIDE 100 ML/HR: 9 INJECTION, SOLUTION INTRAVENOUS at 10:08

## 2021-08-11 RX ADMIN — SERTRALINE 50 MG: 50 TABLET, FILM COATED ORAL at 09:07

## 2021-08-11 RX ADMIN — HYDROCODONE BITARTRATE AND ACETAMINOPHEN 2 TABLET: 5; 325 TABLET ORAL at 04:41

## 2021-08-11 RX ADMIN — ROSUVASTATIN CALCIUM 40 MG: 20 TABLET, FILM COATED ORAL at 21:00

## 2021-08-11 NOTE — CONSULTATION
CONSULT NOTE    Reason for consultation: Perioperative management of hypertension and diabetes and chronic lightheadedness    Consult requested by: Urology    HPI  Del Mckeon is a 61 y.o. male admitted to observation s/p cystoscopy, left ureteroscopy with laser lithotripsy x 2 stones and stent.  Medicine is requested to manage the patient's diabetes and hypertension in the setting of the patient's chronic L weakness due to CVA 5/2020 and his reported lightheadedness on standing described as hypotensive episodes.      He takes Amlodipine QHS and Losartan QAM and states his BP is generally well controlled, occasionally in the 100s systolic.  He often feels lightheaded on standing, especially first thing in the morning.  His last HgbA1c was 5.4 on chronic Metformin.  He has chronic mild anemia as has been referred to GI for colonoscopy.He has mild L sided weakness post CVA but lives alone and performs all ADLs.  He has fallen in the past.      REVIEW OF SYSTEMS  Comprehensive review of 10 other systems were negative after careful review.    PAST MEDICAL HISTORY  Past Medical History:   Diagnosis Date   • Anemia    • Asthma     no attacks as long as away from hay fever allergens in [place]   • Cardiovascular disease     known bicuspid aortic valve, mild AVS, murmur   • Depressive disorder    • Endocrine disorder    • Heart murmur    • Hyperlipidemia    • Kidney disease 2021, 2011    calculi   • Obesity    • Peripheral neuropathy     L arm    • Stroke (CMS/[facility]) ([facility]) 05/21/2020    ischemic, L sided weakness; L leg weakness remains       PAST SURGICAL HISTORY  Past Surgical History:   Procedure Laterality Date   • BACK SURGERY  2018    lumbar discectomy   • CRYOTHERAPY      dermatology   • EYE SURGERY Bilateral 08/2020    cataracts       SOCIAL HISTORY  Social History     Socioeconomic History   • Marital status:      Spouse name: Not on file   • Number of children: Not on file   • Years of education: Not on  file   • Highest education level: Not on file   Occupational History   • Not on file   Tobacco Use   • Smoking status: Never Smoker   • Smokeless tobacco: Never Used   Vaping Use   • Vaping Use: Never used   Substance and Sexual Activity   • Alcohol use: No     Comment: had 1 beer in 2010   • Drug use: Never   • Sexual activity: Not Currently   Other Topics Concern   • Not on file   Social History Narrative   • Not on file     Social Determinants of Health     Financial Resource Strain:    • Difficulty of Paying Living Expenses:    Food Insecurity:    • Worried About Running Out of Food in the Last Year:    • Ran Out of Food in the Last Year:    Transportation Needs:    • Lack of Transportation (Medical):    • Lack of Transportation (Non-Medical):    Physical Activity:    • Days of Exercise per Week:    • Minutes of Exercise per Session:    Stress:    • Feeling of Stress :    Social Connections:    • Frequency of Communication with Friends and Family:    • Frequency of Social Gatherings with Friends and Family:    • Attends [employer] Services:    • Active Member of Clubs or Organizations:    • Attends Club or Organization Meetings:    • Marital Status:    Intimate Partner Violence:    • Fear of Current or Ex-Partner:    • Emotionally Abused:    • Physically Abused:    • Sexually Abused:        Family History:  family history includes Alzheimer's disease in his father; Diabetes in his sister; Diabetes type II in his father.    Allergies:  Allergies   Allergen Reactions   • Penicillins      Tested as a child; showed positive       Medications:   Prior to Admission medications    Medication Sig Start Date End Date Taking? Authorizing Provider   clopidogreL (PLAVIX) 75 mg tablet Take 1 tablet by mouth once daily 8/9/21  Yes Klarissa Lester MD   potassium 99 mg tablet Take 1 tab/cap by mouth daily   Yes Historical Provider, MD   omega-3 fatty acids-fish oil (Fish OiL) 340-1,000 mg capsule Take 1 tab/cap by mouth daily   Yes  Historical Provider, MD   losartan (COZAAR) 100 mg tablet Take 1 tablet by mouth once daily 7/30/21  Yes Klarissa Lester MD   sertraline (ZOLOFT) 50 mg tablet Take 1 tablet by mouth once daily 7/23/21  Yes Klarissa Lester MD   amLODIPine (NORVASC) 10 mg tablet Take 1 tablet by mouth nightly 7/13/21  Yes Klarissa Lester MD   tamsulosin (FLOMAX) 0.4 mg capsule Take 1 capsule (0.4 mg total) by mouth daily 7/2/21 8/31/21 Yes Ann Marie Mcdaniels CNP   metFORMIN (GLUCOPHAGE) 500 mg tablet Take 2 tablets (1,000 mg total) by mouth 2 (two) times a day with meals 1/6/21  Yes Klarissa Lester MD   rosuvastatin (CRESTOR) 40 mg tablet Take 1 tablet (40 mg total) by mouth nightly 7/2/20  Yes Klarissa Lester MD   cholecalciferol, vitamin D3, (Vitamin D3) 25 mcg (1,000 unit) capsule Take 1 capsule (1,000 Units total) by mouth daily 6/11/20  Yes Klarissa Lester MD   multivitamin with minerals tablet Take 1 tablet by mouth every other day     Yes Historical Provider, MD   melatonin 5 mg capsule Take 5 mg by mouth nightly as needed (insomnia)    Yes Historical Provider, MD   diphenhydramine-acetaminophen (TYLENOL PM EXTRA STRENGTH)  mg tablet Take by mouth nightly as needed   10/25/13  Yes Conversion Provider   HYDROcodone-acetaminophen (NORCO) 5-325 mg per tablet Take 1-2 tablets by mouth every 8 (eight) hours as needed for pain scale 8-10/10 (pain) for up to 25 doses No more than 4 tablets per day Max Daily Amount: 6 tablets 7/2/21   Ann Marie Mcdaniels CNP   semaglutide 1 mg/dose (2 mg/1.5 mL) pen injector Inject 0.25 mg under the skin every 7 (seven) days  Patient not taking: Reported on 4/23/2021 1/6/21   Klarissa Lester MD   ibuprofen (AdviL) 200 mg tablet Take 400 mg by mouth every 6 (six) hours as needed for pain scale 1-3/10 or pain scale 8-10/10. 7/13/20   /[facility] Provider, MD   acetaminophen (Tylenol 8 Hour) 650 mg 8 hr tablet Take 650-1,300 mg by mouth every 8 (eight) hours as needed for pain scale 1-3/10   6/22/20   /[facility] Provider, MD         Current Facility-Administered Medications:   •  [START ON 8/11/2021] clopidogreL (PLAVIX) tablet 75 mg, 75 mg, oral, Daily, Ann Marie Mcdaniels CNP  •  losartan (COZAAR) tablet 100 mg, 100 mg, oral, Nightly, Ann Marie Mcdaniels CNP  •  melatonin tablet 3 mg, 3 mg, oral, Nightly PRN, Ann Marie Mcdaniels CNP  •  [Held by provider] metFORMIN (GLUCOPHAGE) tablet 1,000 mg, 1,000 mg, oral, 2x daily with meals, Ann Marie Mcdaniels CNP  •  rosuvastatin (CRESTOR) tablet 40 mg, 40 mg, oral, Nightly, Ann Marie Mcdaniels CNP  •  [START ON 8/11/2021] sertraline (ZOLOFT) tablet 50 mg, 50 mg, oral, Daily, Ann Marie Mcdaniels CNP  •  Insert peripheral IV, , , Once **AND** Maintain IV access, , , Until discontinued **AND** Saline lock IV, , , Once **AND** sodium chloride flush 3 mL, 3 mL, intravenous, PRN, Ann Marie Mcdaniels CNP  •  sodium chloride 0.9 % infusion, 100 mL/hr, intravenous, Continuous, Ann Marie Mcdaniels CNP, Last Rate: 100 mL/hr at 08/10/21 1511, 100 mL/hr at 08/10/21 1511  •  acetaminophen (TYLENOL) tablet 325-650 mg, 325-650 mg, oral, q4h PRN, Ann Marie Mcdaniles CNP  •  HYDROcodone-acetaminophen (NORCO) 5-325 mg per tablet 1-2 tablet, 1-2 tablet, oral, q4h PRN, Ann Marie Mcdaniels CNP  •  HYDROcodone-acetaminophen (LORCET HD)  mg per tablet 1 tablet, 1 tablet, oral, q4h PRN, Ann Marie Mcdaniels CNP  •  ondansetron (ZOFRAN) tablet 4 mg, 4 mg, oral, q6h PRN **OR** ondansetron ODT (ZOFRAN-ODT) disintegrating tablet 4 mg, 4 mg, oral, q6h PRN **OR** ondansetron (ZOFRAN) injection 4 mg, 4 mg, intravenous, q6h PRN, Ann Marie Mcdaniels CNP  •  phenazopyridine (PYRIDIUM) tablet 200 mg, 200 mg, oral, 3x daily PRN, Ann Marie Mcdaniels CNP  •  insulin aspart U-100 (NovoLOG) injection pen (correction dose) 0-15 Units, 0-15 Units, subcutaneous, q4h SANDOVAL, Champ Acosta MD      Objective     Vital signs:  Temp:  [36.4 °C (97.5 °F)-36.7 °C (98.1 °F)] 36.4 °C (97.6 °F)  Heart Rate:  [61-95] 95  Resp:  [12-16] 14  BP: ()/(63-93) 96/67       Intake/Output last 3 shifts:  I/O last 3 completed shifts:  In: 200 [I.V.:200]  Out: 500 [Urine:500]  Intake/Output this shift:  No intake/output data recorded.    Exam:  Patient consented to exam.  General: Appears comfortable and in no distress.   Neuro: No new focal deficits observed. No motor or sensory deficits observed. Cranial nerves intact.  Psychiatric: No hallucinations or delusions.   ENT: MMM and no acute oral pathology detected.  CVS: S1 S2 with equal pulses.  No murmur or edema.  Resp: CTABL with good inspiratory effort.  Abdomen: SNT with +BS. No guarding, rebound or distention.  Extremities: Good perfusion. Radial pulses intact.  Musculoskeletal: Reasonable range of motion of extremities; no synovitis detected.  Strength is 5/5 on the R, 4/5 on the L.  Integ:  No rash or lesions.      RESULTS AND DECISION MAKING:              No lab exists for component: LABALBU    Lab Results   Component Value Date    PTH 11.4 07/21/2021    CALCIUM 9.6 08/06/2021    PHOS 3.2 05/21/2020                   Lab Results   Component Value Date    TLKBRJNT23 382 01/04/2021                Lab Results   Component Value Date    CHOL 153 01/04/2021     Lab Results   Component Value Date    HDL 34 (L) 01/04/2021     Lab Results   Component Value Date    LDLCALC 76 01/04/2021     Lab Results   Component Value Date    TRIG 213 (H) 01/04/2021       I have reviewed all the lab results.      IMAGING:  X-ray kidneys ureters bladder    Result Date: 8/10/2021  Narrative: Exam: Abdomen, single view 08/10/2021. Clinical history:  occlusion of ureter due to calculus Comparison(s):  7/21/2021 Findings:  Lower pole right renal calculus. No definite left ureteral calculus. Faint calcification in the region of the distal left ureter may be residual ureteral calculus. Difficult to be certain as this is fairly radiolucent.     Impression: IMPRESSION:  Possible distal left ureteral calculi which are very faint and x-ray..    X-ray kidneys  ureters bladder    Result Date: 7/21/2021  Narrative: Exam: DX KIDNEYS URETERS BLADDER 07/21/2021 . Clinical History: Calculus of kidney; kidney stone Comparison(s): 7/3/2021 Findings: Bowel gas pattern is unremarkable.  There are no abnormal abdominal calcifications. No obstruction. Osseous structures are unremarkable     Impression: IMPRESSION: 1. Normal exam.    CT abdomen pelvis renal stone protocol    Result Date: 7/28/2021  Narrative: CT OF THE ABDOMEN AND PELVIS WITHOUT CONTRAST, RENAL STONE PROTOCOL  07/28/2021 . CLINICAL HISTORY: Calculus of kidney; Kidney stone  known  follow up. COMPARISON(S): 6/16/2021. TECHNIQUE: With the patient in the Prone position, helical axial imaging was performed from the level of the adrenal glands through the bladder. Multiplanar reformations were constructed and reviewed.  Dose reduction technique utilized; automatic/anatomic modulation of X-ray tube current (Auto mA). FINDINGS: Statements: Lack of intravenous contrast compromises evaluation of solid organs and vasculature. Lack of oral contrast compromises evaluation of bowel. Thoracic: Included images of the lower chest demonstrate no abnormalities> PowerScribe CT Separate Chest. Hepatobiliary: Calcified hepatic granulomas. The gallbladder is present. Small distal pancreatic hypodensity (series 3, image 35), which measures up to 10 mm Spleen: The entirety of the spleen was not imaged, per protocol. No abnormality identified in the visualizedspleen. Genitourinary: Interval mild distal migration of 2 left distal ureteral stones, the largest of which measures up to 3 mm. There is upstream left hydroureter and moderate to severe left hydronephrosis. Tiny nonobstructive right inferior pole nephrolithiasis. No right-sided hydronephrosis. Dystrophic ossifications of the prostate. Gastrointestinal: Minimal nonspecific wall thickening of the sigmoid colon which could be related to peristalsis.Normal appendix. Vascular/Lymphatics: No  enlarged lymph nodes by CT size criteria. Abdominal aorta is normal in caliber. MSK: No concerning bony lesion identified. Other: No extraluminal air. No extraluminal fluid.     Impression: IMPRESSION: Interval mild distal migration of 2 left distal ureteral stones, the largest of which measures up to 3 mm. There is upstream left hydroureter and moderate to severe left hydronephrosis. Tiny nonobstructive right inferior pole nephrolithiasis. No right-sided hydronephrosis. Small distal pancreatic hypodensity which measures up to 10 mm, indeterminate. No change since 5/20/2019 CT, however MRI with IV contrast of the abdomen could better evaluate.     FL fluoro charge    Result Date: 8/10/2021  Narrative: NOTE: This study was stored without submission for formal interpretation.      ASSESSMENT AND PLAN:     Assessment/Plan       Active Problems:    Benign essential hypertension    Ischemic cerebrovascular accident (CVA) (CMS/HCC) (HCC)    Type 2 diabetes mellitus without complication, with long-term current use of insulin (CMS/HCC) (HCC)    Anemia    Postoperative observation      1.  Nephrolithiasis, per Uro.  Expect some perioperative blood loss as normal.    2.  Lightheadedness.  Concern for orthostatic hypotension.  Check and follow.    3.  HTN.  Possibly over controlled.  Hold Amlodipine and follow.    4.  DM II.  Well controlled.  SS Insulin.  Hold Metformin and follow.    5.  Normocytic anemia.  Follow.  Defer to PCP/GI if no acute findings.    6.  Debility with history of L deficits with CVA.  Resume Plavix per Uro instructions.  PT eval for safe discharge.    DVT Prophylaxis: SCDs, per Uro      Thank you for this interesting consultation.  Hospitalist team will be following with you during the course of this hospitalization.  Please do not hesitate to call if you have any questions.      Total time spent: 64 minutes.    Code Status: Full Code         A voice recognition program was used to aid in documentation  of this record. Sometimes words are not printed exactly as they were spoken.  While efforts were made to carefully edit and correct any inaccuracies, some areas may be present; please take these into context.  Please contact the provider if areas are identified.    Champ Acosta MD

## 2021-08-11 NOTE — H&P
CONSULT NOTE    Reason for consultation: Perioperative management of hypertension and diabetes and chronic lightheadedness    Consult requested by: Urology    HPI  Del Mckeon is a 61 y.o. male admitted to observation s/p cystoscopy, left ureteroscopy with laser lithotripsy x 2 stones and stent.  Medicine is requested to manage the patient's diabetes and hypertension in the setting of the patient's chronic L weakness due to CVA 5/2020 and his reported lightheadedness on standing described as hypotensive episodes.      He takes Amlodipine QHS and Losartan QAM and states his BP is generally well controlled, occasionally in the 100s systolic.  He often feels lightheaded on standing, especially first thing in the morning.  His last HgbA1c was 5.4 on chronic Metformin.  He has chronic mild anemia as has been referred to GI for colonoscopy.He has mild L sided weakness post CVA but lives alone and performs all ADLs.  He has fallen in the past.      REVIEW OF SYSTEMS  Comprehensive review of 10 other systems were negative after careful review.    PAST MEDICAL HISTORY  Past Medical History:   Diagnosis Date   • Anemia    • Asthma     no attacks as long as away from hay fever allergens in [place]   • Cardiovascular disease     known bicuspid aortic valve, mild AVS, murmur   • Depressive disorder    • Endocrine disorder    • Heart murmur    • Hyperlipidemia    • Kidney disease 2021, 2011    calculi   • Obesity    • Peripheral neuropathy     L arm    • Stroke (CMS/[facility]) ([facility]) 05/21/2020    ischemic, L sided weakness; L leg weakness remains       PAST SURGICAL HISTORY  Past Surgical History:   Procedure Laterality Date   • BACK SURGERY  2018    lumbar discectomy   • CRYOTHERAPY      dermatology   • EYE SURGERY Bilateral 08/2020    cataracts       SOCIAL HISTORY  Social History     Socioeconomic History   • Marital status:      Spouse name: Not on file   • Number of children: Not on file   •  Years of education: Not on file   • Highest education level: Not on file   Occupational History   • Not on file   Tobacco Use   • Smoking status: Never Smoker   • Smokeless tobacco: Never Used   Vaping Use   • Vaping Use: Never used   Substance and Sexual Activity   • Alcohol use: No     Comment: had 1 beer in 2010   • Drug use: Never   • Sexual activity: Not Currently   Other Topics Concern   • Not on file   Social History Narrative   • Not on file     Social Determinants of Health     Financial Resource Strain:    • Difficulty of Paying Living Expenses:    Food Insecurity:    • Worried About Running Out of Food in the Last Year:    • Ran Out of Food in the Last Year:    Transportation Needs:    • Lack of Transportation (Medical):    • Lack of Transportation (Non-Medical):    Physical Activity:    • Days of Exercise per Week:    • Minutes of Exercise per Session:    Stress:    • Feeling of Stress :    Social Connections:    • Frequency of Communication with Friends and Family:    • Frequency of Social Gatherings with Friends and Family:    • Attends Church Services:    • Active Member of Clubs or Organizations:    • Attends Club or Organization Meetings:    • Marital Status:    Intimate Partner Violence:    • Fear of Current or Ex-Partner:    • Emotionally Abused:    • Physically Abused:    • Sexually Abused:        Family History:  family history includes Alzheimer's disease in his father; Diabetes in his sister; Diabetes type II in his father.    Allergies:  Allergies   Allergen Reactions   • Penicillins      Tested as a child; showed positive       Medications:   Prior to Admission medications    Medication Sig Start Date End Date Taking? Authorizing Provider   clopidogreL (PLAVIX) 75 mg tablet Take 1 tablet by mouth once daily 8/9/21  Yes Klarissa Lester MD   potassium 99 mg tablet Take 1 tab/cap by mouth daily   Yes Historical Provider, MD   omega-3 fatty acids-fish oil (Fish OiL) 340-1,000 mg capsule Take 1  tab/cap by mouth daily   Yes Historical Provider, MD   losartan (COZAAR) 100 mg tablet Take 1 tablet by mouth once daily 7/30/21  Yes Klarissa Lester MD   sertraline (ZOLOFT) 50 mg tablet Take 1 tablet by mouth once daily 7/23/21  Yes Klarissa Lester MD   amLODIPine (NORVASC) 10 mg tablet Take 1 tablet by mouth nightly 7/13/21  Yes Klarissa Lester MD   tamsulosin (FLOMAX) 0.4 mg capsule Take 1 capsule (0.4 mg total) by mouth daily 7/2/21 8/31/21 Yes Ann Marie Mcdaniels CNP   metFORMIN (GLUCOPHAGE) 500 mg tablet Take 2 tablets (1,000 mg total) by mouth 2 (two) times a day with meals 1/6/21  Yes Klarissa Lester MD   rosuvastatin (CRESTOR) 40 mg tablet Take 1 tablet (40 mg total) by mouth nightly 7/2/20  Yes Klarissa Lester MD   cholecalciferol, vitamin D3, (Vitamin D3) 25 mcg (1,000 unit) capsule Take 1 capsule (1,000 Units total) by mouth daily 6/11/20  Yes Klarissa Lester MD   multivitamin with minerals tablet Take 1 tablet by mouth every other day     Yes Historical Provider, MD   melatonin 5 mg capsule Take 5 mg by mouth nightly as needed (insomnia)    Yes Historical Provider, MD   diphenhydramine-acetaminophen (TYLENOL PM EXTRA STRENGTH)  mg tablet Take by mouth nightly as needed   10/25/13  Yes Conversion Provider   HYDROcodone-acetaminophen (NORCO) 5-325 mg per tablet Take 1-2 tablets by mouth every 8 (eight) hours as needed for pain scale 8-10/10 (pain) for up to 25 doses No more than 4 tablets per day Max Daily Amount: 6 tablets 7/2/21   Ann Marie Mcdaniels CNP   semaglutide 1 mg/dose (2 mg/1.5 mL) pen injector Inject 0.25 mg under the skin every 7 (seven) days  Patient not taking: Reported on 4/23/2021 1/6/21   Klarissa Lester MD   ibuprofen (AdviL) 200 mg tablet Take 400 mg by mouth every 6 (six) hours as needed for pain scale 1-3/10 or pain scale 8-10/10. 7/13/20   /[facility] Provider, MD   acetaminophen (Tylenol 8 Hour) 650 mg 8 hr tablet Take 650-1,300 mg by mouth every 8 (eight) hours as needed for pain scale 1-3/10   6/22/20    /[facility] Provider, MD        Current Facility-Administered Medications:   •  [START ON 8/11/2021] clopidogreL (PLAVIX) tablet 75 mg, 75 mg, oral, Daily, Ann Marie Mcdaniels CNP  •  losartan (COZAAR) tablet 100 mg, 100 mg, oral, Nightly, Ann Marie Mcdaniels CNP  •  melatonin tablet 3 mg, 3 mg, oral, Nightly PRN, Ann Marie Mcdaniels CNP  •  [Held by provider] metFORMIN (GLUCOPHAGE) tablet 1,000 mg, 1,000 mg, oral, 2x daily with meals, Ann Marie Mcdaniels CNP  •  rosuvastatin (CRESTOR) tablet 40 mg, 40 mg, oral, Nightly, Ann Marie Mcdaniels CNP  •  [START ON 8/11/2021] sertraline (ZOLOFT) tablet 50 mg, 50 mg, oral, Daily, Ann Marie Mcdaniels CNP  •  Insert peripheral IV, , , Once **AND** Maintain IV access, , , Until discontinued **AND** Saline lock IV, , , Once **AND** sodium chloride flush 3 mL, 3 mL, intravenous, PRN, Ann Marie Mcdaniels CNP  •  sodium chloride 0.9 % infusion, 100 mL/hr, intravenous, Continuous, Ann Marie Mcdaniels CNP, Last Rate: 100 mL/hr at 08/10/21 1511, 100 mL/hr at 08/10/21 1511  •  acetaminophen (TYLENOL) tablet 325-650 mg, 325-650 mg, oral, q4h PRN, Ann Marie Mcdaniels CNP  •  HYDROcodone-acetaminophen (NORCO) 5-325 mg per tablet 1-2 tablet, 1-2 tablet, oral, q4h PRN, Ann Marie Mcdaniels CNP  •  HYDROcodone-acetaminophen (LORCET HD)  mg per tablet 1 tablet, 1 tablet, oral, q4h PRN, Ann Marie Mcdaniels CNP  •  ondansetron (ZOFRAN) tablet 4 mg, 4 mg, oral, q6h PRN **OR** ondansetron ODT (ZOFRAN-ODT) disintegrating tablet 4 mg, 4 mg, oral, q6h PRN **OR** ondansetron (ZOFRAN) injection 4 mg, 4 mg, intravenous, q6h PRN, Ann Marie Mcdaniels CNP  •  phenazopyridine (PYRIDIUM) tablet 200 mg, 200 mg, oral, 3x daily PRN, Ann Marie Mcdaniels CNP  •  insulin aspart U-100 (NovoLOG) injection pen (correction dose) 0-15 Units, 0-15 Units, subcutaneous, q4h SANDOVAL, Champ Acosta MD      Objective     Vital signs:  Temp:  [36.4 °C (97.5 °F)-36.7 °C (98.1 °F)] 36.4 °C (97.6 °F)  Heart Rate:  [61-95] 95  Resp:  [12-16] 14  BP:  ()/(63-93) 96/67      Intake/Output last 3 shifts:  I/O last 3 completed shifts:  In: 200 [I.V.:200]  Out: 500 [Urine:500]  Intake/Output this shift:  No intake/output data recorded.    Exam:  Patient consented to exam.  General: Appears comfortable and in no distress.   Neuro: No new focal deficits observed. No motor or sensory deficits observed. Cranial nerves intact.  Psychiatric: No hallucinations or delusions.   ENT: MMM and no acute oral pathology detected.  CVS: S1 S2 with equal pulses.  No murmur or edema.  Resp: CTABL with good inspiratory effort.  Abdomen: SNT with +BS. No guarding, rebound or distention.  Extremities: Good perfusion. Radial pulses intact.  Musculoskeletal: Reasonable range of motion of extremities; no synovitis detected.  Strength is 5/5 on the R, 4/5 on the L.  Integ:  No rash or lesions.      RESULTS AND DECISION MAKING:              No lab exists for component: LABALBU    Lab Results   Component Value Date    PTH 11.4 07/21/2021    CALCIUM 9.6 08/06/2021    PHOS 3.2 05/21/2020                   Lab Results   Component Value Date    [id number] 382 01/04/2021                Lab Results   Component Value Date    CHOL 153 01/04/2021     Lab Results   Component Value Date    HDL 34 (L) 01/04/2021     Lab Results   Component Value Date    LDLCALC 76 01/04/2021     Lab Results   Component Value Date    TRIG 213 (H) 01/04/2021       I have reviewed all the lab results.      IMAGING:  X-ray kidneys ureters bladder    Result Date: 8/10/2021  Narrative: Exam: Abdomen, single view 08/10/2021. Clinical history:  occlusion of ureter due to calculus Comparison(s):  7/21/2021 Findings:  Lower pole right renal calculus. No definite left ureteral calculus. Faint calcification in the region of the distal left ureter may be residual ureteral calculus. Difficult to be certain as this is fairly radiolucent.     Impression: IMPRESSION:  Possible distal left ureteral calculi which are very faint and  x-ray..    X-ray kidneys ureters bladder    Result Date: 7/21/2021  Narrative: Exam: DX KIDNEYS URETERS BLADDER 07/21/2021 . Clinical History: Calculus of kidney; kidney stone Comparison(s): 7/3/2021 Findings: Bowel gas pattern is unremarkable.  There are no abnormal abdominal calcifications. No obstruction. Osseous structures are unremarkable     Impression: IMPRESSION: 1. Normal exam.    CT abdomen pelvis renal stone protocol    Result Date: 7/28/2021  Narrative: CT OF THE ABDOMEN AND PELVIS WITHOUT CONTRAST, RENAL STONE PROTOCOL  07/28/2021 . CLINICAL HISTORY: Calculus of kidney; Kidney stone  known  follow up. COMPARISON(S): 6/16/2021. TECHNIQUE: With the patient in the Prone position, helical axial imaging was performed from the level of the adrenal glands through the bladder. Multiplanar reformations were constructed and reviewed.  Dose reduction technique utilized; automatic/anatomic modulation of X-ray tube current (Auto mA). FINDINGS: Statements: Lack of intravenous contrast compromises evaluation of solid organs and vasculature. Lack of oral contrast compromises evaluation of bowel. Thoracic: Included images of the lower chest demonstrate no abnormalities> PowerScribe CT Separate Chest. Hepatobiliary: Calcified hepatic granulomas. The gallbladder is present. Small distal pancreatic hypodensity (series 3, image 35), which measures up to 10 mm Spleen: The entirety of the spleen was not imaged, per protocol. No abnormality identified in the visualizedspleen. Genitourinary: Interval mild distal migration of 2 left distal ureteral stones, the largest of which measures up to 3 mm. There is upstream left hydroureter and moderate to severe left hydronephrosis. Tiny nonobstructive right inferior pole nephrolithiasis. No right-sided hydronephrosis. Dystrophic ossifications of the prostate. Gastrointestinal: Minimal nonspecific wall thickening of the sigmoid colon which could be related to peristalsis.Normal  appendix. Vascular/Lymphatics: No enlarged lymph nodes by CT size criteria. Abdominal aorta is normal in caliber. MSK: No concerning bony lesion identified. Other: No extraluminal air. No extraluminal fluid.     Impression: IMPRESSION: Interval mild distal migration of 2 left distal ureteral stones, the largest of which measures up to 3 mm. There is upstream left hydroureter and moderate to severe left hydronephrosis. Tiny nonobstructive right inferior pole nephrolithiasis. No right-sided hydronephrosis. Small distal pancreatic hypodensity which measures up to 10 mm, indeterminate. No change since 5/20/2019 CT, however MRI with IV contrast of the abdomen could better evaluate.     FL fluoro charge    Result Date: 8/10/2021  Narrative: NOTE: This study was stored without submission for formal interpretation.      ASSESSMENT AND PLAN:     Assessment/Plan       Active Problems:    Benign essential hypertension    Ischemic cerebrovascular accident (CVA) (CMS/HCC) (HCC)    Type 2 diabetes mellitus without complication, with long-term current use of insulin (CMS/[facility]) (HCC)    Anemia    Postoperative observation      1.  Nephrolithiasis, per Uro.  Expect some perioperative blood loss as normal.    2.  Lightheadedness.  Concern for orthostatic hypotension.  Check and follow.    3.  HTN.  Possibly over controlled.  Hold Amlodipine and follow.    4.  DM II.  Well controlled.  SS Insulin.  Hold Metformin and follow.    5.  Normocytic anemia.  Follow.  Defer to PCP/GI if no acute findings.    6.  Debility with history of L deficits with CVA.  Resume Plavix per Uro instructions.  PT eval for safe discharge.    DVT Prophylaxis: SCDs, per Uro      Thank you for this interesting consultation.  Hospitalist team will be following with you during the course of this hospitalization.  Please do not hesitate to call if you have any questions.      Total time spent: 64 minutes.    Code Status: Full Code         A voice recognition program  was used to aid in documentation of this record. Sometimes words are not printed exactly as they were spoken.  While efforts were made to carefully edit and correct any inaccuracies, some areas may be present; please take these into context.  Please contact the provider if areas are identified.    Champ Acosta MD

## 2021-08-11 NOTE — PROGRESS NOTES
Hospitalist Progress Note      Active problems/plan  Principal Problem:    Postoperative observation  Active Problems:    Calculus of left ureter s/p cystoscopy, laser of stones, stent placement-gross hematuria, urinary retention overnight currently with Hdz-urology managing    Orthostatic hypotension-hold Cozaar, amlodipine, will discuss tamsulosin with urology.  As has been going on since his CVA will obtain an MRI    History of right pontine CVA 1 year ago    Anemia    Benign essential hypertension    Type 2 diabetes mellitus without complication, with long-term current use of insulin (CMS/[facility]) ([facility])      ==================================================================================================================================================    Reason for consult  Management of diabetes, chronic lightheadedness    HPI:        Hospital Course:  8/10/2021-cystoscopy, left ureteroscopy with laser of stones, placement of a left ureteral stent      Vital signs:  Temp:  [36.4 °C (97.5 °F)-37.1 °C (98.8 °F)] 36.6 °C (97.8 °F)  Heart Rate:  [61-95] 64  Resp:  [12-18] 18  BP: ()/(63-93) 129/84    Intake/Output Summary (Last 24 hours) at 8/11/2021 0828  Last data filed at 8/11/2021 0436  Gross per 24 hour   Intake 1143.01 ml   Output 1460 ml   Net -316.99 ml       Physical Exam  General in no distress  Supine /79 pulse 61  Standing BP 67/50 pulse 107  Neuro no facial asymmetry, fairly equal muscle strength  Psych awake alert oriented x3  Chest clear  Heart regular no murmurs  Extremities no swelling    Laboratory data  Lab Results   Component Value Date    WBC 6.7 08/06/2021    HGB 11.6 (L) 08/06/2021    HCT 34.6 (L) 08/06/2021    MCV 93.6 08/06/2021     08/06/2021     Lab Results   Component Value Date     08/06/2021    K 3.9 08/06/2021     08/06/2021    CO2 23 08/06/2021    BUN 39 (H) 08/06/2021    CREATININE 1.48 (H) 08/06/2021    GLUCOSE 115 (H) 08/06/2021    CALCIUM 9.6  08/06/2021    ANIONGAP 10 08/06/2021     No results found for: INR, PT     Medications.  Scheduled Meds:clopidogreL, 75 mg, oral, Daily  losartan, 100 mg, oral, Nightly  [Held by provider] metFORMIN, 1,000 mg, oral, 2x daily with meals  rosuvastatin, 40 mg, oral, Nightly  sertraline, 50 mg, oral, Daily  insulin aspart U-100, 0-15 Units, subcutaneous, q4h SANDOVAL  tamsulosin, 0.4 mg, oral, Daily      Continuous Infusions:sodium chloride 0.9 %, 100 mL/hr, Last Rate: 100 mL/hr (08/11/21 0015)      PRN Meds:.melatonin  •  Insert peripheral IV **AND** Maintain IV access **AND** Saline lock IV **AND** sodium chloride  •  acetaminophen  •  HYDROcodone-acetaminophen  •  ondansetron **OR** ondansetron ODT **OR** ondansetron  •  phenazopyridine        DVT Prophylaxis: SCDs    Code Status: Full Code    Anticipated date of discharge:  To be decided.     JOVANNI VINCENT MD    Provider Inpatient Hospital Services Certification: Current therapeutic care plan can only be provided in the hospital      A voice recognition program was used to aid in documentation of this record.  Sometimes words are not printed exactly as they were spoken.  While efforts were made to carefully edit and correct any inaccuracies, some inaccuracies may be missed, please take these into context.  Please contact the provider if areas are identified.

## 2021-08-11 NOTE — NURSING END OF SHIFT
Nursing End of Shift Summary:    Patient: Del Mckeon  MRN: [id number]  : 1960, Age: 61 y.o.    Location: [place] [address]    Nursing Goals  Clinical Goals for the Shift: Monitor vs, labs and maintain safety, comfort.     Narrative Summary of Progress Toward Clinical Goals:  VS, labs monitored and safety, comfort maintained.     Barriers to Goals/Nursing Concerns:  No.     New Patient or Family Concerns/Issues:  No.     Shift Summary:      Significant Events & Communications to Providers (last 12 hours)      Last 5 Values     Row Name 08/10/21 2153 08/10/21 2210 08/10/21 2307 21 0025          Provider Notification    Reason for Communication  Review case bladder scan greater than 400.  -  Review case bladder scan greater than 400.  -  Review case unable to insert agarwal.  -  Review case unable to insert agarwal.  -     Provider Name  Brian Beckman  -  Kaylen Leiva  -  Kaylen Leiva  -  Derick Patel  -       User Key  (r) = Recorded By, (t) = Taken By, (c) = Cosigned By    Initials Name     Zo Villasenor RN               Oxygen Usage (last 12 hours)      Last 5 Values     Row Name 08/10/21 2000                   Oxygen Weaning Trial by Nursing    Is Patient on Room Air OR on the Same Amount of O2 as at Home?  Yes                   Mobility (last 12 hours)      Last 5 Values     Row Name 08/10/21 2000                   Mobility    Activity  Commode        Level of Assistance  Standby assist, set-up cues, supervision of patient - no hands on        Anti-Embolism Devices  Bilateral;AE calf pump            Urethral Catheter    Active Urethral Catheter     Name:   Placement date:   Placement time:   Site:   Days:    Urethral Catheter   21    0120     less than 1            Active Lines    Active Central venous catheter / Peripherally inserted central catheter / Implantable Port / Hemodialysis catheter / Midline Catheter     None              Infusing Medications   Medication Dose Last  Rate   • sodium chloride 0.9 %  100 mL/hr 100 mL/hr (08/11/21 0015)     PRN Medications   Medication Dose Last Admin   • melatonin  3 mg 3 mg at 08/10/21 2133   • sodium chloride  3 mL     • acetaminophen  325-650 mg     • HYDROcodone-acetaminophen  1-2 tablet 2 tablet at 08/11/21 0441   • ondansetron  4 mg      Or   • ondansetron ODT  4 mg      Or   • ondansetron  4 mg     • phenazopyridine  200 mg       _________________________  Zo Villasenor RN  08/11/21 5:52 AM

## 2021-08-11 NOTE — PROGRESS NOTES
Patient seen 8/11/2021 at 1245    Admitted 8/10/2021    Patient seen and examined. Discussed with nurse and . He is status post (8-10-21) cystoscopy, left retrograde pyelogram, left ureteroscopy with laser of stone, left ureteral stent placement with Dr. Leiva.  Postoperatively he was admitted for intermittent hypotension associated with dizziness and reported frequent falls at home.  Per review of her chart he is a well controlled diabetic and history of CVA 5/20 for which he is on Plavix therapy.  He takes amlodipine and losartan and states his BP is generally well controlled, with systolic ranging from 100-120's.  He is often lightheaded with standing and walking and has had frequent falls requiring additional help of 1-2 individuals to become standing again. On the preoperative clearance note there was a question of possible slow chronic GI bleed as Cologuard screening test was positive with slight anemia.  His PCP did place a GI consult at that time.   During the night he was only able to void small amounts, less than 200 mL.  He was bladder scanned a few times, all readings between 400-500 mL.  Nursing was unable to place a Hdz catheter and so Dr. Patel was called to place it.  Per patient's report he was on Flomax in July but recently stopped taking it.  He currently reports mild suprapubic and urethral meatus discomfort.  He denies fever, chills, flank pain, constipation, nausea, shortness of breath.          PMH, medications, allergies, social history, family history reviewed per chart          PE:  Vitals:  /79 (BP Location: Left arm, Patient Position: Head of bed 30 degrees or higher, Cuff Size: Regular Adult)   Pulse 70   Temp 36.6 °C (97.8 °F) (Oral)   Resp 18   Wt 79.5 kg (175 lb 4.3 oz)   SpO2 97%   BMI 24.44 kg/m²     General:  Alert, normal appearance, no acute distress, not ill appearing, nontoxic appearing  HEENT:  Normal cephalic, atraumatic  Lungs:  Effort normal,  no stridor  Abd:  soft, nontender, no guarding  : Scant dried crusted blood around the urethral meatus, urethral stent strings are noted protruding from meatus, Hdz catheter in place draining dark red urine without clots or debris     Lab  -per chart, personally reviewed.      Rad  -per chart, personally reviewed.           Impression/Plan  1.  Left ureteral stones, status post left ureteroscopy with laser of stones and stent placement  2.  Incomplete bladder emptying  3.  Gross hematuria, status post left ureteroscopy with laser of stones and stent placement  2.  Dizziness  3.  Frequent falls  4.  Hypertension  5.  H/O CVA  6.  Type 2 diabetes  -Discussed with patient gross hematuria such as this is common after ureteroscopy with laser and stent placement.  Mild abdominal and urethral discomfort is common.  He may use Pyridium for stent discomfort.  -Discussed with patient mild gross hematuria from urethral meatus is common after several difficult catheter attempts and recent surgery.  -Hypertension medication per hospitalist  -Diabetic medication per hospitalist  -Flomax restarted per hospitalist  -May consider holding Plavix additional days if hematuria does not improve  -Plan for him to discharge home with Hdz catheter in place.  Will return to urology clinic in 1 week for voiding trial and stent removal.  -Follow-up with Dr. Leiva in urology office in 3 months  Ann Marie Mcdaniels, CNP

## 2021-08-11 NOTE — H&P
History and Physical    08/11/21  10:12 AM    Late entry: Exam preformed 8/10/21 at 1415    HPI  Patient is a 61 y.o. male admitted for intermittent hypotension associated with dizziness and reported frequent falls at home.  He is status post (8-10-21) cystoscopy, left retrograde pyelogram, left ureteroscopy with laser of stone, left ureteral stent placement with Dr. Leiva. Per review of her chart he is a well controlled diabetic and history of CVA 5/20 for which he is on Plavix therapy.  He takes amlodipine and losartan and states his BP is generally well controlled, with systolic ranging from 100-120's.  He is often lightheaded with standing and walking and has had frequent falls requiring additional help of 1-2 individuals to become standing again. On the preoperative clearance note there was a question of possible slow chronic GI bleed as Cologuard screening test was positive with slight anemia.  His PCP did place a GI consult at that time.     Past Medical History:   Diagnosis Date   • Anemia    • Asthma     no attacks as long as away from hay fever allergens in [place]   • Cardiovascular disease     known bicuspid aortic valve, mild AVS, murmur   • Depressive disorder    • Endocrine disorder    • Heart murmur    • Hyperlipidemia    • Kidney disease 2021, 2011    calculi   • Obesity    • Peripheral neuropathy     L arm    • Stroke (CMS/[facility]) ([facility]) 05/21/2020    ischemic, L sided weakness; L leg weakness remains       Past Surgical History:   Procedure Laterality Date   • BACK SURGERY  2018    lumbar discectomy   • CRYOTHERAPY      dermatology   • EYE SURGERY Bilateral 08/2020    cataracts       Family History   Problem Relation Age of Onset   • Alzheimer's disease Father    • Diabetes type II Father    • Diabetes Sister        Social History     Socioeconomic History   • Marital status:      Spouse name: Not on file   • Number of children: Not on file   • Years of education: Not on file   • Highest  education level: Not on file   Occupational History   • Not on file   Tobacco Use   • Smoking status: Never Smoker   • Smokeless tobacco: Never Used   Vaping Use   • Vaping Use: Never used   Substance and Sexual Activity   • Alcohol use: No     Comment: had 1 beer in 2010   • Drug use: Never   • Sexual activity: Not Currently   Other Topics Concern   • Not on file   Social History Narrative   • Not on file     Social Determinants of Health     Financial Resource Strain:    • Difficulty of Paying Living Expenses:    Food Insecurity:    • Worried About Running Out of Food in the Last Year:    • Ran Out of Food in the Last Year:    Transportation Needs:    • Lack of Transportation (Medical):    • Lack of Transportation (Non-Medical):    Physical Activity:    • Days of Exercise per Week:    • Minutes of Exercise per Session:    Stress:    • Feeling of Stress :    Social Connections:    • Frequency of Communication with Friends and Family:    • Frequency of Social Gatherings with Friends and Family:    • Attends Zoroastrianism Services:    • Active Member of Clubs or Organizations:    • Attends Club or Organization Meetings:    • Marital Status:    Intimate Partner Violence:    • Fear of Current or Ex-Partner:    • Emotionally Abused:    • Physically Abused:    • Sexually Abused:        Allergies   Allergen Reactions   • Penicillins      Tested as a child; showed positive       Current Facility-Administered Medications   Medication Dose Route Frequency Provider Last Rate Last Admin   • clopidogreL (PLAVIX) tablet 75 mg  75 mg oral Daily Ann Marie Mcdaniels CNP       • losartan (COZAAR) tablet 100 mg  100 mg oral Nightly Ann Marie Mcdaniels CNP   100 mg at 08/10/21 2011   • melatonin tablet 3 mg  3 mg oral Nightly PRN Ann Marie Mcdaniels CNP   3 mg at 08/10/21 2133   • [Held by provider] metFORMIN (GLUCOPHAGE) tablet 1,000 mg  1,000 mg oral 2x daily with meals Ann Marie Mcdaniels CNP       • rosuvastatin (CRESTOR) tablet 40 mg  40 mg  oral Nightly Ann Marie Mcdaniels CNP   40 mg at 08/10/21 2011   • sertraline (ZOLOFT) tablet 50 mg  50 mg oral Daily Ann Marie Mcdaniels CNP   50 mg at 08/11/21 0907   • sodium chloride flush 3 mL  3 mL intravenous PRN Ann Marie Mcdaniels CNP       • sodium chloride 0.9 % infusion  100 mL/hr intravenous Continuous Ann Marie Mcdaniels  mL/hr at 08/11/21 1008 100 mL/hr at 08/11/21 1008   • acetaminophen (TYLENOL) tablet 325-650 mg  325-650 mg oral q4h PRN Ann Marie Mcdaniels CNP       • HYDROcodone-acetaminophen (NORCO) 5-325 mg per tablet 1-2 tablet  1-2 tablet oral q4h PRN Ann Marie Mcdaniels CNP   2 tablet at 08/11/21 0441   • ondansetron (ZOFRAN) tablet 4 mg  4 mg oral q6h PRN Ann Marie Mcdaniels CNP        Or   • ondansetron ODT (ZOFRAN-ODT) disintegrating tablet 4 mg  4 mg oral q6h PRN Ann Marie Mcdaniels CNP        Or   • ondansetron (ZOFRAN) injection 4 mg  4 mg intravenous q6h PRN Ann Marie Mcdaniels CNP       • insulin aspart U-100 (NovoLOG) injection pen (correction dose) 0-15 Units  0-15 Units subcutaneous q4h [facility] MD       • phenazopyridine (PYRIDIUM) tablet 200 mg  200 mg oral 3x daily PRN Kaylen Leiva MD       • tamsulosin (FLOMAX) 24 hr capsule 0.4 mg  0.4 mg oral Daily Kaylen Leiva MD   0.4 mg at 08/11/21 0014       Prior to Admission medications    Medication Sig Start Date End Date Taking? Authorizing Provider   clopidogreL (PLAVIX) 75 mg tablet Take 1 tablet by mouth once daily 8/9/21  Yes Klarissa Lester MD   potassium 99 mg tablet Take 1 tab/cap by mouth daily   Yes Historical Provider, MD   omega-3 fatty acids-fish oil (Fish OiL) 340-1,000 mg capsule Take 1 tab/cap by mouth daily   Yes Historical Provider, MD   losartan (COZAAR) 100 mg tablet Take 1 tablet by mouth once daily 7/30/21  Yes Klarissa Lester MD   sertraline (ZOLOFT) 50 mg tablet Take 1 tablet by mouth once daily 7/23/21  Yes Klarissa Lester MD   amLODIPine (NORVASC) 10 mg tablet Take 1 tablet by mouth nightly 7/13/21  Yes  Klarissa Lester MD   tamsulosin (FLOMAX) 0.4 mg capsule Take 1 capsule (0.4 mg total) by mouth daily 7/2/21 8/31/21 Yes Ann Marie Mcdaniels CNP   metFORMIN (GLUCOPHAGE) 500 mg tablet Take 2 tablets (1,000 mg total) by mouth 2 (two) times a day with meals 1/6/21  Yes Klarissa Lester MD   rosuvastatin (CRESTOR) 40 mg tablet Take 1 tablet (40 mg total) by mouth nightly 7/2/20  Yes Klarissa Lester MD   cholecalciferol, vitamin D3, (Vitamin D3) 25 mcg (1,000 unit) capsule Take 1 capsule (1,000 Units total) by mouth daily 6/11/20  Yes Klarissa Lester MD   multivitamin with minerals tablet Take 1 tablet by mouth every other day     Yes Historical Provider, MD   melatonin 5 mg capsule Take 5 mg by mouth nightly as needed (insomnia)    Yes Historical Provider, MD   diphenhydramine-acetaminophen (TYLENOL PM EXTRA STRENGTH)  mg tablet Take by mouth nightly as needed   10/25/13  Yes Conversion Provider   HYDROcodone-acetaminophen (NORCO) 5-325 mg per tablet Take 1-2 tablets by mouth every 8 (eight) hours as needed for pain scale 8-10/10 (pain) for up to 25 doses No more than 4 tablets per day Max Daily Amount: 6 tablets 7/2/21   Ann Marie Mcdaniels CNP   semaglutide 1 mg/dose (2 mg/1.5 mL) pen injector Inject 0.25 mg under the skin every 7 (seven) days  Patient not taking: Reported on 4/23/2021 1/6/21   Klarissa Lester MD   ibuprofen (AdviL) 200 mg tablet Take 400 mg by mouth every 6 (six) hours as needed for pain scale 1-3/10 or pain scale 8-10/10. 7/13/20   /[facility] Provider, MD   acetaminophen (Tylenol 8 Hour) 650 mg 8 hr tablet Take 650-1,300 mg by mouth every 8 (eight) hours as needed for pain scale 1-3/10   6/22/20   [facility], MD       Temp:  [36.4 °C (97.5 °F)-37.1 °C (98.8 °F)] 36.6 °C (97.8 °F)  Heart Rate:  [61-95] 64  Resp:  [12-18] 18  BP: ()/(63-93) 129/84    I/O last 3 completed shifts:  In: 1143 [P.O.:120; I.V.:1023]  Out: 1460 [Urine:1460]    Physical Exam  Constitutional:       General: He is not in acute  distress.     Appearance: He is not toxic-appearing.      Comments: Drowsy but alert in PACU bed following anesthesia for ureteroscopy with laser of stone and stent placement.    HENT:      Head: Normocephalic and atraumatic.   Pulmonary:      Effort: Pulmonary effort is normal. No respiratory distress.   Abdominal:      General: Abdomen is flat.      Palpations: Abdomen is soft.   Genitourinary:     Comments: Ureteral stent strings noted to be protruding from meatus  Neurological:      Mental Status: He is alert.   Psychiatric:         Mood and Affect: Mood normal.         CBC with Platelet:    Lab Results   Component Value Date    WBC 6.7 08/06/2021    HGB 11.6 (L) 08/06/2021    HCT 34.6 (L) 08/06/2021     08/06/2021     Renal Panel:   Lab Results   Component Value Date     08/06/2021    K 3.9 08/06/2021     08/06/2021    CO2 23 08/06/2021    BUN 39 (H) 08/06/2021    CREATININE 1.48 (H) 08/06/2021    GLUCOSE 115 (H) 08/06/2021    CALCIUM 9.6 08/06/2021     Magnesium:   Lab Results   Component Value Date    MG 1.7 (L) 06/16/2021     A1c:   Lab Results   Component Value Date    HGBA1C 5.4 08/06/2021        X-ray kidneys ureters bladder    Result Date: 8/10/2021  Narrative: Exam: Abdomen, single view 08/10/2021. Clinical history:  occlusion of ureter due to calculus Comparison(s):  7/21/2021 Findings:  Lower pole right renal calculus. No definite left ureteral calculus. Faint calcification in the region of the distal left ureter may be residual ureteral calculus. Difficult to be certain as this is fairly radiolucent.     Impression: IMPRESSION:  Possible distal left ureteral calculi which are very faint and x-ray..    X-ray kidneys ureters bladder    Result Date: 7/21/2021  Narrative: Exam: DX KIDNEYS URETERS BLADDER 07/21/2021 . Clinical History: Calculus of kidney; kidney stone Comparison(s): 7/3/2021 Findings: Bowel gas pattern is unremarkable.  There are no abnormal abdominal calcifications. No  obstruction. Osseous structures are unremarkable     Impression: IMPRESSION: 1. Normal exam.    CT abdomen pelvis renal stone protocol    Result Date: 7/28/2021  Narrative: CT OF THE ABDOMEN AND PELVIS WITHOUT CONTRAST, RENAL STONE PROTOCOL  07/28/2021 . CLINICAL HISTORY: Calculus of kidney; Kidney stone  known  follow up. COMPARISON(S): 6/16/2021. TECHNIQUE: With the patient in the Prone position, helical axial imaging was performed from the level of the adrenal glands through the bladder. Multiplanar reformations were constructed and reviewed.  Dose reduction technique utilized; automatic/anatomic modulation of X-ray tube current (Auto mA). FINDINGS: Statements: Lack of intravenous contrast compromises evaluation of solid organs and vasculature. Lack of oral contrast compromises evaluation of bowel. Thoracic: Included images of the lower chest demonstrate no abnormalities> PowerScribe CT Separate Chest. Hepatobiliary: Calcified hepatic granulomas. The gallbladder is present. Small distal pancreatic hypodensity (series 3, image 35), which measures up to 10 mm Spleen: The entirety of the spleen was not imaged, per protocol. No abnormality identified in the visualizedspleen. Genitourinary: Interval mild distal migration of 2 left distal ureteral stones, the largest of which measures up to 3 mm. There is upstream left hydroureter and moderate to severe left hydronephrosis. Tiny nonobstructive right inferior pole nephrolithiasis. No right-sided hydronephrosis. Dystrophic ossifications of the prostate. Gastrointestinal: Minimal nonspecific wall thickening of the sigmoid colon which could be related to peristalsis.Normal appendix. Vascular/Lymphatics: No enlarged lymph nodes by CT size criteria. Abdominal aorta is normal in caliber. MSK: No concerning bony lesion identified. Other: No extraluminal air. No extraluminal fluid.     Impression: IMPRESSION: Interval mild distal migration of 2 left distal ureteral stones, the  largest of which measures up to 3 mm. There is upstream left hydroureter and moderate to severe left hydronephrosis. Tiny nonobstructive right inferior pole nephrolithiasis. No right-sided hydronephrosis. Small distal pancreatic hypodensity which measures up to 10 mm, indeterminate. No change since 5/20/2019 CT, however MRI with IV contrast of the abdomen could better evaluate.     FL fluoro charge    Result Date: 8/10/2021  Narrative: NOTE: This study was stored without submission for formal interpretation.      Assessment and Plan  Principal Problem:    Postoperative observation  Active Problems:    Benign essential hypertension    History of lacunar cerebrovascular accident (CVA)    Type 2 diabetes mellitus without complication, with long-term current use of insulin (CMS/HCC) (HCC)    Calculus of left ureter    Anemia      Assessment/Plan   1.  Left ureteral stones, status post left ureteroscopy with laser of stones and stent placement  2.  Dizziness  3.  Frequent falls  4.  Hypertension on amlodipine and losartan  5.  H/O CVA, on Plavix therapy  6.  Type 2 diabetes  7.  Anemia  -Post operative observation as patient is not felt safe to return home following surgery and anesthesia.  Does not have close friends or family and assist him in his immediate postop period.   -Hospitalist consult placed  -Physical therapy evaluation placed  -Request for case management to arrange for home health  -He may remove his ureteral stent in 4-5 days  -Restart Plavix tomorrow  -Diabetic management per hospitalist  -Hypertension managed by hospitalist  -CBC and BMP q morning    Ann Marie Mcdaniels, CNP

## 2021-08-11 NOTE — INTERDISCIPLINARY/THERAPY
Case Management Progress Note    Phone # [phone]  Chart reviewed.  Discussion of plan of care and recap of yesterday's events with Dr. Balderas and AUGIE Jesus.  Provided Dr. Lester's name/number to Dr. Balderas.  Spoke with pt and Dr. Balderas.  Pt is very grateful that he did not go home last night r/t needing urology involvement.  He states his son and ex-wife want him to move back to [place] but his allergies are better controlled in [place].  His sisters want him to move to [facility] but he doesn't want to move there.  This admission has made him realize that it'd be helpful to have family nearby.  He really likes his current appt. Explained how I'd like him to live as well as possible in his current setting but that he's going to need help to remain independent in his appt.  Educated on [facility] info, HH, and [facility] referral.  Encouraged him to be looking into options before he gets into crisis.  He agreed to a [facility] referral and will accept a list of ALFs for him to make phone inquiries to.  He's willing to remain at [facility] until his 2 issues of orthostatic hypotension and urology issues are resolved to the point where he can go home.     1300- Present when Ann Marie BOBO spoke with pt.  Updated Ann Marie GHOSH and encouraged initiation of home cath care teaching.

## 2021-08-11 NOTE — NURSING END OF SHIFT
Nursing End of Shift Summary:    Patient: Del Mckeon  MRN: [id number]  : 1960, Age: 61 y.o.    Location: [place] [address]    Nursing Goals  Clinical Goals for the Shift: Monitor VS and telemetry, maintain comfort and safety    Narrative Summary of Progress Toward Clinical Goals:  Vital signs stable during shift when patient is lying in bed, ortho BP's positive (see flowsheets). Comfort and safety maintained.     Barriers to Goals/Nursing Concerns:  No    New Patient or Family Concerns/Issues:  No    Shift Summary:      Significant Events & Communications to Providers (last 12 hours)      Last 5 Values    No documentation.              Oxygen Usage (last 12 hours)      Last 5 Values     Row Name 21 0800 21 0850 21 1559             Oxygen Weaning Trial by Nursing    Is Patient on Room Air OR on the Same Amount of O2 as at Home?  Yes  Yes  Yes                 Mobility (last 12 hours)      Last 5 Values     Row Name 21 0834 21 0850 21 0900             Mobility    Activity  --  --  Commode      Level of Assistance  --  --  Standby assist, set-up cues, supervision of patient - no hands on      Anti-Embolism Devices  Bilateral;AE calf pump  Bilateral;AE calf pump  --          Urethral Catheter    Active Urethral Catheter     Name:   Placement date:   Placement time:   Site:   Days:    Urethral Catheter   21    0120     less than 1            Active Lines    Active Central venous catheter / Peripherally inserted central catheter / Implantable Port / Hemodialysis catheter / Midline Catheter     None              Infusing Medications   Medication Dose Last Rate   • sodium chloride 0.9 %  100 mL/hr 100 mL/hr (21 0824)     PRN Medications   Medication Dose Last Admin   • melatonin  3 mg 3 mg at 08/10/21 2133   • sodium chloride  3 mL     • acetaminophen  325-650 mg     • HYDROcodone-acetaminophen  1-2 tablet 2 tablet at 21 0441   • ondansetron  4 mg      Or   •  ondansetron ODT  4 mg      Or   • ondansetron  4 mg     • phenazopyridine  200 mg       _________________________  Brittany Encarnacion  08/11/21 5:56 PM

## 2021-08-11 NOTE — NURSING END OF SHIFT
Nursing End of Shift Summary:    Patient: Del Mckeon  MRN: [id number]  : 1960, Age: 61 y.o.    Location: [place] [address]    Nursing Goals  Clinical Goals for the Shift: Admit patient, monitor VS, maintain comfort and safety    Narrative Summary of Progress Toward Clinical Goals:  Vital signs stable during shift. Ortho BP obtained - See flowsheet. Comfort and safety maintained.    Barriers to Goals/Nursing Concerns:  No    New Patient or Family Concerns/Issues:  No    Shift Summary:      Significant Events & Communications to Providers (last 12 hours)      Last 5 Values    No documentation.              Oxygen Usage (last 12 hours)      Last 5 Values     Row Name 08/10/21 1500                   Oxygen Weaning Trial by Nursing    Is Patient on Room Air OR on the Same Amount of O2 as at Home?  Yes                   Mobility (last 12 hours)      Last 5 Values     Row Name 08/10/21 1355 08/10/21 1500                Mobility    Activity  --  Commode       Level of Assistance  --  Standby assist, set-up cues, supervision of patient - no hands on       Anti-Embolism Devices  Bilateral;AE calf pump  Bilateral;AE calf pump           Urethral Catheter    Active Urethral Catheter     None            Active Lines    Active Central venous catheter / Peripherally inserted central catheter / Implantable Port / Hemodialysis catheter / Midline Catheter     None              Infusing Medications   Medication Dose Last Rate   • sodium chloride 0.9 %  100 mL/hr 100 mL/hr (08/10/21 1511)     PRN Medications   Medication Dose Last Admin   • melatonin  3 mg     • sodium chloride  3 mL     • acetaminophen  325-650 mg     • HYDROcodone-acetaminophen  1-2 tablet     • HYDROcodone-acetaminophen  1 tablet     • ondansetron  4 mg      Or   • ondansetron ODT  4 mg      Or   • ondansetron  4 mg     • phenazopyridine  200 mg       _________________________  Britni Pedro RN  08/10/21 6:07 PM

## 2021-08-11 NOTE — CONSULTATION
Urology note    Called to see patient secondary to inability to pass agarwal    HPI: Pt underwent ureteroscopy for stone today. Was admitted for unclear reasons. I was called at 1245 am because of inability to pass agarwal. The patient was apparently having trouble voiding. I was called to place agarwal. Cysto note from today mentions high bladder neck.     PE: pt's abdomen is soft , nontender and without distention of the bladder.  A string is attached to th stent and visible at the meatus    Plan: agarwal placed 18 Canadian coude with no difficulty. Clear urine passed. Balloon filled with 10 ml. Catheter placed to straight drainage. Plan per Dr. Leiva's team in morning      Date of procedure: 8/10/2021  Pre-procedure diagnosis: BPH with obstruction  Post-procedure diagnosis: same  Procedure: simple agarwal insertion  Anesthesia: general  EBL: none  Drains: 18 Tajik coude  Complications: none    Description of procedure:    Patient's genitalia were prepped and draped in a sterile fashion.  Initial attempt with an 18 Tajik catheter were successful. The balloon was filled to 10 mL.  The catheter was placed to straight drainage.  There were no immediate complications.

## 2021-08-12 VITALS
HEART RATE: 74 BPM | SYSTOLIC BLOOD PRESSURE: 154 MMHG | TEMPERATURE: 98.6 F | DIASTOLIC BLOOD PRESSURE: 85 MMHG | BODY MASS INDEX: 24.44 KG/M2 | RESPIRATION RATE: 18 BRPM | WEIGHT: 175.27 LBS | OXYGEN SATURATION: 96 %

## 2021-08-12 LAB
ANION GAP SERPL CALC-SCNC: 12 MMOL/L (ref 3–11)
BACTERIA UR CULT: NORMAL
BASOPHILS # BLD AUTO: 0 10*3/UL
BASOPHILS NFR BLD AUTO: 1 % (ref 0–2)
BUN SERPL-MCNC: 25 MG/DL (ref 7–25)
CALCIUM SERPL-MCNC: 8.6 MG/DL (ref 8.6–10.3)
CHLORIDE SERPL-SCNC: 111 MMOL/L (ref 98–107)
CO2 SERPL-SCNC: 17 MMOL/L (ref 21–32)
CREAT SERPL-MCNC: 1.05 MG/DL (ref 0.7–1.3)
EOSINOPHIL # BLD AUTO: 0.1 10*3/UL
EOSINOPHIL NFR BLD AUTO: 1 % (ref 0–3)
ERYTHROCYTE [DISTWIDTH] IN BLOOD BY AUTOMATED COUNT: 14.2 % (ref 11.5–15)
GFR SERPL CREATININE-BSD FRML MDRD: 76 ML/MIN/1.73M*2
GLUCOSE BLDC GLUCOMTR-SCNC: 106 MG/DL (ref 70–105)
GLUCOSE BLDC GLUCOMTR-SCNC: 113 MG/DL (ref 70–105)
GLUCOSE SERPL-MCNC: 114 MG/DL (ref 70–105)
HCT VFR BLD AUTO: 32.5 % (ref 38–50)
HGB BLD-MCNC: 11.2 G/DL (ref 13.2–17.2)
LYMPHOCYTES # BLD AUTO: 2.1 10*3/UL
LYMPHOCYTES NFR BLD AUTO: 23 % (ref 15–47)
MCH RBC QN AUTO: 31.7 PG (ref 29–34)
MCHC RBC AUTO-ENTMCNC: 34.4 G/DL (ref 32–36)
MCV RBC AUTO: 92.1 FL (ref 82–97)
MONOCYTES # BLD AUTO: 0.7 10*3/UL
MONOCYTES NFR BLD AUTO: 8 % (ref 5–13)
NEUTROPHILS # BLD AUTO: 6.1 10*3/UL
NEUTROPHILS NFR BLD AUTO: 68 % (ref 46–70)
PLATELET # BLD AUTO: 143 10*3/UL (ref 130–350)
PMV BLD AUTO: 8.6 FL (ref 6.9–10.8)
POTASSIUM SERPL-SCNC: 3.6 MMOL/L (ref 3.5–5.1)
RBC # BLD AUTO: 3.52 10*6/ΜL (ref 4.1–5.8)
SODIUM SERPL-SCNC: 140 MMOL/L (ref 135–145)
WBC # BLD AUTO: 8.9 10*3/UL (ref 3.7–9.6)

## 2021-08-12 PROCEDURE — G0378 HOSPITAL OBSERVATION PER HR: HCPCS

## 2021-08-12 PROCEDURE — 80048 BASIC METABOLIC PNL TOTAL CA: CPT | Performed by: INTERNAL MEDICINE

## 2021-08-12 PROCEDURE — 2580000300 HC RX 258: Performed by: NURSE PRACTITIONER

## 2021-08-12 PROCEDURE — 97165 OT EVAL LOW COMPLEX 30 MIN: CPT | Mod: GO

## 2021-08-12 PROCEDURE — 82947 ASSAY GLUCOSE BLOOD QUANT: CPT | Mod: QW

## 2021-08-12 PROCEDURE — 99225 *NO LONGER ACTIVE 2023 DO NOT USE*  PR SBSQ OBSERVATION CARE/DAY 25 MIN: CPT | Performed by: INTERNAL MEDICINE

## 2021-08-12 PROCEDURE — 6370000100 HC RX 637 (ALT 250 FOR IP): Performed by: NURSE PRACTITIONER

## 2021-08-12 PROCEDURE — 85025 COMPLETE CBC W/AUTO DIFF WBC: CPT | Performed by: INTERNAL MEDICINE

## 2021-08-12 PROCEDURE — 97530 THERAPEUTIC ACTIVITIES: CPT | Mod: GO

## 2021-08-12 PROCEDURE — 6370000100 HC RX 637 (ALT 250 FOR IP): Performed by: UROLOGY

## 2021-08-12 PROCEDURE — 36415 COLL VENOUS BLD VENIPUNCTURE: CPT | Performed by: INTERNAL MEDICINE

## 2021-08-12 PROCEDURE — 97116 GAIT TRAINING THERAPY: CPT | Mod: GP | Performed by: PHYSICAL THERAPIST

## 2021-08-12 RX ORDER — PHENAZOPYRIDINE HYDROCHLORIDE 200 MG/1
200 TABLET, FILM COATED ORAL 3 TIMES DAILY PRN
Qty: 30 TABLET | Refills: 0 | Status: SHIPPED | OUTPATIENT
Start: 2021-08-12 | End: 2021-08-22

## 2021-08-12 RX ADMIN — TAMSULOSIN HYDROCHLORIDE 0.4 MG: 0.4 CAPSULE ORAL at 09:06

## 2021-08-12 RX ADMIN — SERTRALINE 50 MG: 50 TABLET, FILM COATED ORAL at 09:07

## 2021-08-12 RX ADMIN — SODIUM CHLORIDE 100 ML/HR: 9 INJECTION, SOLUTION INTRAVENOUS at 13:16

## 2021-08-12 RX ADMIN — HYDROCODONE BITARTRATE AND ACETAMINOPHEN 1 TABLET: 5; 325 TABLET ORAL at 09:20

## 2021-08-12 NOTE — INTERDISCIPLINARY/THERAPY
08/11/21 0810   Time Calculation   Start Time 0810   Stop Time 0833   Time Calculation (min) 23 min   PT Last Visit   PT Received On 08/11/21  (PT orders reiceved 8-10-21)   General   Chart Reviewed Yes   Therapy Treatment Diagnosis dx: s/p cystocopy with left ereteroscopy with stent placement, hx of CVA    Is this a Co-Treatment? No   Family/Caregiver Present No   Precautions   Other Precautions FALL, orthostatis    Home Living   Home Living Comments Pt lives in an apartment and owns 4WW however mostly wall walks. He is indep wtih ADLs and iADLs. He has had frequent falls.    Subjective Comments   RN Stated patient is medically cleared for therapy Yes   Subjective Comments RN ok'd PT session. Pt was in semi fowelr position in bed pre and post session. He had bed alarm engaged and call light in reach.    Bed Mobility   Bed Mobility Assessed   Bed Mobility 1   Bed Mobility Comments 1 He exited and entered bed with SBAX1.    Transfers   Transfer Assessed   Transfer 1   Trials/Comments 1 He stood from bed and chair with gait belt donned and MinAx1-SBAX1.    Ambulation   Ambulation Assessed   Ambulation 1   Comments/Distance 1 He ambulated 4 meters with MinAX1 and external support and then pivoted from chair to bed with SBAX1.    Assessment   Assessment Comment Del is likely at his baseline and would benefit from continuous use of 4WW including at home. If confines of home are limiting a SPC may be appropriate.    Recommendation   Recommendations for Therapy Continue skilled therapy   Equipment Recommended Cane   DME Justification (Cane) Patient has a mobility limitation that considerably impairs the ability to participate in MRADL in the home. The patient is able to safely use and the mobility deficit is resolved by the use of a cane   Therapeutic Interventions (Time Spent in Minutes)   Gait/Mobility 13   PT Untimed Charges - Quick List (Time Spent in Minutes)   PT Eval Low Complexity 10   Plan   PT Frequency  3-5x/wk   Plan Comment PT: 1 assist, 3-5x/wk for gait training - TAKE SPC and FWW, BLE exercises, balance training, safety education    Date POC Due 08/18/21

## 2021-08-12 NOTE — INTERDISCIPLINARY/THERAPY
08/12/21 1416   Time Calculation   Start Time 1416   Stop Time 1428   Time Calculation (min) 12 min   PT Last Visit   PT Received On 08/12/21   Pain Assessment Scale   Pain Scale 0-10   0-10 Pain Score 0 - No pain   General   Chart Reviewed Yes   Therapy Treatment Diagnosis s/p cystocopy with left ereteroscopy with stent placement, hx of CVA    PT Treatment Duration (Min) 12 Minutes   Is this a Co-Treatment? No   Family/Caregiver Present No   Precautions   Other Precautions fall, orthostasis   Weight Bearing Precautions No   Subjective Comments   RN Stated patient is medically cleared for therapy Yes   Subjective Comments Pt. was sitting EOB, agreed to PT. He ended session lying in bed with call light in reach, bed alarm on.   Cognition   Arousal/Alertness WFL   Bed Mobility   Bed Mobility Assessed   Bed Mobility 1   Bed Mobility From 1 Short sit   Bed Mobility Type 1 To   Bed Mobility to 1 Supine   Level of Assistance 1 Standby assistance   Transfers   Transfer Assessed   Transfer 1   Transfer From 1 Sit;Bed   Transfer Type 1 To and from   Transfer to 1 Stand   Transfer Device 1 Front wheeled walker;Transfer belt   Level of Assistance 1 Contact guard assist x 1;Verbal cueing   Ambulation   Ambulation Assessed   Ambulation 1   Surface 1 Level surface;Smooth   Device 1 Gait belt;Front wheeled walker;Single point cane   Assistance 1 Contact guard assist x 1;Min assist x 1   Quality of Gait 1 More steady with FWW than with cane.    Comments/Distance 1 20 meters with FWW and 20 meters with HurryCane. Pt. does have FWW at home, he said.   Balance   Balance Impaired   Activity Tolerance   Position Sitting;Standing   Standing Endurance Patient limited by fatigue   Sitting Endurance Patient limited by fatigue   Activity Tolerance Comments On room air. No adverse effects noted.   Assessment   Rehab Potential Good   Progress Progressing toward goals   Problem List Decreased endurance;Impaired balance;Decreased mobility    Assessment Comment Del is more steady w/ FWW than with cane. He does currently need asst. of 1 person for ambulation, and he was encouraged to amb. with nursing staff as able, not without asst.   Recommendation   Recommendations for Therapy Continue skilled therapy   Equipment Recommended   (Has FWW, should use it.)   Therapeutic Interventions (Time Spent in Minutes)   Gait/Mobility 12   Plan   Treatment Interventions Bed mobility;Functional transfer training;Gait training;Balance training;Endurance training;Therapeutic activities;Therapeutic exercises   PT Frequency 3-5x/wk   Plan Comment 1 asst. FWW 40+ meters. Balance, gait safety, sit to stand practice, may add standing balance ex's

## 2021-08-12 NOTE — DISCHARGE INSTRUCTIONS
Hold Plavix for 3 more days                Cystoscopy, Care After  Refer to this sheet in the next few weeks. These instructions provide you with information about caring for yourself after your procedure. Your health care provider may also give you more specific instructions. Your treatment has been planned according to current medical practices, but problems sometimes occur. Call your health care provider if you have any problems or questions after your procedure.  What can I expect after the procedure?  After the procedure, it is common to have:  · Mild pain when you urinate. Pain should stop within a few minutes after you urinate. This may last for up to 1 week.  · A small amount of blood in your urine for several days.  · Feeling like you need to urinate but producing only a small amount of urine.  Follow these instructions at home:    Medicines  · Take over-the-counter and prescription medicines only as told by your health care provider.  · If you were prescribed an antibiotic medicine, take it as told by your health care provider. Do not stop taking the antibiotic even if you start to feel better.  General instructions    · Return to your normal activities as told by your health care provider. Ask your health care provider what activities are safe for you.  · Do not drive for 24 hours if you received a sedative.  · Watch for any blood in your urine. If the amount of blood in your urine increases, call your health care provider.  · Follow instructions from your health care provider about eating or drinking restrictions.  · If a tissue sample was removed for testing (biopsy) during your procedure, it is your responsibility to get your test results. Ask your health care provider or the department performing the test when your results will be ready.  · Drink enough fluid to keep your urine clear or pale yellow.  · Keep all follow-up visits as told by your health care provider. This is important.  Contact a  health care provider if:  · You have pain that gets worse or does not get better with medicine, especially pain when you urinate.  · You have difficulty urinating.  Get help right away if:  · You have more blood in your urine.  · You have blood clots in your urine.  · You have abdominal pain.  · You have a fever or chills.  · You are unable to urinate.  This information is not intended to replace advice given to you by your health care provider. Make sure you discuss any questions you have with your health care provider.  Document Released: 07/07/2006 Document Revised: 05/25/2017 Document Reviewed: 11/03/2016  [employer] Interactive Patient Education © 2019 [employer]      Ureteral Stent Implantation, Care After  This sheet gives you information about how to care for yourself after your procedure. Your health care provider may also give you more specific instructions. If you have problems or questions, contact your health care provider.  What can I expect after the procedure?  After the procedure, it is common to have:  · Nausea.  · Mild pain when you urinate. You may feel this pain in your lower back or lower abdomen. The pain should stop within a few minutes after you urinate. This may last for up to 1 week.  · A small amount of blood in your urine for several days.  Follow these instructions at home:  Medicines  · Take over-the-counter and prescription medicines only as told by your health care provider.  · If you were prescribed an antibiotic medicine, take it as told by your health care provider. Do not stop taking the antibiotic even if you start to feel better.  · Do not drive for 24 hours if you were given a sedative during your procedure.  · Ask your health care provider if the medicine prescribed to you requires you to avoid driving or using heavy machinery.  Activity  · Rest as told by your health care provider.  · Avoid sitting for a long time without moving. Get up to take short walks every 1-2 hours.  This is important to improve blood flow and breathing. Ask for help if you feel weak or unsteady.  · Return to your normal activities as told by your health care provider. Ask your health care provider what activities are safe for you.  General instructions    · Watch for any blood in your urine. Call your health care provider if the amount of blood in your urine increases.  · If you have a catheter:  ? Follow instructions from your health care provider about taking care of your catheter and collection bag.  ? Do not take baths, swim, or use a hot tub until your health care provider approves. Ask your health care provider if you may take showers. You may only be allowed to take sponge baths.  · Drink enough fluid to keep your urine pale yellow.  · Do not use any products that contain nicotine or tobacco, such as cigarettes, e-cigarettes, and chewing tobacco. These can delay healing after surgery. If you need help quitting, ask your health care provider.  · Keep all follow-up visits as told by your health care provider. This is important.  Contact a health care provider if:  · You have pain that gets worse or does not get better with medicine, especially pain when you urinate.  · You have difficulty urinating.  · You feel nauseous or you vomit repeatedly during a period of more than 2 days after the procedure.  Get help right away if:  · Your urine is dark red or has blood clots in it.  · You are leaking urine (have incontinence).  · The end of the stent comes out of your urethra.  · You cannot urinate.  · You have sudden, sharp, or severe pain in your abdomen or lower back.  · You have a fever.  · You have swelling or pain in your legs.  · You have difficulty breathing.  Summary  · After the procedure, it is common to have mild pain when you urinate that goes away within a few minutes after you urinate. This may last for up to 1 week.  · Watch for any blood in your urine. Call your health care provider if the amount  of blood in your urine increases.  · Take over-the-counter and prescription medicines only as told by your health care provider.  · Drink enough fluid to keep your urine pale yellow.  This information is not intended to replace advice given to you by your health care provider. Make sure you discuss any questions you have with your health care provider.  Document Revised: 09/24/2019 Document Reviewed: 09/25/2019  [employer]. Patient Education © 2021 [employer]        General Anesthesia, Adult, Care After  This sheet gives you information about how to care for yourself after your procedure. Your health care provider may also give you more specific instructions. If you have problems or questions, contact your health care provider.  What can I expect after the procedure?  After the procedure, the following side effects are common:  · Pain or discomfort at the IV site.  · Nausea.  · Vomiting.  · Sore throat.  · Trouble concentrating.  · Feeling cold or chills.  · Weak or tired.  · Sleepiness and fatigue.  · Soreness and body aches. These side effects can affect parts of the body that were not involved in surgery.  Follow these instructions at home:    For at least 24 hours after the procedure:  · Have a responsible adult stay with you. It is important to have someone help care for you until you are awake and alert.  · Rest as needed.  · Do not:  ? Participate in activities in which you could fall or become injured.  ? Drive.  ? Use heavy machinery.  ? Drink alcohol.  ? Take sleeping pills or medicines that cause drowsiness.  ? Make important decisions or sign legal documents.  ? Take care of children on your own.  Eating and drinking  · Follow any instructions from your health care provider about eating or drinking restrictions.  · When you feel hungry, start by eating small amounts of foods that are soft and easy to digest (bland), such as toast. Gradually return to your regular diet.  · Drink enough fluid to keep your  urine pale yellow.  · If you vomit, rehydrate by drinking water, juice, or clear broth.  General instructions  · If you have sleep apnea, surgery and certain medicines can increase your risk for breathing problems. Follow instructions from your health care provider about wearing your sleep device:  ? Anytime you are sleeping, including during daytime naps.  ? While taking prescription pain medicines, sleeping medicines, or medicines that make you drowsy.  · Return to your normal activities as told by your health care provider. Ask your health care provider what activities are safe for you.  · Take over-the-counter and prescription medicines only as told by your health care provider.  · If you smoke, do not smoke without supervision.  · Keep all follow-up visits as told by your health care provider. This is important.  Contact a health care provider if:  · You have nausea or vomiting that does not get better with medicine.  · You cannot eat or drink without vomiting.  · You have pain that does not get better with medicine.  · You are unable to pass urine.  · You develop a skin rash.  · You have a fever.  · You have redness around your IV site that gets worse.  Get help right away if:  · You have difficulty breathing.  · You have chest pain.  · You have blood in your urine or stool, or you vomit blood.  Summary  · After the procedure, it is common to have a sore throat or nausea. It is also common to feel tired.  · Have a responsible adult stay with you for the first 24 hours after general anesthesia. It is important to have someone help care for you until you are awake and alert.  · When you feel hungry, start by eating small amounts of foods that are soft and easy to digest (bland), such as toast. Gradually return to your regular diet.  · Drink enough fluid to keep your urine pale yellow.  · Return to your normal activities as told by your health care provider. Ask your health care provider what activities are safe  for you.  This information is not intended to replace advice given to you by your health care provider. Make sure you discuss any questions you have with your health care provider.  Document Revised: 12/21/2018 Document Reviewed: 08/03/2018  [employer] Patient Education © 2021 [employer].

## 2021-08-12 NOTE — NURSING NOTE
Patient discharged, teaching on agarwal care done, supplies provided, instructions shared and verbalized understanding, all questions answered, belonging packed and given, accompanied by PCC to his car home, stable, no complaint

## 2021-08-12 NOTE — DISCHARGE SUMMARY
Inpatient Discharge Summary    BRIEF OVERVIEW  Admitting Provider: Kaylen Leiva MD  Discharge Provider: Kaylen Leiva MD  Primary Care Physician at Discharge: Klarissa Lester [phone]     Admission Date: 8/10/2021     Discharge Date: 8/12/2021    Primary Discharge Diagnosis  1.  Orthostatic hypotension    Secondary Discharge Diagnosis  2.  Left ureteral stones, status post left ureteroscopy with laser of stones and stent placement  3.  Gross hematuria, status post the above  4.  Incomplete bladder emptying  5.  Dizziness  6.  Frequent falls  7.  History of CVA, on Plavix therapy  8.  Type 2 diabetes  9.  Anemia    Discharge Disposition  01 - Home or Self-Care  Code Status at Discharge: Full code    Active Issues Requiring Follow-up  Issue: Orthostatic hypotension with discontinuation of his blood pressure meds by hospitalist service.  Frequent falls  Responsible Individual: PCP  What is Needed: Blood pressure management  Follow-up Appointments Arranged: Yes     Active Issues Requiring Follow-up  Issue: Left ureteroscopy with laser of stones and stent placement.  Hdz catheter in place.  Gross hematuria.  Responsible Individual: Urology  What is Needed: Hdz catheter and stent removal this coming Wednesday  Follow-up Appointments Arranged: Yes    Outpatient Follow-Up  Future Appointments   Date Time Provider Department Center   8/16/2021 10:00 AM [facility], [facility]URSE [facility]   8/18/2021  9:30 AM Ann Marie Mcdaniels CNP [id number] UROL    8/23/2021 10:40 [facility]    9/20/2021 11:15 AM Shital Chavez MD [facility] NR    10/5/2021 11:15 AM Shital Chavez MD [facility] NR RC   11/18/2021  3:00 PM Kaylen Leiva MD [id number] UROL RC   1/21/2022  8:45 AM Kaylen Leiva MD [id number] UROL          Test Results Pending at Discharge  None    DETAILS OF HOSPITAL STAY    Presenting Problem/History of Present Illness  Calculus of ureter [N20.1]  Hydronephrosis with urinary obstruction due to  "ureteral calculus [N13.2]  Postoperative observation [Z48.89]        Hospital Course  The patient was admitted on 8/10/2021 for outpatient surgery with Dr. Leiva, after receiving preoperative clearance by PCP and holding Plavix 5 days preoperatively.  Cystoscopy, laser retrograde pyelogram, left ureteroscopy with laser of stone, left ureteral stent placement was performed without incident.  He was noted to be intermittently dizzy and unsteady on his feet.  He takes his blood pressure frequently at home and notes that systolic is often 100.  Reports he is often dizzy and has had several falls, often requiring assistance to stand again, and furniture \"surfs\" when in his house to maintain his balance.   Postoperatively he was admitted for blood pressure management and possibility of home health or placement.  Hospitalist was and PT was consulted for this and case management started following him before he even left the postop area.  Changes were made to his blood pressure medication per hospitalist after orthostatic blood pressures were found to be  systolic 130 supine, and 79 standing.  In the early morning hours of 8/11/2021 incomplete bladder emptying was noted.  After some difficulty a agarwal catheter was placed.  It continues to drain dark red urine without clots or debris.  He was restarted on Flomax.  Plavix was held will continue to be held for 3 days after discharge.  He will be discharged with a Agarwal catheter in place, nursing has been instructed to teach him appropriate cares for this and provide a leg and night bag.  He will follow-up 8/18/21 to have this removed along with his ureteral stent.      Operative Procedures Performed  [unfilled]  Treatments: surgery: Left ureteroscopy with laser of stone and stent placement on 8/10/2021  Consults: Hospitalist and physical therapy  Procedures: Urethral catheter insertion  Pertinent Test Results: none    Physical Exam at Discharge  Discharge Condition: " fair  Heart Rate: 82  Resp: 16  BP: 125/79  Temp: 37.2 °C (98.9 °F)  Weight: 79.5 kg (175 lb 4.3 oz)     General:  Alert, normal appearance, no acute distress, not ill appearing, nontoxic appearing  HEENT:  Normal cephalic, atraumatic  Lungs:  Effort normal, no stridor  Abd:  soft, nontender, no guarding  : Stent strings are noted protruding from meatus, Hdz catheter in place draining dark red urine without clots or debris

## 2021-08-12 NOTE — PROGRESS NOTES
Hospitalist Progress Note      Active problems/plan  Principal Problem:    Postoperative observation  Active Problems:    Calculus of left ureter s/p cystoscopy, laser of stones, stent placement-gross hematuria, urinary retention overnight currently with Hdz-urology managing    Orthostatic hypotension-much less dizzy Cozaar, amlodipine,-we will leave off for now    History of right pontine CVA 1 year ago    Anemia    Benign essential hypertension    Type 2 diabetes mellitus without complication, with long-term current use of insulin (CMS/[facility]) ([facility])      ==================================================================================================================================================    Reason for consult  Management of diabetes, chronic lightheadedness    HPI:    61-year-old male with PMH of asthma, anemia came in for laser lithotripsy of 2 stones with stent placement. He then developed significant dizziness with standing and medicine was called      Hospital Course:  8/10/2021-cystoscopy, left ureteroscopy with laser of stones, placement of a left ureteral stent  8/11/2021 excessive dizziness with standing orthostatic from systolic 130 supine to 68 standing. Amlodipine, Cozaar discontinued. Brain MRI no acute changes  8/12/2021 no longer with orthostatic dizziness, eager to go home. He will be discharged with Hdz under the instructions of urology. Updated Dr Lester- will have a nurse visit on 8/16-10am     Vital signs:  Temp:  [36.2 °C (97.1 °F)-37.2 °C (98.9 °F)] 37.2 °C (98.9 °F)  Heart Rate:  [61-82] 82  Resp:  [16-18] 16  BP: (125-164)/(79-97) 125/79    Intake/Output Summary (Last 24 hours) at 8/12/2021 1510  Last data filed at 8/12/2021 1400  Gross per 24 hour   Intake 4085.04 ml   Output 3750 ml   Net 335.04 ml       Physical Exam  General in no distress  Supine SBP per nursing staff 100  Standing SBP per nursing staff 80  Neuro no facial asymmetry, fairly equal muscle strength  Psych awake  alert oriented x3  Chest clear  Heart regular no murmurs  Extremities no swelling    Laboratory data  Lab Results   Component Value Date    WBC 8.9 08/12/2021    HGB 11.2 (L) 08/12/2021    HCT 32.5 (L) 08/12/2021    MCV 92.1 08/12/2021     08/12/2021     Lab Results   Component Value Date     08/12/2021    K 3.6 08/12/2021     (H) 08/12/2021    CO2 17 (L) 08/12/2021    BUN 25 08/12/2021    CREATININE 1.05 08/12/2021    GLUCOSE 114 (H) 08/12/2021    CALCIUM 8.6 08/12/2021    ANIONGAP 12 (H) 08/12/2021     No results found for: INR, PT     Medications.  Scheduled Meds:insulin aspart U-100, 0-15 Units, subcutaneous, Insulin: 4x daily with meals  clopidogreL, 75 mg, oral, Daily  [Held by provider] metFORMIN, 1,000 mg, oral, 2x daily with meals  rosuvastatin, 40 mg, oral, Nightly  sertraline, 50 mg, oral, Daily  tamsulosin, 0.4 mg, oral, Daily      Continuous Infusions:   PRN Meds:.melatonin  •  Insert peripheral IV **AND** Maintain IV access **AND** Saline lock IV **AND** sodium chloride  •  acetaminophen  •  HYDROcodone-acetaminophen  •  ondansetron **OR** ondansetron ODT **OR** ondansetron  •  phenazopyridine        DVT Prophylaxis: SCDs    Code Status: Full Code    Anticipated date of discharge:  To be decided.     JOVANNI VINCENT MD    Provider Inpatient Hospital Services Certification: Current therapeutic care plan can only be provided in the hospital      A voice recognition program was used to aid in documentation of this record.  Sometimes words are not printed exactly as they were spoken.  While efforts were made to carefully edit and correct any inaccuracies, some inaccuracies may be missed, please take these into context.  Please contact the provider if areas are identified.

## 2021-08-12 NOTE — INTERDISCIPLINARY/THERAPY
08/12/21 1153   Time Calculation   Start Time 1153   Stop Time 1214   Time Calculation (min) 21 min   OT Last Visit   OT Received On 08/12/21   General   Chart Reviewed Yes   Therapy Treatment Diagnosis s/p cystocopy with left ereteroscopy with stent placement   Is this a Co-Treatment? No   Additional Pertinent History hx CVA, DMII   Family/Caregiver Present No   Precautions   Reinforced Precautions Yes   Other Precautions Orthostatics, fall   Home Living   Type of Home Apartment   Home Layout One level   Home Access Elevator   Bathroom Shower/Tub Tub/shower unit   Bathroom Toilet Handicapped height   Bathroom Equipment None   Home Adaptive Equipment 4 Wheel walker   Home Living Comments Patient reports while walking at home   Prior Function   Level of [place] Independent with ADLs and functional transfers;Independent with homemaking with ambulation   Lived With Alone   Subjective Comments   RN Stated patient is medically cleared for therapy Yes   Subjective Comments Patient agreeable to OT.  End of session patient back in bed with call light/needs in reach.   Pain Assessment Scale   0-10 Pain Score 0 - No pain   Cognition   Arousal/Alertness WFL   Orientation Level Oriented to situation;Oriented to person;Oriented to place   Cognition Comment Appropriate in conversation   Bed Mobility   Bed Mobility Assessed   Bed Mobility 1   Bed Mobility From 1 Supine   Bed Mobility Type 1 To and from   Bed Mobility to 1 Short sit   Level of Assistance 1 Standby assistance   Transfers   Transfer Assessed   Transfer 1   Transfer From 1 Bed;Sit   Transfer Type 1 To and from   Transfer to 1 Stand   Technique 1 Sit to stand;Stand to sit   Transfer Device 1 Transfer belt   Level of Assistance 1 Contact guard assist x 1   Trials/Comments 1 Unable to progress with functional transfer/mobility for safety due to orthostatics (see note below)   Balance   Balance Intact  (Standing EOB)   RUE Assessment   RUE Assessment WFL   LUE  Assessment   LUE Assessment WFL   Hand Function   Gross Grasp R Functional;L Functional   Light Touch   RUE Light Touch Grossly intact   LUE Light Touch Grossly intact   Vision - Complex Assessment   Vision Comments Patient self reports optic neuropathy with blurred vision, demos difficulty reading clock and therapist name tag   Activity Tolerance   Activity Tolerance Comments Orthostatics; supine 130/89 HR 65; sit /80 HR 77, denies symptoms; standing 82/66 HR 87 reports light headed denies dizziness   Assessment   Rehab Potential Fair   Problem List Decreased ADL status;Decreased safe judgment during ADL;Visual deficit;Decreased functional mobility;Decreased IADLs   Barriers to Discharge Decreased caregiver support   Recommendations for Therapy Continue skilled therapy;Safety issues - physical   Assessment Comment Patient demonstrates deficits with functional transfers/mobility due to orthostatics, and ADLs.  Patient demonstrates deficits with vision, will continue to further assess.  Patient will benefit from skilled OT to maximize functional independence.   Therapeutic Interventions (Time Spent in Minutes)   Therapeutic Activity Dynamic 11   OT Untimed Charges - Quick List (Time Spent in Minutes)   OT Eval Low Complexity 10   Plan   Plan Comment continue to assess vision; fxal xfer/mobility, ADLs   Treatment Interventions ADL retraining;Therapeutic activity;Therapeutic exercise;Visual perceptual retraining;UE strengthening/ROM;Endurance training;Patient/family training;Equipment evaluation/education;Compensatory technique education   OT Frequency 2-3x/wk   Date POC Due 08/19/21

## 2021-08-13 ENCOUNTER — TELEPHONE (OUTPATIENT)
Dept: FAMILY MEDICINE | Facility: CLINIC | Age: 61
End: 2021-08-13

## 2021-08-13 ENCOUNTER — TELEPHONE (OUTPATIENT)
Dept: UROLOGY | Facility: CLINIC | Age: 61
End: 2021-08-13

## 2021-08-13 ENCOUNTER — TELEPHONE (OUTPATIENT)
Dept: SOCIAL WORK | Facility: HOSPITAL | Age: 61
End: 2021-08-13

## 2021-08-13 NOTE — TELEPHONE ENCOUNTER
Caller would like to discuss (a) post-surgery issues     Patient: Del L Patton    Callback Number: [phone]    Best Availability: as soon as possible    Additional Info: Patient is calling stating that he would like to speak to someone. He recently had surgery on 8/10/21. He stated that he would like to speak to the nurse to discuss his issues.     I advised caller of a callback by 24-48 hours.

## 2021-08-13 NOTE — TELEPHONE ENCOUNTER
Patient called our office and states that his catheter is causing him pain and leaked last night. Patient is wanting this catheter out today. Advised patient that he is scheduled for a follow up appointment with our office on 08/18/2021. I will review this with Dr. Leiva and get back to patient.

## 2021-08-13 NOTE — TELEPHONE ENCOUNTER
The patient was contacted .mandy following recent hospitalization at [facility]. The patient was discharged from the hospital on 8/12/21.  Information was obtained today through patient and chart review of the recent discharge summary.  The patient’s main diagnosis during the hospitalization was Calculus of ureter [N20.1]  Hydronephrosis with urinary obstruction due to ureteral calculus .  There were medication changes.  The medication list has been updated on the patient’s chart.  The patient has a follow up appointment scheduled with Dr. Lester on 8/23. s.  Any additional testing and labs will be discussed at the patient’s upcoming post-hospital follow up appointment.

## 2021-08-13 NOTE — TELEPHONE ENCOUNTER
"Reviewed with Ann Marie Mcdaniels CNP. Per Ann Marie patient will need to keep his catheter in until his 08/18/2021 appointment as he had urinary retention after surgery. Called and notified patient of this. Patient became very upset stating \"how am I supposed to sleep tonight with this catheter in\". I explained the importance of the catheter remaining in place to the patient. Patient states \"it's a bigger problem if I can't sleep\". Again reviewed with patient that we would need to keep the catheter in place until his follow up with our office. Patient then hung up on me.   "

## 2021-08-13 NOTE — TELEPHONE ENCOUNTER
Patient discharged from [facility] on 8/12/21. Please call to schedule post hospital appointment and please contact within two business days per protocol. Thank you.

## 2021-08-18 ENCOUNTER — APPOINTMENT (OUTPATIENT)
Dept: LAB | Facility: CLINIC | Age: 61
End: 2021-08-18
Payer: COMMERCIAL

## 2021-08-18 ENCOUNTER — OFFICE VISIT (OUTPATIENT)
Dept: UROLOGY | Facility: CLINIC | Age: 61
End: 2021-08-18
Payer: COMMERCIAL

## 2021-08-18 VITALS
SYSTOLIC BLOOD PRESSURE: 151 MMHG | TEMPERATURE: 97.5 F | DIASTOLIC BLOOD PRESSURE: 85 MMHG | RESPIRATION RATE: 16 BRPM | OXYGEN SATURATION: 99 % | HEART RATE: 80 BPM

## 2021-08-18 DIAGNOSIS — N20.0 CALCULUS OF KIDNEY: ICD-10-CM

## 2021-08-18 DIAGNOSIS — N20.1 CALCULUS OF URETER: ICD-10-CM

## 2021-08-18 DIAGNOSIS — N13.2 HYDRONEPHROSIS WITH URINARY OBSTRUCTION DUE TO URETERAL CALCULUS: Primary | ICD-10-CM

## 2021-08-18 PROCEDURE — 99024 POSTOP FOLLOW-UP VISIT: CPT | Performed by: NURSE PRACTITIONER

## 2021-08-18 RX ORDER — TAMSULOSIN HYDROCHLORIDE 0.4 MG/1
0.4 CAPSULE ORAL DAILY
Qty: 90 CAPSULE | Refills: 3 | Status: SHIPPED | OUTPATIENT
Start: 2021-08-18 | End: 2021-08-23 | Stop reason: ALTCHOICE

## 2021-08-18 ASSESSMENT — PAIN SCALES - GENERAL: PAINLEVEL: 2

## 2021-08-18 NOTE — PROGRESS NOTES
[facility]  Urology      HPI:  Del Mckeon is a 61 y.o. male previously evaluated for renal stones, s/p cystoscopy, left retrograde pyelogram, left ureteroscopy with laser of stones, left ureteral stent placement (strings left intact) 8/10/21, here for follow-up on the above.  On 6/16/2001 he noted emesis and diarrhea.  Work-up included CT with 2 mm and 4 mm mid left ureteral stones with moderate hydronephrosis.  KUB 7/2/2021 without definite stones.  CT 7/28/21 demonstrated 2 left distal ureteral stones, the largest of which measures up to 3 mm. Upstream left hydroureter and moderate to severe left hydronephrosis. The above procedure was preformed on 8/10/21, and he was admitted postoperatively for intermittent hypotension associated with dizziness and reported frequent falls at home. While hospitalized pt experienced incomplete bladder emptying and a urethral catheter was placed and he was started on Flomax.   Urine in agarwal today is bloody, and he admits he is not drinking very much fluid. He denies flank pain, dysuria, nausea, vomiting, fever or chills.         The following have been reviewed and updated as appropriate for this visit:  Allergies, Medications, Past Medical, Surgical and Social History, Problem List    Physical Exam:      /85 (BP Location: Right arm, Patient Position: Sitting, Cuff Size: Long Adult)   Pulse 80   Temp 36.4 °C (97.5 °F) (Temporal)   Resp 16   SpO2 99%   Physical Exam  Vitals and nursing note reviewed.   Constitutional:       General: He is not in acute distress.     Appearance: Normal appearance.   HENT:      Head: Normocephalic and atraumatic.   Pulmonary:      Effort: Pulmonary effort is normal. No respiratory distress.   Abdominal:      General: Abdomen is flat.      Palpations: Abdomen is soft.   Genitourinary:     Comments: Stent strings protruding from penis. Brown gross hematuria in catheter.   Skin:     General: Skin is warm and dry.   Neurological:       Mental Status: He is alert. Mental status is at baseline.   Psychiatric:         Mood and Affect: Mood normal.           Results:    Rad-  -CT 7/28/21:  Interval mild distal migration of 2 left distal ureteral stones, the largest of which measures up to 3 mm. There is upstream left hydroureter and moderate to severe left hydronephrosis. Tiny nonobstructive right inferior pole nephrolithiasis. No right-sided hydronephrosis.     Small distal pancreatic hypodensity which measures up to 10 mm, indeterminate. No change since 5/20/2019 CT, however MRI with IV contrast of the abdomen could better evaluate.     -KUB 7/2/21:   Left ureteral stones seen on the prior CT at the level of the sacrum are not appreciated on this study. They are either obscured by overlying bone and soft tissue aren't passed. There are not seen on the 6/16/2021 KUB    -CT 6/16/21:  1. Moderate left-sided hydronephrosis due to 2 small obstructing stones in the left ureter at the level of the sacrum. One measures 4 mm and one measures 2 mm.  2. Calcified granuloma right lower lobe.    Procedure:   Catheter was flushed and aspirated with 300ml total of sterile saline. Urine cleared from brown with very very small clots to a light watermelon pink.     Voiding trial was completed by instilling 250ml of sterile water into the bladder. Hdz catheter was removed intact. Pt was able to void 250ml w/o issue.     Left ureteral stent strings grasped and removed intact.         Assessment & Plan:  1.  Left ureteral stones, resolved, s/p left ureteroscopy with laser of stones, left ureteral stent placement 8/10/21  2.  Moderate left hydronephrosis  3.  Gross hematuria, uncertain prognosis  4.  Urinary retention, resolved based on void trial  5.  Orthostatic hypotension  -Discussed w/ the pt gross hematuria should slowly resolve now that stent and catheter have been removed. He needs to drink more fluid, 100oz/day. Will check a UA micro in 3 months to check for  resolution  -24 hours urine results personally reviewed as part of MDM and were discussed w/ the pt. Normal except he needs to increase fluid intake for goal UOP 2000ml daily  -the patient was encouraged to increase fluid intake for goal UOP 2000 ml daily, add lemon or orange juice to diet to increase urinary citrate levels, decrease salt and protein in diet to decrease overall stone risk  -Continue Flomax  -Follow-up with PCP for orthostatic hypotension, lightheadedness and dizziness  -PSA due April 2022  -PSA result 4/7/2021 personally reviewed  -CT report 6/16/2021 personally reviewed  -KUB report 7/2/2021 personally reviewed  -CT 6/16/2021 Personally reviewed  -Will obtain renal ultrasound to assess for resolution of hydronephrosis  -f/u with UA and PVR in 3 month  -The patient is in agreement with the above plan, questions answered      [id number] global period (8/10/21)    ---------------------------------  Ann Marie Mcdaniels CNP  08/18/21 8:59 AM

## 2021-08-23 ENCOUNTER — OFFICE VISIT (OUTPATIENT)
Dept: FAMILY MEDICINE | Facility: CLINIC | Age: 61
End: 2021-08-23
Payer: COMMERCIAL

## 2021-08-23 ENCOUNTER — APPOINTMENT (OUTPATIENT)
Dept: LAB | Facility: CLINIC | Age: 61
End: 2021-08-23
Payer: COMMERCIAL

## 2021-08-23 VITALS
WEIGHT: 185.4 LBS | BODY MASS INDEX: 25.86 KG/M2 | RESPIRATION RATE: 16 BRPM | OXYGEN SATURATION: 99 % | TEMPERATURE: 97.6 F

## 2021-08-23 DIAGNOSIS — D50.9 IRON DEFICIENCY ANEMIA, UNSPECIFIED IRON DEFICIENCY ANEMIA TYPE: ICD-10-CM

## 2021-08-23 DIAGNOSIS — I95.1 ORTHOSTATIC HYPOTENSION: ICD-10-CM

## 2021-08-23 DIAGNOSIS — Z86.73 HISTORY OF LACUNAR CEREBROVASCULAR ACCIDENT (CVA): ICD-10-CM

## 2021-08-23 DIAGNOSIS — N17.9 ACUTE KIDNEY INJURY (CMS/HCC): ICD-10-CM

## 2021-08-23 DIAGNOSIS — Z79.4 TYPE 2 DIABETES MELLITUS WITHOUT COMPLICATION, WITH LONG-TERM CURRENT USE OF INSULIN (CMS/HCC): ICD-10-CM

## 2021-08-23 DIAGNOSIS — I10 BENIGN ESSENTIAL HYPERTENSION: ICD-10-CM

## 2021-08-23 DIAGNOSIS — D64.9 ANEMIA, UNSPECIFIED TYPE: Primary | ICD-10-CM

## 2021-08-23 DIAGNOSIS — E11.9 TYPE 2 DIABETES MELLITUS WITHOUT COMPLICATION, WITH LONG-TERM CURRENT USE OF INSULIN (CMS/HCC): ICD-10-CM

## 2021-08-23 DIAGNOSIS — D64.9 ANEMIA, UNSPECIFIED TYPE: ICD-10-CM

## 2021-08-23 LAB
BASOPHILS # BLD AUTO: 0.1 10*3/UL
BASOPHILS NFR BLD AUTO: 1 % (ref 0–2)
EOSINOPHIL # BLD AUTO: 0.1 10*3/UL
EOSINOPHIL NFR BLD AUTO: 1 % (ref 0–3)
ERYTHROCYTE [DISTWIDTH] IN BLOOD BY AUTOMATED COUNT: 14.2 % (ref 11.5–15)
FERRITIN SERPL-MCNC: 137.3 NG/ML (ref 24–336)
HCT VFR BLD AUTO: 31.5 % (ref 38–50)
HGB BLD-MCNC: 10.8 G/DL (ref 13.2–17.2)
IRON SATN MFR SERPL: 12 % (ref 13–50)
IRON SERPL-MCNC: 32 UG/DL (ref 50–175)
LYMPHOCYTES # BLD AUTO: 2.4 10*3/UL
LYMPHOCYTES NFR BLD AUTO: 27 % (ref 15–47)
MCH RBC QN AUTO: 32.1 PG (ref 29–34)
MCHC RBC AUTO-ENTMCNC: 34.4 G/DL (ref 32–36)
MCV RBC AUTO: 93.2 FL (ref 82–97)
MONOCYTES # BLD AUTO: 0.5 10*3/UL
MONOCYTES NFR BLD AUTO: 6 % (ref 5–13)
NEUTROPHILS # BLD AUTO: 5.7 10*3/UL
NEUTROPHILS NFR BLD AUTO: 65 % (ref 46–70)
PLATELET # BLD AUTO: 211 10*3/UL (ref 130–350)
PMV BLD AUTO: 8.1 FL (ref 6.9–10.8)
RBC # BLD AUTO: 3.38 10*6/ΜL (ref 4.1–5.8)
TIBC SERPL-MCNC: 267 UG/DL (ref 250–450)
UIBC SERPL-MCNC: 235 UG/DL (ref 155–355)
WBC # BLD AUTO: 8.7 10*3/UL (ref 3.7–9.6)

## 2021-08-23 PROCEDURE — 85025 COMPLETE CBC W/AUTO DIFF WBC: CPT | Performed by: FAMILY MEDICINE

## 2021-08-23 PROCEDURE — 82728 ASSAY OF FERRITIN: CPT | Performed by: FAMILY MEDICINE

## 2021-08-23 PROCEDURE — 83540 ASSAY OF IRON: CPT | Performed by: FAMILY MEDICINE

## 2021-08-23 PROCEDURE — 99215 OFFICE O/P EST HI 40 MIN: CPT | Performed by: FAMILY MEDICINE

## 2021-08-23 PROCEDURE — 36415 COLL VENOUS BLD VENIPUNCTURE: CPT | Performed by: FAMILY MEDICINE

## 2021-08-23 PROCEDURE — 83550 IRON BINDING TEST: CPT | Performed by: FAMILY MEDICINE

## 2021-08-23 ASSESSMENT — PAIN SCALES - GENERAL: PAINLEVEL: 0-NO PAIN

## 2021-08-23 NOTE — PROGRESS NOTES
"Outpatient Progress Note    SUBJECTIVE:   ID/CC: Del Mckeon is a 61 y.o. male presenting to clinic today for   Chief Complaint   Patient presents with   • Follow-up     4 month chronic conditions   • Eye Problem     patient reports his eye sight \"got worse this past weekend\"- He say his eye  last week and she told him he had damage caused by diabetes.   • Urinary Incontinence     patient reports he had a urology visit last week and his catheter was removed.  He has had 2 episoces of urinary incont since then       HPI:   Del is a 61-year-old male with a past medical history of type 2 diabetes, hypertension, Obstructive sleep apnea and hyperlipidemia as well as of recent stroke.  He is here today to follow-up from a recent hospitalization.    He was admitted from 8/10/2021 through 8/12/2021.  He initially went in due to requiring ureteral stents for obstructing ureteral stones.  However during initial consultation, he endorsed to the nurse practitioner that he was having lightheaded episodes, furniture surfing at home due to feeling unsteady on his feet and had had several falls that required assistance with to stand up.  This was all new and he had never mentioned these during our previous appointments.    He was found to be orthostatic in the hospital with supine blood pressure of 130 but a standing blood pressure of 68 systolic.  They held his amlodipine 10 mg and losartan 100 mg and encouraged him to follow-up here.  Due to the lightheadedness and vision changes, they did check an MRI of his brain and this was with minimal atrophy and stable changes from his previous stroke.  He was discharged in stable condition with resolution of his orthostatic hypotension on the 12th.    He has followed up with urology.  They did have to place a Hdz in the hospital due to some urinary retention but this was removed during his hospital follow-up on the 18th.  He has had no further urinary retention or blood in his " urine.  He reports that he is satisfactorily emptying his bladder and is not having any dribbling or increased frequency.    Regarding his blood pressures since getting discharged, his blood pressures have been running in th 110-130/60-80s. There have been no pressures less than 100 systolic. He has not been taking his losartan and amlodipine as they were held in the hospital. He has not had any more lightheaded episodes and does not have to surf between furniture.     He describes the episodes to me that he had prior to admission. Once he was riding his bike in the park and ran out of energy. He had to stop pedaling and in the process of getting off he tripped his foot on the pedal and fell over, but could not get back up. He had no energy to do so. This was not on a warm day. No syncope. Other episodes were when he would wake up in the morning and he describes dizziness or lightheadedness after standing up first thing in the morning. And this resolved in a few minutes with sitting down and resting. He has had a few episodes similar to his biking episode in the last several months. He denies any signs of bleeding in his urine, or stool and denies any diarrhea. But he did have a few bloody noses that have been easy to stop. These have been present ever since his stroke and starting his plavix.     His hgb did drop in the hospital from 11.6 prior to admission to 11.2 on the day he was discharged.  He has had a low hemoglobin in the past and is pale on examination as well and has never had an iron panel drawn or an anemia work-up.  He denies any blood in his stool or urine.    He has also been having some vision issues. He describes blurry vision and has had difficulty reading what he was reading a few days ago. No headaches that are different from baseline. He saw his eye doctor last week at [facility].  At that time, he was not having any decrease in vision and there were some diabetic retinopathy  changes but his symptoms started after his recent admission.      ROS:   12 point ROS reviewed and negative except as in HPI.       Past Medical History:   Diagnosis Date   • Anemia    • Asthma     no attacks as long as away from hay fever allergens in [place]   • Cardiovascular disease     known bicuspid aortic valve, mild AVS, murmur   • Depressive disorder    • Endocrine disorder    • Heart murmur    • Hyperlipidemia    • Kidney disease 2021, 2011    calculi   • Obesity    • Peripheral neuropathy     L arm    • Stroke (CMS/HCC) (HCC) 05/21/2020    ischemic, L sided weakness; L leg weakness remains       Past Surgical History:   Procedure Laterality Date   • BACK SURGERY  2018    lumbar discectomy   • CRYOTHERAPY      dermatology   • CYSTOSCOPY URETEROSCOPY W/ LASER LITHOTRIPSY Left 8/10/2021    Procedure: CYSTOSCOPY LEFT URETEROSCOPY WITH LASER LITHOTRIPSY WITH STENT PLACEMENT;  Surgeon: Kaylen Leiva MD;  Location: [facility];  Service: Urology;  Laterality: Left;   • EYE SURGERY Bilateral 08/2020    cataracts         Current Outpatient Medications:   •  clopidogreL (PLAVIX) 75 mg tablet, Take 1 tablet by mouth once daily, Disp: 90 tablet, Rfl: 3  •  potassium 99 mg tablet, Take 1 tab/cap by mouth daily, Disp: , Rfl:   •  omega-3 fatty acids-fish oil (Fish OiL) 340-1,000 mg capsule, Take 1 tab/cap by mouth every other day  , Disp: , Rfl:   •  sertraline (ZOLOFT) 50 mg tablet, Take 1 tablet by mouth once daily, Disp: 90 tablet, Rfl: 3  •  metFORMIN (GLUCOPHAGE) 500 mg tablet, Take 2 tablets (1,000 mg total) by mouth 2 (two) times a day with meals, Disp: 120 tablet, Rfl: 11  •  ibuprofen (AdviL) 200 mg tablet, Take 400 mg by mouth every 6 (six) hours as needed for pain scale 1-3/10 or pain scale 8-10/10., Disp: , Rfl:   •  rosuvastatin (CRESTOR) 40 mg tablet, Take 1 tablet (40 mg total) by mouth nightly, Disp: 90 tablet, Rfl: 3  •  acetaminophen (Tylenol 8 Hour) 650 mg 8 hr tablet, Take 650-1,300 mg by mouth every 8  (eight) hours as needed for pain scale 1-3/10  , Disp: , Rfl:   •  cholecalciferol, vitamin D3, (Vitamin D3) 25 mcg (1,000 unit) capsule, Take 1 capsule (1,000 Units total) by mouth daily, Disp: 30 capsule, Rfl: 3  •  multivitamin with minerals tablet, Take 1 tablet by mouth every other day  , Disp: , Rfl:   •  melatonin 5 mg capsule, Take 5 mg by mouth nightly as needed (insomnia) , Disp: , Rfl:   •  diphenhydramine-acetaminophen (TYLENOL PM EXTRA STRENGTH)  mg tablet, Take by mouth nightly as needed  , Disp: , Rfl:   •  ferrous sulfate ER (Slow Release Iron) 140 mg (45 mg iron) tablet, Take 1 tablet (140 mg total) by mouth 2 (two) times a day with meals, Disp: 60 tablet, Rfl: 11    Allergies   Allergen Reactions   • Penicillins      Tested as a child; showed positive       Social History     Socioeconomic History   • Marital status:      Spouse name: Not on file   • Number of children: Not on file   • Years of education: Not on file   • Highest education level: Not on file   Occupational History   • Not on file   Tobacco Use   • Smoking status: Never Smoker   • Smokeless tobacco: Never Used   Vaping Use   • Vaping Use: Never used   Substance and Sexual Activity   • Alcohol use: No     Comment: had 1 beer in 2010   • Drug use: Never   • Sexual activity: Not Currently   Other Topics Concern   • Not on file   Social History Narrative   • Not on file     Social Determinants of Health     Financial Resource Strain:    • Difficulty of Paying Living Expenses: Not on file   Food Insecurity:    • Worried About Running Out of Food in the Last Year: Not on file   • Ran Out of Food in the Last Year: Not on file   Transportation Needs:    • Lack of Transportation (Medical): Not on file   • Lack of Transportation (Non-Medical): Not on file   Physical Activity:    • Days of Exercise per Week: Not on file   • Minutes of Exercise per Session: Not on file   Stress:    • Feeling of Stress : Not on file   Social  Connections:    • Frequency of Communication with Friends and Family: Not on file   • Frequency of Social Gatherings with Friends and Family: Not on file   • Attends Gnosticist Services: Not on file   • Active Member of Clubs or Organizations: Not on file   • Attends Club or Organization Meetings: Not on file   • Marital Status: Not on file   Intimate Partner Violence:    • Fear of Current or Ex-Partner: Not on file   • Emotionally Abused: Not on file   • Physically Abused: Not on file   • Sexually Abused: Not on file       Family History   Problem Relation Age of Onset   • Alzheimer's disease Father    • Diabetes type II Father    • Diabetes Sister        OBJECTIVE:   Vitals:   Vitals:    08/23/21 1043   Resp: 16   Temp: 36.4 °C (97.6 °F)   SpO2: 99%     Weights (last 180 days)     Date/Time   Weight    08/23/21 1043  84.1 kg (185 lb 6.4 oz)              Orthostatic blood pressures:   Supine blood pressure 140/76, heart rate 61  Sitting blood pressure 116/76, pulse of 71  Standing blood pressure of 118/72, pulse of 86    Physical Exam:   General: Alert and oriented. In no acute distress.   Head:normocephalic and atraumatic. No tenderness palpated  Eyes:Pupils equal equal and reactive, no corneal or scleral injection.  ENT: TMs visible without bulging or erythema. No nasal lesions. No oral lesions.   Cardio: S1 S2 without extra sounds or murmurs.  No edema. PP intact. Cap refill <2s.  No JVD  Pulm: CTAB without wheezing or crackles. Symmetrical air exchange.   Gi: BS present x4. Soft, nontender and non distended.  No masses palpated.  Ext: Full range of motion of all extremities.  No joint tenderness or erythema or swelling noted.  Neuro: Motor and sensation intact bilaterally. No focal lesions.  Cranial nerves II through XII are intact and symmetrical.  Deep tendon reflexes 2 throughout.  Skin: No rashes or lesions noted.  Psych: Denies SI/HI/AH/VH      Labs/Imaging/Micro:  Labs from his hospitalization were  reviewed    Component      Latest Ref Rng & Units 8/23/2021   Iron      50 - 175 ug/dL 32 (L)   TIBC      250 - 450 ug/dL 267   Iron Saturation      13 - 50 % 12 (L)   UIBC      155 - 355 ug/dL 235   Ferritin      24.0 - 336.0 ng/mL 137.3     Component      Latest Ref Rng & Units 8/6/2021 8/12/2021 8/23/2021   WBC      3.7 - 9.6 10*3/uL 6.7 8.9 8.7   RBC      4.10 - 5.80 10*6/µL 3.70 (L) 3.52 (L) 3.38 (L)   Hemoglobin      13.2 - 17.2 g/dL 11.6 (L) 11.2 (L) 10.8 (L)   Hematocrit      38.0 - 50.0 % 34.6 (L) 32.5 (L) 31.5 (L)   MCV      82.0 - 97.0 fL 93.6 92.1 93.2   MCH      29.0 - 34.0 pg 31.4 31.7 32.1   MCHC      32.0 - 36.0 g/dL 33.6 34.4 34.4   RDW      11.5 - 15.0 % 13.8 14.2 14.2   Platelets      130 - 350 10*3/uL 183 143 211   MPV      6.9 - 10.8 fL 8.2 8.6 8.1   Neutrophils%      46 - 70 % 61 68 65   Lymphocytes%      15 - 47 % 30 23 27   Monocytes%      5 - 13 % 5 8 6   Eosinophils%      0 - 3 % 2 1 1   Basophils%      0 - 2 % 1 1 1   ANC (auto diff)      10*3/UL 4.10 6.10 5.70   Lymphs # (Absolute)      10*3/uL 2.00 2.10 2.40   Monocytes # (Absolute)      10*3/uL 0.40 0.70 0.50   Eos # (Absolute)      10*3/uL 0.10 0.10 0.10     Component      Latest Ref Rng & Units 8/27/2021   Fecal Occult Bld      Negative Positive (A)       ASSESSMENT AND PLAN:   Del Mckeon is a 61 y.o. male presenting to clinic today for:     Orthostatic hypotension  His syncopal episodes he tells me today were not syncope and he never lost consciousness.  However he does have lightheadedness with standing.  He was pretty orthostatic during his admission and this has improved and he has normal orthostatics today.  I recommend we continue to hold his losartan and amlodipine for now.    However, he also has an iron deficiency anemia.  He is on Plavix due to his recent stroke.I am concerned about potential GI bleed and I do recommend that he gets a colonoscopy.  I have placed a referral to GI for this.  In the meantime, I do want him  to start taking slow release iron 1 tablet 3 times a day and we will recheck his iron panel in 6 weeks.    Benign essential hypertension  His blood pressure is controlled today.  He is checking these at home as well and they are running in the 120s over 80s over or less.  He has not had anything less then 100/60 is filled his medications will help with the hospital.  We are going to continue to hold his losartan and amlodipine for now.  We again discussed the blood pressure goal of 120/80.    Type 2 diabetes mellitus without complication, with long-term current use of insulin (CMS/[facility]) ([facility])  His diabetes is currently stable.  He states his blood sugars areWell controlled and mostly in the  range.  He is currently only on Metformin 1000 mg 2 times a day.  His most recent A1c was 5.4% which was an improvement from 5.9% 4 months ago.    As for his vision changes, it is potential that he could have some diabetic retinopathy.  I do recommend that he see his ophthalmologist.  MRI was reviewed from his hospitalization and negative for any new changes that could be causing his vision changes.    Acute kidney injury (CMS/HCC) ([facility])  His acute kidney injury resolved in the hospital.  He did have some stones removed as well as stents placed in his ureters.  He did have to have Hdz after his procedure but he ended up having this removed at his urology follow-up.  Things have been going well since then.  He will follow with them again in a month.     Diagnosis Plan   1. Anemia, unspecified type  CBC w/auto differential Blood, Venous    Iron panel Blood, Venous    Ambulatory referral to Gastroenterology   2. Benign essential hypertension     3. Orthostatic hypotension     4. History of lacunar cerebrovascular accident (CVA)     5. Type 2 diabetes mellitus without complication, with long-term current use of insulin (CMS/HCC) ([facility])     6. Iron deficiency anemia, unspecified iron deficiency anemia type  ferrous sulfate ER  (Slow Release Iron) 140 mg (45 mg iron) tablet    Occult Blood, fecal (Screen) Stool    CBC w/auto differential Blood, Venous    Iron panel Blood, Venous    Ambulatory referral to Gastroenterology   7. Acute kidney injury (CMS/HCC) (HCC)         Return in 6 weeks (on 10/4/2021), or depending on labs, for Anemia and chronic care follow up - 40 min.    Total time spent with patient was 45 minutes.    Klarissa Lester MD  08/30/21  5:22 AM

## 2021-08-26 RX ORDER — UREA 10 %
140 LOTION (ML) TOPICAL 2 TIMES DAILY WITH MEALS
Qty: 60 TABLET | Refills: 11 | Status: SHIPPED | OUTPATIENT
Start: 2021-08-26

## 2021-08-27 ENCOUNTER — APPOINTMENT (OUTPATIENT)
Dept: LAB | Facility: CLINIC | Age: 61
End: 2021-08-27
Payer: COMMERCIAL

## 2021-08-27 DIAGNOSIS — D50.9 IRON DEFICIENCY ANEMIA, UNSPECIFIED IRON DEFICIENCY ANEMIA TYPE: ICD-10-CM

## 2021-08-27 LAB — FECAL OCCULT BLOOD SCREEN, POC: POSITIVE

## 2021-08-27 PROCEDURE — 82274 ASSAY TEST FOR BLOOD FECAL: CPT | Performed by: FAMILY MEDICINE

## 2021-08-30 PROBLEM — R19.7 DIARRHEA: Status: RESOLVED | Noted: 2021-06-16 | Resolved: 2021-08-30

## 2021-08-30 PROBLEM — N13.2 HYDRONEPHROSIS WITH URINARY OBSTRUCTION DUE TO URETERAL CALCULUS: Status: RESOLVED | Noted: 2021-07-02 | Resolved: 2021-08-30

## 2021-08-30 PROBLEM — R11.2 NAUSEA AND VOMITING: Status: RESOLVED | Noted: 2021-06-16 | Resolved: 2021-08-30

## 2021-08-30 NOTE — ASSESSMENT & PLAN NOTE
His diabetes is currently stable.  He states his blood sugars areWell controlled and mostly in the  range.  He is currently only on Metformin 1000 mg 2 times a day.  His most recent A1c was 5.4% which was an improvement from 5.9% 4 months ago.    As for his vision changes, it is potential that he could have some diabetic retinopathy.  I do recommend that he see his ophthalmologist.  MRI was reviewed from his hospitalization and negative for any new changes that could be causing his vision changes.

## 2021-08-30 NOTE — ASSESSMENT & PLAN NOTE
His blood pressure is controlled today.  He is checking these at home as well and they are running in the 120s over 80s over or less.  He has not had anything less then 100/60 is filled his medications will help with the hospital.  We are going to continue to hold his losartan and amlodipine for now.  We again discussed the blood pressure goal of 120/80.

## 2021-08-30 NOTE — ASSESSMENT & PLAN NOTE
His acute kidney injury resolved in the hospital.  He did have some stones removed as well as stents placed in his ureters.  He did have to have Hdz after his procedure but he ended up having this removed at his urology follow-up.  Things have been going well since then.  He will follow with them again in a month.

## 2021-08-30 NOTE — ASSESSMENT & PLAN NOTE
His syncopal episodes he tells me today were not syncope and he never lost consciousness.  However he does have lightheadedness with standing.  He was pretty orthostatic during his admission and this has improved and he has normal orthostatics today.  I recommend we continue to hold his losartan and amlodipine for now.    However, he also has an iron deficiency anemia.  He is on Plavix due to his recent stroke.I am concerned about potential GI bleed and I do recommend that he gets a colonoscopy.  I have placed a referral to GI for this.  In the meantime, I do want him to start taking slow release iron 1 tablet 3 times a day and we will recheck his iron panel in 6 weeks.

## 2021-09-10 ENCOUNTER — HOSPITAL ENCOUNTER (OUTPATIENT)
Dept: ULTRASOUND IMAGING | Facility: HOSPITAL | Age: 61
Discharge: HOME | End: 2021-09-10
Payer: COMMERCIAL

## 2021-09-10 DIAGNOSIS — N13.2 HYDRONEPHROSIS WITH URINARY OBSTRUCTION DUE TO URETERAL CALCULUS: ICD-10-CM

## 2021-09-10 PROCEDURE — 76770 US EXAM ABDO BACK WALL COMP: CPT

## 2021-09-13 NOTE — PROGRESS NOTES
Spoke with patient to relay this message. He had no questions. Reminded him of his next appointment. He states he will be here. He had no further questions.

## 2021-09-23 ENCOUNTER — TELEPHONE (OUTPATIENT)
Dept: FAMILY MEDICINE | Facility: CLINIC | Age: 61
End: 2021-09-23

## 2021-09-23 DIAGNOSIS — D50.0 IRON DEFICIENCY ANEMIA DUE TO CHRONIC BLOOD LOSS: Primary | ICD-10-CM

## 2021-09-23 NOTE — PROGRESS NOTES
Can you fax over this quickly please? Del is at endoscopy and they are wanting to add on the EGD. Thanks.   Klarissa Lester MD  09/23/21  1:47 PM

## 2021-10-04 ENCOUNTER — ANCILLARY PROCEDURE (OUTPATIENT)
Dept: NEUROLOGY | Facility: CLINIC | Age: 61
End: 2021-10-04
Payer: COMMERCIAL

## 2021-10-04 VITALS
TEMPERATURE: 98 F | SYSTOLIC BLOOD PRESSURE: 140 MMHG | DIASTOLIC BLOOD PRESSURE: 88 MMHG | RESPIRATION RATE: 14 BRPM | HEART RATE: 81 BPM

## 2021-10-04 DIAGNOSIS — E08.42 DIABETIC POLYNEUROPATHY ASSOCIATED WITH DIABETES MELLITUS DUE TO UNDERLYING CONDITION (CMS/HCC): ICD-10-CM

## 2021-10-04 DIAGNOSIS — G56.21 ULNAR NEUROPATHY OF RIGHT UPPER EXTREMITY: ICD-10-CM

## 2021-10-04 PROCEDURE — 95910 NRV CNDJ TEST 7-8 STUDIES: CPT | Performed by: PSYCHIATRY & NEUROLOGY

## 2021-10-04 PROCEDURE — 95885 MUSC TST DONE W/NERV TST LIM: CPT | Mod: 59,51 | Performed by: PSYCHIATRY & NEUROLOGY

## 2021-10-04 PROCEDURE — 95886 MUSC TEST DONE W/N TEST COMP: CPT | Mod: 51 | Performed by: PSYCHIATRY & NEUROLOGY

## 2021-10-04 ASSESSMENT — PAIN SCALES - GENERAL: PAINLEVEL: 0-NO PAIN

## 2021-10-06 ENCOUNTER — OFFICE VISIT (OUTPATIENT)
Dept: NEUROLOGY | Facility: CLINIC | Age: 61
End: 2021-10-06
Payer: COMMERCIAL

## 2021-10-06 VITALS
SYSTOLIC BLOOD PRESSURE: 128 MMHG | BODY MASS INDEX: 25.9 KG/M2 | WEIGHT: 185 LBS | HEIGHT: 71 IN | DIASTOLIC BLOOD PRESSURE: 84 MMHG | HEART RATE: 68 BPM

## 2021-10-06 DIAGNOSIS — R29.898 WEAKNESS OF RIGHT HAND: ICD-10-CM

## 2021-10-06 DIAGNOSIS — M54.2 CHRONIC NECK PAIN: ICD-10-CM

## 2021-10-06 DIAGNOSIS — E08.42 DIABETIC POLYNEUROPATHY ASSOCIATED WITH DIABETES MELLITUS DUE TO UNDERLYING CONDITION (CMS/HCC): Primary | ICD-10-CM

## 2021-10-06 DIAGNOSIS — G89.29 CHRONIC NECK PAIN: ICD-10-CM

## 2021-10-06 PROCEDURE — 99214 OFFICE O/P EST MOD 30 MIN: CPT | Performed by: PSYCHIATRY & NEUROLOGY

## 2021-10-06 NOTE — PROGRESS NOTES
"Date of evaluation: 10/6/2021    Chief complaint: follow up hand weakness    History of present illness:     Patient was last seen 7/2021. He has left hemiparesis from an old stroke sustained 5/2020.  Overall the left-sided strength has significantly improved particularly in the upper extremity.  Sometimes he would feel like his left knee would give out but has had no falls.  He is on clopidogrel 75 mg a day and atorvastatin.    He presented 5/2020 with generalized weakness (more on the left side) of 4 days duration.  BP was noted to be 185/104 in the ED. EKG noted as showing sinus rhythm.  He was placed on aspirin, clopidogrel and statin. He was admitted 5/21 - 5/27/2020 and was in rehab until 6/5/2020.  He made slow improvement. Prior to the stroke he was taking aspirin sporadically \"for prevention\" and had nose bleed with DAPT.      He has had chronic weakness of right hand which he noticed a year ago - EMG/NCS done 6/2020 and 10/2021 as noted below. He has no numbness in the right ulnar distribution, forearm or arm. He has had chronic neck pain/discomfort on intermittent basis without radicular component.    He has chronic numbness in his feet but no pain or paresthesias    REVIEW OF SYSTEMS:    No headache or visual complaints.  No facial numbness or droop or dysphagia. No symptoms suggestive of TIA or recurrent stroke. No dyspnea, palpitations or syncope.  No complaints pertaining to other systems except as noted.    Past Medical History:   Diagnosis Date   • Anemia    • Asthma     no attacks as long as away from hay fever allergens in [place]   • Cardiovascular disease     known bicuspid aortic valve, mild AVS, murmur   • Depressive disorder    • Endocrine disorder    • Heart murmur    • Hyperlipidemia    • Kidney disease 2021, 2011    calculi   • Obesity    • Peripheral neuropathy     L arm    • Stroke (CMS/[facility]) (HCC) 05/21/2020    ischemic, L sided weakness; L leg weakness remains     Current Outpatient " "Medications   Medication Sig Dispense Refill   • ferrous sulfate ER (Slow Release Iron) 140 mg (45 mg iron) tablet Take 1 tablet (140 mg total) by mouth 2 (two) times a day with meals 60 tablet 11   • clopidogreL (PLAVIX) 75 mg tablet Take 1 tablet by mouth once daily 90 tablet 3   • potassium 99 mg tablet Take 1 tab/cap by mouth daily     • omega-3 fatty acids-fish oil (Fish OiL) 340-1,000 mg capsule Take 1 tab/cap by mouth every other day       • sertraline (ZOLOFT) 50 mg tablet Take 1 tablet by mouth once daily 90 tablet 3   • metFORMIN (GLUCOPHAGE) 500 mg tablet Take 2 tablets (1,000 mg total) by mouth 2 (two) times a day with meals 120 tablet 11   • ibuprofen (AdviL) 200 mg tablet Take 400 mg by mouth every 6 (six) hours as needed for pain scale 1-3/10 or pain scale 8-10/10.     • rosuvastatin (CRESTOR) 40 mg tablet Take 1 tablet (40 mg total) by mouth nightly 90 tablet 3   • acetaminophen (Tylenol 8 Hour) 650 mg 8 hr tablet Take 650-1,300 mg by mouth every 8 (eight) hours as needed for pain scale 1-3/10       • cholecalciferol, vitamin D3, (Vitamin D3) 25 mcg (1,000 unit) capsule Take 1 capsule (1,000 Units total) by mouth daily 30 capsule 3   • multivitamin with minerals tablet Take 1 tablet by mouth every other day       • melatonin 5 mg capsule Take 5 mg by mouth nightly as needed (insomnia)      • diphenhydramine-acetaminophen (TYLENOL PM EXTRA STRENGTH)  mg tablet Take by mouth nightly as needed         No current facility-administered medications for this visit.     Allergies   Allergen Reactions   • Penicillins      Tested as a child; showed positive       EXAM:    Vitals: Blood pressure 128/84, pulse 68, height 1.803 m (5' 11\"), weight 83.9 kg (185 lb).    Patient is alert and well-oriented  Extraocular movements intact  Minimal left facial weakness  No nystagmus or dysarthria  Tongue midline, no fasciculations    Muscle bulk severely decreased right dorsal   interossei and bilaterally extensor " digitorum brevis     Right upper extremity 5 except finger abduction 3+   and thumb abduction 4-   Good strength of flexion of wrist and distal IP III-IV  Right lower extremity 5 except great toe extension 4    Left upper extremity 4 to 4+  and lower extremity  4+    Deep tendon reflexes:  0 right upper extremity and lower extremities;   1+ left upper extremity    Plantars flexor right and extensor left    Finger-nose testing: normal right and mildly unsteady left  Mildly wide based, normal stride, fairly steady    Pin reduced lower 1/2 of legs and feet  No sensory loss in ulnar distribution  No tenderness or swelling right wrist    Lab Results   Component Value Date    WBC 8.7 08/23/2021    HGB 10.8 (L) 08/23/2021    HCT 31.5 (L) 08/23/2021    MCV 93.2 08/23/2021     08/23/2021       Lab Results   Component Value Date     08/12/2021    K 3.6 08/12/2021     (H) 08/12/2021    CO2 17 (L) 08/12/2021    BUN 25 08/12/2021    CALCIUM 8.6 08/12/2021    ALBUMIN 4.3 08/06/2021    BILITOT 0.44 08/06/2021    AST 16 08/06/2021    ALT 14 08/06/2021    ALKPHOS 62 08/06/2021       Lab Results   Component Value Date    GLUCOSE 114 (H) 08/12/2021       Lab Results   Component Value Date    CREATININE 1.05 08/12/2021       Lab Results   Component Value Date    EGFR 76 08/12/2021       Lab Results   Component Value Date    PROT 7.2 08/06/2021       Lab Results   Component Value Date    TSH 1.304 02/24/2016       Lab Results   Component Value Date    [id number] 382 01/04/2021 6/2020   EMG/NCS right upper extremity noted   - right median neuropathy (sensory motor/primarily demyelinating)  - right ulnar neuropathy at or about the wrist (demyelinating/axonal)  - sensory neuropathy    5/2020 carotid U/S:  Less than 50% stenosis bilateral ICA   (vertebrals antegrade)    Echocardiogram normal left ventricular wall thickness, EF 52%.  No wall motion defects.  Mild biatrial enlargement.  No PFO.  Calcified aortic valve  with stenosis and mild regurgitation.    MRI brain WO:  1.  Small acute right inferior anterior pontine infarct.  2.  There are Small areas have abnormal signal intensity involving the left posterior periventricular white matter in the left posterior parietal white matter. Probably chronic ischemic change rather than acute acute infarcts.  3.  Areas of low density on the CT involving the right temporal lobe and left parietal lobe are normal and appear to have represented artifacts on the CT    10/2020 Normal MRA of the head. Basilar artery is normal. Left posterior communicating artery is not visualized, a common anatomic variant.    10/2021 EMG/NCS right upper/lower extremity:  1. Peripheral sensorimotor neuropathy involving mostly   the lower extremity - findings are axonal and distal.       2. Complex study suggestive of right ulnar neuropathy (non-localizable) versus C8-T1 radiculopathy        IMPRESSION:    1.  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  Right hand weakness: R/O C8-T1 root compression vs ulnar neuropathy at the wrist    3. Diabetic sensori-motor (axonal) neuropathy    4.  Hypertension, hyperlipidemia    5.  Obstructive sleep apnea (intolerant to CPAP)    6.  Vitamin D deficiency        Plan:    1. X-ray right wrist  2. MRI C-spine   3.  Follow-up in 6 months        CC: Klarissa Lester MD    Time spent 30 minutes. Explained possible diagnosis for right hand weakness. Patient's questions were addressed and understanding was expressed. At least 50% of the time was spent in direct face-to-face discussion with the patient, counseling and coordination of care.      Dictation done using voice recognition software to aid in this documentation. Sometimes words are not transcribed exactly as they were spoken. There may be uncorrected errors or inaccuracies within the document despite efforts made to avoid them.

## 2021-11-18 ENCOUNTER — APPOINTMENT (OUTPATIENT)
Dept: LAB | Facility: CLINIC | Age: 61
End: 2021-11-18
Payer: COMMERCIAL

## 2021-11-18 ENCOUNTER — OFFICE VISIT (OUTPATIENT)
Dept: UROLOGY | Facility: CLINIC | Age: 61
End: 2021-11-18
Payer: COMMERCIAL

## 2021-11-18 VITALS
SYSTOLIC BLOOD PRESSURE: 138 MMHG | HEIGHT: 71 IN | BODY MASS INDEX: 25.87 KG/M2 | OXYGEN SATURATION: 100 % | TEMPERATURE: 97.3 F | WEIGHT: 184.8 LBS | HEART RATE: 67 BPM | DIASTOLIC BLOOD PRESSURE: 81 MMHG

## 2021-11-18 DIAGNOSIS — N13.2 HYDRONEPHROSIS WITH URINARY OBSTRUCTION DUE TO URETERAL CALCULUS: Primary | ICD-10-CM

## 2021-11-18 DIAGNOSIS — N13.2 HYDRONEPHROSIS WITH URINARY OBSTRUCTION DUE TO URETERAL CALCULUS: ICD-10-CM

## 2021-11-18 DIAGNOSIS — R35.1 NOCTURIA: Primary | ICD-10-CM

## 2021-11-18 LAB
BACTERIA #/AREA URNS HPF: ABNORMAL /HPF
BILIRUB UR QL: ABNORMAL
CLARITY UR: ABNORMAL
COLOR UR: YELLOW
GLUCOSE UR QL: NEGATIVE MG/DL
HGB UR QL: ABNORMAL
KETONES UR-MCNC: 40 MG/DL
LEUKOCYTE ESTERASE UR QL STRIP: ABNORMAL
NITRITE UR QL: NEGATIVE
PH UR: 5 PH
PROT UR STRIP-MCNC: 100 MG/DL
RBC #/AREA URNS HPF: ABNORMAL /HPF
SP GR UR: >=1.03 (ref 1–1.03)
SPECIMEN VOL ?TM UR: 2 ML
SQUAMOUS #/AREA URNS HPF: ABNORMAL /HPF
UROBILINOGEN UR-MCNC: 0.2 E.U./DL
WBC #/AREA URNS HPF: >100 /HPF

## 2021-11-18 PROCEDURE — 87077 CULTURE AEROBIC IDENTIFY: CPT | Performed by: UROLOGY

## 2021-11-18 PROCEDURE — 87186 SC STD MICRODIL/AGAR DIL: CPT | Performed by: UROLOGY

## 2021-11-18 PROCEDURE — 99214 OFFICE O/P EST MOD 30 MIN: CPT | Mod: 25 | Performed by: UROLOGY

## 2021-11-18 PROCEDURE — 81001 URINALYSIS AUTO W/SCOPE: CPT | Performed by: UROLOGY

## 2021-11-18 PROCEDURE — 51798 US URINE CAPACITY MEASURE: CPT | Performed by: UROLOGY

## 2021-11-18 PROCEDURE — 87088 URINE BACTERIA CULTURE: CPT | Performed by: UROLOGY

## 2021-11-18 RX ORDER — ALFUZOSIN HYDROCHLORIDE 10 MG/1
10 TABLET, EXTENDED RELEASE ORAL DAILY
Qty: 90 TABLET | Refills: 1 | Status: SHIPPED | OUTPATIENT
Start: 2021-11-18 | End: 2022-06-14

## 2021-11-18 RX ORDER — AMLODIPINE BESYLATE 5 MG/1
TABLET ORAL EVERY 24 HOURS
COMMUNITY
End: 2022-07-27 | Stop reason: ALTCHOICE

## 2021-11-18 RX ORDER — LOSARTAN POTASSIUM 100 MG/1
TABLET ORAL EVERY 24 HOURS
COMMUNITY
End: 2022-07-27 | Stop reason: SDUPTHER

## 2021-11-18 RX ORDER — MINERAL OIL
ENEMA (ML) RECTAL EVERY 24 HOURS
COMMUNITY
End: 2023-07-24 | Stop reason: SDUPTHER

## 2021-11-18 RX ORDER — TAMSULOSIN HYDROCHLORIDE 0.4 MG/1
0.4 CAPSULE ORAL DAILY
COMMUNITY
Start: 2021-11-15 | End: 2021-11-18 | Stop reason: ALTCHOICE

## 2021-11-18 RX ORDER — TALC
250 POWDER (GRAM) TOPICAL 2 TIMES DAILY
COMMUNITY

## 2021-11-18 ASSESSMENT — PAIN SCALES - GENERAL: PAINLEVEL: 0-NO PAIN

## 2021-11-19 NOTE — PROGRESS NOTES
Subjective     Patient ID: Del Mckeon is a 61 y.o. male.    HPI    The patient is a 61-year-old male previously evaluated for renal stones here for follow-up on the above.  On 6/16/2001 he noted emesis and diarrhea.  Work-up included CT with 2 mm and 4 mm mid left ureteral stones with moderate hydronephrosis.  KUB 7/2/2021 without definite stones.    CT 7/28/2021 with 2 distal left ureteral stones with hydronephrosis.  On 8/10/2021 he underwent left ureteroscopy with laser of stones and left ureteral stent placement.  Postoperatively he required Hdz catheter placement by Dr. Patel due to elevated bladder neck.  He was able to remove the stent in the postoperative period.  He underwent successful voiding trial.  He currently denies flank pain, gross hematuria, dysuria, nausea, vomiting, fever or chills.    He continues on Flomax with nocturia x3 without gross hematuria, dysuria or incomplete bladder emptying.  The patient states their symptoms are constant, bothersome, they are unable to identify other aggravating, alleviating or associated factors.       PMH, medications, allergies, social history, family history reviewed per chart     Review of Systems    Objective     Physical Exam    Vitals:    11/18/21 1457   BP:    Pulse:    Temp:    SpO2: 100%     Lab     Ref. Range 4/7/2021 09:22   PSA Latest Ref Range: 0.00 - 4.00 ng/mL 0.13       Rad  -Ultrasound PVR 11/18/2021: 2 mL    -CT stone 7/28/2021:  Interval mild distal migration of 2 left distal ureteral stones, the largest of which measures up to 3 mm. There is upstream left hydroureter and moderate to severe left hydronephrosis. Tiny nonobstructive right inferior pole nephrolithiasis. No right-sided hydronephrosis.     Small distal pancreatic hypodensity which measures up to 10 mm, indeterminate. No change since 5/20/2019 CT, however MRI with IV contrast of the abdomen could better evaluate.         Assessment/Plan     Diagnoses and all orders for  this visit:    Nocturia  -     alfuzosin (UroxatraL) 10 mg 24 hr tablet; Take 1 tablet (10 mg total) by mouth daily  -     POCT Bladder Scan  -     Urine culture    1.  Left ureteral stones resolved status post ureteroscopy  2.  Lower urinary tract symptoms failing Flomax  3.  No evidence of incomplete bladder emptying, stable  4.  Small distal pancreatic hypodensity, uncertain prognosis  5.  Normal PSA  -CT report 7/28/2021 personally reviewed  -Ultrasound PVR ordered  -Ultrasound PVR result 11/18/2021 personally reviewed  -DC Flomax  -Start Uroxatral 10 mg p.o. daily  -Urine culture sent today  -Follow-up 2 months for reevaluation with UA dip, UA micro and PVR  -PSA due April 2022  -the patient was encouraged to increase fluid intake for goal UOP 2000 ml daily, add lemon or orange juice to diet to increase urinary citrate levels, decrease salt and protein in diet to decrease overall stone risk  -The patient will require 24 hour urine to evaluate metabolic causes of stone disease  -Patient in agreement the above plan, questions answered

## 2021-11-19 NOTE — PROGRESS NOTES
OK to call pt--prelim urine culture with UTI  Plan:  start Bactrim bid x 5 days  Script sent in  thanks

## 2021-11-20 LAB — BACTERIA UR CULT: ABNORMAL

## 2021-12-01 DIAGNOSIS — A04.8 H. PYLORI INFECTION: ICD-10-CM

## 2021-12-01 NOTE — TELEPHONE ENCOUNTER
Care Due:                  Date            Visit Type   Department     Provider  --------------------------------------------------------------------------------                              ESTABLISHED   [facility]  Last Visit: 08-      PATIENT      MEDICINE       SHERWIN LOPES  Next Visit: None Scheduled  None         None Found                                                            Last  Test          Frequency    Reason                     Performed    Due Date  --------------------------------------------------------------------------------  Vitamin D...  12 months..  cholecalciferol,.........  06- 06-    Powered by "[employer] by [employer]. Reference number: [id number]. 12/01/2021 9:42:15 AM MST

## 2021-12-02 NOTE — TELEPHONE ENCOUNTER
Medication refill request:  Medication(s):  prilosec not filled due to (route to Nurse Pool) Medication not on active medication list-looks like it was ment to be short term med.

## 2021-12-06 ENCOUNTER — NURSE TRIAGE (OUTPATIENT)
Dept: FAMILY MEDICINE | Facility: CLINIC | Age: 61
End: 2021-12-06
Payer: COMMERCIAL

## 2021-12-06 RX ORDER — OMEPRAZOLE 20 MG/1
CAPSULE, DELAYED RELEASE ORAL
Qty: 28 CAPSULE | Refills: 0 | OUTPATIENT
Start: 2021-12-06

## 2021-12-06 NOTE — TELEPHONE ENCOUNTER
[facility] Nurse Triage Note    INITIAL REQUIRED QUESTIONS:    Are you currently in [place]? Yes    Are you currently driving?: No      CHIEF COMPLAINT AND HISTORY:                                               Reason for call:  diarrhea since Friday.  Occasional Stomach pain.     Do you follow with a specialist for this condition: No    Onset: 12/03/2021 days ago    Duration: INTERMITTENT    Severity: Moderate    Pain Level Reported: 0    Location:  bowels    Associated Symptoms: denies    Are symptoms interfering with Activities of Daily Living?: denies    History of similar symptoms: denies    Aggravating/Relieving Factors: reports not eating on Sat., felt good on Sunday ate his breakfast, than had diarrhea, and again today.     LMP (for females ages 10-60): N/A     Allergies - Patient reported: reviewed      CARE ADVICE:   Please review Encounters in Chart review for Specific Care Advice given by Triage RN.       DISPOSITION:   See Physician Within 24 Hours   Patient is going to try imodium today, to see if this does help. If not he is going to call back later today or in the morning to get in to see .     PCP COMMUNICATION:             Kari Talley RN  December 6, 2021 10:36 AM    Reason for Disposition  • [1] MODERATE diarrhea (e.g., 4-6 times / day more than normal) AND [2] present > 48 hours (2 days)    Protocols used: DIARRHEA-A-

## 2021-12-26 DIAGNOSIS — J30.1 SEASONAL ALLERGIC RHINITIS DUE TO POLLEN: ICD-10-CM

## 2021-12-26 NOTE — TELEPHONE ENCOUNTER
Care Due:                  Date            Visit Type   Department     Provider  --------------------------------------------------------------------------------                              ESTABLISHED   [facility]  Last Visit: 08-      PATIENT      MEDICINE       SHERWIN LOPES  Next Visit: None Scheduled  None         None Found                                                            Last  Test          Frequency    Reason                     Performed    Due Date  --------------------------------------------------------------------------------  Lipid Panel.  12 months..  rosuvastatin.............  Not Found    Overdue    Powered by [employer] by [employer]. Reference number: [id number]. 12/26/2021 2:31:11 PM MST

## 2021-12-28 RX ORDER — LORATADINE 10 MG/1
TABLET ORAL
Qty: 30 TABLET | Refills: 2 | Status: SHIPPED | OUTPATIENT
Start: 2021-12-28

## 2021-12-28 NOTE — TELEPHONE ENCOUNTER
Medication refill request:  Medication(s):  eq allergy  not filled due to (route to Nurse Pool) Medication not on active medication list

## 2022-01-03 ENCOUNTER — NURSE TRIAGE (OUTPATIENT)
Dept: FAMILY MEDICINE | Facility: CLINIC | Age: 62
End: 2022-01-03
Payer: COMMERCIAL

## 2022-01-03 ENCOUNTER — APPOINTMENT (OUTPATIENT)
Dept: LAB | Facility: URGENT CARE | Age: 62
End: 2022-01-03
Payer: COMMERCIAL

## 2022-01-03 DIAGNOSIS — Z20.822 CONTACT WITH AND (SUSPECTED) EXPOSURE TO COVID-19: ICD-10-CM

## 2022-01-03 LAB — SARS-COV-2 RNA RESP QL NAA+PROBE: NEGATIVE

## 2022-01-03 PROCEDURE — U0005 INFEC AGEN DETEC AMPLI PROBE: HCPCS | Performed by: FAMILY MEDICINE

## 2022-01-03 PROCEDURE — 99211 OFF/OP EST MAY X REQ PHY/QHP: CPT | Mod: CS,LAB | Performed by: FAMILY MEDICINE

## 2022-01-03 PROCEDURE — 87635 SARS-COV-2 COVID-19 AMP PRB: CPT | Performed by: FAMILY MEDICINE

## 2022-01-03 NOTE — TELEPHONE ENCOUNTER
COVID-19 SCREENING ASSESSMENT     CRITERIA FOR TESTING  Is patient symptomatic: Yes: What symptoms are you experiencing? Cough     ORDER QUESTIONS  Date of onset: 12/31/2021     OTHER QUESTIONS  Are you a [facility] [employer] [facility] [employer] [employer] employee? No  Have you had close contact with a lab confirmed case of COVID-19 in the last 14 days? no        Reason for Disposition  • Patient has COVID-19 symptoms per CDC guidelines.    Protocols used: COVID-19 TRIAGE PROTOCOL [employer]

## 2022-01-06 ENCOUNTER — TELEPHONE (OUTPATIENT)
Dept: UROLOGY | Facility: CLINIC | Age: 62
End: 2022-01-06
Payer: COMMERCIAL

## 2022-01-06 NOTE — TELEPHONE ENCOUNTER
Caller would like to discuss RS Appointment     Patient: Del L Patton    Callback Number: [phone]    Best Availability: as soon as possible    Initial Appt:1/21/22    Provider: brown    Additional Info: Needs to reschedule above appt. Needs to r/s because of personal conflict    I advised caller of a callback by 24-48 hours.

## 2022-01-28 DIAGNOSIS — E11.9 TYPE 2 DIABETES MELLITUS WITHOUT COMPLICATION, WITH LONG-TERM CURRENT USE OF INSULIN (CMS/HCC): ICD-10-CM

## 2022-01-28 DIAGNOSIS — Z79.4 TYPE 2 DIABETES MELLITUS WITHOUT COMPLICATION, WITH LONG-TERM CURRENT USE OF INSULIN (CMS/HCC): ICD-10-CM

## 2022-01-28 RX ORDER — METFORMIN HYDROCHLORIDE 500 MG/1
TABLET ORAL
Qty: 120 TABLET | Refills: 6 | Status: SHIPPED | OUTPATIENT
Start: 2022-01-28 | End: 2022-07-27 | Stop reason: ALTCHOICE

## 2022-01-28 NOTE — TELEPHONE ENCOUNTER
Care Due:                  Date            Visit Type   Department     Provider  --------------------------------------------------------------------------------                              ESTABLISHED   [facility]  Last Visit: 08-      PATIENT      MEDICINE       SHERWIN LOPES  Next Visit: None Scheduled  None         None Found                                                            Last  Test          Frequency    Reason                     Performed    Due Date  --------------------------------------------------------------------------------  Office Visit  6 months...  clopidogreL, metFORMIN...  08- 02-    ALT.........  6 months...  clopidogreL..............  08- 02-    CBC.........  6 months...  clopidogreL..............  Not Found    Overdue    HBA1C.......  6 months...  metFORMIN................  08- 02-    Powered by [employer]. Reference number: [id number]. 1/28/2022 10:30:20 AM MST

## 2022-02-18 ENCOUNTER — TELEPHONE (OUTPATIENT)
Dept: UROLOGY | Facility: CLINIC | Age: 62
End: 2022-02-18

## 2022-05-06 ENCOUNTER — LAB (OUTPATIENT)
Dept: LAB | Facility: CLINIC | Age: 62
End: 2022-05-06
Payer: COMMERCIAL

## 2022-05-06 ENCOUNTER — OFFICE VISIT (OUTPATIENT)
Dept: UROLOGY | Facility: CLINIC | Age: 62
End: 2022-05-06
Payer: COMMERCIAL

## 2022-05-06 VITALS
DIASTOLIC BLOOD PRESSURE: 92 MMHG | BODY MASS INDEX: 25.87 KG/M2 | RESPIRATION RATE: 18 BRPM | HEIGHT: 71 IN | OXYGEN SATURATION: 99 % | HEART RATE: 69 BPM | SYSTOLIC BLOOD PRESSURE: 139 MMHG | TEMPERATURE: 96.6 F | WEIGHT: 184.8 LBS

## 2022-05-06 DIAGNOSIS — R35.1 NOCTURIA: ICD-10-CM

## 2022-05-06 LAB
BACTERIA #/AREA URNS HPF: ABNORMAL /HPF
BILIRUB UR QL: NEGATIVE
CAOX CRY #/AREA URNS HPF: PRESENT /HPF
CLARITY UR: CLEAR
COLOR UR: YELLOW
GLUCOSE UR QL: NEGATIVE MG/DL
GRAN CASTS #/AREA URNS HPF: ABNORMAL /LPF
HGB UR QL: ABNORMAL
HYALINE CASTS #/AREA URNS AUTO: ABNORMAL /LPF
KETONES UR-MCNC: ABNORMAL MG/DL
LEUKOCYTE ESTERASE UR QL STRIP: NEGATIVE
MUCOUS THREADS #/AREA URNS HPF: PRESENT /[HPF]
NITRITE UR QL: NEGATIVE
PH UR: 5.5 PH
PROT UR STRIP-MCNC: >=300 MG/DL
PSA SERPL-MCNC: 0.18 NG/ML (ref 0–4)
RBC #/AREA URNS HPF: ABNORMAL /HPF
SP GR UR: >=1.03 (ref 1–1.03)
SPECIMEN VOL ?TM UR: 164 ML
SQUAMOUS #/AREA URNS HPF: ABNORMAL /HPF
UROBILINOGEN UR-MCNC: 1 E.U./DL
WBC #/AREA URNS HPF: ABNORMAL /HPF

## 2022-05-06 PROCEDURE — 51798 US URINE CAPACITY MEASURE: CPT | Performed by: UROLOGY

## 2022-05-06 PROCEDURE — 36415 COLL VENOUS BLD VENIPUNCTURE: CPT | Performed by: UROLOGY

## 2022-05-06 PROCEDURE — 84153 ASSAY OF PSA TOTAL: CPT | Performed by: UROLOGY

## 2022-05-06 PROCEDURE — 99213 OFFICE O/P EST LOW 20 MIN: CPT | Mod: 25 | Performed by: UROLOGY

## 2022-05-06 PROCEDURE — 81001 URINALYSIS AUTO W/SCOPE: CPT | Performed by: UROLOGY

## 2022-05-06 ASSESSMENT — PAIN SCALES - GENERAL: PAINLEVEL: 0-NO PAIN

## 2022-05-06 NOTE — PROGRESS NOTES
Subjective     Patient ID: Del Mckeon is a 61 y.o. male.    HPI    The patient is a 61-year-old male previously evaluated for renal stones here for follow-up on the above.  On 6/16/2001 he noted emesis and diarrhea.  Work-up included CT with 2 mm and 4 mm mid left ureteral stones with moderate hydronephrosis.  KUB 7/2/2021 without definite stones.    CT 7/28/2021 with 2 distal left ureteral stones with hydronephrosis.  On 8/10/2021 he underwent left ureteroscopy with laser of stones and left ureteral stent placement.  Postoperatively he required Hdz catheter placement by Dr. Patel due to elevated bladder neck.  He was able to remove the stent in the postoperative period.  He underwent successful voiding trial.  Renal ultrasound 9/10/2021 with normal bladder and kidneys without hydronephrosis.  He currently denies flank pain, gross hematuria, nausea or vomiting.    He continued on Flomax with nocturia x3 without gross hematuria, dysuria or incomplete bladder emptying.  On 11/18/2021 he was changed from Flomax to Uroxatrol 10 mg p.o. daily with current symptoms nocturia x0-1, daytime frequency every 3-4 hours, adequate stream without gross hematuria or dysuria.  The patient states their symptoms are constant, not bothersome, they are unable to identify other aggravating, alleviating or associated factors.       PMH, medications, allergies, social history, family history reviewed per chart    Review of Systems    Objective     Physical Exam    Vitals:    05/06/22 0919   BP:    Pulse:    Resp:    Temp:    SpO2: 99%     Rad  -Ultrasound PVR 5/6/2022 2 hours post void: 164 mL  -Ultrasound PVR 11/18/2021: 2 mL     -Renal ultrasound 9/10/2021:  1.  Negative appearance of the kidneys.  2.  Negative appearance of the bladder    -CT stone 7/28/2021:  Interval mild distal migration of 2 left distal ureteral stones, the largest of which measures up to 3 mm. There is upstream left hydroureter and moderate to severe  left hydronephrosis. Tiny nonobstructive right inferior pole nephrolithiasis. No right-sided hydronephrosis.     Small distal pancreatic hypodensity which measures up to 10 mm, indeterminate. No change since 5/20/2019 CT, however MRI with IV contrast of the abdomen could better evaluate.    Assessment/Plan     Diagnoses and all orders for this visit:    Nocturia  -     POCT Bladder Scan    1.  Left ureteral stones resolved status post ureteroscopy with normal renal ultrasound 9/10/2021  2.  Lower urinary tract symptoms failing Flomax stable on Uroxatrol  3.  No evidence of incomplete bladder emptying, stable  4.  Small distal pancreatic hypodensity, uncertain prognosis  5.  Normal PSA  -Continue Uroxatrol 10 mg p.o. daily  - PSA today to evaluate other causes of symptoms  -the patient was encouraged to increase fluid intake for goal UOP 2000 ml daily, add lemon or orange juice to diet to increase urinary citrate levels, decrease salt and protein in diet to decrease overall stone risk  -Follow-up 1 year with UA dip, UA micro, PSA, STEVE and PVR  -Patient in agreement the above plan, questions answered    21 minutes spent on today's encounter

## 2022-05-07 NOTE — PROGRESS NOTES
Gave patient discharge instructions. She states understanding.  Patient discharged to home     Andrea Olmedo RN  03/28/20 5290 Good news:  PSA stable at 0.18  Please call with any questions

## 2022-05-23 ENCOUNTER — DOCUMENTATION (OUTPATIENT)
Dept: OCCUPATIONAL THERAPY | Facility: HOSPITAL | Age: 62
End: 2022-05-23
Payer: COMMERCIAL

## 2022-05-23 NOTE — INTERDISCIPLINARY/THERAPY
[address] [facility] [address]  [phone]        Occupational Therapy Discharge Note    Date of Service: 5/23/2022     Patient Name: Del Mckeon  Referring Provider: Dr. Borrero        Onset Date: n/a        Primary Medical Diagnosis: Bilateral carpal tunnel syndrome (G56.03]     Treatment Diagnosis. Decreased bilateral  and pinch strength, functional bilateral hand use, decreased functional activity tolerance in bilateral hands.     Date of last visit: 10/9/2020  Total number of therapy visits:  4  Treatment included: Therapeutic activity  Therapeutic exercise  Manual therapy  Equipment evaluation/education  Fine motor coordination activities  Compensatory technique education      This patient has been discharged from Occupational Therapy because: physician has elected to discontinue     Patient Compliance: good    Subjective:  Patient did not attend a formal discharge therapy session or has not been seen in the last 30 days.  No objective information able to be obtained and goals not reassessed secondary to not attending a formal discharge therapy session.  Please reference previous daily notes and evaluation for information related to this patients care.                                           Discharge recommendations:  Continue with recommended splint wearing as dicussed during last treatment session.       Thank you for allowing us to share in the care of this patient. If you have any questions, recommendations, or further concerns regarding this patient, please feel free to contact us at [address] [facility] [address]  Dept: [phone] .      Signed by: KAREL Hugo 5/23/2022 1:13 PM

## 2022-06-14 DIAGNOSIS — R35.1 NOCTURIA: ICD-10-CM

## 2022-06-14 RX ORDER — ALFUZOSIN HYDROCHLORIDE 10 MG/1
10 TABLET, EXTENDED RELEASE ORAL DAILY
Qty: 90 TABLET | Refills: 3 | Status: SHIPPED | OUTPATIENT
Start: 2022-06-14 | End: 2023-07-24 | Stop reason: ALTCHOICE

## 2022-07-25 ENCOUNTER — NURSE TRIAGE (OUTPATIENT)
Dept: FAMILY MEDICINE | Facility: CLINIC | Age: 62
End: 2022-07-25
Payer: COMMERCIAL

## 2022-07-25 NOTE — TELEPHONE ENCOUNTER
"[facility] Nurse Triage Note    INITIAL REQUIRED QUESTIONS:    Are you currently in [place]? Yes    Are you currently driving?: No      CHIEF COMPLAINT AND HISTORY:                                               Reason for call:  Been having high BP's  This morning 144/80, some days 160's.  Wondering if he needs some mediation.   Has been self medicating with his older BP meds.      Do you follow with a specialist for this condition: No    Onset: 1-2 months ago    Duration: See Above Charting and INTERMITTENT    Severity: See Above Charting    Pain Level Reported: See Above Charting    Location:  See Above Charting    Associated Symptoms: denies    Are symptoms interfering with Activities of Daily Living?: denies    History of similar symptoms: denies    Aggravating/Relieving Factors: reports took old BP medications and they \" are too strong\"    LMP (for females ages 10-60): N/A     Allergies - Patient reported: not reviewed      CARE ADVICE:   Please review Encounters in Chart review for Specific Care Advice given by Triage RN.       DISPOSITION:   See Within 3 Days in Office Wants to wait to see Dr Lester.  Did educate to not take his old BP medications without provider being aware.       PCP COMMUNICATION:   Was the patient's PCP contacted?: Route encounter to PCP as GEOFF Cosby RN  July 25, 2022 3:59 PM    Reason for Disposition  • Systolic BP  >= 160 OR Diastolic >= 100    Protocols used: BLOOD PRESSURE - HIGH-A-      "

## 2022-07-27 ENCOUNTER — APPOINTMENT (OUTPATIENT)
Dept: LAB | Facility: CLINIC | Age: 62
End: 2022-07-27
Payer: COMMERCIAL

## 2022-07-27 ENCOUNTER — OFFICE VISIT (OUTPATIENT)
Dept: FAMILY MEDICINE | Facility: CLINIC | Age: 62
End: 2022-07-27
Payer: COMMERCIAL

## 2022-07-27 VITALS
OXYGEN SATURATION: 99 % | BODY MASS INDEX: 25.97 KG/M2 | SYSTOLIC BLOOD PRESSURE: 132 MMHG | HEART RATE: 75 BPM | TEMPERATURE: 97.7 F | WEIGHT: 186.2 LBS | DIASTOLIC BLOOD PRESSURE: 82 MMHG

## 2022-07-27 DIAGNOSIS — Z79.4 TYPE 2 DIABETES MELLITUS WITHOUT COMPLICATION, WITH LONG-TERM CURRENT USE OF INSULIN (CMS/HCC): ICD-10-CM

## 2022-07-27 DIAGNOSIS — R29.898 SHOULDER WEAKNESS: ICD-10-CM

## 2022-07-27 DIAGNOSIS — E11.9 TYPE 2 DIABETES MELLITUS WITHOUT COMPLICATION, WITH LONG-TERM CURRENT USE OF INSULIN (CMS/HCC): ICD-10-CM

## 2022-07-27 DIAGNOSIS — I10 BENIGN ESSENTIAL HYPERTENSION: Primary | ICD-10-CM

## 2022-07-27 DIAGNOSIS — F43.21 ADJUSTMENT DISORDER WITH DEPRESSED MOOD: ICD-10-CM

## 2022-07-27 DIAGNOSIS — M67.912 DISORDER OF LEFT ROTATOR CUFF: ICD-10-CM

## 2022-07-27 DIAGNOSIS — I63.9 ISCHEMIC CEREBROVASCULAR ACCIDENT (CVA) (CMS/HCC): ICD-10-CM

## 2022-07-27 DIAGNOSIS — Z86.73 HISTORY OF LACUNAR CEREBROVASCULAR ACCIDENT (CVA): ICD-10-CM

## 2022-07-27 DIAGNOSIS — D50.8 IRON DEFICIENCY ANEMIA SECONDARY TO INADEQUATE DIETARY IRON INTAKE: ICD-10-CM

## 2022-07-27 DIAGNOSIS — E78.2 MIXED HYPERLIPIDEMIA: ICD-10-CM

## 2022-07-27 DIAGNOSIS — D50.9 IRON DEFICIENCY ANEMIA, UNSPECIFIED IRON DEFICIENCY ANEMIA TYPE: ICD-10-CM

## 2022-07-27 DIAGNOSIS — I10 BENIGN ESSENTIAL HYPERTENSION: ICD-10-CM

## 2022-07-27 PROBLEM — N17.9 ACUTE KIDNEY INJURY (CMS/HCC): Status: RESOLVED | Noted: 2021-06-16 | Resolved: 2022-07-27

## 2022-07-27 PROBLEM — M54.50 ACUTE LEFT-SIDED LOW BACK PAIN WITHOUT SCIATICA: Status: RESOLVED | Noted: 2021-06-16 | Resolved: 2022-07-27

## 2022-07-27 PROBLEM — Z48.89 POSTOPERATIVE OBSERVATION: Status: RESOLVED | Noted: 2021-08-10 | Resolved: 2022-07-27

## 2022-07-27 LAB
ALBUMIN SERPL-MCNC: 4.3 G/DL (ref 3.5–5.3)
ALP SERPL-CCNC: 67 U/L (ref 45–115)
ALT SERPL-CCNC: 13 U/L (ref 7–52)
ANION GAP SERPL CALC-SCNC: 10 MMOL/L (ref 3–11)
AST SERPL-CCNC: 17 U/L
BASOPHILS # BLD AUTO: 0.1 10*3/UL
BASOPHILS NFR BLD AUTO: 1 % (ref 0–2)
BILIRUB SERPL-MCNC: 0.53 MG/DL (ref 0.2–1.4)
BUN SERPL-MCNC: 31 MG/DL (ref 7–25)
CALCIUM ALBUM COR SERPL-MCNC: 9.1 MG/DL (ref 8.6–10.3)
CALCIUM SERPL-MCNC: 9.3 MG/DL (ref 8.6–10.3)
CHLORIDE SERPL-SCNC: 107 MMOL/L (ref 98–107)
CHOLEST SERPL-MCNC: 289 MG/DL (ref 0–199)
CO2 SERPL-SCNC: 23 MMOL/L (ref 21–32)
CREAT SERPL-MCNC: 1.12 MG/DL (ref 0.7–1.3)
EOSINOPHIL # BLD AUTO: 0.1 10*3/UL
EOSINOPHIL NFR BLD AUTO: 1.5 % (ref 0–3)
ERYTHROCYTE [DISTWIDTH] IN BLOOD BY AUTOMATED COUNT: 14 % (ref 11.5–15)
EST. AVERAGE GLUCOSE BLD GHB EST-MCNC: 105.4 MG/DL
FASTING STATUS PATIENT QL REPORTED: YES
FERRITIN SERPL-MCNC: 91.6 NG/ML (ref 24–336)
GFR SERPL CREATININE-BSD FRML MDRD: 74 ML/MIN/1.73M*2
GLUCOSE SERPL-MCNC: 106 MG/DL (ref 70–105)
HBA1C MFR BLD: 5.3 % (ref 4–6)
HCT VFR BLD AUTO: 36.4 % (ref 38–50)
HDLC SERPL-MCNC: 36 MG/DL
HGB BLD-MCNC: 12.9 G/DL (ref 13.2–17.2)
IRON SATN MFR SERPL: 32 % (ref 13–50)
IRON SERPL-MCNC: 91 UG/DL (ref 50–175)
LDLC SERPL CALC-MCNC: 230 MG/DL (ref 20–99)
LYMPHOCYTES # BLD AUTO: 1.9 10*3/UL
LYMPHOCYTES NFR BLD AUTO: 29.6 % (ref 15–47)
MAGNESIUM SERPL-MCNC: 1.8 MG/DL (ref 1.8–2.4)
MCH RBC QN AUTO: 31.4 PG (ref 29–34)
MCHC RBC AUTO-ENTMCNC: 35.4 G/DL (ref 32–36)
MCV RBC AUTO: 88.8 FL (ref 82–97)
MONOCYTES # BLD AUTO: 0.4 10*3/UL
MONOCYTES NFR BLD AUTO: 5.5 % (ref 5–13)
NEUTROPHILS # BLD AUTO: 4.1 10*3/UL
NEUTROPHILS NFR BLD AUTO: 62.4 % (ref 46–70)
PLATELET # BLD AUTO: 163 10*3/UL (ref 130–350)
PMV BLD AUTO: 7.3 FL (ref 6.9–10.8)
POTASSIUM SERPL-SCNC: 3.4 MMOL/L (ref 3.5–5.1)
PROT SERPL-MCNC: 7.1 G/DL (ref 6–8.3)
RBC # BLD AUTO: 4.1 10*6/ΜL (ref 4.1–5.8)
SODIUM SERPL-SCNC: 140 MMOL/L (ref 135–145)
TIBC SERPL-MCNC: 282 UG/DL (ref 250–450)
TRIGL SERPL-MCNC: 114 MG/DL
UIBC SERPL-MCNC: 191 UG/DL (ref 155–355)
WBC # BLD AUTO: 6.5 10*3/UL (ref 3.7–9.6)

## 2022-07-27 PROCEDURE — 80061 LIPID PANEL: CPT | Performed by: FAMILY MEDICINE

## 2022-07-27 PROCEDURE — 83550 IRON BINDING TEST: CPT | Performed by: FAMILY MEDICINE

## 2022-07-27 PROCEDURE — 83735 ASSAY OF MAGNESIUM: CPT | Performed by: FAMILY MEDICINE

## 2022-07-27 PROCEDURE — 83036 HEMOGLOBIN GLYCOSYLATED A1C: CPT | Performed by: FAMILY MEDICINE

## 2022-07-27 PROCEDURE — 83540 ASSAY OF IRON: CPT | Performed by: FAMILY MEDICINE

## 2022-07-27 PROCEDURE — 85025 COMPLETE CBC W/AUTO DIFF WBC: CPT | Performed by: FAMILY MEDICINE

## 2022-07-27 PROCEDURE — 80053 COMPREHEN METABOLIC PANEL: CPT | Performed by: FAMILY MEDICINE

## 2022-07-27 PROCEDURE — 36415 COLL VENOUS BLD VENIPUNCTURE: CPT | Performed by: FAMILY MEDICINE

## 2022-07-27 PROCEDURE — 82728 ASSAY OF FERRITIN: CPT | Performed by: FAMILY MEDICINE

## 2022-07-27 PROCEDURE — 99215 OFFICE O/P EST HI 40 MIN: CPT | Performed by: FAMILY MEDICINE

## 2022-07-27 RX ORDER — CLOPIDOGREL BISULFATE 75 MG/1
75 TABLET ORAL DAILY
Qty: 90 TABLET | Refills: 3 | Status: SHIPPED | OUTPATIENT
Start: 2022-07-27 | End: 2023-04-27 | Stop reason: SDUPTHER

## 2022-07-27 RX ORDER — SERTRALINE HYDROCHLORIDE 50 MG/1
50 TABLET, FILM COATED ORAL DAILY
Qty: 90 TABLET | Refills: 3 | Status: SHIPPED | OUTPATIENT
Start: 2022-07-27 | End: 2023-04-27 | Stop reason: SDUPTHER

## 2022-07-27 RX ORDER — LOSARTAN POTASSIUM 25 MG/1
25 TABLET ORAL DAILY
Qty: 90 TABLET | Refills: 3 | Status: SHIPPED | OUTPATIENT
Start: 2022-07-27 | End: 2022-10-14 | Stop reason: SDUPTHER

## 2022-07-27 ASSESSMENT — PAIN SCALES - GENERAL: PAINLEVEL: 0-NO PAIN

## 2022-07-27 NOTE — ASSESSMENT & PLAN NOTE
Blood pressure today is slightly elevated at 132/82.  His blood pressure goal should be less than 120/80 but not less than 90/60.  I recommend we restart his losartan but at 25 mg once daily.  He is already following a low-sodium diet and trying to exercise regularly.  I would like to follow-up with him in 2 months to make sure his blood pressure is improving.    He does still have the 100 mg losartan tablets and the 10 mg amlodipine tablets and I did ask him to throw these away as I do not want him to get the medications mixed up.

## 2022-07-27 NOTE — PROGRESS NOTES
Outpatient Progress Note    SUBJECTIVE:   ID/CC: Del Mckeon is a 62 y.o. male presenting to clinic today for   Chief Complaint   Patient presents with   • Hypertension       HPI:   Del is a 62-year-old with a past medical history of hypertension, hyperlipidemia, type 2 diabetes, cerebral vascular disease, iron deficiency anemia, depression and obstructive sleep apnea who is here today to discuss a couple of concerns.  It has been about a year since we have seen him.    He does check his blood pressures at homeAnd has a home monitoring system that was supposed to be faxed over to us.  However, he has gotten some notifications that his blood pressures have been in the 160s over 100s.  The majority of the time his blood pressures are in the 130s over 90s but he is starting to spike more frequently.  Previously about a year ago, we stopped his antihypertensives because he was having blood pressures in the 80s over 40s and was passing out and having syncopal episodes.  He has not had any headache, blurry vision, chest pain or shortness of breath or changes in urination with higher blood pressures.  He did try to use some of his old blood pressure medication but that would drop his blood pressure too far.  Unfortunately, we do not have the readings from his blood pressure monitoring system but he is trying to get those faxed to us.    Regarding his diabetes, he has been trying to manage this with his diet and lifestyle.  He is currently eating mostly animal-based proteins and very few carbohydrates of any kind including dietary fiber.  He also is trying to avoid any kinds of fruits or vegetables.  He does sometimes eat sweets at the grocery store but denies eating grains or sugars regularly.  He is following mostly a ketogenic diet at the moment.  He was supposed to be taking metformin 500 mg daily but is not currently taking this.  He will sometimes take 500 mg tablet once daily for 3 days if he eats something  sweet.    He also stopped taking his rosuvastatin as he was concerned about its efficacy as well as potential side effects and need for the medication.  He is eating quite a bit of dietary cholesterol.  He is also had a stroke.  He also has diabetes.  We discussed all of these risk factors and need for statin therapy but he does not want to be on it right now and declines therapy.  HLD/CVA - not taking crestor    Regarding his depression, he states this is currently pretty well controlled.  He is taking his sertraline 50 mg daily and tolerates it pretty well and feels like it helps with his mood.    Finally, he has had some left shoulder weakness over the last month.  There was no trauma to start the discomfort or the weakness.  There has been no swelling or redness of his shoulder joint.  He is unable to get his shoulder abducted past 90 degrees if he is holding something such as a cup or he can or back.  He can do it without anything in his hand.  He denies any numbness or tingling in his hands.      ROS:   12 point ROS reviewed and negative except as in HPI.       Past Medical History:   Diagnosis Date   • Anemia    • Asthma     no attacks as long as away from hay fever allergens in [place]   • Cardiovascular disease     known bicuspid aortic valve, mild AVS, murmur   • Depressive disorder    • Endocrine disorder    • Heart murmur    • Hyperlipidemia    • Kidney disease 2021, 2011    calculi   • Obesity    • Peripheral neuropathy     L arm    • Stroke (CMS/[facility]) ([facility]) 05/21/2020    ischemic, L sided weakness; L leg weakness remains       Past Surgical History:   Procedure Laterality Date   • BACK SURGERY  2018    lumbar discectomy   • CRYOTHERAPY      dermatology   • CYSTOSCOPY URETEROSCOPY W/ LASER LITHOTRIPSY Left 8/10/2021    Procedure: CYSTOSCOPY LEFT URETEROSCOPY WITH LASER LITHOTRIPSY WITH STENT PLACEMENT;  Surgeon: Kaylen Leiva MD;  Location: [facility];  Service: Urology;  Laterality: Left;   • EYE SURGERY  Bilateral 08/2020    cataracts         Current Outpatient Medications:   •  alfuzosin (UROXATRAL) 10 mg 24 hr tablet, Take 1 tablet (10 mg total) by mouth daily, Disp: 90 tablet, Rfl: 3  •  Allergy Relief, loratadine, 10 mg tablet, Take 1 tablet by mouth once daily, Disp: 30 tablet, Rfl: 2  •  magnesium oxide 250 mg magnesium tablet, every 12 hours, Disp: , Rfl:   •  potassium 99 mg tablet, Take 1 tab/cap by mouth daily, Disp: , Rfl:   •  cholecalciferol, vitamin D3, (Vitamin D3) 25 mcg (1,000 unit) capsule, Take 1 capsule (1,000 Units total) by mouth daily, Disp: 30 capsule, Rfl: 3  •  melatonin 5 mg capsule, Take 5 mg by mouth nightly as needed (insomnia) , Disp: , Rfl:   •  losartan (COZAAR) 25 mg tablet, Take 1 tablet (25 mg total) by mouth daily, Disp: 90 tablet, Rfl: 3  •  sertraline (ZOLOFT) 50 mg tablet, Take 1 tablet (50 mg total) by mouth daily, Disp: 90 tablet, Rfl: 3  •  clopidogreL (PLAVIX) 75 mg tablet, Take 1 tablet (75 mg total) by mouth daily, Disp: 90 tablet, Rfl: 3  •  fexofenadine (ALLEGRA) 180 mg tablet, daily, Disp: , Rfl:   •  ferrous sulfate ER (Slow Release Iron) 140 mg (45 mg iron) tablet, Take 1 tablet (140 mg total) by mouth 2 (two) times a day with meals (Patient not taking: Reported on 7/27/2022), Disp: 60 tablet, Rfl: 11  •  omega-3 fatty acids-fish oil (Fish OiL) 340-1,000 mg capsule, Take 1 tab/cap by mouth every other day  , Disp: , Rfl:   •  ibuprofen (ADVIL,MOTRIN) 200 mg tablet, Take 400 mg by mouth every 6 (six) hours as needed for pain scale 1-3/10 or pain scale 8-10/10., Disp: , Rfl:   •  acetaminophen (TYLENOL) 650 mg 8 hr tablet, Take 650-1,300 mg by mouth every 8 (eight) hours as needed for pain scale 1-3/10  , Disp: , Rfl:   •  multivitamin with minerals tablet, Take 1 tablet by mouth every other day  , Disp: , Rfl:   •  diphenhydramine-acetaminophen (TYLENOL PM EXTRA STRENGTH)  mg tablet, Take by mouth nightly as needed  , Disp: , Rfl:     Allergies   Allergen  Reactions   • Penicillins      Tested as a child; showed positive       Social History     Socioeconomic History   • Marital status:      Spouse name: Not on file   • Number of children: Not on file   • Years of education: Not on file   • Highest education level: Not on file   Occupational History   • Not on file   Tobacco Use   • Smoking status: Never Smoker   • Smokeless tobacco: Never Used   Vaping Use   • Vaping Use: Never used   Substance and Sexual Activity   • Alcohol use: Not Currently   • Drug use: Never   • Sexual activity: Not Currently   Other Topics Concern   • Not on file   Social History Narrative   • Not on file     Social Determinants of Health     Financial Resource Strain: Not on file   Food Insecurity: Not on file   Transportation Needs: Not on file   Physical Activity: Not on file   Stress: Not on file   Social Connections: Not on file   Intimate Partner Violence: Not on file   Housing Stability: Not on file       Family History   Problem Relation Age of Onset   • Alzheimer's disease Father    • Diabetes type II Father    • Diabetes Sister        OBJECTIVE:   Vitals:   Vitals:    07/27/22 0856   BP: 132/82   Pulse: 75   Temp: 36.5 °C (97.7 °F)   SpO2: 99%     Weights (Current Encounter Only) (last 180 days)     Date/Time Weight    07/27/22 0856 84.5 kg (186 lb 3.2 oz)          Physical Exam:   General: Alert and oriented. In no acute distress.   Head:normocephalic and atraumatic. No tenderness palpated  Eyes:Pupils equal equal and reactive, no corneal or scleral injection.  ENT: TMs visible without bulging or erythema. No nasal lesions. No oral lesions.   Cardio: S1 S2 without extra sounds or murmurs.  No edema. PP intact. Cap refill <2s.  No JVD  Pulm: CTAB without wheezing or crackles. Symmetrical air exchange.   Gi: BS present x4. Soft, nontender and non distended.  No masses palpated.  Ext: Left shoulder with significant weakness on abduction against resistance.  Liftoff test is  positive on the left.  Weakness with empty can test as well.  Full range of motion of all extremities.  No joint tenderness or erythema or swelling noted.  Neuro: Motor and sensation intact bilaterally. No focal lesions.  Cranial nerves II through XII are intact and symmetrical.  Deep tendon reflexes 2 throughout.  Skin: No rashes or lesions noted.  Psych: Denies SI/HI/AH/VH      Labs/Imaging/Micro:  Previous labs and imaging were reviewed.  Patient is due for updated labs today.    ASSESSMENT AND PLAN:   Del Mckeon is a 62 y.o. male presenting to clinic today for:     Benign essential hypertension  Blood pressure today is slightly elevated at 132/82.  His blood pressure goal should be less than 120/80 but not less than 90/60.  I recommend we restart his losartan but at 25 mg once daily.  He is already following a low-sodium diet and trying to exercise regularly.  I would like to follow-up with him in 2 months to make sure his blood pressure is improving.    He does still have the 100 mg losartan tablets and the 10 mg amlodipine tablets and I did ask him to throw these away as I do not want him to get the medications mixed up.          Type 2 diabetes mellitus without complication, with long-term current use of insulin (CMS/[facility]) (HCC)  He does need an updated A1c and CMP which I did order for him today.  Based off of these results we will decide if we want to continue the metformin or hold it.  I did tell him that he really should not take the metformin on an as-needed basis but it should be more of a daily basis.  At this time, I asked him to hold his metformin if most of his blood sugars are running in the 80s as he states they are.    Regarding his glucose monitoring, he is not checking blood sugars at home because he cannot see very well anymore to poke his fingers.  He is not interested in continuous glucose monitoring but we did discuss.    Mixed hyperlipidemia  He is goal LDL to be less than 70.  He is  fasting today so we are updating his lipid panel.  He is not following a plant-based diet as he does not want to eat the carbohydrates.  However, he is also not paying attention to his dietary cholesterol intake either.  He has stopped his rosuvastatin and declines further therapy.  His ASCVD risk score is high because of his diabetes and history of a stroke.  We discussed these risk factors and the recommendations for statin therapy but he declines therapy at this time and accepts those risks over the potential risks and side effects of statins.      Adjustment disorder with depressed mood  This is currently stable so we will continue his sertraline 50 mg daily.  He is due for renewal so I did reorder this for him today.    History of lacunar cerebrovascular accident (CVA)  We discussed the importance of statin therapy as well as antiplatelet therapy.  He states he is taking his Plavix 75 mg daily which does need to be renewed.  He is not taking aspirin.  He has not had any new stroke symptoms.  We will work on blood pressure control and blood sugar control to the best of our ability.    Left shoulder weakness  I am concerned for left rotator cuff injury or tear.  He does have significant weakness on examination.  We are going to start with an MRI due to the weakness.  Based on the degree of rotator cuff injury, we will refer to physical therapy or surgery if necessary.     Diagnosis Plan   1. Benign essential hypertension  CBC w/auto differential Blood, Venous    Comprehensive metabolic panel Blood, Venous    Urine Albumin/Creatinine Ratio Urine, Clean Catch    losartan (COZAAR) 25 mg tablet    Magnesium Blood, Venous   2. Mixed hyperlipidemia  Lipid panel Blood, Venous   3. History of lacunar cerebrovascular accident (CVA)     4. Type 2 diabetes mellitus without complication, with long-term current use of insulin (CMS/[facility]) ([facility])  Comprehensive metabolic panel Blood, Venous    Hemoglobin A1c (glycosylated) Blood,  Venous    Urine Albumin/Creatinine Ratio Urine, Clean Catch    Magnesium Blood, Venous   5. Adjustment disorder with depressed mood  sertraline (ZOLOFT) 50 mg tablet   6. Iron deficiency anemia secondary to inadequate dietary iron intake  CBC w/auto differential Blood, Venous    Iron panel Blood, Venous   7. Shoulder weakness  MR shoulder left without contrast   8. Disorder of left rotator cuff  MR shoulder left without contrast   9. Ischemic cerebrovascular accident (CVA) (CMS/HCC) (HCC)  clopidogreL (PLAVIX) 75 mg tablet       Return in about 2 months (around 9/27/2022) for Chronic care follow up - 40 min.    Total time spent with patient was 41 minutes.    Klarissa Lester MD  07/27/22  10:31 AM

## 2022-07-27 NOTE — ASSESSMENT & PLAN NOTE
We discussed the importance of statin therapy as well as antiplatelet therapy.  He states he is taking his Plavix 75 mg daily which does need to be renewed.  He is not taking aspirin.  He has not had any new stroke symptoms.  We will work on blood pressure control and blood sugar control to the best of our ability.

## 2022-07-27 NOTE — ASSESSMENT & PLAN NOTE
He is goal LDL to be less than 70.  He is fasting today so we are updating his lipid panel.  He is not following a plant-based diet as he does not want to eat the carbohydrates.  However, he is also not paying attention to his dietary cholesterol intake either.  He has stopped his rosuvastatin and declines further therapy.  His ASCVD risk score is high because of his diabetes and history of a stroke.  We discussed these risk factors and the recommendations for statin therapy but he declines therapy at this time and accepts those risks over the potential risks and side effects of statins.

## 2022-07-27 NOTE — ASSESSMENT & PLAN NOTE
This is currently stable so we will continue his sertraline 50 mg daily.  He is due for renewal so I did reorder this for him today.

## 2022-07-27 NOTE — ASSESSMENT & PLAN NOTE
He does need an updated A1c and CMP which I did order for him today.  Based off of these results we will decide if we want to continue the metformin or hold it.  I did tell him that he really should not take the metformin on an as-needed basis but it should be more of a daily basis.  At this time, I asked him to hold his metformin if most of his blood sugars are running in the 80s as he states they are.    Regarding his glucose monitoring, he is not checking blood sugars at home because he cannot see very well anymore to poke his fingers.  He is not interested in continuous glucose monitoring but we did discuss.

## 2022-07-28 ENCOUNTER — APPOINTMENT (OUTPATIENT)
Dept: LAB | Facility: CLINIC | Age: 62
End: 2022-07-28
Payer: COMMERCIAL

## 2022-07-28 LAB
CREAT UR-MCNC: 55.3 MG/DL
MICROALBUMIN UR-MCNC: 816.3 MG/L (ref 0–30)
MICROALBUMIN/CREAT UR: 1476.1 MG/G CREAT (ref 0–34)

## 2022-07-28 PROCEDURE — 82043 UR ALBUMIN QUANTITATIVE: CPT | Performed by: FAMILY MEDICINE

## 2022-07-28 PROCEDURE — 82570 ASSAY OF URINE CREATININE: CPT | Performed by: FAMILY MEDICINE

## 2022-08-02 DIAGNOSIS — E11.9 TYPE 2 DIABETES MELLITUS WITHOUT COMPLICATION, WITH LONG-TERM CURRENT USE OF INSULIN (CMS/HCC): ICD-10-CM

## 2022-08-02 DIAGNOSIS — Z79.4 TYPE 2 DIABETES MELLITUS WITHOUT COMPLICATION, WITH LONG-TERM CURRENT USE OF INSULIN (CMS/HCC): ICD-10-CM

## 2022-08-02 DIAGNOSIS — Z86.73 HISTORY OF LACUNAR CEREBROVASCULAR ACCIDENT (CVA): ICD-10-CM

## 2022-08-02 DIAGNOSIS — E78.2 MIXED HYPERLIPIDEMIA: Primary | ICD-10-CM

## 2022-08-02 RX ORDER — ATORVASTATIN CALCIUM 40 MG/1
40 TABLET, FILM COATED ORAL DAILY
Qty: 90 TABLET | Refills: 3 | Status: SHIPPED | OUTPATIENT
Start: 2022-08-02 | End: 2022-10-05 | Stop reason: ALTCHOICE

## 2022-08-08 ENCOUNTER — TELEPHONE (OUTPATIENT)
Dept: FAMILY MEDICINE | Facility: CLINIC | Age: 62
End: 2022-08-08

## 2022-08-08 NOTE — TELEPHONE ENCOUNTER
Caller would like to discuss (a) FYI Writer has advised caller of a callback from within 24 hours.    Patient: Del L Patton    Caller Name (Last and first, relation/role): self    Name of Facility: [facility]    Callback Number:     Best Availability: anytime    Fax Number: na    Additional Info: has rotator cuff issue, is going to try PT before doing other tests, has already made arrangements for it    Did you confirm the message with the caller: Yes    Is it okay that the nurse communicates your response through Acronishart? No

## 2022-08-09 NOTE — TELEPHONE ENCOUNTER
Attempted to reach patient, left vm regarding recommendations and to call back if he is still having issues.

## 2022-08-10 ENCOUNTER — HOSPITAL ENCOUNTER (OUTPATIENT)
Dept: PHYSICAL THERAPY | Facility: HOSPITAL | Age: 62
Setting detail: THERAPIES SERIES
Discharge: STILL ADMITTED OR ADMINISTRATIVE | End: 2022-08-10
Payer: COMMERCIAL

## 2022-08-10 PROCEDURE — 97110 THERAPEUTIC EXERCISES: CPT | Mod: GP | Performed by: PHYSICAL THERAPIST

## 2022-08-10 PROCEDURE — 97162 PT EVAL MOD COMPLEX 30 MIN: CPT | Mod: GP | Performed by: PHYSICAL THERAPIST

## 2022-08-10 NOTE — INTERDISCIPLINARY/THERAPY
[address] [facility] [id number] [phone]        Physical Therapy Initial Evaluation     Date of Service: 8/10/2022    Patient Name: Del Mckeon  Referring Provider: Nish Esposito PT    Onset Date: chronic  SOC Date: 08/10/22     Prior Hospitalizations: 8/10/21-8/12/21  HICN: [id number]    Primary Medical Diagnosis:   left rotator cuff injury (SR)     Treatment Diagnosis: Connective tissue dysfunction; Impaired joint mobility, motor function, muscle performance and range of motion associated with        SUBJECTIVE:  HISTORY OF CURRENT COMPLAINT:   Del Mckeon is a 62-year-old right handed male referred to PT with left shoulder pain.  He is uncertain about his onset date and cannot recall any incident to affect this.  He reports no pain at rest and pain with certain movements such as behind his back and fully overhead.  He says that the shoulder is tight.     He reports a history of a CVA a few years ago affecting his left side.  He did not have pain at that time.  He started to do some weight training and noted no strength in his left arm when attempting to do a 20-30# overhead press.  He is currently not doing any exercise since he started having pain.    PRIOR LEVEL OF FUNCTION:   Left side affected CVA; independent with mobility and driving    SOCIAL/OCCUPATIONAL/RECREATIONAL:  Retired; lives alone in an apartment with no family support    Pain:  Pain Assessment Scale  Pain Scale: 0-10  0-10 Pain Score: 0 - No pain  Pain Location: Shoulder  Pain Orientation: Left   Aggravating factors: movements  Relieving factors: rest  Descriptors: burning  Frequency: intermittent      Past Medical History:   Diagnosis Date   • Anemia    • Asthma     no attacks as long as away from hay fever allergens in [place]   • Cardiovascular disease     known bicuspid aortic valve, mild AVS, murmur   • Depressive disorder    • Endocrine disorder    • Heart murmur    • Hyperlipidemia    • Kidney disease 2021, 2011     calculi   • Obesity    • Peripheral neuropathy     L arm    • Stroke (CMS/HCC) ([facility]) 05/21/2020    ischemic, L sided weakness; L leg weakness remains         Current Outpatient Medications:   •  atorvastatin (LIPITOR) 40 mg tablet, Take 1 tablet (40 mg total) by mouth daily, Disp: 90 tablet, Rfl: 3  •  losartan (COZAAR) 25 mg tablet, Take 1 tablet (25 mg total) by mouth daily, Disp: 90 tablet, Rfl: 3  •  sertraline (ZOLOFT) 50 mg tablet, Take 1 tablet (50 mg total) by mouth daily, Disp: 90 tablet, Rfl: 3  •  clopidogreL (PLAVIX) 75 mg tablet, Take 1 tablet (75 mg total) by mouth daily, Disp: 90 tablet, Rfl: 3  •  alfuzosin (UROXATRAL) 10 mg 24 hr tablet, Take 1 tablet (10 mg total) by mouth daily, Disp: 90 tablet, Rfl: 3  •  Allergy Relief, loratadine, 10 mg tablet, Take 1 tablet by mouth once daily, Disp: 30 tablet, Rfl: 2  •  magnesium oxide 250 mg magnesium tablet, every 12 hours, Disp: , Rfl:   •  fexofenadine (ALLEGRA) 180 mg tablet, daily, Disp: , Rfl:   •  ferrous sulfate ER (Slow Release Iron) 140 mg (45 mg iron) tablet, Take 1 tablet (140 mg total) by mouth 2 (two) times a day with meals (Patient not taking: Reported on 7/27/2022), Disp: 60 tablet, Rfl: 11  •  potassium 99 mg tablet, Take 1 tab/cap by mouth daily, Disp: , Rfl:   •  omega-3 fatty acids-fish oil (Fish OiL) 340-1,000 mg capsule, Take 1 tab/cap by mouth every other day  , Disp: , Rfl:   •  ibuprofen (ADVIL,MOTRIN) 200 mg tablet, Take 400 mg by mouth every 6 (six) hours as needed for pain scale 1-3/10 or pain scale 8-10/10., Disp: , Rfl:   •  acetaminophen (TYLENOL) 650 mg 8 hr tablet, Take 650-1,300 mg by mouth every 8 (eight) hours as needed for pain scale 1-3/10  , Disp: , Rfl:   •  cholecalciferol, vitamin D3, (Vitamin D3) 25 mcg (1,000 unit) capsule, Take 1 capsule (1,000 Units total) by mouth daily, Disp: 30 capsule, Rfl: 3  •  multivitamin with minerals tablet, Take 1 tablet by mouth every other day  , Disp: , Rfl:   •  melatonin 5 mg  capsule, Take 5 mg by mouth nightly as needed (insomnia) , Disp: , Rfl:   •  diphenhydramine-acetaminophen (TYLENOL PM EXTRA STRENGTH)  mg tablet, Take by mouth nightly as needed  , Disp: , Rfl:     ALLERGIES:  Penicillins    CURRENT ASSISTIVE OR ADAPTIVE EQUIPMENT:  none  Whitten Fall Risk Score: 0  FALL RISK Interventions: n/a    DIAGNOSTIC TESTING:  None    ACTIVITIES LIMITED BY PATIENT COMPLAINT: reaching behind back, lifting    PATIENT’S GOALS FOR THERAPY:   None listed; pain relief    OBJECTIVE:  Functional Mobility:  -requires bilateral UE to arise from a chair  -independent with gait    Posture:  -significant kyphosis and forward head    ROM:  -flexion/standing left 120 degrees with end range pain, right 120 degrees  -flexion/supine (passive): left 165 degrees with end range pain  -extension: left 65 degrees with end range pain, right 80 degrees  -abduction: left 150 degrees with end range pain, right 150 degrees  -ER(75 degrees): left 60 degrees with pain  -IR(75 degrees): left 70 degrees with pain  -IR/ext: left lumbar with pain, right T6  -ER/flex: left CT, right CT    Strength:  -MMT:  Flexion 5/5, gianni  Abduction left 4/5 with pain, right 5/5  IR 5/5, gianni  ER left 4/5 with pain, right 5/5    Palpation:  -multiple tender points about the left GH joint    Special Tests:  -Neer's (+)  -Sears/Jacob (+)    OUTCOME MEASURES COMPLETED:  FOTO: Patient’s intake functional measure is 62/100 where a higher number = greater function.                                                                                                               EDUCATION:  The patient was provided education on the examination findings and likelihood of RC tendinosis related to atrophy/disuse and injury to the tissue.  He is asked to begin with a HEP of supine dowel pullovers and supine shoulder active ER/IR.    Time Calculation  Start Time: 1002  Stop Time: 1045  Time Calculation (min): 43 min    Therapeutic Interventions (Time  Spent in Minutes)  Therapeutic Exercise: 8         PT Untimed Charges - Quick List (Time Spent in Minutes)  PT Eval Moderate Complexity: 35         EVALUATION:  Moderate complexity: 1-2 personal factors or comorbidities affecting plan of care; evaluation of 3 or more body structures, functions, or activity limitations; evolving/changing clinical presentation.        INITIAL TREATMENT:  Evaluation  TherEx:  -HEP development/ed; patient education on pathomechanics of tendinosis/impingement       ASSESSMENT:  Patient presents with impaired left shoulder ROM and strength.  He has a previous CVA affecting this left side.  ROM is symmetrical to this right side but he has pain with most movements into end range.  This patient would benefit from PT to address pain sensitivity and to help decrease this to allow for better quality of life.    Rehab Potential:    Fair    Barriers to outcome: limited left UE use with atrophy related to CVA; chronicity         GOALS:       The following goals were established with the patient and include:   Short Term Goals:   1.            Patient to be compliant to a HEP and have less end range of motion discomfort to be completed in 4-6 weeks.     Long Term Goals:    1.            Patient to have a FOTO change score or greater of equal to 3-5 indicating an improvement in function to be completed in 4-8 weeks.       PLAN:  It is recommended that the client attend rehabilitative therapy for up to 2 visits per week with an expected duration through  .  Interventions during the course of treatment may include any combination of the following:  Therapeutic exercise, manual therapy, and modalities, .     Thank you for allowing us to share in the care of this patient. If you have any questions, recommendations, or further concerns regarding this patient, please feel free to contact us at [address] [facility] [id number]  Dept: [phone]      Signed by: Nish Esposito, PT 8/10/2022   11:55 AM

## 2022-08-15 ENCOUNTER — HOSPITAL ENCOUNTER (OUTPATIENT)
Dept: PHYSICAL THERAPY | Facility: HOSPITAL | Age: 62
Setting detail: THERAPIES SERIES
Discharge: STILL ADMITTED OR ADMINISTRATIVE | End: 2022-08-15
Payer: COMMERCIAL

## 2022-08-15 PROCEDURE — 97110 THERAPEUTIC EXERCISES: CPT | Mod: GP | Performed by: PHYSICAL THERAPIST

## 2022-08-15 PROCEDURE — 97140 MANUAL THERAPY 1/> REGIONS: CPT | Mod: GP | Performed by: PHYSICAL THERAPIST

## 2022-08-15 NOTE — INTERDISCIPLINARY/THERAPY
[address] [facility] [id number] [phone]      Physical Therapy Daily Treatment Note    Date of Service: 8/15/2022  Patient Name: Del Mckeon  Referring Provider: Nish Esposito PT  Visit Number: 2         SUBJECTIVE:  Normal has minimal to no pain at rest.  He reached out earlier today and had some pain.  He has no pain after the tx session.      Has patient fallen since last visit? No  FALL RISK Interventions: n/a  Have there been any changes in medications? No    Pain:   Pain Assessment Scale  Pain Scale: 0-10  0-10 Pain Score: 0 - No pain  Pain Location: Shoulder  Pain Orientation: Left     OBJECTIVE:  Treatment:  TherEx:  -supine multi-planar rhythmic stabilization in 90 flexion with slow changes of direction and longer hold times  >progressed toward overhead flexion for a second set  -supine 50 degree abduction red tband ER  -supine 50 degrees abduction red tband IR    Manual Therapy:  -supine PROM into end ranges of:  >scaption  >ER  >ER/90 flex  >abduction  -supine grade II-III multi-directional GH mobs  -supine long axis traction in overhead scaption  -supraspinatus and deltoid STM    HEP:  -supine dowel pullovers  -supine shoulder active ER/IR                                                                    Time Calculation  Start Time: 1329  Stop Time: 1358  Time Calculation (min): 28 min   Therapeutic Interventions (Time Spent in Minutes)  Manual Therapy: 18  Therapeutic Exercise: 10            ASSESSMENT:  Most pain stimulated with end range stretching/PROM and STM.  Joint mobs do not positively affect pain at end range.  Notable weakness to tband ER/IR.      PLAN FOR NEXT TREATMENT SESSION:  Cont POC    Thank you for allowing us to share in the care of this patient. If you have any questions, recommendations, or further concerns regarding this patient, please feel free to contact me at [address] [facility] [address]  Dept: [phone].  Signed by: Nish Esposito  PT  8/15/2022  2:39 PM

## 2022-08-17 ENCOUNTER — HOSPITAL ENCOUNTER (OUTPATIENT)
Dept: PHYSICAL THERAPY | Facility: HOSPITAL | Age: 62
Setting detail: THERAPIES SERIES
Discharge: STILL ADMITTED OR ADMINISTRATIVE | End: 2022-08-17
Payer: COMMERCIAL

## 2022-08-17 PROCEDURE — 97110 THERAPEUTIC EXERCISES: CPT | Mod: GP | Performed by: PHYSICAL THERAPIST

## 2022-08-17 PROCEDURE — 97140 MANUAL THERAPY 1/> REGIONS: CPT | Mod: GP | Performed by: PHYSICAL THERAPIST

## 2022-08-17 NOTE — INTERDISCIPLINARY/THERAPY
[address] [facility] [id number] [phone]        Physical Therapy Daily Treatment Note    Date of Service: 8/17/2022  Patient Name: Dle Mckeon  Referring Provider: Nish Esposito, PT  Visit Number: 3         SUBJECTIVE:  Del says that he is not having much pain today.  Continues to have distress at full elevation.      Has patient fallen since last visit? No  FALL RISK Interventions: n/a  Have there been any changes in medications? No    Pain:   Pain Assessment Scale  Pain Scale: 0-10  0-10 Pain Score: 1  Pain Location: Shoulder  Pain Orientation: Left     OBJECTIVE:  Treatment:  TherEx:  -seated active ROM assessment:  >full overhead increases pain, minimal  -seated resistive arm raise to ~90 degrees  >1# 1x10  >2# 1x10  >3# 1x10  -supine 2# dumbbell straight arm raises  -seated yellow tband ER, gianni    Manual Therapy:  -supine PROM into end ranges of:  >scaption  >ER      HEP:  -supine dowel pullovers  -supine shoulder active ER/IR  -2# dumbbell scaption raises  -yellow mini band ER                                                                    Time Calculation  Start Time: 1548  Stop Time: 1615  Time Calculation (min): 27 min   Therapeutic Interventions (Time Spent in Minutes)  Manual Therapy: 10  Therapeutic Exercise: 17            ASSESSMENT:  No pain with strengthening and does have some pain into end range stretching but this is tolerable.    PLAN FOR NEXT TREATMENT SESSION:  Cont POC    Thank you for allowing us to share in the care of this patient. If you have any questions, recommendations, or further concerns regarding this patient, please feel free to contact me at [address] [facility] [address]  Dept: [phone].  Signed by: Nish Esposito, SHIRLEY  8/17/2022  4:22 PM

## 2022-08-23 ENCOUNTER — HOSPITAL ENCOUNTER (OUTPATIENT)
Dept: PHYSICAL THERAPY | Facility: HOSPITAL | Age: 62
Setting detail: THERAPIES SERIES
Discharge: STILL ADMITTED OR ADMINISTRATIVE | End: 2022-08-23
Payer: COMMERCIAL

## 2022-08-23 PROCEDURE — 97110 THERAPEUTIC EXERCISES: CPT | Mod: GP | Performed by: PHYSICAL THERAPIST

## 2022-08-23 PROCEDURE — 97140 MANUAL THERAPY 1/> REGIONS: CPT | Mod: GP | Performed by: PHYSICAL THERAPIST

## 2022-08-23 NOTE — INTERDISCIPLINARY/THERAPY
[address] [facility]  [place] [facility] [address]  [phone]        Physical Therapy Daily Treatment Note    Date of Service: 8/23/2022  Patient Name: Del Mckeon  Referring Provider: Nish Esposito PT  Visit Number: 4         SUBJECTIVE:  Del says that his shoulder is doing a little better.  He is compliant to his HEP.      Has patient fallen since last visit? No  FALL RISK Interventions: n/a  Have there been any changes in medications? No    Pain:   Pain Assessment Scale  Pain Scale: 0-10  0-10 Pain Score: 1  Pain Location: Shoulder  Pain Orientation: Left     OBJECTIVE:  Treatment:  TherEx:  -wall slides for end range flexion stretching:  >unilateral  >bilateral    Manual Therapy:  -supine PROM into end ranges of:  >scaption  >ER      HEP:  -supine dowel pullovers  -supine shoulder active ER/IR  -2# dumbbell scaption raises  -yellow mini band ER  -wall slides                                                                    Time Calculation  Start Time: 1248  Stop Time: 1315  Time Calculation (min): 27 min   Therapeutic Interventions (Time Spent in Minutes)  Manual Therapy: 19  Therapeutic Exercise: 8            ASSESSMENT:  End range of motion continues to improve and he has less pain into end ranges.      PLAN FOR NEXT TREATMENT SESSION:  Cont POC    Thank you for allowing us to share in the care of this patient. If you have any questions, recommendations, or further concerns regarding this patient, please feel free to contact me at [address] [facility] [address]  Dept: [phone].  Signed by: Nish Esposito PT  8/23/2022  1:17 PM

## 2022-09-01 ENCOUNTER — HOSPITAL ENCOUNTER (OUTPATIENT)
Dept: PHYSICAL THERAPY | Facility: HOSPITAL | Age: 62
Setting detail: THERAPIES SERIES
Discharge: STILL ADMITTED OR ADMINISTRATIVE | End: 2022-09-01
Payer: COMMERCIAL

## 2022-09-01 PROCEDURE — 97140 MANUAL THERAPY 1/> REGIONS: CPT | Mod: GP | Performed by: PHYSICAL THERAPIST

## 2022-09-01 NOTE — INTERDISCIPLINARY/THERAPY
[address] [facility] [id number] [phone]        Physical Therapy Daily Treatment Note    Date of Service: 9/1/2022  Patient Name: Del Mckeon  Referring Provider: Nish Esposito PT  Visit Number: 5         SUBJECTIVE:  Del has no shoulder pain today and is doing well at home.      Has patient fallen since last visit? No  FALL RISK Interventions: n/a  Have there been any changes in medications? No    Pain:   Pain Assessment Scale  Pain Scale: 0-10  0-10 Pain Score: 1  Pain Location: Shoulder  Pain Orientation: Left     OBJECTIVE:  Treatment:  Manual Therapy:  -supine PROM into end ranges of:  >scaption  >ER  >flexion    HEP:  -supine dowel pullovers  -supine shoulder active ER/IR  -2# dumbbell scaption raises  -yellow mini band ER  -wall slides                                                                    Time Calculation  Start Time: 1047  Stop Time: 1114  Time Calculation (min): 27 min   Therapeutic Interventions (Time Spent in Minutes)  Manual Therapy: 27            ASSESSMENT:  More mobility into end range elevation without pain.  Making progress in pain control at home with HEP compliance.      PLAN FOR NEXT TREATMENT SESSION:  Cont POC    Thank you for allowing us to share in the care of this patient. If you have any questions, recommendations, or further concerns regarding this patient, please feel free to contact me at [address] [facility] [address]  Dept: [phone].  Signed by: Nish Esposito PT  9/1/2022  11:14 AM

## 2022-09-06 ENCOUNTER — HOSPITAL ENCOUNTER (OUTPATIENT)
Dept: PHYSICAL THERAPY | Facility: HOSPITAL | Age: 62
Setting detail: THERAPIES SERIES
Discharge: STILL ADMITTED OR ADMINISTRATIVE | End: 2022-09-06
Payer: COMMERCIAL

## 2022-09-06 PROCEDURE — 97110 THERAPEUTIC EXERCISES: CPT | Mod: GP | Performed by: PHYSICAL THERAPIST

## 2022-09-06 PROCEDURE — 97140 MANUAL THERAPY 1/> REGIONS: CPT | Mod: GP | Performed by: PHYSICAL THERAPIST

## 2022-09-06 NOTE — INTERDISCIPLINARY/THERAPY
[address] [facility] [id number] [phone]        Physical Therapy Daily Treatment Note    Date of Service: 9/6/2022  Patient Name: Del Mckeon  Referring Provider: Nish Esposito PT  Visit Number: 6         SUBJECTIVE:  Del has no shoulder pain today and is doing well at home.  He will work with his HEP and schedule more appointments if needed.      Has patient fallen since last visit? No  FALL RISK Interventions: n/a  Have there been any changes in medications? No    Pain:   Pain Assessment Scale  Pain Scale: 0-10  0-10 Pain Score: 1  Pain Type: Acute pain  Pain Location: Shoulder  Pain Orientation: Left     OBJECTIVE:  Treatment:  Manual Therapy:  -supine PROM into end ranges of:  >scaption  >ER  >flexion    TherEx:  -FOTO re-assessment  -HEP review    HEP:  -supine dowel pullovers  -supine shoulder active ER/IR  -2# dumbbell scaption raises  -yellow mini band ER  -wall slides                                                                    Time Calculation  Start Time: 1034                ASSESSMENT:  He has a 1% decrease on his FOTO score as his functional ability was not predicted to have significant change.  He is reporting better pain control overall.      PLAN FOR NEXT TREATMENT SESSION:  Cont POC    Thank you for allowing us to share in the care of this patient. If you have any questions, recommendations, or further concerns regarding this patient, please feel free to contact me at [address] [facility] [address]  Dept: [phone].  Signed by: Nish Esposito PT  9/6/2022  10:35 AM

## 2022-09-21 NOTE — TELEPHONE ENCOUNTER
Caller would like to discuss (a) medication Writer has advised caller of a callback from within 24 hours.    Patient: Del L Patton    Caller Name (Last and first, relation/role): Audra home health nurse    Name of Facility: na    Callback Number: [phone]    Best Availability: anytime    Fax Number: na    Additional Info: Nurse needs to review medication & getting pt a glucose monitor.  Pt can't do that on his own due to visual stats    Did you confirm the message with the caller: Yes    Is it okay that the nurse communicates your response through MyChart? No     Yes

## 2022-09-28 ENCOUNTER — TELEPHONE (OUTPATIENT)
Dept: FAMILY MEDICINE | Facility: CLINIC | Age: 62
End: 2022-09-28
Payer: COMMERCIAL

## 2022-09-28 NOTE — TELEPHONE ENCOUNTER
Attempted to call patient due to past immunizations. No answer. Dr. Lester would like to know if patient would like to have Hepatitis B and MMR vaccine titers.

## 2022-09-30 NOTE — TELEPHONE ENCOUNTER
Patient returned call. Will check with his mother for immunization record. Declined titers for now.

## 2022-10-05 ENCOUNTER — APPOINTMENT (OUTPATIENT)
Dept: LAB | Facility: CLINIC | Age: 62
End: 2022-10-05
Payer: COMMERCIAL

## 2022-10-05 ENCOUNTER — TELEPHONE (OUTPATIENT)
Dept: FAMILY MEDICINE | Facility: CLINIC | Age: 62
End: 2022-10-05

## 2022-10-05 ENCOUNTER — OFFICE VISIT (OUTPATIENT)
Dept: FAMILY MEDICINE | Facility: CLINIC | Age: 62
End: 2022-10-05
Payer: COMMERCIAL

## 2022-10-05 VITALS
OXYGEN SATURATION: 99 % | TEMPERATURE: 97.1 F | RESPIRATION RATE: 16 BRPM | DIASTOLIC BLOOD PRESSURE: 74 MMHG | BODY MASS INDEX: 27.58 KG/M2 | HEART RATE: 63 BPM | HEIGHT: 71 IN | WEIGHT: 197 LBS | SYSTOLIC BLOOD PRESSURE: 132 MMHG

## 2022-10-05 DIAGNOSIS — I10 BENIGN ESSENTIAL HYPERTENSION: ICD-10-CM

## 2022-10-05 DIAGNOSIS — Z86.73 HISTORY OF LACUNAR CEREBROVASCULAR ACCIDENT (CVA): ICD-10-CM

## 2022-10-05 DIAGNOSIS — E11.9 TYPE 2 DIABETES MELLITUS WITHOUT COMPLICATION, WITH LONG-TERM CURRENT USE OF INSULIN (CMS/HCC): ICD-10-CM

## 2022-10-05 DIAGNOSIS — E78.2 MIXED HYPERLIPIDEMIA: Primary | ICD-10-CM

## 2022-10-05 DIAGNOSIS — Z79.4 TYPE 2 DIABETES MELLITUS WITHOUT COMPLICATION, WITH LONG-TERM CURRENT USE OF INSULIN (CMS/HCC): ICD-10-CM

## 2022-10-05 DIAGNOSIS — D64.9 ANEMIA, UNSPECIFIED TYPE: ICD-10-CM

## 2022-10-05 DIAGNOSIS — E78.2 MIXED HYPERLIPIDEMIA: ICD-10-CM

## 2022-10-05 DIAGNOSIS — E55.9 VITAMIN D DEFICIENCY: ICD-10-CM

## 2022-10-05 LAB
ANION GAP SERPL CALC-SCNC: 9 MMOL/L (ref 3–11)
BUN SERPL-MCNC: 29 MG/DL (ref 7–25)
CALCIUM SERPL-MCNC: 9.3 MG/DL (ref 8.6–10.3)
CHLORIDE SERPL-SCNC: 107 MMOL/L (ref 98–107)
CHOLEST SERPL-MCNC: 170 MG/DL (ref 0–199)
CO2 SERPL-SCNC: 24 MMOL/L (ref 21–32)
CREAT SERPL-MCNC: 1.08 MG/DL (ref 0.7–1.3)
FASTING STATUS PATIENT QL REPORTED: YES
GFR SERPL CREATININE-BSD FRML MDRD: 78 ML/MIN/1.73M*2
GLUCOSE SERPL-MCNC: 99 MG/DL (ref 70–105)
HDLC SERPL-MCNC: 33 MG/DL
LDLC SERPL CALC-MCNC: 114 MG/DL (ref 20–99)
POTASSIUM SERPL-SCNC: 3.7 MMOL/L (ref 3.5–5.1)
SODIUM SERPL-SCNC: 140 MMOL/L (ref 135–145)
TRIGL SERPL-MCNC: 116 MG/DL

## 2022-10-05 PROCEDURE — 80048 BASIC METABOLIC PNL TOTAL CA: CPT | Performed by: FAMILY MEDICINE

## 2022-10-05 PROCEDURE — 36415 COLL VENOUS BLD VENIPUNCTURE: CPT | Performed by: FAMILY MEDICINE

## 2022-10-05 PROCEDURE — 80061 LIPID PANEL: CPT | Performed by: FAMILY MEDICINE

## 2022-10-05 PROCEDURE — 99214 OFFICE O/P EST MOD 30 MIN: CPT | Performed by: FAMILY MEDICINE

## 2022-10-05 RX ORDER — ROSUVASTATIN CALCIUM 20 MG/1
20 TABLET, COATED ORAL DAILY
Qty: 90 TABLET | Refills: 3 | Status: SHIPPED | OUTPATIENT
Start: 2022-10-05 | End: 2023-04-27 | Stop reason: SDUPTHER

## 2022-10-05 RX ORDER — METFORMIN HYDROCHLORIDE 500 MG/1
500 TABLET ORAL 2 TIMES DAILY WITH MEALS
COMMUNITY
End: 2023-02-28

## 2022-10-05 NOTE — TELEPHONE ENCOUNTER
Caller would like to discuss (a) medication Writer has advised caller of a callback from within 24 hours.    Patient: Del L Patton    Caller Name (Last and first, relation/role): self    Name of Facility: na    Callback Number: [phone]    Best Availability: any    Fax Number: na    Additional Info: Patient wants to discuss blood pressure being high in the evening - wants to discuss medication     Did you confirm the message with the caller: Yes    Is it okay that the nurse communicates your response through inContacthart? No

## 2022-10-05 NOTE — PROGRESS NOTES
Outpatient Progress Note    SUBJECTIVE:   ID/CC: Del Mckeon is a 62 y.o. male presenting to clinic today for   Chief Complaint   Patient presents with    Follow-up     Would like to et a CAC score. BP reading have been faxed to us, bp at night are 160/80.  Had a test done through research study program that stated he had a different aorta, would like to discuss this further.       HPI:   Del is a 62-year-old male with a past medical history of type 2 diabetes, hypertension, hyperlipidemia, vitamin D deficiency, Anemia, obstructive sleep apnea and a history of a stroke with cerebrovascular disease who is here today to follow-up on his chronic medical conditions.    At his last appointment, he had stopped taking his rosuvastatin because he had done some research about statins and felt like these medications were more harmful than beneficial.  However, his LDL cholesterol went from 79 up to 230.  He does follow a more ketogenic diet because of his diabetes and has felt his blood sugars are better controlled when he does not eat a lot of carbohydrates including fruits, vegetables or grains of any kind.  He also avoids beans.  The majority of his diet is meat and animal products, saturated fats and higher cholesterol containing foods.  However, he feels these foods do not affect his blood sugar is much.  In the past, he had been on different statins and had some muscle aches but he is able to tolerate the rosuvastatin well.  However, he is simply not interested in taking them.  The ASCVD Risk score ([place] DK, et al., 2019) failed to calculate for the following reasons:    The patient has a prior MI or stroke diagnosis    Regarding his diabetes, he has been eating a lot more sugar as Halloween is a very difficult time for him to resist all the sugar that he sees when he goes to the store.  He had also tried to decrease his cholesterol intake and eat more fruits and vegetables but is more concerned about his blood  sugars being uncontrolled by eating a more plant-based diet.  He does feel like his blood sugar does better when he follows a more ketogenic diet.  His most recent A1c was 5.3%.  He also feels like if he can keep his diabetes under control his cardiovascular risk will be further benefited that if he were to keep his cholesterol under control.    Finally, his blood pressure has not been checked at home.  He is currently on losartan 25 mg daily.  He was having some lower blood pressures but after stopping those medications, his blood pressures went up into the 140s to 150s.  Since being on the 25 mg of losartan daily, he has blood pressures have stabilized and he has not had any syncopal episodes.        ROS:   12 point ROS reviewed and negative except as in HPI.       Past Medical History:   Diagnosis Date    Anemia     Asthma     no attacks as long as away from hay fever allergens in [place]    Cardiovascular disease     known bicuspid aortic valve, mild AVS, murmur    Depressive disorder     Endocrine disorder     Heart murmur     Hyperlipidemia     Kidney disease 2021, 2011    calculi    Obesity     Peripheral neuropathy     L arm     Stroke (CMS/[facility]) ([facility]) 05/21/2020    ischemic, L sided weakness; L leg weakness remains       Past Surgical History:   Procedure Laterality Date    BACK SURGERY  2018    lumbar discectomy    CRYOTHERAPY      dermatology    CYSTOSCOPY URETEROSCOPY W/ LASER LITHOTRIPSY Left 8/10/2021    Procedure: CYSTOSCOPY LEFT URETEROSCOPY WITH LASER LITHOTRIPSY WITH STENT PLACEMENT;  Surgeon: Kaylen Leiva MD;  Location: [facility];  Service: Urology;  Laterality: Left;    EYE SURGERY Bilateral 08/2020    cataracts         Current Outpatient Medications:     metFORMIN (GLUCOPHAGE) 500 mg tablet, Take 500 mg by mouth 2 (two) times a day with meals, Disp: , Rfl:     losartan (COZAAR) 25 mg tablet, Take 1 tablet (25 mg total) by mouth daily, Disp: 90 tablet, Rfl: 3    sertraline (ZOLOFT) 50 mg tablet,  Take 1 tablet (50 mg total) by mouth daily, Disp: 90 tablet, Rfl: 3    clopidogreL (PLAVIX) 75 mg tablet, Take 1 tablet (75 mg total) by mouth daily, Disp: 90 tablet, Rfl: 3    alfuzosin (UROXATRAL) 10 mg 24 hr tablet, Take 1 tablet (10 mg total) by mouth daily, Disp: 90 tablet, Rfl: 3    Allergy Relief, loratadine, 10 mg tablet, Take 1 tablet by mouth once daily, Disp: 30 tablet, Rfl: 2    magnesium oxide 250 mg magnesium tablet, every 12 hours, Disp: , Rfl:     fexofenadine (ALLEGRA) 180 mg tablet, daily, Disp: , Rfl:     ferrous sulfate ER (Slow Release Iron) 140 mg (45 mg iron) tablet, Take 1 tablet (140 mg total) by mouth 2 (two) times a day with meals, Disp: 60 tablet, Rfl: 11    potassium 99 mg tablet, Take 1 tab/cap by mouth daily, Disp: , Rfl:     omega-3 fatty acids-fish oil (Fish OiL) 340-1,000 mg capsule, Take 1 tab/cap by mouth every other day  , Disp: , Rfl:     cholecalciferol, vitamin D3, (Vitamin D3) 25 mcg (1,000 unit) capsule, Take 1 capsule (1,000 Units total) by mouth daily, Disp: 30 capsule, Rfl: 3    multivitamin with minerals tablet, Take 1 tablet by mouth every other day  , Disp: , Rfl:     melatonin 5 mg capsule, Take 5 mg by mouth nightly as needed (insomnia) , Disp: , Rfl:     rosuvastatin (CRESTOR) 20 mg tablet, Take 1 tablet (20 mg total) by mouth daily, Disp: 90 tablet, Rfl: 3    Allergies   Allergen Reactions    Penicillins      Tested as a child; showed positive       Social History     Socioeconomic History    Marital status:      Spouse name: Not on file    Number of children: Not on file    Years of education: Not on file    Highest education level: Not on file   Occupational History    Not on file   Tobacco Use    Smoking status: Never    Smokeless tobacco: Never   Vaping Use    Vaping Use: Never used   Substance and Sexual Activity    Alcohol use: Not Currently    Drug use: Never    Sexual activity: Not Currently   Other Topics Concern    Not on file   Social History  Narrative    Not on file     Social Determinants of Health     Financial Resource Strain: Not on file   Food Insecurity: Not on file   Transportation Needs: Not on file   Physical Activity: Not on file   Stress: Not on file   Social Connections: Not on file   Intimate Partner Violence: Not on file   Housing Stability: Not on file       Family History   Problem Relation Age of Onset    Alzheimer's disease Father     Diabetes type II Father     Diabetes Sister        OBJECTIVE:   Vitals:   Vitals:    10/05/22 1025   BP: 132/74   Pulse: 63   Resp: 16   Temp: 36.2 °C (97.1 °F)   SpO2: 99%     Weights (Current Encounter Only) (last 180 days)       Date/Time Weight    10/05/22 1025 89.4 kg (197 lb)            Physical Exam:   General: Alert and oriented. In no acute distress.   Head:normocephalic and atraumatic. No tenderness palpated  Eyes:Pupils equal equal and reactive, no corneal or scleral injection.  ENT: TMs visible without bulging or erythema. No nasal lesions. No oral lesions.   Cardio: S1 S2 without extra sounds or murmurs.  No edema. PP intact. Cap refill <2s.  No JVD  Pulm: CTAB without wheezing or crackles. Symmetrical air exchange.   Gi: BS present x4. Soft, nontender and non distended.  No masses palpated.  Ext: Full range of motion of all extremities.  No joint tenderness or erythema or swelling noted.  Neuro: Motor and sensation intact bilaterally. No focal lesions.  Cranial nerves II through XII are intact and symmetrical.  Deep tendon reflexes 2 throughout.  Skin: No rashes or lesions noted.  Psych: Denies SI/HI/AH/VH      Labs/Imaging/Micro:  Component      Latest Ref Rng & Units 7/27/2022 7/28/2022 10/5/2022   Sodium      135 - 145 mmol/L 140  140   Potassium      3.5 - 5.1 MMOL/L 3.4 (L)  3.7   Chloride      98 - 107 mmol/L 107  107   CO2      21 - 32 mmol/L 23  24   Anion Gap      3 - 11 mmol/L 10  9   BUN      7 - 25 mg/dL 31 (H)  29 (H)   Creatinine, Ser      0.70 - 1.30 mg/dL 1.12  1.08    Glucose      70 - 105 mg/dL 106 (H)  99   Calcium      8.6 - 10.3 mg/dL 9.3  9.3   AST      <40 U/L 17     ALT (SGPT)      7 - 52 U/L 13     Alkaline Phosphatase      45 - 115 U/L 67     Total Protein      6.0 - 8.3 g/dL 7.1     Albumin      3.5 - 5.3 g/dL 4.3     Total Bilirubin      0.20 - 1.40 mg/dL 0.53     Corrected Calcium      8.6 - 10.3 mg/dL 9.1     eGFR      >60 mL/min/1.73m*2 74  78   Triglycerides      <=149 mg/dL 114  116   Cholesterol      0 - 199 mg/dL 289 (H)  170   HDL      >=40 mg/dL 36 (L)  33 (L)   LDL Calculated      20 - 99 mg/dL 230 (H)  114 (H)   Fasting?       Yes  Yes   Albumin, Urine      0.0 - 30.0 mg/L  816.3 (H)    Albumin/Creat Ratio      0.0 - 34.0 mg/g Creat  1,476.1 (H)    Creatinine, Ur      MG/DL  55.3    Hemoglobin A1C      4.0 - 6.0 % 5.3     Estimated Average Glucose      mg/dL 105.4     Magnesium      1.8 - 2.4 mg/dL 1.8         ASSESSMENT AND PLAN:   Del Mckeon is a 62 y.o. male presenting to clinic today for:     Mixed hyperlipidemia  He does need to have an updated lipid panel.He has been taking his rosuvastatin since I saw him last.  His LDL has come down from 230 to 114 which is a much safer level.  He tolerates the rosuvastatin well.  After a long discussion today about the options for treatment of his cholesterol and history of vascular disease, he has opted to stay on the rosuvastatin until potential side effects develop.  However, he refuses to stay on 40 mg daily and would like to decrease this dose to 20 mg daily.  I recommended that we could try this as 20 mg daily is still high intensity statin therapy but we do need to recheck a lipid panel in 2 months to make sure his LDL levels continue to improve.    I also discussed with him in great depth the importance of decreasing the amount of cholesterol and saturated fat in his diet.  Both of these things will increase his cardiovascular risk and make another stroke or cardiovascular event more likely.  However,  he does not feel that he can follow a whole food, plant-based diet as he is distressed about his blood sugars.  He is worried they will go up with him eating a whole food plant-based diet.    We did discuss a coronary artery calcium scoring exam but considering his previous events, this would not change the recommendations for statin therapy and whole food plant-based diet.  He opted to not do this at this time.    Type 2 diabetes mellitus without complication, with long-term current use of insulin (CMS/[facility]) ([facility])  It is too soon to recheck an A1c but I did check his fasting blood sugar today and that is at 90.  He cannot see well to check his blood sugars so he is not checking these regularly.  I did discuss with him continuous glucose monitoring but he does not want to do this at this time.    He is currently only on metformin 500 mg 2 times a day and has otherwise tried to manage this with his diet.    I discussed with him following a more plant-based diet and increasing dietary fiber to help with glucose control but he is worried that this will make his blood sugars higher so he is going to continue higher animal protein diet and low carbohydrate diet.  I do recommend we recheck an A1c at his next appointment.    Continue metformin 500 mg 2 times a day.    Benign essential hypertension  Blood pressure is much better controlled.  I recommend she continue the losartan 25 mg daily.  Kidney function was reviewed and stable.    History of lacunar cerebrovascular accident (CVA)  Because of his history of a stroke, it is recommended that he stay on antiplatelet therapy and he is currently on Plavix 75mg daily which he tolerates well without any signs of bleeding.  I also recommend he stay on high intensity statin.  It is discussed above.  Diabetes and blood pressure control are also important but are currently stable.     Diagnosis Plan   1. Mixed hyperlipidemia  rosuvastatin (CRESTOR) 20 mg tablet    Lipid panel  Blood, Venous    Lipid panel Blood, Venous      2. Type 2 diabetes mellitus without complication, with long-term current use of insulin (CMS/[facility]) (HCC)  Basic metabolic panel Blood, Venous    CBC w/auto differential Blood, Venous    Comprehensive metabolic panel Blood, Venous    Hemoglobin A1c (glycosylated) Blood, Venous    Urine Albumin/Creatinine Ratio Urine, Clean Catch      3. Benign essential hypertension  CBC w/auto differential Blood, Venous    Comprehensive metabolic panel Blood, Venous    Urine Albumin/Creatinine Ratio Urine, Clean Catch      4. History of lacunar cerebrovascular accident (CVA)        5. Anemia, unspecified type  CBC w/auto differential Blood, Venous    Iron panel Blood, Venous      6. Vitamin D deficiency  25-Hydroxyvitamin D2 and D3, serum Blood, Venous          Return in about 3 months (around 1/5/2023) for Chronic care follow up - 40 min.    Total time spent with patient was 35 minutes.    Klarissa Lester MD  10/06/22  7:38 AM

## 2022-10-06 NOTE — TELEPHONE ENCOUNTER
Patient stated he takes losartan in the morning. His morning bp are 140-150/80s. Patient was notified of recommendations, and will reach out to us if blood pressures go below 120/80

## 2022-10-06 NOTE — TELEPHONE ENCOUNTER
Returned call for medication, tried nurse, got vm    Best time to reach you: anytime    Is it ok to leave a voicemail:yes     Is it ok to message you through [employer]:  no

## 2022-10-06 NOTE — ASSESSMENT & PLAN NOTE
It is too soon to recheck an A1c but I did check his fasting blood sugar today and that is at 90.  He cannot see well to check his blood sugars so he is not checking these regularly.  I did discuss with him continuous glucose monitoring but he does not want to do this at this time.    He is currently only on metformin 500 mg 2 times a day and has otherwise tried to manage this with his diet.    I discussed with him following a more plant-based diet and increasing dietary fiber to help with glucose control but he is worried that this will make his blood sugars higher so he is going to continue higher animal protein diet and low carbohydrate diet.  I do recommend we recheck an A1c at his next appointment.    Continue metformin 500 mg 2 times a day.

## 2022-10-06 NOTE — ASSESSMENT & PLAN NOTE
Because of his history of a stroke, it is recommended that he stay on antiplatelet therapy and he is currently on Plavix 75mg daily which he tolerates well without any signs of bleeding.  I also recommend he stay on high intensity statin.  It is discussed above.  Diabetes and blood pressure control are also important but are currently stable.

## 2022-10-06 NOTE — TELEPHONE ENCOUNTER
"Please clarify when he takes his losartan (in am or PM?) And what are his morning \"ok\" readings? My recommendation would be to increase his losartan to 50mg daily as long as his morning blood pressures are not <120/80.   "

## 2022-10-06 NOTE — ASSESSMENT & PLAN NOTE
He does need to have an updated lipid panel.He has been taking his rosuvastatin since I saw him last.  His LDL has come down from 230 to 114 which is a much safer level.  He tolerates the rosuvastatin well.  After a long discussion today about the options for treatment of his cholesterol and history of vascular disease, he has opted to stay on the rosuvastatin until potential side effects develop.  However, he refuses to stay on 40 mg daily and would like to decrease this dose to 20 mg daily.  I recommended that we could try this as 20 mg daily is still high intensity statin therapy but we do need to recheck a lipid panel in 2 months to make sure his LDL levels continue to improve.    I also discussed with him in great depth the importance of decreasing the amount of cholesterol and saturated fat in his diet.  Both of these things will increase his cardiovascular risk and make another stroke or cardiovascular event more likely.  However, he does not feel that he can follow a whole food, plant-based diet as he is distressed about his blood sugars.  He is worried they will go up with him eating a whole food plant-based diet.    We did discuss a coronary artery calcium scoring exam but considering his previous events, this would not change the recommendations for statin therapy and whole food plant-based diet.  He opted to not do this at this time.

## 2022-10-07 NOTE — INTERDISCIPLINARY/THERAPY
[address] [facility] [id number] [phone]      Physical Therapy Discharge Note    Date of Service:  9/6/22    Patient Name: Del Mckeon  Referring Provider: Nish Esposito PT    Onset Date: chronic  SOC Date: 8/10/22       Primary Medical Diagnosis:   left rotator cuff injury (SR)     Treatment Diagnosis: Connective tissue dysfunction; Impaired joint mobility, motor function, muscle performance and range of motion associated with      Date of last visit: 9/6/22  Total number of therapy visits:  6  Treatment included: Therapeutic exercise and manual therapy,    This patient has been discharged from Physical Therapy because: goals have been met    Patient Compliance: excellent       Objective:   FOTO: Patient’s current functional status measure is 61/100 (compared to initial measure of 62/100) where a higher number = greater function.      ROM:  -flexion: bilateral 120 degrees without pain    Strength:  -MMT:  Abduction: 5/5, gianni without pain  ER: 5/5, bilateral without pain                                                                                                  Assessment:  Prior Status: The patient presented with impaired left shoulder ROM and strength with a previous CVA affecting his left side.     Current Status: At his last attended appointment, he reported significant pain improvement although his FOTO score was statistically unchanged.  His account was left open for one month in case of need to reschedule.  He has not rescheduled and will be d/c from this POC.    Barriers to outcome: none    Current Goals/Progress Towards:  The following goals were established with the patient and include:   Short Term Goals:   1.            Patient to be compliant to a HEP and have less end range of motion discomfort to be completed in 4-6 weeks.   -goal met     Long Term Goals:    1.            Patient to have a FOTO change score or greater of equal to 3-5 indicating an improvement in function  to be completed in 4-8 weeks.   -not met but reports better pain control and ability to move his arm overhead      Discharge recommendations:  Continue with HEP      Thank you for allowing us to share in the care of this patient. If you have any questions, recommendations, or further concerns regarding this patient, please feel free to contact us at [address] [facility] [id number]  Dept: [phone] .      Signed by: Nish Esposito, PT 10/7/2022 9:10 AM

## 2022-10-12 DIAGNOSIS — I10 BENIGN ESSENTIAL HYPERTENSION: ICD-10-CM

## 2022-10-12 RX ORDER — LOSARTAN POTASSIUM 25 MG/1
25 TABLET ORAL DAILY
OUTPATIENT
Start: 2022-10-12

## 2022-10-12 NOTE — TELEPHONE ENCOUNTER
Medication(s) losartan refill refused due to DUPLICATE   Recently filled on 7/27/22; same pharmacy, receipt confirmed.  Dispense amount:  90, Refill: 3,  12 month supply.  Refills or Rx should be available at preferred pharmacy.

## 2022-10-12 NOTE — TELEPHONE ENCOUNTER
Patient has contacted the pharmacy? Yes    Patient Advised:  needing updated script    Name of Medications (Have patient read from the bottle or snip from medication list):      Are you having any trouble with the medication:   No    How many doses do you have left: (Multiple med requests; Report the doses of the medication with the least amount left) 1    Is the Diagnosis (DX) active? yes    Additional Information:  script was doubled at last appt    Patient's preferred pharmacy has been noted and populated.    Is it okay that the nurse communicates your response through [employer]? No      Caller has been advised: Your request does not guarantee an immediate refill. Please allow up to 72 hours for completion.

## 2022-10-12 NOTE — TELEPHONE ENCOUNTER
Care Due:                  Date            Visit Type   Department     Provider  --------------------------------------------------------------------------------                              ESTABLISHED                              PATIENT      [facility]  Last Visit: 10-      EXTENDED     MEDICINE       SHERWIN LOPES                              ESTABLISHED   [facility]  Next Visit: 01-      PATIENT      MEDICINE       SHERWIN LOPES                                                            Last  Test          Frequency    Reason                     Performed    Due Date  --------------------------------------------------------------------------------  B12 Level...  12 months..  ferrous..................  01- 01-    [facility] Embedded Care Gaps. Reference number: [id number]. 10/12/2022 10:13:45 AM NIRMALA

## 2022-10-14 DIAGNOSIS — I10 BENIGN ESSENTIAL HYPERTENSION: ICD-10-CM

## 2022-10-14 RX ORDER — LOSARTAN POTASSIUM 25 MG/1
50 TABLET ORAL DAILY
Qty: 90 TABLET | Refills: 1 | Status: SHIPPED | OUTPATIENT
Start: 2022-10-14 | End: 2022-12-30 | Stop reason: DRUGHIGH

## 2022-10-14 NOTE — TELEPHONE ENCOUNTER
Patient has contacted the pharmacy? Yes    Patient Advised: they will receive a call back.    Name of Medications (Have patient read from the bottle or snip from medication list):      Are you having any trouble with the medication:   No    How many doses do you have left: (Multiple med requests; Report the doses of the medication with the least amount left) 0    Is the Diagnosis (DX) active? yes    Additional Information: Patient is completely out of medication.    Patient's preferred pharmacy has been noted and populated.    Is it okay that the nurse communicates your response through [employer]? No      Caller has been advised: Your request does not guarantee an immediate refill. Please allow up to 72 hours for completion.

## 2022-10-14 NOTE — TELEPHONE ENCOUNTER
Medication refill request:  Medication(s):  Losartan 25 mg daily not filled due to (route to Nurse Pool) .  This Rx filled in July.  I called pharmacy, and tech said patient de-activated medication because he was informed to take two 25 mg tablets daily for total dose of 50mg.  Sending to nurse pool to review and confirm dosage.

## 2022-10-14 NOTE — TELEPHONE ENCOUNTER
No new care gaps identified.  [facility] Embedded Care Gaps. Reference number: [id number]. 10/14/2022 10:51:13 AM NIRMALA

## 2022-12-30 ENCOUNTER — OFFICE VISIT (OUTPATIENT)
Dept: FAMILY MEDICINE | Facility: CLINIC | Age: 62
End: 2022-12-30
Payer: COMMERCIAL

## 2022-12-30 ENCOUNTER — NURSE TRIAGE (OUTPATIENT)
Dept: FAMILY MEDICINE | Facility: CLINIC | Age: 62
End: 2022-12-30
Payer: COMMERCIAL

## 2022-12-30 ENCOUNTER — TELEPHONE (OUTPATIENT)
Dept: FAMILY MEDICINE | Facility: CLINIC | Age: 62
End: 2022-12-30

## 2022-12-30 VITALS
BODY MASS INDEX: 26.99 KG/M2 | RESPIRATION RATE: 14 BRPM | WEIGHT: 192.8 LBS | HEART RATE: 66 BPM | HEIGHT: 71 IN | TEMPERATURE: 97.7 F | SYSTOLIC BLOOD PRESSURE: 135 MMHG | OXYGEN SATURATION: 100 % | DIASTOLIC BLOOD PRESSURE: 85 MMHG

## 2022-12-30 DIAGNOSIS — E11.29 DIABETES MELLITUS WITH PROTEINURIA (CMS/HCC): ICD-10-CM

## 2022-12-30 DIAGNOSIS — R80.1 PERSISTENT PROTEINURIA: Primary | ICD-10-CM

## 2022-12-30 DIAGNOSIS — I10 BENIGN ESSENTIAL HYPERTENSION: ICD-10-CM

## 2022-12-30 DIAGNOSIS — E55.9 VITAMIN D DEFICIENCY: ICD-10-CM

## 2022-12-30 DIAGNOSIS — R80.9 DIABETES MELLITUS WITH PROTEINURIA (CMS/HCC): ICD-10-CM

## 2022-12-30 PROCEDURE — 99214 OFFICE O/P EST MOD 30 MIN: CPT | Performed by: FAMILY MEDICINE

## 2022-12-30 RX ORDER — LOSARTAN POTASSIUM 50 MG/1
50 TABLET ORAL 2 TIMES DAILY
Qty: 180 TABLET | Refills: 1 | Status: SHIPPED | OUTPATIENT
Start: 2022-12-30 | End: 2023-04-27 | Stop reason: SDUPTHER

## 2022-12-30 RX ORDER — LOSARTAN POTASSIUM 25 MG/1
25 TABLET ORAL 2 TIMES DAILY
Qty: 90 TABLET | Refills: 1
Start: 2022-12-30 | End: 2022-12-30 | Stop reason: SDUPTHER

## 2022-12-30 ASSESSMENT — ENCOUNTER SYMPTOMS
HEMATOLOGIC/LYMPHATIC NEGATIVE: 1
RESPIRATORY NEGATIVE: 1
PSYCHIATRIC NEGATIVE: 1
GASTROINTESTINAL NEGATIVE: 1
ALLERGIC/IMMUNOLOGIC NEGATIVE: 1
FEVER: 0
ENDOCRINE NEGATIVE: 1
HYPERTENSION: 1
ACTIVITY CHANGE: 1
UNEXPECTED WEIGHT CHANGE: 0
WEAKNESS: 0
MUSCULOSKELETAL NEGATIVE: 1
EYES NEGATIVE: 1
CARDIOVASCULAR NEGATIVE: 1
NEUROLOGICAL NEGATIVE: 1
FATIGUE: 0
SHORTNESS OF BREATH: 0

## 2022-12-30 ASSESSMENT — PAIN SCALES - GENERAL: PAINLEVEL: 0-NO PAIN

## 2022-12-30 NOTE — PROGRESS NOTES
Del Mckeon is a 62 y.o. year old male who presents for   Chief Complaint   Patient presents with    Hypertension     Few Patient; patient reports taking his blood pressure up to 2 times a day. This morning he reports 165/93- two hours after blood pressure medication.    .      62-year-old male comes in today reporting he is at high blood pressures at home 165/93 2 hours after taking his blood pressure medication.  He is on losartan 25 twice daily.  Also on metformin, Crestor.  He does have history of stroke several years ago.  Remote history of orthostatic hypotension but he denies have any low blood pressures in the last 3 months.  I do see that he also does have proteinuria.  He has had kidney stones several times, diabetes mellitus overall controlled currently.  Last few checks his creatinine is been under 1.5.  Denies any syncopal episodes.  He is coming in to establish with me later in January.    Recheck blood pressure after sitting in bed I did get 135/85.    Hypertension  This is a chronic problem. The problem is uncontrolled. Pertinent negatives include no chest pain or shortness of breath.       Allergies   Allergen Reactions    Penicillins      Tested as a child; showed positive       Current Outpatient Medications on File Prior to Visit   Medication Sig    metFORMIN (GLUCOPHAGE) 500 mg tablet Take 500 mg by mouth 2 (two) times a day with meals    rosuvastatin (CRESTOR) 20 mg tablet Take 1 tablet (20 mg total) by mouth daily    sertraline (ZOLOFT) 50 mg tablet Take 1 tablet (50 mg total) by mouth daily    clopidogreL (PLAVIX) 75 mg tablet Take 1 tablet (75 mg total) by mouth daily    alfuzosin (UROXATRAL) 10 mg 24 hr tablet Take 1 tablet (10 mg total) by mouth daily    Allergy Relief, loratadine, 10 mg tablet Take 1 tablet by mouth once daily    magnesium oxide 250 mg magnesium tablet every 12 hours    fexofenadine (ALLEGRA) 180 mg tablet daily    ferrous sulfate ER (Slow Release Iron) 140 mg (45 mg  iron) tablet Take 1 tablet (140 mg total) by mouth 2 (two) times a day with meals    cholecalciferol, vitamin D3, (Vitamin D3) 25 mcg (1,000 unit) capsule Take 1 capsule (1,000 Units total) by mouth daily    multivitamin with minerals tablet Take 1 tablet by mouth every other day      melatonin 5 mg capsule Take 5 mg by mouth nightly as needed (insomnia)     potassium 99 mg tablet Take 1 tab/cap by mouth daily    omega-3 fatty acids-fish oil (Fish OiL) 340-1,000 mg capsule Take 1 tab/cap by mouth every other day       No current facility-administered medications on file prior to visit.        The following portions of the patient's history were reviewed and updated as appropriate:  Past Medical History:   Diagnosis Date    Anemia     Asthma     no attacks as long as away from hay fever allergens in [place]    Cardiovascular disease     known bicuspid aortic valve, mild AVS, murmur    Depressive disorder     Endocrine disorder     Heart murmur     Hyperlipidemia     Kidney disease 2021, 2011    calculi    Obesity     Peripheral neuropathy     L arm     Stroke (CMS/[facility]) ([facility]) 05/21/2020    ischemic, L sided weakness; L leg weakness remains     Past Surgical History:   Procedure Laterality Date    BACK SURGERY  2018    lumbar discectomy    CRYOTHERAPY      dermatology    CYSTOSCOPY URETEROSCOPY W/ LASER LITHOTRIPSY Left 8/10/2021    Procedure: CYSTOSCOPY LEFT URETEROSCOPY WITH LASER LITHOTRIPSY WITH STENT PLACEMENT;  Surgeon: Kaylen Leiva MD;  Location: [facility];  Service: Urology;  Laterality: Left;    EYE SURGERY Bilateral 08/2020    cataracts     Family History   Problem Relation Age of Onset    Alzheimer's disease Father     Diabetes type II Father     Diabetes Sister      Social History     Socioeconomic History    Marital status:    Tobacco Use    Smoking status: Never    Smokeless tobacco: Never   Vaping Use    Vaping Use: Never used   Substance and Sexual Activity    Alcohol use: Not Currently    Drug  use: Never    Sexual activity: Not Currently     Social Determinants of Health     Tobacco Use: Low Risk     Smoking Tobacco Use: Never    Smokeless Tobacco Use: Never     Most Recent Immunizations   Administered Date(s) Administered    Fluarix/Flulaval/Fluzone Quad 11/09/2017    Pneumococcal Polysaccharide - PPSV23 01/06/2021    Shingrix IM 01/06/2021    Tdap 02/17/2021     Health Maintenance   Topic Date Due    COVID-19 Vaccine (1) Never done    Foot Exam  Never done    MMR Vaccines (1 of 1 - Standard series) Never done    Diabetic Eye Exam  07/27/2022    Influenza Vaccine (1) 09/01/2022    Hemoglobin A1C  10/27/2022    Urine Microalbumin  07/28/2023    DTaP,Tdap,and Td Vaccines (2 - Td or Tdap) 02/17/2031    Colorectal Cancer Screening  10/20/2031    Hepatitis C Screening  Completed    Zoster Vaccines Series  Completed    Pneumococcal 0-64  Aged Out    HIB Vaccines  Aged Out    Hepatitis B Vaccines  Aged Out    IPV Vaccines  Aged Out    Hepatitis A Vaccines  Aged Out    Meningococcal Vaccine  Aged Out    HPV Vaccines  Aged Out     Health Maintenance Due   Topic Date Due    COVID-19 Vaccine (1) Never done    Foot Exam  Never done    MMR Vaccines (1 of 1 - Standard series) Never done    Diabetic Eye Exam  07/27/2022    Influenza Vaccine (1) 09/01/2022    Hemoglobin A1C  10/27/2022         Review of Systems   Constitutional:  Positive for activity change (Chronic after stroke with some left-sided weakness he described). Negative for fatigue, fever and unexpected weight change.   HENT: Negative.     Eyes: Negative.    Respiratory: Negative.  Negative for shortness of breath.    Cardiovascular: Negative.  Negative for chest pain.   Gastrointestinal: Negative.    Endocrine: Negative.    Genitourinary: Negative.    Musculoskeletal: Negative.    Skin: Negative.    Allergic/Immunologic: Negative.    Neurological: Negative.  Negative for weakness.   Hematological: Negative.    Psychiatric/Behavioral: Negative.        Vitals:    12/30/22 1523   BP: 135/85   Pulse:    Resp:    Temp:    SpO2:        Wt Readings from Last 3 Encounters:   12/30/22 87.5 kg (192 lb 12.8 oz)   10/05/22 89.4 kg (197 lb)   07/27/22 84.5 kg (186 lb 3.2 oz)     Temp Readings from Last 3 Encounters:   12/30/22 36.5 °C (97.7 °F) (Temporal)   10/05/22 36.2 °C (97.1 °F) (Temporal)   07/27/22 36.5 °C (97.7 °F) (Temporal)     BP Readings from Last 3 Encounters:   12/30/22 135/85   10/05/22 132/74   07/27/22 132/82     Pulse Readings from Last 3 Encounters:   12/30/22 66   10/05/22 63   07/27/22 75       Physical Exam  Vitals and nursing note reviewed.   Constitutional:       General: He is not in acute distress.     Appearance: He is well-developed and normal weight. He is not ill-appearing.   HENT:      Head: Normocephalic and atraumatic.      Right Ear: External ear normal.      Left Ear: External ear normal.      Nose: Nose normal. No congestion or rhinorrhea.      Mouth/Throat:      Mouth: Mucous membranes are moist.      Pharynx: Oropharynx is clear. No oropharyngeal exudate.   Eyes:      General: No scleral icterus.        Right eye: No discharge.         Left eye: No discharge.      Extraocular Movements: Extraocular movements intact.      Conjunctiva/sclera: Conjunctivae normal.   Cardiovascular:      Rate and Rhythm: Normal rate and regular rhythm.      Pulses: Normal pulses.      Heart sounds: Normal heart sounds. No murmur heard.    No friction rub. No gallop.   Pulmonary:      Effort: Pulmonary effort is normal. No respiratory distress.      Breath sounds: Normal breath sounds. No wheezing or rhonchi.   Abdominal:      General: Abdomen is flat. There is no distension.      Palpations: Abdomen is soft.   Musculoskeletal:         General: No swelling, deformity or signs of injury.      Cervical back: Neck supple. No rigidity or tenderness.   Skin:     General: Skin is warm and dry.      Capillary Refill: Capillary refill takes less than 2 seconds.       Findings: No bruising or erythema.   Neurological:      Mental Status: He is alert and oriented to person, place, and time. Mental status is at baseline.      Motor: Weakness present.      Coordination: Coordination abnormal.   Psychiatric:         Mood and Affect: Mood normal.         Behavior: Behavior normal.         Thought Content: Thought content normal.         Judgment: Judgment normal.       No results found for this or any previous visit (from the past 24 hour(s)).    Del was seen today for hypertension.    Diagnoses and all orders for this visit:    Persistent proteinuria  -     Urine Albumin/Creatinine Ratio Urine, Clean Catch; Future    Benign essential hypertension  -     Discontinue: losartan (COZAAR) 25 mg tablet; Take 1 tablet (25 mg total) by mouth 2 (two) times a day  -     losartan (COZAAR) 50 mg tablet; Take 1 tablet (50 mg total) by mouth 2 (two) times a day  -     CBC w/auto differential Blood, Venous; Future  -     Comprehensive metabolic panel Blood, Venous; Future  -     Urine Albumin/Creatinine Ratio Urine, Clean Catch; Future  -     Hemoglobin A1c (glycosylated) Blood, Venous; Future  -     Thyroid Stimulating Hormone, Ultrasensitive Blood, Venous; Future    Vitamin D deficiency  -     Comprehensive metabolic panel Blood, Venous; Future  -     Vitamin D 25 hydroxy Blood, Venous; Future    Diabetes mellitus with proteinuria (CMS/HCC) (HCC)  -     Urine Albumin/Creatinine Ratio Urine, Clean Catch; Future  -     Hemoglobin A1c (glycosylated) Blood, Venous; Future      ASSESSMENT  Problem List       Benign essential hypertension    Current Assessment & Plan     He is having some episodic higher blood pressures it looks like and he is reporting a higher blood pressure trend.  Would like to increase losartan.  He says he is taking 25 twice daily currently.  We will increase to 50 twice daily.  This may also benefit his chronic proteinuria with his controlled diabetes history of kidney  stones.  Coming in to establish with myself at the end of January.  In 1 week recommend he come in and recheck labs, nursing blood pressure check         Relevant Medications    losartan (COZAAR) 50 mg tablet    Other Relevant Orders    CBC w/auto differential Blood, Venous    Comprehensive metabolic panel Blood, Venous    Urine Albumin/Creatinine Ratio Urine, Clean Catch    Hemoglobin A1c (glycosylated) Blood, Venous    Thyroid Stimulating Hormone, Ultrasensitive Blood, Venous    Vitamin D deficiency    Relevant Orders    Comprehensive metabolic panel Blood, Venous    Vitamin D 25 hydroxy Blood, Venous    Diabetes mellitus with proteinuria (CMS/HCC) (HCC)    Current Assessment & Plan     Diabetes mellitus controlled however with proteinuria chronic.  Looks like this is been gradually progressing.  He is on angiotensin receptor blocker, blood pressure and diabetes overall optimized it looks like.  Followed like to be a little bit more aggressive with his blood pressure control with his history of stroke.  Would like to trend labs and make sure he does not need sodium bicarb versus nephrology consult moving forward.  Recent renal and bladder ultrasound reviewed.         Relevant Orders    Urine Albumin/Creatinine Ratio Urine, Clean Catch    Hemoglobin A1c (glycosylated) Blood, Venous    Persistent proteinuria - Primary    Relevant Orders    Urine Albumin/Creatinine Ratio Urine, Clean Catch

## 2022-12-30 NOTE — ASSESSMENT & PLAN NOTE
He is having some episodic higher blood pressures it looks like and he is reporting a higher blood pressure trend.  Would like to increase losartan.  He says he is taking 25 twice daily currently.  We will increase to 50 twice daily.  This may also benefit his chronic proteinuria with his controlled diabetes history of kidney stones.  Coming in to establish with myself at the end of January.  In 1 week recommend he come in and recheck labs, nursing blood pressure check

## 2022-12-30 NOTE — TELEPHONE ENCOUNTER
[facility] Nurse Triage Note    INITIAL REQUIRED QUESTIONS:    Are you currently in [place]? Yes    Are you currently driving?: No      CHIEF COMPLAINT AND HISTORY:                                               Reason for call:  concerned about high blood pressure, high for months; last night 190 systolic; this morning 165/93; both readings were 2 hours after taking bp medication; states he feels allright, and denies any concerning symptoms    Do you follow with a specialist for this condition: No    Onset: See Above Charting    Duration: See Above Charting    Severity: See Above Charting    Pain Level Reported: See Above Charting    Location:  See Above Charting    Associated Symptoms: denies    Are symptoms interfering with Activities of Daily Living?: denies    History of similar symptoms: See Above Charting    Aggravating/Relieving Factors: denies    LMP (for females ages 10-60): N/A     Allergies - Patient reported: not reviewed      CARE ADVICE:   Please review Encounters in Chart review for Specific Care Advice given by Triage RN.       DISPOSITION:   See Within 3 Days in Office ;  patient demands to be seen, called clinic nurse and gave report and she states she does not have any appts on her side, but I can schedule him same day with Dr. Gasca today.  Patient scheduled and has not further questions at this time.      PCP COMMUNICATION:   Was the patient's PCP contacted?: Route encounter to PCP as GEOFF Reed RN  December 30, 2022 11:11 AM  Reason for Disposition   Systolic BP  >= 160 OR Diastolic >= 100    Protocols used: Blood Pressure - High-A-

## 2022-12-30 NOTE — ASSESSMENT & PLAN NOTE
Diabetes mellitus controlled however with proteinuria chronic.  Looks like this is been gradually progressing.  He is on angiotensin receptor blocker, blood pressure and diabetes overall optimized it looks like.  Followed like to be a little bit more aggressive with his blood pressure control with his history of stroke.  Would like to trend labs and make sure he does not need sodium bicarb versus nephrology consult moving forward.  Recent renal and bladder ultrasound reviewed.

## 2023-01-06 ENCOUNTER — LAB (OUTPATIENT)
Dept: LAB | Facility: CLINIC | Age: 63
End: 2023-01-06
Payer: COMMERCIAL

## 2023-01-06 ENCOUNTER — CLINICAL SUPPORT (OUTPATIENT)
Dept: FAMILY MEDICINE | Facility: CLINIC | Age: 63
End: 2023-01-06
Payer: COMMERCIAL

## 2023-01-06 VITALS — SYSTOLIC BLOOD PRESSURE: 143 MMHG | DIASTOLIC BLOOD PRESSURE: 87 MMHG

## 2023-01-06 DIAGNOSIS — Z79.4 TYPE 2 DIABETES MELLITUS WITHOUT COMPLICATION, WITH LONG-TERM CURRENT USE OF INSULIN (CMS/HCC): ICD-10-CM

## 2023-01-06 DIAGNOSIS — E78.2 MIXED HYPERLIPIDEMIA: ICD-10-CM

## 2023-01-06 DIAGNOSIS — E87.6 HYPOKALEMIA: ICD-10-CM

## 2023-01-06 DIAGNOSIS — E11.9 TYPE 2 DIABETES MELLITUS WITHOUT COMPLICATION, WITH LONG-TERM CURRENT USE OF INSULIN (CMS/HCC): ICD-10-CM

## 2023-01-06 DIAGNOSIS — I10 BENIGN ESSENTIAL HYPERTENSION: Primary | ICD-10-CM

## 2023-01-06 DIAGNOSIS — E55.9 VITAMIN D DEFICIENCY: ICD-10-CM

## 2023-01-06 DIAGNOSIS — R80.1 PERSISTENT PROTEINURIA: ICD-10-CM

## 2023-01-06 DIAGNOSIS — R80.9 DIABETES MELLITUS WITH PROTEINURIA (CMS/HCC): ICD-10-CM

## 2023-01-06 DIAGNOSIS — E11.29 DIABETES MELLITUS WITH PROTEINURIA (CMS/HCC): ICD-10-CM

## 2023-01-06 DIAGNOSIS — D64.9 ANEMIA, UNSPECIFIED TYPE: ICD-10-CM

## 2023-01-06 DIAGNOSIS — I10 BENIGN ESSENTIAL HYPERTENSION: ICD-10-CM

## 2023-01-06 LAB
25(OH)D3 SERPL-MCNC: 47 NG/ML (ref 30–100)
ALBUMIN SERPL-MCNC: 4 G/DL (ref 3.5–5.3)
ALP SERPL-CCNC: 84 U/L (ref 45–115)
ALT SERPL-CCNC: 16 U/L (ref 7–52)
ANION GAP SERPL CALC-SCNC: 7 MMOL/L (ref 3–11)
AST SERPL-CCNC: 20 U/L
BASOPHILS # BLD AUTO: 0.1 10*3/UL
BASOPHILS NFR BLD AUTO: 1.1 % (ref 0–2)
BILIRUB SERPL-MCNC: 0.64 MG/DL (ref 0.2–1.4)
BUN SERPL-MCNC: 27 MG/DL (ref 7–25)
CALCIUM ALBUM COR SERPL-MCNC: 8.9 MG/DL (ref 8.6–10.3)
CALCIUM SERPL-MCNC: 8.9 MG/DL (ref 8.6–10.3)
CHLORIDE SERPL-SCNC: 107 MMOL/L (ref 98–107)
CHOLEST SERPL-MCNC: 202 MG/DL (ref 0–199)
CO2 SERPL-SCNC: 25 MMOL/L (ref 21–32)
CREAT SERPL-MCNC: 1.12 MG/DL (ref 0.7–1.3)
CREAT UR-MCNC: 152.4 MG/DL
EOSINOPHIL # BLD AUTO: 0.1 10*3/UL
EOSINOPHIL NFR BLD AUTO: 2.2 % (ref 0–3)
ERYTHROCYTE [DISTWIDTH] IN BLOOD BY AUTOMATED COUNT: 15.3 % (ref 11.5–15)
EST. AVERAGE GLUCOSE BLD GHB EST-MCNC: 116.9 MG/DL
FASTING STATUS PATIENT QL REPORTED: YES
FERRITIN SERPL-MCNC: 237.2 NG/ML (ref 24–336)
GFR SERPL CREATININE-BSD FRML MDRD: 74 ML/MIN/1.73M*2
GLUCOSE SERPL-MCNC: 121 MG/DL (ref 70–105)
HBA1C MFR BLD: 5.7 % (ref 4–6)
HCT VFR BLD AUTO: 36.6 % (ref 38–50)
HDLC SERPL-MCNC: 33 MG/DL
HGB BLD-MCNC: 12.7 G/DL (ref 13.2–17.2)
IRON SATN MFR SERPL: 30 % (ref 13–50)
IRON SERPL-MCNC: 80 UG/DL (ref 50–175)
LDLC SERPL CALC-MCNC: 148 MG/DL (ref 20–99)
LYMPHOCYTES # BLD AUTO: 1.7 10*3/UL
LYMPHOCYTES NFR BLD AUTO: 25.1 % (ref 15–47)
MCH RBC QN AUTO: 31.5 PG (ref 29–34)
MCHC RBC AUTO-ENTMCNC: 34.7 G/DL (ref 32–36)
MCV RBC AUTO: 90.8 FL (ref 82–97)
MICROALBUMIN UR-MCNC: 2027.5 MG/L (ref 0–30)
MICROALBUMIN/CREAT UR: 1330.4 MG/G CREAT (ref 0–34)
MONOCYTES # BLD AUTO: 0.4 10*3/UL
MONOCYTES NFR BLD AUTO: 6.6 % (ref 5–13)
NEUTROPHILS # BLD AUTO: 4.3 10*3/UL
NEUTROPHILS NFR BLD AUTO: 65 % (ref 46–70)
PLATELET # BLD AUTO: 145 10*3/UL (ref 130–350)
PMV BLD AUTO: 7.9 FL (ref 6.9–10.8)
POTASSIUM SERPL-SCNC: 3.3 MMOL/L (ref 3.5–5.1)
PROT SERPL-MCNC: 7 G/DL (ref 6–8.3)
RBC # BLD AUTO: 4.04 10*6/ΜL (ref 4.1–5.8)
SODIUM SERPL-SCNC: 139 MMOL/L (ref 135–145)
TIBC SERPL-MCNC: 270 UG/DL (ref 250–450)
TRIGL SERPL-MCNC: 107 MG/DL
TSH SERPL DL<=0.05 MIU/L-ACNC: 2.1 UIU/ML (ref 0.34–4.82)
UIBC SERPL-MCNC: 190 UG/DL (ref 155–355)
WBC # BLD AUTO: 6.6 10*3/UL (ref 3.7–9.6)

## 2023-01-06 PROCEDURE — 82043 UR ALBUMIN QUANTITATIVE: CPT | Performed by: FAMILY MEDICINE

## 2023-01-06 PROCEDURE — 82306 VITAMIN D 25 HYDROXY: CPT | Performed by: FAMILY MEDICINE

## 2023-01-06 PROCEDURE — 85025 COMPLETE CBC W/AUTO DIFF WBC: CPT | Performed by: FAMILY MEDICINE

## 2023-01-06 PROCEDURE — 84443 ASSAY THYROID STIM HORMONE: CPT | Performed by: FAMILY MEDICINE

## 2023-01-06 PROCEDURE — 80061 LIPID PANEL: CPT | Performed by: FAMILY MEDICINE

## 2023-01-06 PROCEDURE — 82570 ASSAY OF URINE CREATININE: CPT | Performed by: FAMILY MEDICINE

## 2023-01-06 PROCEDURE — 83540 ASSAY OF IRON: CPT | Performed by: FAMILY MEDICINE

## 2023-01-06 PROCEDURE — 83550 IRON BINDING TEST: CPT | Performed by: FAMILY MEDICINE

## 2023-01-06 PROCEDURE — 80053 COMPREHEN METABOLIC PANEL: CPT | Performed by: FAMILY MEDICINE

## 2023-01-06 PROCEDURE — 83036 HEMOGLOBIN GLYCOSYLATED A1C: CPT | Performed by: FAMILY MEDICINE

## 2023-01-06 PROCEDURE — 36415 COLL VENOUS BLD VENIPUNCTURE: CPT | Performed by: FAMILY MEDICINE

## 2023-01-06 PROCEDURE — 82728 ASSAY OF FERRITIN: CPT | Performed by: FAMILY MEDICINE

## 2023-01-06 RX ORDER — HYDROCHLOROTHIAZIDE 12.5 MG/1
12.5 TABLET ORAL DAILY
Qty: 30 TABLET | Refills: 3 | Status: SHIPPED | OUTPATIENT
Start: 2023-01-06 | End: 2023-04-27 | Stop reason: SDUPTHER

## 2023-01-06 RX ORDER — POTASSIUM CHLORIDE 750 MG/1
10 TABLET, EXTENDED RELEASE ORAL 2 TIMES DAILY
Qty: 60 TABLET | Refills: 11 | Status: SHIPPED | OUTPATIENT
Start: 2023-01-06 | End: 2023-04-27 | Stop reason: SDUPTHER

## 2023-01-10 LAB
25(OH)D2 SERPL-MCNC: <4 NG/ML
25(OH)D3 SERPL-MCNC: 50 NG/ML
25(OH)D3+25(OH)D2 SERPL-MCNC: 50 NG/ML

## 2023-01-25 NOTE — INTERDISCIPLINARY/THERAPY
05/24/20 0706   OT Last Visit   OT Received On 05/24/20   General   Chart Reviewed Yes   Is this a Co-Treatment? No   Family/Caregiver Present No   Subjective Comments   Subjective Comments Recieved RN's consent and pt is agreeable to participate in OT Intervention. Following session, pt is left at EOB with all of his needs within reach.   Pain Assessment   Pain Assessment No/denies pain   Cognition   Arousal/Alertness WFL   Cognition Comment Appropriate to task and commands   Bed Mobility   Bed Mobility Not assessed   Transfers   Transfer Assessed   Transfer 1   Transfer From 1 Bed;Sit   Transfer Type 1 To and from   Transfer to 1 Stand   Technique 1 Sit to stand;Stand to sit   Transfer Device 1 Front wheeled walker;Transfer belt   Level of Assistance 1 Contact guard assist x 1   Trials/Comments 1 CGA for safety and balance   Ambulation   Ambulation Assessed   Ambulation 1   Surface 1 Level surface;Smooth   Device 1 Front wheeled walker;Gait belt   Assistance 1 Contact guard assist x 1   Quality of Gait 1 Slow movements, slightly unsteady    Comments/Distance 1 100m   Balance   Balance Impaired   LE Dressing   LE Dressing Comments Voicing have no concerns   Toileting   Toileting Comments No concerns   Assessment   Rehab Potential Good   Progress Progressing toward goals   Recommendations for Therapy Continue skilled therapy   Assessment Comment pt demo'd well during functional transfers and generalized mobility. pt will continue to benefit from skilled OT services to ensure safety upon d/c.   Plan   Plan Comment d/c yelena, L neuro      Star Wedge Flap Text: The defect edges were debeveled with a #15 scalpel blade.  Given the location of the defect, shape of the defect and the proximity to free margins a star wedge flap was deemed most appropriate.  Using a sterile surgical marker, an appropriate rotation flap was drawn incorporating the defect and placing the expected incisions within the relaxed skin tension lines where possible. The area thus outlined was incised deep to adipose tissue with a #15 scalpel blade.  The skin margins were undermined to an appropriate distance in all directions utilizing iris scissors.

## 2023-01-27 ENCOUNTER — LAB (OUTPATIENT)
Dept: LAB | Facility: CLINIC | Age: 63
End: 2023-01-27
Payer: COMMERCIAL

## 2023-01-27 ENCOUNTER — OFFICE VISIT (OUTPATIENT)
Dept: FAMILY MEDICINE | Facility: CLINIC | Age: 63
End: 2023-01-27
Payer: COMMERCIAL

## 2023-01-27 VITALS
WEIGHT: 192.4 LBS | DIASTOLIC BLOOD PRESSURE: 80 MMHG | HEIGHT: 71 IN | HEART RATE: 76 BPM | TEMPERATURE: 97 F | SYSTOLIC BLOOD PRESSURE: 120 MMHG | BODY MASS INDEX: 26.94 KG/M2 | OXYGEN SATURATION: 100 %

## 2023-01-27 DIAGNOSIS — E87.6 HYPOKALEMIA: Primary | ICD-10-CM

## 2023-01-27 DIAGNOSIS — Z86.73 HISTORY OF LACUNAR CEREBROVASCULAR ACCIDENT (CVA): ICD-10-CM

## 2023-01-27 DIAGNOSIS — E87.6 HYPOKALEMIA: ICD-10-CM

## 2023-01-27 DIAGNOSIS — I95.1 ORTHOSTATIC HYPOTENSION: ICD-10-CM

## 2023-01-27 DIAGNOSIS — I10 BENIGN ESSENTIAL HYPERTENSION: ICD-10-CM

## 2023-01-27 DIAGNOSIS — E11.9 TYPE 2 DIABETES MELLITUS WITHOUT COMPLICATION, WITH LONG-TERM CURRENT USE OF INSULIN (CMS/HCC): ICD-10-CM

## 2023-01-27 DIAGNOSIS — Z79.4 TYPE 2 DIABETES MELLITUS WITHOUT COMPLICATION, WITH LONG-TERM CURRENT USE OF INSULIN (CMS/HCC): ICD-10-CM

## 2023-01-27 LAB
ALBUMIN SERPL-MCNC: 4.5 G/DL (ref 3.5–5.3)
ALP SERPL-CCNC: 82 U/L (ref 45–115)
ALT SERPL-CCNC: 12 U/L (ref 7–52)
ANION GAP SERPL CALC-SCNC: 11 MMOL/L (ref 3–11)
AST SERPL-CCNC: 18 U/L
BASOPHILS # BLD AUTO: 0.1 10*3/UL
BASOPHILS NFR BLD AUTO: 0.9 % (ref 0–2)
BILIRUB SERPL-MCNC: 0.62 MG/DL (ref 0.2–1.4)
BUN SERPL-MCNC: 31 MG/DL (ref 7–25)
CALCIUM ALBUM COR SERPL-MCNC: 9.6 MG/DL (ref 8.6–10.3)
CALCIUM SERPL-MCNC: 10 MG/DL (ref 8.6–10.3)
CHLORIDE SERPL-SCNC: 104 MMOL/L (ref 98–107)
CO2 SERPL-SCNC: 24 MMOL/L (ref 21–32)
CREAT SERPL-MCNC: 1.33 MG/DL (ref 0.7–1.3)
EOSINOPHIL # BLD AUTO: 0.2 10*3/UL
EOSINOPHIL NFR BLD AUTO: 2.2 % (ref 0–3)
ERYTHROCYTE [DISTWIDTH] IN BLOOD BY AUTOMATED COUNT: 14.9 % (ref 11.5–15)
GFR SERPL CREATININE-BSD FRML MDRD: 60 ML/MIN/1.73M*2
GLUCOSE SERPL-MCNC: 106 MG/DL (ref 70–105)
HCT VFR BLD AUTO: 38.6 % (ref 38–50)
HGB BLD-MCNC: 13 G/DL (ref 13.2–17.2)
LYMPHOCYTES # BLD AUTO: 2.2 10*3/UL
LYMPHOCYTES NFR BLD AUTO: 30.1 % (ref 15–47)
MCH RBC QN AUTO: 31.4 PG (ref 29–34)
MCHC RBC AUTO-ENTMCNC: 33.7 G/DL (ref 32–36)
MCV RBC AUTO: 93.1 FL (ref 82–97)
MONOCYTES # BLD AUTO: 0.4 10*3/UL
MONOCYTES NFR BLD AUTO: 5.8 % (ref 5–13)
NEUTROPHILS # BLD AUTO: 4.5 10*3/UL
NEUTROPHILS NFR BLD AUTO: 61 % (ref 46–70)
PLATELET # BLD AUTO: 198 10*3/UL (ref 130–350)
PMV BLD AUTO: 8.5 FL (ref 6.9–10.8)
POTASSIUM SERPL-SCNC: 4.3 MMOL/L (ref 3.5–5.1)
PROT SERPL-MCNC: 7.4 G/DL (ref 6–8.3)
RBC # BLD AUTO: 4.15 10*6/ΜL (ref 4.1–5.8)
SODIUM SERPL-SCNC: 139 MMOL/L (ref 135–145)
WBC # BLD AUTO: 7.4 10*3/UL (ref 3.7–9.6)

## 2023-01-27 PROCEDURE — 99213 OFFICE O/P EST LOW 20 MIN: CPT | Performed by: FAMILY MEDICINE

## 2023-01-27 PROCEDURE — 80053 COMPREHEN METABOLIC PANEL: CPT | Performed by: FAMILY MEDICINE

## 2023-01-27 PROCEDURE — 36415 COLL VENOUS BLD VENIPUNCTURE: CPT | Performed by: FAMILY MEDICINE

## 2023-01-27 PROCEDURE — 85025 COMPLETE CBC W/AUTO DIFF WBC: CPT | Performed by: FAMILY MEDICINE

## 2023-01-27 ASSESSMENT — ENCOUNTER SYMPTOMS
EYES NEGATIVE: 1
ENDOCRINE NEGATIVE: 1
NERVOUS/ANXIOUS: 0
PALPITATIONS: 0
NECK PAIN: 0
GASTROINTESTINAL NEGATIVE: 1
RESPIRATORY NEGATIVE: 1
HYPERTENSION: 1
ALLERGIC/IMMUNOLOGIC NEGATIVE: 1
SHORTNESS OF BREATH: 0
UNEXPECTED WEIGHT CHANGE: 0
WEAKNESS: 0
ACTIVITY CHANGE: 1
FEVER: 0
HEMATOLOGIC/LYMPHATIC NEGATIVE: 1
FATIGUE: 0
AGITATION: 0
CARDIOVASCULAR NEGATIVE: 1

## 2023-01-27 ASSESSMENT — PAIN SCALES - GENERAL: PAINLEVEL: 0-NO PAIN

## 2023-01-27 NOTE — PROGRESS NOTES
Del Mckeon is a 62 y.o. year old male who presents for   Chief Complaint   Patient presents with    Establish Care     Blood pressure concerns   .      62-year-old male comes in today for follow-up of hypertension.  He had been able to see him recently when he was having some elevated blood pressures and his PCP is not available.  He does have history of stroke, hypertension and also some history of orthostatic hypotension.  We had doubled his losartan from 25 mg twice daily to the dose of 50 mg twice daily.  He continues on hydrochlorothiazide in the morning and also January labs did come back showing diabetes controlled.  With his prior history of stroke, orthostatic hypotension we discussed on recheck today with him being closer to ideal range would not like to be more aggressive with his blood pressure control at this time and would follow chronically.    Hypertension  This is a chronic problem. The current episode started more than 1 year ago. The problem has been waxing and waning since onset. The problem is uncontrolled (at evenings higher.  mornings better). Pertinent negatives include no anxiety, chest pain, neck pain, palpitations, peripheral edema or shortness of breath. There are no associated agents to hypertension. Risk factors for coronary artery disease include sedentary lifestyle, male gender and diabetes mellitus. Past treatments include angiotensin blockers and diuretics. Hypertensive end-organ damage includes kidney disease (protein in urine).       Allergies   Allergen Reactions    Penicillins      Tested as a child; showed positive       Current Outpatient Medications on File Prior to Visit   Medication Sig    hydroCHLOROthiazide (HYDRODIURIL) 12.5 mg tablet Take 1 tablet (12.5 mg total) by mouth daily    potassium chloride 10 mEq CR tablet Take 1 tablet (10 mEq total) by mouth 2 (two) times a day    losartan (COZAAR) 50 mg tablet Take 1 tablet (50 mg total) by mouth 2 (two) times a day     metFORMIN (GLUCOPHAGE) 500 mg tablet Take 500 mg by mouth 2 (two) times a day with meals    sertraline (ZOLOFT) 50 mg tablet Take 1 tablet (50 mg total) by mouth daily    clopidogreL (PLAVIX) 75 mg tablet Take 1 tablet (75 mg total) by mouth daily    Allergy Relief, loratadine, 10 mg tablet Take 1 tablet by mouth once daily    fexofenadine (ALLEGRA) 180 mg tablet daily    cholecalciferol, vitamin D3, (Vitamin D3) 25 mcg (1,000 unit) capsule Take 1 capsule (1,000 Units total) by mouth daily    melatonin 5 mg capsule Take 5 mg by mouth nightly as needed (insomnia)     rosuvastatin (CRESTOR) 20 mg tablet Take 1 tablet (20 mg total) by mouth daily (Patient not taking: Reported on 1/27/2023)    alfuzosin (UROXATRAL) 10 mg 24 hr tablet Take 1 tablet (10 mg total) by mouth daily (Patient not taking: Reported on 1/27/2023)    magnesium oxide 250 mg magnesium tablet every 12 hours    ferrous sulfate ER (Slow Release Iron) 140 mg (45 mg iron) tablet Take 1 tablet (140 mg total) by mouth 2 (two) times a day with meals    omega-3 fatty acids-fish oil (Fish OiL) 340-1,000 mg capsule Take 1 tab/cap by mouth every other day      multivitamin with minerals tablet Take 1 tablet by mouth every other day       No current facility-administered medications on file prior to visit.        The following portions of the patient's history were reviewed and updated as appropriate:  Past Medical History:   Diagnosis Date    Anemia     Asthma     no attacks as long as away from hay fever allergens in [place]    Cardiovascular disease     known bicuspid aortic valve, mild AVS, murmur    Depressive disorder     Endocrine disorder     Heart murmur     Hyperlipidemia     Kidney disease 2021, 2011    calculi    Obesity     Peripheral neuropathy     L arm     Stroke (CMS/[facility]) ([facility]) 05/21/2020    ischemic, L sided weakness; L leg weakness remains     Past Surgical History:   Procedure Laterality Date    BACK SURGERY  2018    lumbar discectomy     CRYOTHERAPY      dermatology    CYSTOSCOPY URETEROSCOPY W/ LASER LITHOTRIPSY Left 8/10/2021    Procedure: CYSTOSCOPY LEFT URETEROSCOPY WITH LASER LITHOTRIPSY WITH STENT PLACEMENT;  Surgeon: Kaylen Leiva MD;  Location: [facility];  Service: Urology;  Laterality: Left;    EYE SURGERY Bilateral 08/2020    cataracts     Family History   Problem Relation Age of Onset    Alzheimer's disease Father     Diabetes type II Father     Diabetes Sister      Social History     Socioeconomic History    Marital status:    Tobacco Use    Smoking status: Never    Smokeless tobacco: Never   Vaping Use    Vaping Use: Never used   Substance and Sexual Activity    Alcohol use: Not Currently    Drug use: Never    Sexual activity: Not Currently     Social Determinants of Health     Tobacco Use: Low Risk     Smoking Tobacco Use: Never    Smokeless Tobacco Use: Never     Most Recent Immunizations   Administered Date(s) Administered    Fluarix/Flulaval/Fluzone Quad 11/09/2017    Pneumococcal Polysaccharide - PPSV23 01/06/2021    Shingrix IM 01/06/2021    Tdap 02/17/2021     Health Maintenance   Topic Date Due    COVID-19 Vaccine (1) Never done    Foot Exam  Never done    MMR Vaccines (1 of 1 - Standard series) Never done    Diabetic Eye Exam  07/27/2022    Influenza Vaccine (1) 09/01/2022    Hemoglobin A1C  04/06/2023    Urine Microalbumin  01/06/2024    DTaP,Tdap,and Td Vaccines (2 - Td or Tdap) 02/17/2031    Colorectal Cancer Screening  10/20/2031    Hepatitis C Screening  Completed    Zoster Vaccines Series  Completed    Pneumococcal 0-64  Aged Out    HIB Vaccines  Aged Out    Hepatitis B Vaccines  Aged Out    IPV Vaccines  Aged Out    Hepatitis A Vaccines  Aged Out    Meningococcal Vaccine  Aged Out    HPV Vaccines  Aged Out     Health Maintenance Due   Topic Date Due    COVID-19 Vaccine (1) Never done    Foot Exam  Never done    MMR Vaccines (1 of 1 - Standard series) Never done    Diabetic Eye Exam  07/27/2022    Influenza  Vaccine (1) 09/01/2022         Review of Systems   Constitutional:  Positive for activity change (Chronic after stroke with some left-sided weakness he described). Negative for fatigue, fever and unexpected weight change.   HENT: Negative.     Eyes: Negative.    Respiratory: Negative.  Negative for shortness of breath.    Cardiovascular: Negative.  Negative for chest pain, palpitations and leg swelling.   Gastrointestinal: Negative.    Endocrine: Negative.    Genitourinary: Negative.    Musculoskeletal:  Positive for gait problem. Negative for neck pain.   Skin: Negative.    Allergic/Immunologic: Negative.    Neurological:  Negative for weakness.   Hematological: Negative.    Psychiatric/Behavioral:  Negative for agitation and behavioral problems. The patient is not nervous/anxious.      Vitals:    01/27/23 1049   BP: 120/80   Pulse:    Temp:    SpO2:        Wt Readings from Last 3 Encounters:   01/27/23 87.3 kg (192 lb 6.4 oz)   12/30/22 87.5 kg (192 lb 12.8 oz)   10/05/22 89.4 kg (197 lb)     Temp Readings from Last 3 Encounters:   01/27/23 36.1 °C (97 °F) (Temporal)   12/30/22 36.5 °C (97.7 °F) (Temporal)   10/05/22 36.2 °C (97.1 °F) (Temporal)     BP Readings from Last 3 Encounters:   01/27/23 120/80   01/06/23 143/87   12/30/22 135/85     Pulse Readings from Last 3 Encounters:   01/27/23 76   12/30/22 66   10/05/22 63       Physical Exam  Vitals and nursing note reviewed.   Constitutional:       General: He is not in acute distress.     Appearance: He is well-developed and normal weight. He is not ill-appearing.   HENT:      Head: Normocephalic and atraumatic.      Right Ear: External ear normal.      Left Ear: External ear normal.      Nose: Nose normal. No congestion or rhinorrhea.      Mouth/Throat:      Mouth: Mucous membranes are moist.      Pharynx: Oropharynx is clear. No oropharyngeal exudate.   Eyes:      General: No scleral icterus.        Right eye: No discharge.         Left eye: No discharge.       Extraocular Movements: Extraocular movements intact.      Conjunctiva/sclera: Conjunctivae normal.   Cardiovascular:      Rate and Rhythm: Normal rate and regular rhythm.      Pulses: Normal pulses.      Heart sounds: Normal heart sounds. No murmur heard.    No friction rub. No gallop.   Pulmonary:      Effort: Pulmonary effort is normal. No respiratory distress.      Breath sounds: Normal breath sounds. No wheezing or rhonchi.   Abdominal:      General: Abdomen is flat. There is no distension.      Palpations: Abdomen is soft.   Musculoskeletal:         General: No swelling, deformity or signs of injury.      Cervical back: Neck supple. No rigidity or tenderness.   Skin:     General: Skin is warm and dry.      Capillary Refill: Capillary refill takes less than 2 seconds.      Findings: No bruising or erythema.   Neurological:      Mental Status: He is alert and oriented to person, place, and time. Mental status is at baseline.      Motor: Weakness present.      Coordination: Coordination abnormal.   Psychiatric:         Mood and Affect: Mood normal.         Behavior: Behavior normal.         Thought Content: Thought content normal.         Judgment: Judgment normal.       No results found for this or any previous visit (from the past 24 hour(s)).    Del was seen today for establish care.    Diagnoses and all orders for this visit:    Hypokalemia  -     Comprehensive metabolic panel Blood, Venous; Future  -     CBC w/auto differential Blood, Venous; Future    Benign essential hypertension  -     Comprehensive metabolic panel Blood, Venous; Future  -     CBC w/auto differential Blood, Venous; Future    Type 2 diabetes mellitus without complication, with long-term current use of insulin (CMS/[facility]) ([facility])    History of lacunar cerebrovascular accident (CVA)    Orthostatic hypotension      ASSESSMENT  Problem List       Benign essential hypertension    Current Assessment & Plan     Recheck blood pressure stable.   Within 10 points approximately of normal.  With prior history of orthostatic hypotension, stroke would not like to be more aggressive at this time.  Would however like to recheck potassium and blood counts.  He is now on twice a day potassium instead of once daily.  Would like to make sure that his potassium is trending back into normal range.  Further follow-up with PCP discussed.         Relevant Orders    Comprehensive metabolic panel Blood, Venous    CBC w/auto differential Blood, Venous    Orthostatic hypotension    History of lacunar cerebrovascular accident (CVA)    Type 2 diabetes mellitus without complication, with long-term current use of insulin (CMS/HCC) ([facility])    Hypokalemia - Primary    Relevant Orders    Comprehensive metabolic panel Blood, Venous    CBC w/auto differential Blood, Venous     F/u 3 months with pcp

## 2023-01-27 NOTE — ASSESSMENT & PLAN NOTE
Recheck blood pressure stable.  Within 10 points approximately of normal.  With prior history of orthostatic hypotension, stroke would not like to be more aggressive at this time.  Would however like to recheck potassium and blood counts.  He is now on twice a day potassium instead of once daily.  Would like to make sure that his potassium is trending back into normal range.  Further follow-up with PCP discussed.

## 2023-02-28 DIAGNOSIS — Z79.4 TYPE 2 DIABETES MELLITUS WITHOUT COMPLICATION, WITH LONG-TERM CURRENT USE OF INSULIN (CMS/HCC): Primary | ICD-10-CM

## 2023-02-28 DIAGNOSIS — E11.9 TYPE 2 DIABETES MELLITUS WITHOUT COMPLICATION, WITH LONG-TERM CURRENT USE OF INSULIN (CMS/HCC): Primary | ICD-10-CM

## 2023-02-28 RX ORDER — METFORMIN HYDROCHLORIDE 500 MG/1
TABLET ORAL
Qty: 120 TABLET | Refills: 2 | Status: SHIPPED | OUTPATIENT
Start: 2023-02-28 | End: 2023-04-27 | Stop reason: SDUPTHER

## 2023-02-28 NOTE — TELEPHONE ENCOUNTER
Medication refill request:  Medication(s):  metformin not filled due to (route to Nurse Pool) Historical Provider listed, please update to PCP if appropriate to fill

## 2023-02-28 NOTE — TELEPHONE ENCOUNTER
Care Due:                  Date            Visit Type   Department     Provider  --------------------------------------------------------------------------------                                           [facility]  Last Visit: 01-      NEW PATIENT  MEDICINE       ANN-MARIE KELLY                              ESTABLISHED   [facility]  Next Visit: 04-      PATIENT      MEDICINE       SHERWIN LOPES                                                            Last  Test          Frequency    Reason                     Performed    Due Date  --------------------------------------------------------------------------------  B12 Level...  12 months..  ferrous..................  Not Found    Overdue    [employer] Embedded Care Gaps. Reference number: [id number]. 2/28/2023 10:44:02 AM MST

## 2023-03-03 ENCOUNTER — ANESTHESIA EVENT (OUTPATIENT)
Dept: GASTROENTEROLOGY | Facility: HOSPITAL | Age: 63
End: 2023-03-03
Payer: COMMERCIAL

## 2023-03-03 ENCOUNTER — HOSPITAL ENCOUNTER (OUTPATIENT)
Facility: HOSPITAL | Age: 63
Setting detail: OUTPATIENT SURGERY
Discharge: HOME | End: 2023-03-03
Attending: INTERNAL MEDICINE | Admitting: INTERNAL MEDICINE
Payer: COMMERCIAL

## 2023-03-03 ENCOUNTER — ANESTHESIA (OUTPATIENT)
Dept: GASTROENTEROLOGY | Facility: HOSPITAL | Age: 63
End: 2023-03-03
Payer: COMMERCIAL

## 2023-03-03 VITALS
RESPIRATION RATE: 16 BRPM | BODY MASS INDEX: 27.2 KG/M2 | DIASTOLIC BLOOD PRESSURE: 83 MMHG | HEIGHT: 70 IN | HEART RATE: 53 BPM | TEMPERATURE: 98.2 F | WEIGHT: 190 LBS | SYSTOLIC BLOOD PRESSURE: 140 MMHG | OXYGEN SATURATION: 95 %

## 2023-03-03 PROCEDURE — (BLANK): Performed by: INTERNAL MEDICINE

## 2023-03-03 PROCEDURE — 2580000300 HC RX 258: Performed by: ANESTHESIOLOGY

## 2023-03-03 PROCEDURE — 6360000200 HC RX 636 W HCPCS (ALT 250 FOR IP): Performed by: NURSE ANESTHETIST, CERTIFIED REGISTERED

## 2023-03-03 PROCEDURE — (BLANK) HC RECOVERY PHASE-2 EACH ADDITIONAL 1/2 HOUR ACUITY LEVEL 2: Performed by: INTERNAL MEDICINE

## 2023-03-03 PROCEDURE — (BLANK) HC MAC ANESTHESIA FACILITY CHARGE 1ST 15 MIN: Performed by: INTERNAL MEDICINE

## 2023-03-03 PROCEDURE — 6360000200 HC RX 636 W HCPCS (ALT 250 FOR IP): Performed by: ANESTHESIOLOGY

## 2023-03-03 PROCEDURE — 2500000200 HC RX 250 WO HCPCS: Performed by: NURSE ANESTHETIST, CERTIFIED REGISTERED

## 2023-03-03 PROCEDURE — (BLANK) HC MAC ANESTHESIA FACILITY CHARGE EACH ADDITIONAL MIN: Performed by: INTERNAL MEDICINE

## 2023-03-03 PROCEDURE — (BLANK) HC RECOVERY PHASE-2 1ST 1/2 HOUR ACUITY LEVEL 2: Performed by: INTERNAL MEDICINE

## 2023-03-03 PROCEDURE — 00811 ANES LWR INTST NDSC NOS: CPT | Performed by: NURSE ANESTHETIST, CERTIFIED REGISTERED

## 2023-03-03 RX ORDER — MIDAZOLAM HYDROCHLORIDE 1 MG/ML
1 INJECTION INTRAMUSCULAR; INTRAVENOUS EVERY 5 MIN PRN
Status: DISCONTINUED | OUTPATIENT
Start: 2023-03-03 | End: 2023-03-03 | Stop reason: HOSPADM

## 2023-03-03 RX ORDER — LIDOCAINE HYDROCHLORIDE 20 MG/ML
INJECTION, SOLUTION EPIDURAL; INFILTRATION; INTRACAUDAL; PERINEURAL AS NEEDED
Status: DISCONTINUED | OUTPATIENT
Start: 2023-03-03 | End: 2023-03-03 | Stop reason: SURG

## 2023-03-03 RX ORDER — SODIUM CHLORIDE 0.9 % (FLUSH) 0.9 %
2 SYRINGE (ML) INJECTION AS NEEDED
Status: DISCONTINUED | OUTPATIENT
Start: 2023-03-03 | End: 2023-03-03 | Stop reason: HOSPADM

## 2023-03-03 RX ORDER — LABETALOL HYDROCHLORIDE 5 MG/ML
INJECTION, SOLUTION INTRAVENOUS AS NEEDED
Status: DISCONTINUED | OUTPATIENT
Start: 2023-03-03 | End: 2023-03-03 | Stop reason: SURG

## 2023-03-03 RX ORDER — SODIUM CHLORIDE 0.9 % (FLUSH) 0.9 %
2 SYRINGE (ML) INJECTION EVERY 8 HOURS SCHEDULED
Status: DISCONTINUED | OUTPATIENT
Start: 2023-03-03 | End: 2023-03-03 | Stop reason: HOSPADM

## 2023-03-03 RX ORDER — ONDANSETRON HYDROCHLORIDE 2 MG/ML
4 INJECTION, SOLUTION INTRAVENOUS ONCE
Status: COMPLETED | OUTPATIENT
Start: 2023-03-03 | End: 2023-03-03

## 2023-03-03 RX ORDER — DEXAMETHASONE SODIUM PHOSPHATE 4 MG/ML
INJECTION, SOLUTION INTRA-ARTICULAR; INTRALESIONAL; INTRAMUSCULAR; INTRAVENOUS; SOFT TISSUE
Status: DISCONTINUED
Start: 2023-03-03 | End: 2023-03-03 | Stop reason: HOSPADM

## 2023-03-03 RX ORDER — ONDANSETRON HYDROCHLORIDE 2 MG/ML
INJECTION, SOLUTION INTRAVENOUS
Status: DISCONTINUED
Start: 2023-03-03 | End: 2023-03-03 | Stop reason: HOSPADM

## 2023-03-03 RX ORDER — PROPOFOL 10 MG/ML
INJECTION, EMULSION INTRAVENOUS AS NEEDED
Status: DISCONTINUED | OUTPATIENT
Start: 2023-03-03 | End: 2023-03-03 | Stop reason: SURG

## 2023-03-03 RX ORDER — DEXAMETHASONE SODIUM PHOSPHATE 4 MG/ML
4 INJECTION, SOLUTION INTRA-ARTICULAR; INTRALESIONAL; INTRAMUSCULAR; INTRAVENOUS; SOFT TISSUE ONCE
Status: COMPLETED | OUTPATIENT
Start: 2023-03-03 | End: 2023-03-03

## 2023-03-03 RX ORDER — LABETALOL HYDROCHLORIDE 5 MG/ML
10 INJECTION, SOLUTION INTRAVENOUS ONCE
Status: DISCONTINUED | OUTPATIENT
Start: 2023-03-03 | End: 2023-03-03 | Stop reason: HOSPADM

## 2023-03-03 RX ORDER — PROPOFOL 10 MG/ML
INJECTION, EMULSION INTRAVENOUS CONTINUOUS PRN
Status: DISCONTINUED | OUTPATIENT
Start: 2023-03-03 | End: 2023-03-03 | Stop reason: SURG

## 2023-03-03 RX ORDER — SODIUM CHLORIDE, SODIUM LACTATE, POTASSIUM CHLORIDE, CALCIUM CHLORIDE 600; 310; 30; 20 MG/100ML; MG/100ML; MG/100ML; MG/100ML
100 INJECTION, SOLUTION INTRAVENOUS CONTINUOUS
Status: DISCONTINUED | OUTPATIENT
Start: 2023-03-03 | End: 2023-03-03 | Stop reason: HOSPADM

## 2023-03-03 RX ADMIN — SODIUM CHLORIDE, POTASSIUM CHLORIDE, SODIUM LACTATE AND CALCIUM CHLORIDE: 600; 310; 30; 20 INJECTION, SOLUTION INTRAVENOUS at 14:53

## 2023-03-03 RX ADMIN — PROPOFOL 20 MG: 10 INJECTION, EMULSION INTRAVENOUS at 14:57

## 2023-03-03 RX ADMIN — PROPOFOL 20 MG: 10 INJECTION, EMULSION INTRAVENOUS at 14:59

## 2023-03-03 RX ADMIN — PROPOFOL 140 MCG/KG/MIN: 10 INJECTION, EMULSION INTRAVENOUS at 14:57

## 2023-03-03 RX ADMIN — ONDANSETRON 4 MG: 2 INJECTION INTRAMUSCULAR; INTRAVENOUS at 14:53

## 2023-03-03 RX ADMIN — LABETALOL HYDROCHLORIDE 10 MG: 5 INJECTION, SOLUTION INTRAVENOUS at 14:50

## 2023-03-03 RX ADMIN — LIDOCAINE HYDROCHLORIDE 50 MG: 20 INJECTION, SOLUTION EPIDURAL; INFILTRATION; INTRACAUDAL; PERINEURAL at 14:57

## 2023-03-03 RX ADMIN — DEXAMETHASONE SODIUM PHOSPHATE 4 MG: 4 INJECTION, SOLUTION INTRA-ARTICULAR; INTRALESIONAL; INTRAMUSCULAR; INTRAVENOUS; SOFT TISSUE at 14:53

## 2023-03-03 NOTE — ANESTHESIA POSTPROCEDURE EVALUATION
Patient: Del Mckeon    Procedure Summary     Date: 03/03/23 Room / Location: [facility] ENDO 03 / [facility]    Anesthesia Start: 1453 Anesthesia Stop: 1524    Procedure: COLONOSCOPY with polypectomies (Anus) Diagnosis:       History of adenomatous polyp of colon      (polyps)      (hemorrhoids)    Providers: Camden Ledezma MD Responsible Provider: Tong Cortez MD    Anesthesia Type: MAC ASA Status: 4          Anesthesia Type: MAC    Last vitals  Vitals Value Taken Time   /66 03/03/23 1535   Temp 36.8 °C (98.2 °F) 03/03/23 1535   Pulse 56 03/03/23 1535   Resp 16 03/03/23 1535   SpO2 94 % 03/03/23 1535   0-10 Pain Score 0 - No pain 03/03/23 1525       Anesthesia Post Evaluation    Patient location during evaluation: PACU  Patient participation: complete - patient participated  Level of consciousness: awake and alert  Pain management: adequate  Airway patency: patent  Anesthetic complications: no  Cardiovascular status: acceptable  Respiratory status: acceptable  Hydration status: acceptable  May dismiss recovered patient based on consultation with the appropriate physicians and/or meeting appropriate discharge criteria      Cosmetic?  This procedure is not cosmetic.

## 2023-03-03 NOTE — PERIOPERATIVE NURSING NOTE
Discharge instructions discussed with patient. Verbalized understanding. All questions answered.

## 2023-03-03 NOTE — ANESTHESIA PREPROCEDURE EVALUATION
"Pre-Procedure Assessment    Patient: Del Mckeon, male, 62 y.o.    Ht Readings from Last 1 Encounters:   01/27/23 1.803 m (5' 10.98\")     Wt Readings from Last 1 Encounters:   01/27/23 87.3 kg (192 lb 6.4 oz)       Last Vitals  BP      Temp      Pulse     Resp      SpO2      Pain Score         Problem list reviewed and Medical history reviewed           Airway   Mallampati: II  TM distance: >3 FB  Neck ROM: full      Dental - normal exam     Pulmonary     breath sounds clear to auscultation  (+) asthma, sleep apnea,   Cardiovascular   (+) hypertension, valvular problems/murmurs AS,     Rhythm: regular  Rate: normal  ROS comment: Echo  Interpretation Summary    ? Limited study performed.  ? Normal left ventricular size, wall thickness and low normal systolic function. EF 52%.  ? Normal left ventricular wall motion.  ? Diastolic function was not assessed.  ? Mild biatrial enlargement.  ? There is no patent foramen ovale detected by agitated saline.  ? Mildly dilated right ventricle with normal function.  ? Calcified aortic valve with stenosis likely, though full Doppler was not performed. Mild regurgitation noted.  ? Inadequate TR spectral Doppler to accurately assess right ventricular systolic pressure.  ? I suggest a complete echocardiographic Doppler evaluation of the aortic valve      Mental Status/Neuro/Psych    Pt is alert.      (+) CVA, peripheral neuropathy,     GI/Hepatic/Renal - negative ROS     Endo/Other    (+) diabetes mellitus,   Abdominal           Social History     Tobacco Use   • Smoking status: Never   • Smokeless tobacco: Never   Substance Use Topics   • Alcohol use: Not Currently      Hematology   WBC   Date Value Ref Range Status   01/27/2023 7.4 3.7 - 9.6 10*3/uL Final     RBC   Date Value Ref Range Status   01/27/2023 4.15 4.10 - 5.80 10*6/µL Final     MCV   Date Value Ref Range Status   01/27/2023 93.1 82.0 - 97.0 fL Final     Hemoglobin   Date Value Ref Range Status   01/27/2023 13.0 (L) " 13.2 - 17.2 g/dL Final     Hematocrit   Date Value Ref Range Status   01/27/2023 38.6 38.0 - 50.0 % Final     Platelets   Date Value Ref Range Status   01/27/2023 198 130 - 350 10*3/uL Final      Coagulation No results found for: PT, APTT, INR   General Chemistry   POC Glucose   Date Value Ref Range Status   08/12/2021 113 (H) 70 - 105 mg/dL Final     Calcium   Date Value Ref Range Status   01/27/2023 10.0 8.6 - 10.3 mg/dL Final     BUN   Date Value Ref Range Status   01/27/2023 31 (H) 7 - 25 mg/dL Final     Creatinine   Date Value Ref Range Status   01/27/2023 1.33 (H) 0.70 - 1.30 mg/dL Final     Glucose   Date Value Ref Range Status   01/27/2023 106 (H) 70 - 105 mg/dL Final     Sodium   Date Value Ref Range Status   01/27/2023 139 135 - 145 mmol/L Final     Potassium   Date Value Ref Range Status   01/27/2023 4.3 3.5 - 5.1 MMOL/L Final     Magnesium   Date Value Ref Range Status   07/27/2022 1.8 1.8 - 2.4 mg/dL Final     CO2   Date Value Ref Range Status   01/27/2023 24 21 - 32 mmol/L Final     Chloride   Date Value Ref Range Status   01/27/2023 104 98 - 107 mmol/L Final     Anesthesia Plan    ASA 4   NPO status reviewed: > 8 hours    MAC                          Anesthetic plan and risks discussed with patient.      Plan discussed with CRNA.

## 2023-03-03 NOTE — H&P
GASTROENTEROLOGY OUTPATIENT HISTORY AND PHYSICAL    Today's Date  03/03/23    Subjective     HPI  Del Mckeon is a 62 y.o. male who has a history of colon adenomas who is here for colonoscopy for adenoma surveillance.    History  Past Medical History:   Diagnosis Date    Anemia     Asthma     no attacks as long as away from hay fever allergens in [place]    Cardiovascular disease     known bicuspid aortic valve, mild AVS, murmur    Depressive disorder     Endocrine disorder     Heart murmur     Hyperlipidemia     Kidney disease 2021, 2011    calculi    Obesity     Peripheral neuropathy     L arm     Stroke (CMS/HCC) ([facility]) 05/21/2020    ischemic, L sided weakness; L leg weakness remains     Past Surgical History:   Procedure Laterality Date    BACK SURGERY  2018    lumbar discectomy    CRYOTHERAPY      dermatology    CYSTOSCOPY URETEROSCOPY W/ LASER LITHOTRIPSY Left 8/10/2021    Procedure: CYSTOSCOPY LEFT URETEROSCOPY WITH LASER LITHOTRIPSY WITH STENT PLACEMENT;  Surgeon: Kaylen Leiva MD;  Location: [facility];  Service: Urology;  Laterality: Left;    EYE SURGERY Bilateral 08/2020    cataracts       Objective     Physical Exam  There were no vitals taken for this visit.  Physical Exam  General: NAD  Heart: RR  Lungs: CTAB  Abdomen: NABS, Soft, NT/ND  Coagulopathy   No       Assessment/Impression  Colon adenoma surveillance    ASA  3    Plan   Colonsocopy    Camden Ledezma MD

## 2023-04-27 ENCOUNTER — LAB (OUTPATIENT)
Dept: LAB | Facility: CLINIC | Age: 63
End: 2023-04-27
Payer: COMMERCIAL

## 2023-04-27 ENCOUNTER — OFFICE VISIT (OUTPATIENT)
Dept: FAMILY MEDICINE | Facility: CLINIC | Age: 63
End: 2023-04-27
Payer: COMMERCIAL

## 2023-04-27 ENCOUNTER — APPOINTMENT (OUTPATIENT)
Dept: LAB | Facility: CLINIC | Age: 63
End: 2023-04-27
Payer: COMMERCIAL

## 2023-04-27 VITALS
HEIGHT: 70 IN | OXYGEN SATURATION: 97 % | RESPIRATION RATE: 16 BRPM | DIASTOLIC BLOOD PRESSURE: 68 MMHG | BODY MASS INDEX: 26.92 KG/M2 | HEART RATE: 64 BPM | TEMPERATURE: 97.4 F | SYSTOLIC BLOOD PRESSURE: 132 MMHG | WEIGHT: 188 LBS

## 2023-04-27 DIAGNOSIS — N18.31 STAGE 3A CHRONIC KIDNEY DISEASE (CMS/HCC): ICD-10-CM

## 2023-04-27 DIAGNOSIS — E11.29 TYPE 2 DIABETES MELLITUS WITH MICROALBUMINURIA, WITH LONG-TERM CURRENT USE OF INSULIN (CMS/HCC): ICD-10-CM

## 2023-04-27 DIAGNOSIS — F43.21 ADJUSTMENT DISORDER WITH DEPRESSED MOOD: ICD-10-CM

## 2023-04-27 DIAGNOSIS — D64.9 ANEMIA, UNSPECIFIED TYPE: ICD-10-CM

## 2023-04-27 DIAGNOSIS — E55.9 VITAMIN D DEFICIENCY: ICD-10-CM

## 2023-04-27 DIAGNOSIS — I10 BENIGN ESSENTIAL HYPERTENSION: ICD-10-CM

## 2023-04-27 DIAGNOSIS — E78.2 MIXED HYPERLIPIDEMIA: ICD-10-CM

## 2023-04-27 DIAGNOSIS — Z79.4 TYPE 2 DIABETES MELLITUS WITH MICROALBUMINURIA, WITH LONG-TERM CURRENT USE OF INSULIN (CMS/HCC): ICD-10-CM

## 2023-04-27 DIAGNOSIS — R80.9 TYPE 2 DIABETES MELLITUS WITH MICROALBUMINURIA, WITH LONG-TERM CURRENT USE OF INSULIN (CMS/HCC): Primary | ICD-10-CM

## 2023-04-27 DIAGNOSIS — R80.9 TYPE 2 DIABETES MELLITUS WITH MICROALBUMINURIA, WITH LONG-TERM CURRENT USE OF INSULIN (CMS/HCC): ICD-10-CM

## 2023-04-27 DIAGNOSIS — E11.29 TYPE 2 DIABETES MELLITUS WITH MICROALBUMINURIA, WITH LONG-TERM CURRENT USE OF INSULIN (CMS/HCC): Primary | ICD-10-CM

## 2023-04-27 DIAGNOSIS — E87.6 HYPOKALEMIA: ICD-10-CM

## 2023-04-27 DIAGNOSIS — E78.2 MIXED HYPERLIPIDEMIA: Primary | ICD-10-CM

## 2023-04-27 DIAGNOSIS — Z79.4 TYPE 2 DIABETES MELLITUS WITH MICROALBUMINURIA, WITH LONG-TERM CURRENT USE OF INSULIN (CMS/HCC): Primary | ICD-10-CM

## 2023-04-27 DIAGNOSIS — E11.9 TYPE 2 DIABETES MELLITUS WITHOUT COMPLICATION, WITH LONG-TERM CURRENT USE OF INSULIN (CMS/HCC): ICD-10-CM

## 2023-04-27 DIAGNOSIS — I63.9 ISCHEMIC CEREBROVASCULAR ACCIDENT (CVA) (CMS/HCC): ICD-10-CM

## 2023-04-27 DIAGNOSIS — Z79.4 TYPE 2 DIABETES MELLITUS WITHOUT COMPLICATION, WITH LONG-TERM CURRENT USE OF INSULIN (CMS/HCC): ICD-10-CM

## 2023-04-27 PROBLEM — R80.1 PERSISTENT PROTEINURIA: Status: RESOLVED | Noted: 2022-12-30 | Resolved: 2023-04-27

## 2023-04-27 PROBLEM — R10.31 RIGHT LOWER QUADRANT ABDOMINAL PAIN: Status: RESOLVED | Noted: 2021-04-29 | Resolved: 2023-04-27

## 2023-04-27 LAB
ALBUMIN SERPL-MCNC: 4 G/DL (ref 3.5–5.3)
ALP SERPL-CCNC: 73 U/L (ref 45–115)
ALT SERPL-CCNC: 7 U/L (ref 7–52)
ANION GAP SERPL CALC-SCNC: 9 MMOL/L (ref 3–11)
AST SERPL-CCNC: 12 U/L
BASOPHILS # BLD AUTO: 0.1 10*3/UL
BASOPHILS NFR BLD AUTO: 1 % (ref 0–2)
BILIRUB SERPL-MCNC: 0.44 MG/DL (ref 0.2–1.4)
BUN SERPL-MCNC: 47 MG/DL (ref 7–25)
CALCIUM ALBUM COR SERPL-MCNC: 10.2 MG/DL (ref 8.6–10.3)
CALCIUM SERPL-MCNC: 10.2 MG/DL (ref 8.6–10.3)
CHLORIDE SERPL-SCNC: 106 MMOL/L (ref 98–107)
CHOLEST SERPL-MCNC: 256 MG/DL (ref 0–199)
CO2 SERPL-SCNC: 24 MMOL/L (ref 21–32)
CREAT SERPL-MCNC: 1.56 MG/DL (ref 0.7–1.3)
CREAT UR-MCNC: 110.5 MG/DL
EOSINOPHIL # BLD AUTO: 0.1 10*3/UL
EOSINOPHIL NFR BLD AUTO: 2 % (ref 0–3)
ERYTHROCYTE [DISTWIDTH] IN BLOOD BY AUTOMATED COUNT: 15.1 % (ref 11.5–15)
EST. AVERAGE GLUCOSE BLD GHB EST-MCNC: 102.5 MG/DL
FASTING STATUS PATIENT QL REPORTED: YES
GFR SERPL CREATININE-BSD FRML MDRD: 50 ML/MIN/1.73M*2
GLUCOSE SERPL-MCNC: 117 MG/DL (ref 70–105)
HBA1C MFR BLD: 5.2 % (ref 4–6)
HCT VFR BLD AUTO: 33.2 % (ref 38–50)
HDLC SERPL-MCNC: 28 MG/DL
HGB BLD-MCNC: 11.2 G/DL (ref 13.2–17.2)
LDLC SERPL CALC-MCNC: 194 MG/DL (ref 20–99)
LYMPHOCYTES # BLD AUTO: 1.8 10*3/UL
LYMPHOCYTES NFR BLD AUTO: 32.7 % (ref 15–47)
MCH RBC QN AUTO: 31.7 PG (ref 29–34)
MCHC RBC AUTO-ENTMCNC: 33.7 G/DL (ref 32–36)
MCV RBC AUTO: 94.2 FL (ref 82–97)
MICROALBUMIN UR-MCNC: 609.2 MG/L (ref 0–30)
MICROALBUMIN/CREAT UR: 551.3 MG/GCREAT
MONOCYTES # BLD AUTO: 0.4 10*3/UL
MONOCYTES NFR BLD AUTO: 6.7 % (ref 5–13)
NEUTROPHILS # BLD AUTO: 3.1 10*3/UL
NEUTROPHILS NFR BLD AUTO: 57.6 % (ref 46–70)
PLATELET # BLD AUTO: 218 10*3/UL (ref 130–350)
PMV BLD AUTO: 7.9 FL (ref 6.9–10.8)
POTASSIUM SERPL-SCNC: 4.1 MMOL/L (ref 3.5–5.1)
PROT SERPL-MCNC: 7.4 G/DL (ref 6–8.3)
RBC # BLD AUTO: 3.52 10*6/ΜL (ref 4.1–5.8)
SODIUM SERPL-SCNC: 139 MMOL/L (ref 135–145)
TRIGL SERPL-MCNC: 171 MG/DL
WBC # BLD AUTO: 5.4 10*3/UL (ref 3.7–9.6)

## 2023-04-27 PROCEDURE — 80053 COMPREHEN METABOLIC PANEL: CPT | Performed by: FAMILY MEDICINE

## 2023-04-27 PROCEDURE — 83525 ASSAY OF INSULIN: CPT | Performed by: FAMILY MEDICINE

## 2023-04-27 PROCEDURE — 82043 UR ALBUMIN QUANTITATIVE: CPT | Performed by: FAMILY MEDICINE

## 2023-04-27 PROCEDURE — 36415 COLL VENOUS BLD VENIPUNCTURE: CPT | Performed by: FAMILY MEDICINE

## 2023-04-27 PROCEDURE — 83527 ASSAY OF INSULIN: CPT | Performed by: FAMILY MEDICINE

## 2023-04-27 PROCEDURE — 82570 ASSAY OF URINE CREATININE: CPT | Performed by: FAMILY MEDICINE

## 2023-04-27 PROCEDURE — 99214 OFFICE O/P EST MOD 30 MIN: CPT | Performed by: FAMILY MEDICINE

## 2023-04-27 PROCEDURE — 85025 COMPLETE CBC W/AUTO DIFF WBC: CPT | Performed by: FAMILY MEDICINE

## 2023-04-27 PROCEDURE — 80061 LIPID PANEL: CPT | Performed by: FAMILY MEDICINE

## 2023-04-27 PROCEDURE — 83036 HEMOGLOBIN GLYCOSYLATED A1C: CPT | Performed by: FAMILY MEDICINE

## 2023-04-27 RX ORDER — HYDROCHLOROTHIAZIDE 12.5 MG/1
12.5 TABLET ORAL DAILY
Qty: 90 TABLET | Refills: 3 | Status: SHIPPED | OUTPATIENT
Start: 2023-04-27 | End: 2024-04-26

## 2023-04-27 RX ORDER — CLOPIDOGREL BISULFATE 75 MG/1
75 TABLET ORAL DAILY
Qty: 90 TABLET | Refills: 3 | Status: SHIPPED | OUTPATIENT
Start: 2023-04-27

## 2023-04-27 RX ORDER — POTASSIUM CHLORIDE 750 MG/1
10 TABLET, EXTENDED RELEASE ORAL 2 TIMES DAILY
Qty: 180 TABLET | Refills: 3 | Status: SHIPPED | OUTPATIENT
Start: 2023-04-27 | End: 2024-04-26

## 2023-04-27 RX ORDER — ROSUVASTATIN CALCIUM 20 MG/1
20 TABLET, COATED ORAL DAILY
Qty: 90 TABLET | Refills: 3 | Status: SHIPPED | OUTPATIENT
Start: 2023-04-27 | End: 2023-04-27 | Stop reason: SDUPTHER

## 2023-04-27 RX ORDER — METFORMIN HYDROCHLORIDE 500 MG/1
1000 TABLET ORAL 2 TIMES DAILY WITH MEALS
Qty: 360 TABLET | Refills: 3 | Status: SHIPPED | OUTPATIENT
Start: 2023-04-27 | End: 2023-07-24 | Stop reason: ALTCHOICE

## 2023-04-27 RX ORDER — LOSARTAN POTASSIUM 50 MG/1
50 TABLET ORAL 2 TIMES DAILY
Qty: 180 TABLET | Refills: 3 | Status: SHIPPED | OUTPATIENT
Start: 2023-04-27 | End: 2024-04-26

## 2023-04-27 RX ORDER — ROSUVASTATIN CALCIUM 40 MG/1
40 TABLET, COATED ORAL DAILY
Qty: 90 TABLET | Refills: 3 | Status: SHIPPED | OUTPATIENT
Start: 2023-04-27 | End: 2023-07-24 | Stop reason: ALTCHOICE

## 2023-04-27 RX ORDER — SERTRALINE HYDROCHLORIDE 50 MG/1
50 TABLET, FILM COATED ORAL DAILY
Qty: 90 TABLET | Refills: 3 | Status: SHIPPED | OUTPATIENT
Start: 2023-04-27

## 2023-04-27 NOTE — ASSESSMENT & PLAN NOTE
Updating lipid panel today.  I discussed with him that his goal should be focused on his LDL and getting this less than 70.  We can do this both with diet and decreasing dietary cholesterol intake as well as adjusting his rosuvastatin dosing if needed.  He is currently on rosuvastatin 20 mg daily and tolerating it well so we will continue this.

## 2023-04-27 NOTE — PROGRESS NOTES
Outpatient Progress Note    SUBJECTIVE:   ID/CC: Del Mckeon is a 62 y.o. male presenting to clinic today for   Chief Complaint   Patient presents with    Follow-up     No concerns.       HPI:   Del is a 62-year-old male with a past medical history of a cerebrovascular disease, hyperlipidemia, hypertension, type 2 diabetes, iron deficiency, vitamin D deficiency and obstructive sleep apnea who is here today to follow-up on his chronic medical conditions.    Regarding his blood pressures, he was having some higher blood pressures in January and saw a partner of mine who increased his losartan to 50 mg 2 times a day as he could not tolerate 100 mg at once.  Since then, Del states his blood pressures have come back down into the 130s over 60s.  He is tolerating the losartan well and he is also on hydrochlorothiazide 12.5 mg daily.  He is not having any lightheadedness or dizziness.      He also has hyperlipidemia and takes rosuvastatin 20 mg daily.  He does tend to eat a higher cholesterol diet because he tries to limit his carbohydrate intake.  We did try him off of his statin for some time per his request but his LDLs went high too quickly.  He tolerates his rosuvastatin well and has not had any issues with this.  He is interested in a couple of cholesterol ratios and is fasting today and would like to repeat his lipid panel.    Regarding his diabetes, he is not checking his blood sugars as as long as he stays on low carbohydrate diet he tends to have normal blood sugar levels.  He is currently on metformin 1000 mg 2 times a day.  This does need to be renewed.  He is also due for an updated A1c.    He does have chronic kidney disease related to his diabetes and hypertension.  He is due for some updated kidney function as well.  He does have proteinuria.    He does have anemia that was related to iron deficiency.  He is still taking slow release iron 45 mg elemental 2 times daily.  I am updating hemoglobin  today.    ROS:   12 point ROS reviewed and negative except as in HPI.       Past Medical History:   Diagnosis Date    Anemia     Asthma     no attacks as long as away from hay fever allergens in [place]    Cardiovascular disease     known bicuspid aortic valve, mild AVS, murmur    Depressive disorder     Endocrine disorder     Heart murmur     Hyperlipidemia     Kidney disease 2021, 2011    calculi    Obesity     Peripheral neuropathy     L arm     Stroke (CMS/Former[facility]) 05/21/2020    ischemic, L sided weakness; L leg weakness remains       Past Surgical History:   Procedure Laterality Date    BACK SURGERY  2018    lumbar discectomy    COLONOSCOPY N/A 3/3/2023    Procedure: COLONOSCOPY with polypectomies;  Surgeon: Camden Ledezma MD;  Location: [facility];  Service: Endoscopy;  Laterality: N/A;    CRYOTHERAPY      dermatology    CYSTOSCOPY URETEROSCOPY W/ LASER LITHOTRIPSY Left 8/10/2021    Procedure: CYSTOSCOPY LEFT URETEROSCOPY WITH LASER LITHOTRIPSY WITH STENT PLACEMENT;  Surgeon: Kaylen Leiva MD;  Location: [facility] OR;  Service: Urology;  Laterality: Left;    EYE SURGERY Bilateral 08/2020    cataracts         Current Outpatient Medications:     Allergy Relief, loratadine, 10 mg tablet, Take 1 tablet by mouth once daily, Disp: 30 tablet, Rfl: 2    magnesium oxide 250 mg magnesium tablet, every 12 hours, Disp: , Rfl:     fexofenadine (ALLEGRA) 180 mg tablet, daily, Disp: , Rfl:     ferrous sulfate ER (Slow Release Iron) 140 mg (45 mg iron) tablet, Take 1 tablet (140 mg total) by mouth 2 (two) times a day with meals, Disp: 60 tablet, Rfl: 11    cholecalciferol, vitamin D3, (Vitamin D3) 25 mcg (1,000 unit) capsule, Take 1 capsule (1,000 Units total) by mouth daily, Disp: 30 capsule, Rfl: 3    melatonin 5 mg capsule, Take 5 mg by mouth nightly as needed (insomnia) , Disp: , Rfl:     metFORMIN (GLUCOPHAGE) 500 mg tablet, Take 2 tablets (1,000 mg total) by mouth 2 (two) times a day with meals, Disp: 360 tablet, Rfl: 3     hydroCHLOROthiazide (HYDRODIURIL) 12.5 mg tablet, Take 1 tablet (12.5 mg total) by mouth daily, Disp: 90 tablet, Rfl: 3    losartan (COZAAR) 50 mg tablet, Take 1 tablet (50 mg total) by mouth 2 (two) times a day, Disp: 180 tablet, Rfl: 3    potassium chloride 10 mEq CR tablet, Take 1 tablet (10 mEq total) by mouth 2 (two) times a day, Disp: 180 tablet, Rfl: 3    clopidogreL (PLAVIX) 75 mg tablet, Take 1 tablet (75 mg total) by mouth daily, Disp: 90 tablet, Rfl: 3    rosuvastatin (CRESTOR) 20 mg tablet, Take 1 tablet (20 mg total) by mouth daily, Disp: 90 tablet, Rfl: 3    sertraline (ZOLOFT) 50 mg tablet, Take 1 tablet (50 mg total) by mouth daily, Disp: 90 tablet, Rfl: 3    alfuzosin (UROXATRAL) 10 mg 24 hr tablet, Take 1 tablet (10 mg total) by mouth daily (Patient not taking: Reported on 4/27/2023), Disp: 90 tablet, Rfl: 3    Allergies   Allergen Reactions    Penicillins      Tested as a child; showed positive       Social History     Socioeconomic History    Marital status:      Spouse name: Not on file    Number of children: Not on file    Years of education: Not on file    Highest education level: Not on file   Occupational History    Not on file   Tobacco Use    Smoking status: Never    Smokeless tobacco: Never   Vaping Use    Vaping Use: Never used   Substance and Sexual Activity    Alcohol use: Not Currently    Drug use: Never    Sexual activity: Not Currently   Other Topics Concern    Not on file   Social History Narrative    Not on file     Social Determinants of Health     Financial Resource Strain: Not on file   Food Insecurity: Not on file   Transportation Needs: Not on file   Physical Activity: Not on file   Stress: Not on file   Social Connections: Not on file   Intimate Partner Violence: Not on file   Housing Stability: Not on file       Family History   Problem Relation Age of Onset    Alzheimer's disease Father     Diabetes type II Father     Diabetes Sister        OBJECTIVE:   Vitals:    Vitals:    04/27/23 1110   BP: 132/68   Pulse: 64   Resp: 16   Temp: 36.3 °C (97.4 °F)   SpO2: 97%     Weights (Current Encounter Only) (last 180 days)       Date/Time Weight    04/27/23 1110 85.3 kg (188 lb)            Physical Exam:   General: Alert and oriented. In no acute distress.   Head:normocephalic and atraumatic. No tenderness palpated  Eyes:Pupils equal equal and reactive, no corneal or scleral injection.  ENT: TMs visible without bulging or erythema. No nasal lesions. No oral lesions.   Cardio: S1 S2 without extra sounds or murmurs.  No edema. PP intact. Cap refill <2s.  No JVD  Pulm: CTAB without wheezing or crackles. Symmetrical air exchange.   Gi: BS present x4. Soft, nontender and non distended.  No masses palpated.  Ext: Full range of motion of all extremities.  No joint tenderness or erythema or swelling noted.  Neuro: Motor and sensation intact bilaterally. No focal lesions.  Cranial nerves II through XII are intact and symmetrical.  Deep tendon reflexes 2 throughout.  Skin: No rashes or lesions noted.  Psych: Denies SI/HI/AH/VH      Labs/Imaging/Micro:  Updating labs today     ASSESSMENT AND PLAN:   Del Mckeon is a 62 y.o. male presenting to clinic today for:     Benign essential hypertension  Blood pressure has stabilized.  I am updating his kidney function today.  Continue losartan 50 mg 2 times a day and hydrochlorothiazide 12.5 mg daily.  I am going to follow-up with him in 4 months for his physical and we will follow-up on his blood pressure at that time as well with repeat labs then.    Mixed hyperlipidemia  Updating lipid panel today.  I discussed with him that his goal should be focused on his LDL and getting this less than 70.  We can do this both with diet and decreasing dietary cholesterol intake as well as adjusting his rosuvastatin dosing if needed.  He is currently on rosuvastatin 20 mg daily and tolerating it well so we will continue this.    Type 2 diabetes mellitus with  microalbuminuria, with long-term current use of insulin (CMS/[facility])  Updating A1c today.  Will continue metformin at 1000 mg 2 times daily.  He is interested in checking his insulin levels and so he we did order a fasting insulin level today.  I did let him know this would take about a week to come back.    He is on rosuvastatin 20 mg daily (high intensity).  He is also on ARB therapy    He does follow with his eye doctor annually.    He does have chronic kidney disease related to this and we are updating his microalbumin creatinine ratio.    Hypokalemia  I am updating his electrolyte and kidney function today.  For now we will continue potassium 10 mEq 2 times daily which he tolerates well.    Stage 3a chronic kidney disease (CMS/[facility])  He does have chronic kidney disease stage III secondary likely to his diabetes and hypertension that have been longstanding.  I am updating his kidney function today.  We discussed the importance of maintaining adequate hydration.  He does have significant proteinuria.  Should his kidney function remain low, it would be beneficial for him to establish with nephrology.    Anemia  This is secondary to iron deficiency.  I am updating his hemoglobin level today.  We will check this again in 4 months as well.  At that time we will check an iron panel.  I want him to continue his the slow release iron 140 mg 2 times daily which he has been better at taking daily.  There could also be a component of anemia related to chronic disease as well.     Diagnosis Plan   1. Mixed hyperlipidemia  Lipid panel Blood, Venous    rosuvastatin (CRESTOR) 20 mg tablet    Lipid panel Blood, Venous      2. Benign essential hypertension  hydroCHLOROthiazide (HYDRODIURIL) 12.5 mg tablet    losartan (COZAAR) 50 mg tablet      3. Vitamin D deficiency  25-Hydroxyvitamin D2 and D3, serum Blood, Venous      4. Type 2 diabetes mellitus with microalbuminuria, with long-term current use of insulin (CMS/[facility])  CBC w/auto  differential Blood, Venous    Comprehensive metabolic panel Blood, Venous    Hemoglobin A1c (glycosylated) Blood, Venous    Urine Albumin/Creatinine Ratio Urine, Clean Catch    Insulin, free and total Blood, Venous    Comprehensive metabolic panel Blood, Venous    Hemoglobin A1c (glycosylated) Blood, Venous    Urine Albumin/Creatinine Ratio Urine, Clean Catch      5. Type 2 diabetes mellitus without complication, with long-term current use of insulin (CMS/[facility])  metFORMIN (GLUCOPHAGE) 500 mg tablet      6. Hypokalemia  potassium chloride 10 mEq CR tablet      7. Stage 3a chronic kidney disease (CMS/[facility])  Urine Albumin/Creatinine Ratio Urine, Clean Catch      8. Anemia, unspecified type  CBC w/auto differential Blood, Venous    Iron panel Blood, Venous      9. Ischemic cerebrovascular accident (CVA) (CMS/[facility])  clopidogreL (PLAVIX) 75 mg tablet      10. Adjustment disorder with depressed mood  sertraline (ZOLOFT) 50 mg tablet          Return in about 4 months (around 8/27/2023) for Next physical - 40 mins.    Total time spent with patient was 35 minutes.    Note and case was reviewed with my current medical student. All documentation was reviewed, edited and approved by myself. I attest the information documented in this encounter is correct and up to date.     Klarissa Lester MD  04/27/23  11:49 AM

## 2023-04-27 NOTE — ASSESSMENT & PLAN NOTE
This is secondary to iron deficiency.  I am updating his hemoglobin level today.  We will check this again in 4 months as well.  At that time we will check an iron panel.  I want him to continue his the slow release iron 140 mg 2 times daily which he has been better at taking daily.  There could also be a component of anemia related to chronic disease as well.

## 2023-04-27 NOTE — ASSESSMENT & PLAN NOTE
Blood pressure has stabilized.  I am updating his kidney function today.  Continue losartan 50 mg 2 times a day and hydrochlorothiazide 12.5 mg daily.  I am going to follow-up with him in 4 months for his physical and we will follow-up on his blood pressure at that time as well with repeat labs then.

## 2023-04-27 NOTE — ASSESSMENT & PLAN NOTE
I am updating his electrolyte and kidney function today.  For now we will continue potassium 10 mEq 2 times daily which he tolerates well.

## 2023-04-27 NOTE — ASSESSMENT & PLAN NOTE
He does have chronic kidney disease stage III secondary likely to his diabetes and hypertension that have been longstanding.  I am updating his kidney function today.  We discussed the importance of maintaining adequate hydration.  He does have significant proteinuria.  Should his kidney function remain low, it would be beneficial for him to establish with nephrology.

## 2023-04-27 NOTE — ASSESSMENT & PLAN NOTE
Updating A1c today.  Will continue metformin at 1000 mg 2 times daily.  He is interested in checking his insulin levels and so he we did order a fasting insulin level today.  I did let him know this would take about a week to come back.    He is on rosuvastatin 20 mg daily (high intensity).  He is also on ARB therapy    He does follow with his eye doctor annually.    He does have chronic kidney disease related to this and we are updating his microalbumin creatinine ratio.

## 2023-05-01 LAB — SCAN RESULT: NORMAL

## 2023-05-02 ENCOUNTER — TELEPHONE (OUTPATIENT)
Dept: UROLOGY | Facility: CLINIC | Age: 63
End: 2023-05-02
Payer: COMMERCIAL

## 2023-05-02 NOTE — TELEPHONE ENCOUNTER
Caller would like to discuss (a)  reschedule      Patient: Del L Patton    Callback Number: [phone]    Additional Info: Patient is calling to reschedule the lab and appt on 5/8/23    I advised caller of a callback by 48 hours.

## 2023-05-02 NOTE — TELEPHONE ENCOUNTER
This MA called this patient back in regards to his call to our clinic needing to reschedule his labs and appt, I did reschedule to the following week for him.

## 2023-05-05 ENCOUNTER — TELEPHONE (OUTPATIENT)
Dept: FAMILY MEDICINE | Facility: CLINIC | Age: 63
End: 2023-05-05
Payer: COMMERCIAL

## 2023-05-05 NOTE — TELEPHONE ENCOUNTER
Caller would like to discuss (a)  results  Writer has advised caller of a callback from within 24 hours.    Patient: Del L Patton    Caller Name (Last and first, relation/role): Self    Name of Facility: N/A    Callback Number: [phone]    Best Availability: Anytime    Fax Number: N/A    Additional Info: Pt is requesting that the lab results from his draw on 4/27 could be mailed to him    Did you confirm the message with the caller: Yes    Is it okay that the nurse communicates your response through [employer]? No

## 2023-05-16 ENCOUNTER — OFFICE VISIT (OUTPATIENT)
Dept: UROLOGY | Facility: CLINIC | Age: 63
End: 2023-05-16
Payer: COMMERCIAL

## 2023-05-16 ENCOUNTER — LAB (OUTPATIENT)
Dept: LAB | Facility: CLINIC | Age: 63
End: 2023-05-16
Payer: COMMERCIAL

## 2023-05-16 VITALS
TEMPERATURE: 97.5 F | BODY MASS INDEX: 26.92 KG/M2 | SYSTOLIC BLOOD PRESSURE: 110 MMHG | OXYGEN SATURATION: 98 % | HEIGHT: 70 IN | WEIGHT: 188 LBS | HEART RATE: 73 BPM | DIASTOLIC BLOOD PRESSURE: 70 MMHG

## 2023-05-16 DIAGNOSIS — R35.1 NOCTURIA: ICD-10-CM

## 2023-05-16 DIAGNOSIS — N13.2 HYDRONEPHROSIS WITH URINARY OBSTRUCTION DUE TO URETERAL CALCULUS: ICD-10-CM

## 2023-05-16 DIAGNOSIS — R53.83 OTHER FATIGUE: Primary | ICD-10-CM

## 2023-05-16 DIAGNOSIS — R53.83 OTHER FATIGUE: ICD-10-CM

## 2023-05-16 LAB
BACTERIA #/AREA URNS HPF: ABNORMAL /HPF
BILIRUB UR QL: NEGATIVE
CLARITY UR: CLEAR
COLOR UR: YELLOW
GLUCOSE UR QL: NEGATIVE MG/DL
GRAN CASTS #/AREA URNS HPF: ABNORMAL /LPF
HGB UR QL: ABNORMAL
HYALINE CASTS #/AREA URNS AUTO: ABNORMAL /LPF
KETONES UR-MCNC: NEGATIVE MG/DL
LEUKOCYTE ESTERASE UR QL STRIP: NEGATIVE
NITRITE UR QL: NEGATIVE
PH UR: 5.5 PH
PROT UR STRIP-MCNC: 100 MG/DL
PSA SERPL-MCNC: 0.08 NG/ML (ref 0–4)
RBC #/AREA URNS HPF: ABNORMAL /HPF
SP GR UR: >=1.03 (ref 1–1.03)
SPECIMEN VOL ?TM UR: 4 ML
SQUAMOUS #/AREA URNS HPF: ABNORMAL /HPF
TSH SERPL DL<=0.05 MIU/L-ACNC: 1.05 UIU/ML (ref 0.34–4.82)
UROBILINOGEN UR-MCNC: 1 MG/DL
WBC #/AREA URNS HPF: ABNORMAL /HPF

## 2023-05-16 PROCEDURE — 87088 URINE BACTERIA CULTURE: CPT | Performed by: UROLOGY

## 2023-05-16 PROCEDURE — 36415 COLL VENOUS BLD VENIPUNCTURE: CPT | Performed by: UROLOGY

## 2023-05-16 PROCEDURE — 84153 ASSAY OF PSA TOTAL: CPT | Performed by: UROLOGY

## 2023-05-16 PROCEDURE — 99214 OFFICE O/P EST MOD 30 MIN: CPT | Mod: 25 | Performed by: UROLOGY

## 2023-05-16 PROCEDURE — 51798 US URINE CAPACITY MEASURE: CPT | Performed by: UROLOGY

## 2023-05-16 PROCEDURE — 81001 URINALYSIS AUTO W/SCOPE: CPT | Performed by: UROLOGY

## 2023-05-16 PROCEDURE — 84443 ASSAY THYROID STIM HORMONE: CPT | Performed by: UROLOGY

## 2023-05-16 ASSESSMENT — PAIN SCALES - GENERAL: PAINLEVEL: 0-NO PAIN

## 2023-05-16 NOTE — PROGRESS NOTES
Subjective     Patient ID: Del Mckeon is a 62 y.o. male.    HPI    The patient is a 62-year-old male previously evaluated for renal stones and lower urinary tract symptoms here for follow-up on the above.  On 6/16/2001 he noted emesis and diarrhea.  Work-up included CT with 2 mm and 4 mm mid left ureteral stones with moderate hydronephrosis.  KUB 7/2/2021 without definite stones.    CT 7/28/2021 with 2 distal left ureteral stones with hydronephrosis.  On 8/10/2021 he underwent left ureteroscopy with laser of stones and left ureteral stent placement.  Postoperatively he required Hdz catheter placement by Dr. Patel due to elevated bladder neck.  He was able to remove the stent in the postoperative period.  He underwent successful voiding trial.  Renal ultrasound 9/10/2021 with normal bladder and kidneys without hydronephrosis.  He currently denies flank pain, gross hematuria, nausea or vomiting.    He continued on Flomax with nocturia x3 without gross hematuria, dysuria or incomplete bladder emptying.  On 11/18/2021 he was changed from Flomax to Uroxatrol 10 mg p.o. daily with nocturia x0-1, daytime frequency every 3-4 hours, adequate stream without gross hematuria or dysuria.  He states he was able to self discontinue Uroxatrol with nocturia x1, daytime frequency every 3 hours, adequate stream without gross hematuria or dysuria.  He reports fatigue with walking up 1 flight of stairs.  He denies chest pain, shortness of breath.  The patient states their symptoms are constant, not bothersome, they are unable to identify other aggravating, alleviating or associated factors.       PMH, medications, allergies, social history, family history reviewed per chart     Review of Systems    Objective     Physical Exam    Vitals:    05/16/23 1310   BP: 110/70   Pulse: 73   Temp: 36.4 °C (97.5 °F)   SpO2: 98%     Rad  -Ultrasound PVR 5/6/2022 2 hours post void: 164 mL  -Ultrasound PVR 11/18/2021: 2 mL     -Renal  ultrasound 9/10/2021:  1.  Negative appearance of the kidneys.  2.  Negative appearance of the bladder     -CT stone 7/28/2021:  Interval mild distal migration of 2 left distal ureteral stones, the largest of which measures up to 3 mm. There is upstream left hydroureter and moderate to severe left hydronephrosis. Tiny nonobstructive right inferior pole nephrolithiasis. No right-sided hydronephrosis.     Small distal pancreatic hypodensity which measures up to 10 mm, indeterminate. No change since 5/20/2019 CT, however MRI with IV contrast of the abdomen could better evaluate.     Assessment/Plan     Diagnoses and all orders for this visit:    Other fatigue  -     Thyroid Stimulating Hormone, Ultrasensitive Blood, Venous; Future    Hydronephrosis with urinary obstruction due to ureteral calculus  -     POCT Bladder Scan    Nocturia  -     PSA diagnostic Blood, Venous; Future    1.  Left ureteral stones resolved status post ureteroscopy with normal renal ultrasound 9/10/2021  2.  Lower urinary tract symptoms with progression on Flomax, improved on Uroxatrol now stable off Uroxatrol  3.  No evidence of incomplete bladder emptying, stable  4.  Small distal pancreatic hypodensity, uncertain prognosis  5.  Normal PSA  6.  Fatigue with walking up 1 flight of stairs  7.  New bacteriuria, uncertain prognosis  - Okay to remain off Uroxatrol  - PSA today  - TSH to evaluate fatigue  -Urine culture sent to evaluate bacteriuria with possible UTI  -Dietary recommendations for stone prevention reviewed  - Follow-up 1 year with UA dip, UA micro, PSA, STEVE, ultrasound PVR  - Patient in agreement the above plan, questions answered    40 minutes spent on today's encounter

## 2023-05-16 NOTE — PATIENT INSTRUCTIONS
It was a pleasure seeing you again today  We will contact you by [facility] with your thyroid and prostate blood levels  You are doing well off your Uroxatrol and you do not need to restart this medication  We will see you back in 1 year but if you need anything before then please do not hesitate to contact us  Happy early birthday!

## 2023-05-17 NOTE — RESULT ENCOUNTER NOTE
Good news: Your thyroid test is normal  We will forward to Dr. Lester for review also  Please call with any questions

## 2023-05-18 LAB — BACTERIA UR CULT: NORMAL

## 2023-07-21 DIAGNOSIS — R94.4 RENAL FUNCTION TEST ABNORMAL: Primary | ICD-10-CM

## 2023-07-24 ENCOUNTER — LAB (OUTPATIENT)
Dept: LAB | Facility: CLINIC | Age: 63
End: 2023-07-24
Payer: COMMERCIAL

## 2023-07-24 ENCOUNTER — OFFICE VISIT (OUTPATIENT)
Dept: NEPHROLOGY | Facility: CLINIC | Age: 63
End: 2023-07-24
Payer: COMMERCIAL

## 2023-07-24 VITALS
HEIGHT: 71 IN | BODY MASS INDEX: 26.54 KG/M2 | SYSTOLIC BLOOD PRESSURE: 98 MMHG | DIASTOLIC BLOOD PRESSURE: 72 MMHG | OXYGEN SATURATION: 98 % | WEIGHT: 189.6 LBS | HEART RATE: 70 BPM

## 2023-07-24 DIAGNOSIS — E83.42 HYPOMAGNESEMIA: ICD-10-CM

## 2023-07-24 DIAGNOSIS — N18.31 STAGE 3A CHRONIC KIDNEY DISEASE (CMS/HCC): Primary | ICD-10-CM

## 2023-07-24 DIAGNOSIS — R80.1 PERSISTENT PROTEINURIA: ICD-10-CM

## 2023-07-24 DIAGNOSIS — E20.89 OTHER HYPOPARATHYROIDISM (CMS/HCC): ICD-10-CM

## 2023-07-24 DIAGNOSIS — R94.4 RENAL FUNCTION TEST ABNORMAL: ICD-10-CM

## 2023-07-24 LAB
25(OH)D3 SERPL-MCNC: 48 NG/ML (ref 30–100)
ALBUMIN SERPL-MCNC: 4.4 G/DL (ref 3.5–5.3)
ANION GAP SERPL CALC-SCNC: 9 MMOL/L (ref 3–11)
BACTERIA #/AREA URNS HPF: ABNORMAL /HPF
BILIRUB UR QL: NEGATIVE
BUN SERPL-MCNC: 45 MG/DL (ref 7–25)
CALCIUM ALBUM COR SERPL-MCNC: 9.8 MG/DL (ref 8.6–10.3)
CALCIUM SERPL-MCNC: 10.1 MG/DL (ref 8.6–10.3)
CHLORIDE SERPL-SCNC: 106 MMOL/L (ref 98–107)
CLARITY UR: CLEAR
CO2 SERPL-SCNC: 24 MMOL/L (ref 21–32)
COLOR UR: YELLOW
CREAT SERPL-MCNC: 1.21 MG/DL (ref 0.7–1.3)
CREAT UR-MCNC: 110.1 MG/DL
EGFRCR SERPLBLD CKD-EPI 2021: 67 ML/MIN/1.73M*2
GLUCOSE SERPL-MCNC: 126 MG/DL (ref 70–105)
GLUCOSE UR QL: NEGATIVE MG/DL
HGB BLD-MCNC: 12.7 G/DL (ref 13.2–17.2)
HGB UR QL: ABNORMAL
KETONES UR-MCNC: NEGATIVE MG/DL
LEUKOCYTE ESTERASE UR QL STRIP: NEGATIVE
MAGNESIUM SERPL-MCNC: 1.5 MG/DL (ref 1.8–2.4)
NITRITE UR QL: NEGATIVE
PH UR: 5.5 PH
PHOSPHATE SERPL-MCNC: 3.9 MG/DL (ref 2.5–4.9)
POTASSIUM SERPL-SCNC: 3.9 MMOL/L (ref 3.5–5.1)
PROT 24H UR-MCNC: 76 MG/DL (ref 0–21.4)
PROT UR STRIP-MCNC: 100 MG/DL
PTH-INTACT SERPL-MCNC: 4.5 PG/ML (ref 9–59)
RBC #/AREA URNS HPF: ABNORMAL /HPF
SODIUM SERPL-SCNC: 139 MMOL/L (ref 135–145)
SP GR UR: 1.02 (ref 1–1.03)
SQUAMOUS #/AREA URNS HPF: ABNORMAL /HPF
URATE SERPL-MCNC: 5.3 MG/DL (ref 4.4–7.6)
UROBILINOGEN UR-MCNC: 0.2 MG/DL
WBC #/AREA URNS HPF: ABNORMAL /HPF

## 2023-07-24 PROCEDURE — 85018 HEMOGLOBIN: CPT | Performed by: NURSE PRACTITIONER

## 2023-07-24 PROCEDURE — 36415 COLL VENOUS BLD VENIPUNCTURE: CPT | Performed by: NURSE PRACTITIONER

## 2023-07-24 PROCEDURE — 84550 ASSAY OF BLOOD/URIC ACID: CPT | Performed by: NURSE PRACTITIONER

## 2023-07-24 PROCEDURE — G2212 PROLONG OUTPT/OFFICE VIS: HCPCS | Performed by: NURSE PRACTITIONER

## 2023-07-24 PROCEDURE — 81001 URINALYSIS AUTO W/SCOPE: CPT | Performed by: NURSE PRACTITIONER

## 2023-07-24 PROCEDURE — 82570 ASSAY OF URINE CREATININE: CPT | Performed by: NURSE PRACTITIONER

## 2023-07-24 PROCEDURE — 82306 VITAMIN D 25 HYDROXY: CPT | Performed by: NURSE PRACTITIONER

## 2023-07-24 PROCEDURE — 83970 ASSAY OF PARATHORMONE: CPT | Performed by: NURSE PRACTITIONER

## 2023-07-24 PROCEDURE — 99215 OFFICE O/P EST HI 40 MIN: CPT | Performed by: NURSE PRACTITIONER

## 2023-07-24 PROCEDURE — 83735 ASSAY OF MAGNESIUM: CPT | Performed by: NURSE PRACTITIONER

## 2023-07-24 PROCEDURE — 84156 ASSAY OF PROTEIN URINE: CPT | Performed by: NURSE PRACTITIONER

## 2023-07-24 PROCEDURE — 80069 RENAL FUNCTION PANEL: CPT | Performed by: NURSE PRACTITIONER

## 2023-07-24 RX ORDER — ACETAMINOPHEN 500 MG/1
1 CAPSULE, LIQUID FILLED ORAL EVERY 4 HOURS PRN
COMMUNITY

## 2023-07-24 ASSESSMENT — PAIN SCALES - GENERAL: PAINLEVEL: 0-NO PAIN

## 2023-07-24 ASSESSMENT — ENCOUNTER SYMPTOMS
GASTROINTESTINAL NEGATIVE: 1
PSYCHIATRIC NEGATIVE: 1
ENDOCRINE NEGATIVE: 1
CARDIOVASCULAR NEGATIVE: 1
LIGHT-HEADEDNESS: 1
RESPIRATORY NEGATIVE: 1
FATIGUE: 1
FREQUENCY: 1
HEMATOLOGIC/LYMPHATIC NEGATIVE: 1
ARTHRALGIAS: 1

## 2023-07-24 NOTE — PROGRESS NOTES
Nephrology Consult Note    REASON FOR VISIT:   Chief Complaint   Patient presents with    New Patient    Chronic Kidney Disease    Blood Pressure Check    Diabetes Mellitus     PRIMARY CARE/ REFERRING: Klarissa Lester MD, Klarissa Lester MD    Subjective    HISTORY OF PRESENT ILLNESS  Del Mckeon is a 63 y.o. male seen in consultation at the request of Klarissa Lester MD with past medical history of HTN, T2DM, Hyperlipidemia, Kidney stones, Stage 3a CKD, CVA, NIK, CAD, Vitamin D deficiency.    He tells me that since his last lab work, he has stopped his Metformin completely in April due to his HA1C being 5.2. He has also been following a low carb diet since his last bout with kidney stones about 2 years ago.  He  follows a low oxalate diet because he reports kidney stones were analyzed in the past and are Calcium oxalate. He has had no recurrence of symptomatic kidney stones since following this diet.    He admits to eating a diet high in sugar in the distant past and using a lot of ibuprofen.  He has not used any ibuprofen now for about 5-6 years and no longer eats sugar.    He denies any history of alcohol or street drug use.  He states he gets an occasional bout of dizziness and last time this happened was in May.  He also states he was slightly lightheaded this morning, but this resolved very quickly.  He has had no recent falls, however, since his stroke, his gait is impaired and slow.  He takes his losartan as directed and keeps them in a pill box, but has occasionally forgotten a dose or taken a double dose.   He tells me he has diabetic retinopathy and can't see to read.     Allergies   Allergen Reactions    Penicillins      Tested as a child; showed positive      Current Outpatient Medications   Medication Sig Dispense Refill    metFORMIN (GLUCOPHAGE) 500 mg tablet Take 2 tablets (1,000 mg total) by mouth 2 (two) times a day with meals 360 tablet 3     hydroCHLOROthiazide (HYDRODIURIL) 12.5 mg tablet Take 1 tablet (12.5 mg total) by mouth daily 90 tablet 3    losartan (COZAAR) 50 mg tablet Take 1 tablet (50 mg total) by mouth 2 (two) times a day 180 tablet 3    potassium chloride 10 mEq CR tablet Take 1 tablet (10 mEq total) by mouth 2 (two) times a day 180 tablet 3    clopidogreL (PLAVIX) 75 mg tablet Take 1 tablet (75 mg total) by mouth daily 90 tablet 3    sertraline (ZOLOFT) 50 mg tablet Take 1 tablet (50 mg total) by mouth daily 90 tablet 3    rosuvastatin (CRESTOR) 40 mg tablet Take 1 tablet (40 mg total) by mouth daily 90 tablet 3    alfuzosin (UROXATRAL) 10 mg 24 hr tablet Take 1 tablet (10 mg total) by mouth daily (Patient not taking: Reported on 4/27/2023) 90 tablet 3    Allergy Relief, loratadine, 10 mg tablet Take 1 tablet by mouth once daily 30 tablet 2    magnesium oxide 250 mg magnesium tablet every 12 hours      fexofenadine (ALLEGRA) 180 mg tablet daily      ferrous sulfate ER (Slow Release Iron) 140 mg (45 mg iron) tablet Take 1 tablet (140 mg total) by mouth 2 (two) times a day with meals 60 tablet 11    cholecalciferol, vitamin D3, (Vitamin D3) 25 mcg (1,000 unit) capsule Take 1 capsule (1,000 Units total) by mouth daily 30 capsule 3    melatonin 5 mg capsule Take 5 mg by mouth nightly as needed (insomnia)        No current facility-administered medications for this visit.     Past Medical History:   Diagnosis Date    Anemia     Asthma     no attacks as long as away from hay fever allergens in [place]    Cardiovascular disease     known bicuspid aortic valve, mild AVS, murmur    Depressive disorder     Endocrine disorder     Heart murmur     Hyperlipidemia     Kidney disease 2021, 2011    calculi    Obesity     Peripheral neuropathy     L arm     Stroke ([facility]) 05/21/2020    ischemic, L sided weakness; L leg weakness remains      Past Surgical History:   Procedure Laterality Date    BACK SURGERY  2018    lumbar discectomy    COLONOSCOPY N/A  "3/3/2023    Procedure: COLONOSCOPY with polypectomies;  Surgeon: Camden Ledezma MD;  Location: [facility];  Service: Endoscopy;  Laterality: N/A;    CRYOTHERAPY      dermatology    CYSTOSCOPY URETEROSCOPY W/ LASER LITHOTRIPSY Left 8/10/2021    Procedure: CYSTOSCOPY LEFT URETEROSCOPY WITH LASER LITHOTRIPSY WITH STENT PLACEMENT;  Surgeon: Kaylen Leiva MD;  Location: [facility];  Service: Urology;  Laterality: Left;    EYE SURGERY Bilateral 08/2020    cataracts      Family History   Problem Relation Age of Onset    Alzheimer's disease Father     Diabetes type II Father     Diabetes Sister       Social History     Socioeconomic History    Marital status:      Spouse name: Not on file    Number of children: Not on file    Years of education: Not on file    Highest education level: Not on file   Occupational History    Not on file   Tobacco Use    Smoking status: Never    Smokeless tobacco: Never   Vaping Use    Vaping Use: Never used   Substance and Sexual Activity    Alcohol use: Not Currently    Drug use: Never    Sexual activity: Not Currently   Other Topics Concern    Not on file   Social History Narrative    Not on file     Social Determinants of Health     Financial Resource Strain: Not on file   Food Insecurity: Not on file   Transportation Needs: Not on file   Physical Activity: Not on file   Stress: Not on file   Social Connections: Not on file   Intimate Partner Violence: Not on file   Housing Stability: Not on file        Objective      ROS  Review of Systems   Constitutional:  Positive for fatigue.        He states he has had chronic fatigue since his stroke, but this has been stable.    Eyes:  Positive for visual disturbance.        Since his stroke, he has issues with vision.  He gets \"shots\" in his eyes for diabetic retinopathy.    Respiratory: Negative.     Cardiovascular: Negative.    Gastrointestinal: Negative.    Endocrine: Negative.    Genitourinary:  Positive for frequency. "   Musculoskeletal:  Positive for arthralgias.   Skin: Negative.    Neurological:  Positive for light-headedness.        Had a brief episode of lightheadedness this morning that resolved quickly.     Hematological: Negative.    Psychiatric/Behavioral: Negative.         Labs    Lab Results   Component Value Date    ALBUMIN 4.0 04/27/2023    CALCIUM 10.2 04/27/2023    PTH 11.4 07/21/2021    PHOS 3.2 05/21/2020     04/27/2023    K 4.1 04/27/2023    CO2 24 04/27/2023     04/27/2023    MG 1.8 07/27/2022    BUN 47 (H) 04/27/2023    CREATININE 1.56 (H) 04/27/2023    URICACID 6.0 07/21/2021     Lab Results   Component Value Date    ALT 7 04/27/2023    AST 12 04/27/2023    ALKPHOS 73 04/27/2023    BILITOT 0.44 04/27/2023    LIPASE 80 06/16/2021    AMYLASE 31 05/20/2019     Lab Results   Component Value Date    CHOL 256 (H) 04/27/2023    LDLCALC 194 (H) 04/27/2023    HDL 28 (L) 04/27/2023    TRIG 171 (H) 04/27/2023     Lab Results   Component Value Date    WBC 5.4 04/27/2023    HGB 11.2 (L) 04/27/2023    HCT 33.2 (L) 04/27/2023    MCV 94.2 04/27/2023     04/27/2023     Lab Results   Component Value Date    GLUCOSE 117 (H) 04/27/2023    HGBA1C 5.2 04/27/2023     Lab Results   Component Value Date    PROTUR 100 (A) 05/16/2023     Lab Results   Component Value Date    MICROALBCREA 551.3 (H) 04/27/2023     Lab Results   Component Value Date    CREATUR 110.5 04/27/2023     Lab Results   Component Value Date    RBCU 50-75 (A) 05/16/2023    WBCU 0-4 05/16/2023    SQUAMEPIU 0-4 05/16/2023    BACTERIAU Moderate (A) 05/16/2023    GRANCASTU 0-4 (A) 05/16/2023       Vital Signs  There were no vitals taken for this visit.  Wt Readings from Last 3 Encounters:   05/16/23 85.3 kg (188 lb)   04/27/23 85.3 kg (188 lb)   03/03/23 86.2 kg (190 lb)        Physical Exam  Constitutional:       Appearance: He is ill-appearing.   HENT:      Head: Normocephalic.      Mouth/Throat:      Mouth: Mucous membranes are moist.   Eyes:       Pupils: Pupils are equal, round, and reactive to light.   Cardiovascular:      Rate and Rhythm: Regular rhythm.      Heart sounds: Normal heart sounds.      Comments: No JVD or carotid bruit.  Pulmonary:      Breath sounds: Normal breath sounds.   Abdominal:      General: Bowel sounds are normal.      Palpations: Abdomen is soft.   Musculoskeletal:      Cervical back: Neck supple.      Comments: No LE edema.   Skin:     General: Skin is warm and dry.   Neurological:      Mental Status: He is alert and oriented to person, place, and time.      Gait: Gait abnormal.      Comments: Delayed responses to questions, but this is due to the CVA 3 years ago.  Also, affected his gait.  However, responses are appropriate.    Psychiatric:         Mood and Affect: Mood normal.         Behavior: Behavior normal.         Assessment and Plan  There are no diagnoses linked to this encounter.   CKD Stage 3A/A2.  Started Jardiance 10 mg daily to reduce proteinuria further.    Electrolytes   Sodium: 139  Potassiu: 3.9  Magnesium: 1.5 - stop magnesium oxide and start Magnesium Glycinate 100 mg daily.     Metabolic Acidosis   None    Osteodystrophy   Calcium: 10.1  Phosphorus: 3.9  Vit. D: 48  PTH: 4.5 - low number could be a reflection of the low Magnesium level.  We will monitor this.      PROTEINURIA   Improving, but still present.  Jardiance 10 mg daily was ordered.     ANEMIA    Hgb: 12.7  Likely a combination of iron deficiency and anemia of chronic disease.  Continue  Ferrous sulfate as ordered by PCP.    HYPERTENSION    Controlled.  Continue Losartan as ordered.     Volume status:    Euvolemic      Total time spent reviewing records in preparation for consultation 30 minutes. An additional 50 minutes was spent face to face with the patient today in completing history, physical examination, reviewing test results, and in discussion of CKD stages, progression and prevention.     Overall Plan:   Start Jardiance 25 mg daily for the  protein in your urine.  Continue to drink enough water at least 64 ounces per day.  We will follow-up in about 3 months with lab work prior to that visit.  Future ordered.  Stop the magnesium oxide and switch to magnesium glycinate 100 mg daily.    No follow-ups on file.     Bushra Chery, CNP

## 2023-07-24 NOTE — PATIENT INSTRUCTIONS
Start Jardiance 25 mg daily for the protein in your urine.  Continue to drink enough water at least 64 ounces per day.  We will follow-up in about 3 months with lab work prior to that visit.  Stop the magnesium oxide and switch to magnesium glycinate 100 mg daily.

## 2023-08-09 ENCOUNTER — TELEPHONE (OUTPATIENT)
Dept: UROLOGY | Facility: CLINIC | Age: 63
End: 2023-08-09
Payer: COMMERCIAL

## 2023-08-09 NOTE — TELEPHONE ENCOUNTER
Called Pt to schedule a Yr FU and the Pt stated that he would be moving out of the area and requested for me to cancel the appt.

## 2023-10-10 ENCOUNTER — NURSE TRIAGE (OUTPATIENT)
Dept: FAMILY MEDICINE | Facility: CLINIC | Age: 63
End: 2023-10-10
Payer: COMMERCIAL

## 2023-10-10 ENCOUNTER — OFFICE VISIT (OUTPATIENT)
Dept: PAIN MEDICINE | Facility: HOSPITAL | Age: 63
End: 2023-10-10
Payer: COMMERCIAL

## 2023-10-10 ENCOUNTER — HOSPITAL ENCOUNTER (OUTPATIENT)
Dept: RADIOLOGY | Facility: HOSPITAL | Age: 63
Discharge: HOME | End: 2023-10-10
Payer: COMMERCIAL

## 2023-10-10 VITALS
SYSTOLIC BLOOD PRESSURE: 124 MMHG | RESPIRATION RATE: 16 BRPM | DIASTOLIC BLOOD PRESSURE: 88 MMHG | HEART RATE: 73 BPM | OXYGEN SATURATION: 100 %

## 2023-10-10 DIAGNOSIS — M62.830 BACK MUSCLE SPASM: Primary | ICD-10-CM

## 2023-10-10 DIAGNOSIS — M54.50 ACUTE MIDLINE LOW BACK PAIN WITHOUT SCIATICA: ICD-10-CM

## 2023-10-10 PROCEDURE — 72100 X-RAY EXAM L-S SPINE 2/3 VWS: CPT | Mod: TC | Performed by: ORTHOPAEDIC SURGERY

## 2023-10-10 PROCEDURE — G0463 HOSPITAL OUTPT CLINIC VISIT: HCPCS

## 2023-10-10 RX ORDER — METHYLPREDNISOLONE 4 MG/1
TABLET ORAL
Qty: 21 TABLET | Refills: 0 | Status: SHIPPED | OUTPATIENT
Start: 2023-10-10 | End: 2023-10-17

## 2023-10-10 ASSESSMENT — ENCOUNTER SYMPTOMS
EYES NEGATIVE: 1
HEMATOLOGIC/LYMPHATIC NEGATIVE: 1
WEAKNESS: 1
ENDOCRINE NEGATIVE: 1
CONSTITUTIONAL NEGATIVE: 1
AGITATION: 1
BACK PAIN: 1
RESPIRATORY NEGATIVE: 1
CARDIOVASCULAR NEGATIVE: 1
GASTROINTESTINAL NEGATIVE: 1

## 2023-10-10 ASSESSMENT — PAIN SCALES - GENERAL: PAINLEVEL: 8

## 2023-10-10 ASSESSMENT — PAIN - FUNCTIONAL ASSESSMENT: PAIN_FUNCTIONAL_ASSESSMENT: 0-10

## 2023-10-10 ASSESSMENT — PAIN DESCRIPTION - DESCRIPTORS: DESCRIPTORS: SHARP

## 2023-10-10 NOTE — TELEPHONE ENCOUNTER
[facility] Nurse Triage Note    INITIAL REQUIRED QUESTIONS:    Are you currently in [place]? Yes    Are you currently driving?: No      CHIEF COMPLAINT AND HISTORY:                                               Reason for call:  I have had lower back pain for a week, I would rate this at 8/10. I fell last week after seeing my eye Dr, the Paramedics were called to the clinic, I felt better after I got home but the pain still hurts. I try Tylenol and heat/ice packs with little relief. I do not have numbness nor tingling in my legs and I am able to walk around.     Pt declined to see other careproviders than PCP, PCP was not available, pt agreed to go to  [facility].    Do you follow with a specialist for this condition: No    Onset: See Above Charting    Duration: See Above Charting    Severity: See Above Charting    Pain Level Reported: See Above Charting    Location:  See Above Charting    Associated Symptoms: See Above Charting    Are symptoms interfering with Activities of Daily Living?: See Above Charting    History of similar symptoms: See Above Charting    Aggravating/Relieving Factors: See Above Charting    LMP (for females ages 10-60): N/A     Allergies - Patient reported: not reviewed      CARE ADVICE:   Please review Encounters in Chart review for Specific Care Advice given by Triage RN.       DISPOSITION:   See HCP Within 4 Hours (Or PCP Triage)       PCP COMMUNICATION:   Was the patient's PCP contacted?: Patient Chose to go to Urgent Care         Jaimie Mccord RN  October 10, 2023 12:06 PM  Reason for Disposition   [1] SEVERE back pain (e.g., excruciating, unable to do any normal activities) AND [2] not improved 2 hours after pain medicine    Protocols used: Back Pain-A-

## 2023-10-10 NOTE — PROGRESS NOTES
Date of service:10/10/2023      Subjective     Patient ID: Del Mckeon is a 63 y.o. male.  Patient seen today in walk-in spine clinic    HPI:   HPI  Patient is 63-year-old male status post lumbar surgery in 2018, stated he did extremely well until recently when he was on IM appointment getting his monthly eye injections he did have what appeared to be a syncopal episode, may have fallen although he cannot recollect.  They offered to take him to urgent care/ED and he refused.  He has had increased back pain ever since this incident.  He cannot pinpoint anything prior to this.    Patient mainly complaining lower back pain.  Denies any bowel or bladder dysfunction.  Denies saddle anesthesia.  He has left foot drop but this is status post stroke which she has had a few years ago.    Pain is moderate severe.  He attempted ice heat, over-the-counter pain medications but he cannot take NSAIDs as he is on blood thinner.  Pain is moderate to severe.    RN Assessment:    Pain Assessment: 0-10  Pain Score: 8  Pain Type: Acute pain  Pain Location: Back  Pain Radiating Towards: from buttocks up slightly  Pain Descriptors: Sharp  Pain Frequency: Constant/continuous  Interference on Function: walking  Pain Onset: Ongoing  Aggravating Factors: Standing, Walking  Pain Interventions: Other (Comment), Heat applied, Cold applied, Home medication (lean forward or pull legs up)  Response to Interventions: nothing is helping    Review of Systems:  Review of Systems   Constitutional: Negative.    HENT: Negative.     Eyes: Negative.    Respiratory: Negative.     Cardiovascular: Negative.    Gastrointestinal: Negative.    Endocrine: Negative.    Genitourinary: Negative.    Musculoskeletal:  Positive for back pain.   Neurological:  Positive for weakness.        Status post CVA, residual left hemiplegia   Hematological: Negative.    Psychiatric/Behavioral:  Positive for agitation (Acute pain behavior).        Objective     Vital Signs:  BP  124/88   Pulse 73   Resp 16   SpO2 100%     Medications:    Current Outpatient Medications:     acetaminophen 500 mg capsule, Take 1 capsule (500 mg total) by mouth every 4 (four) hours as needed for pain scale 1-3/10 or headaches, Disp: , Rfl:     clopidogreL (PLAVIX) 75 mg tablet, Take 1 tablet (75 mg total) by mouth daily (Patient taking differently: Take 1 tablet (75 mg total) by mouth daily Takes every other day, not daily), Disp: 90 tablet, Rfl: 3    methylPREDNISolone (Medrol, Edwin,) 4 mg tablet, follow package directions, Disp: 21 tablet, Rfl: 0    empagliflozin (Jardiance) 10 mg tablet tablet, Take 10 mg by mouth daily, Disp: 30 tablet, Rfl: 5    hydroCHLOROthiazide (HYDRODIURIL) 12.5 mg tablet, Take 1 tablet (12.5 mg total) by mouth daily, Disp: 90 tablet, Rfl: 3    losartan (COZAAR) 50 mg tablet, Take 1 tablet (50 mg total) by mouth 2 (two) times a day, Disp: 180 tablet, Rfl: 3    potassium chloride 10 mEq CR tablet, Take 1 tablet (10 mEq total) by mouth 2 (two) times a day, Disp: 180 tablet, Rfl: 3    sertraline (ZOLOFT) 50 mg tablet, Take 1 tablet (50 mg total) by mouth daily, Disp: 90 tablet, Rfl: 3    Allergy Relief, loratadine, 10 mg tablet, Take 1 tablet by mouth once daily (Patient not taking: Reported on 10/10/2023), Disp: 30 tablet, Rfl: 2    magnesium oxide 250 mg magnesium tablet, Take 1 tablet (250 mg total) by mouth 2 (two) times a day, Disp: , Rfl:     ferrous sulfate ER (Slow Release Iron) 140 mg (45 mg iron) tablet, Take 1 tablet (140 mg total) by mouth 2 (two) times a day with meals, Disp: 60 tablet, Rfl: 11    cholecalciferol, vitamin D3, (Vitamin D3) 25 mcg (1,000 unit) capsule, Take 1 capsule (1,000 Units total) by mouth daily, Disp: 30 capsule, Rfl: 3    melatonin 5 mg capsule, Take 5 mg by mouth nightly as needed (insomnia) , Disp: , Rfl:     Physical Exam:  Physical Exam  Vitals and nursing note reviewed.   Constitutional:       General: He is in acute distress.      Appearance:  He is normal weight.   HENT:      Head: Normocephalic.      Nose: Nose normal.   Eyes:      Conjunctiva/sclera: Conjunctivae normal.      Pupils: Pupils are equal, round, and reactive to light.   Cardiovascular:      Rate and Rhythm: Normal rate. Rhythm irregular.      Pulses: Normal pulses.      Heart sounds: Normal heart sounds.   Pulmonary:      Effort: Pulmonary effort is normal.      Breath sounds: Normal breath sounds.   Abdominal:      Palpations: Abdomen is soft.   Musculoskeletal:      Cervical back: Normal range of motion.      Comments: Pain palpitation with paravertebral tenderness noted bilaterally at lumbar 3-4, lumbar 4-5 and lumbar 5-sacral 1  Negative straight leg raises bilaterally  Reflexes +2/+4 bilateral brachioradialis and wrist  +2/+4 patella on the right, +1/+4 patellar reflex on the left  Decreased strength dorsiflexing left foot versus right   Skin:     General: Skin is warm and dry.      Capillary Refill: Capillary refill takes 2 to 3 seconds.   Neurological:      Mental Status: He is alert and oriented to person, place, and time. Mental status is at baseline.      Motor: Weakness present.   Psychiatric:         Mood and Affect: Mood normal.         Behavior: Behavior normal.         Thought Content: Thought content normal.         Judgment: Judgment normal.         Assessment:   1. Back muscle spasm    2. Acute midline low back pain without sciatica        Plan:  We will order lumbar x-ray 2 views  Medrol steroid Dosepak  Follow-up with me in 1 week    Camilo Ponce MD      DISCUSSION  Today's plan was a combined effort between the patient and myself. The patient understood the plan, verbally consented, and agreed to participate. An After Visit Summary regarding today's office visit was given to the patient.     The diagnostic assessment has been reviewed. Patient and/or patient's surrogate has expressed a good understanding of the assessment and recommendations from today's visit. There  are no apparent barriers to understanding this information. Patient’s questions were addressed.    Patient has been advised to contact this office or the answering service with questions or concerns that may arise. Patient has been advised to follow up with Primary Care Provider for general medical needs.     A total of 36 minutes was required for this visit. Greater than 50% of this time was spent in direct face to face communication with the patient and/or surrogate in order to provide counseling regarding etiology and options for treatment of pain.                  A voice recognition program was used to aid in documentation of this record. Sometimes words are not printed exactly as they were spoken.  While efforts were made to carefully edit and correct any inaccuracies, some errors may be present; please take these into context.  Please contact the provider's office if you have any questions or concerns.

## 2023-10-11 ENCOUNTER — DOCUMENTATION (OUTPATIENT)
Dept: PAIN MEDICINE | Facility: HOSPITAL | Age: 63
End: 2023-10-11
Payer: COMMERCIAL

## 2023-10-11 NOTE — PROGRESS NOTES
Phone call to patient with the results of the lumbar film.  Showing multilevel degenerative disc disease.  No evidence of fracture.    Patient stated after starting the steroid Dosepak he woke up this morning with less pain.  I told him to continue finishing the steroid Dosepak and follow-up with me in a week.  All questions answered.

## 2023-10-17 ENCOUNTER — OFFICE VISIT (OUTPATIENT)
Dept: PAIN MEDICINE | Facility: HOSPITAL | Age: 63
End: 2023-10-17
Payer: COMMERCIAL

## 2023-10-17 VITALS
HEART RATE: 84 BPM | OXYGEN SATURATION: 100 % | WEIGHT: 189 LBS | RESPIRATION RATE: 18 BRPM | HEIGHT: 70 IN | SYSTOLIC BLOOD PRESSURE: 108 MMHG | BODY MASS INDEX: 27.06 KG/M2 | DIASTOLIC BLOOD PRESSURE: 81 MMHG

## 2023-10-17 DIAGNOSIS — M62.830 BACK MUSCLE SPASM: Primary | ICD-10-CM

## 2023-10-17 PROCEDURE — G0463 HOSPITAL OUTPT CLINIC VISIT: HCPCS

## 2023-10-17 RX ORDER — METHOCARBAMOL 500 MG/1
500 TABLET, FILM COATED ORAL 3 TIMES DAILY PRN
Qty: 45 TABLET | Refills: 1 | Status: SHIPPED | OUTPATIENT
Start: 2023-10-17 | End: 2023-11-16

## 2023-10-17 ASSESSMENT — PAIN DESCRIPTION - DESCRIPTORS: DESCRIPTORS: SHARP;ACHING

## 2023-10-17 ASSESSMENT — PAIN SCALES - GENERAL: PAINLEVEL: 5

## 2023-10-17 ASSESSMENT — PAIN - FUNCTIONAL ASSESSMENT: PAIN_FUNCTIONAL_ASSESSMENT: 0-10

## 2023-10-17 NOTE — PROGRESS NOTES
Subject: Patient here for follow-up after being seen in our walk-in spine clinic approximately a week ago for exacerbation back pain mainly in the lumbosacral region.  More so off to the right aspect above the pelvic brim.  Denies any radicular component of pain.  He did try a steroid Dosepak, and over a week he feels he is doing somewhat better is just not as severe.  Denies any new symptoms.    Objective:  Vital signs stable  Pain palpitation over the posterior iliac crest, following cluneal nerve distribution.        Assessment  Back pain, spasm muscle  X-rays showing multilevel degenerative disc disease no fracture, tumor, or deformity  History of CVA        Plan: Trial of Robaxin 500 mg 3 times daily  Follow-up with me in 1 month      Total 20 minutes was spent reviewing patient's chart, 50% of this was face-to-face with patient

## 2023-10-23 ENCOUNTER — TELEPHONE (OUTPATIENT)
Dept: NEPHROLOGY | Facility: CLINIC | Age: 63
End: 2023-10-23
Payer: COMMERCIAL

## 2023-10-23 NOTE — TELEPHONE ENCOUNTER
"Pt cancelled appointment with Bushra Chery CNP. The appointment was cancelled by the call center. Reached out to pt to see if he would like to reschedule appointment. He stated that he was not impressed with the provider that he saw on his initial visit and does not wish to see her again. He stated \"She made some snide comments that I was not comfortable with.\"Pt was offered to see a different provider in clinic. He stated \" I just feel it would be a waste of time as I will be moving out of state.\" Pt was advised that if he changes his mind that he is an established and call and set up an appointment at any time. Pt voiced understanding.   "

## 2023-11-08 ENCOUNTER — TELEPHONE (OUTPATIENT)
Dept: FAMILY MEDICINE | Facility: CLINIC | Age: 63
End: 2023-11-08

## 2023-11-08 NOTE — TELEPHONE ENCOUNTER
Caller would like to discuss (a) return call Writer has advised caller of a callback from within 24 hours.    Patient: Del L Patton    Caller Name (Last and first, relation/role): self    Name of Facility: [facility]    Callback Number: [phone]     Best Availability: any    Fax Number: [email or web address]    Additional Info: Requested lab work at last visit for  fasting insulin wants results from the last test     Did you confirm the message with the caller: Yes    Is it okay that the nurse communicates your response through [employer]? No

## 2023-11-08 NOTE — TELEPHONE ENCOUNTER
Patient wanted to know what his fasting insulin result was from 4/2023. Patient notified and no further questions.

## 2024-06-20 ENCOUNTER — OFFICE VISIT (OUTPATIENT)
Dept: URGENT CARE | Facility: CLINIC | Age: 64
End: 2024-06-20
Payer: COMMERCIAL

## 2024-06-20 VITALS
WEIGHT: 198 LBS | SYSTOLIC BLOOD PRESSURE: 175 MMHG | HEART RATE: 61 BPM | RESPIRATION RATE: 20 BRPM | TEMPERATURE: 98.3 F | DIASTOLIC BLOOD PRESSURE: 99 MMHG | OXYGEN SATURATION: 98 %

## 2024-06-20 DIAGNOSIS — R22.43 LOCALIZED SWELLING OF BOTH LOWER LEGS: Primary | ICD-10-CM

## 2024-06-20 PROCEDURE — 1036F TOBACCO NON-USER: CPT

## 2024-06-20 PROCEDURE — 99202 OFFICE O/P NEW SF 15 MIN: CPT

## 2024-06-20 RX ORDER — METFORMIN HYDROCHLORIDE 500 MG/1
500 TABLET ORAL DAILY
COMMUNITY

## 2024-06-20 ASSESSMENT — ENCOUNTER SYMPTOMS
SHORTNESS OF BREATH: 0
WHEEZING: 0
CHILLS: 0
DIARRHEA: 0
FEVER: 0
WEAKNESS: 1
COUGH: 0
MYALGIAS: 0
PALPITATIONS: 0
COLOR CHANGE: 0
NAUSEA: 0
JOINT SWELLING: 0
BRUISES/BLEEDS EASILY: 0
VOMITING: 0
CHEST TIGHTNESS: 0
FATIGUE: 0
ABDOMINAL PAIN: 0

## 2024-06-20 ASSESSMENT — PAIN SCALES - GENERAL: PAINLEVEL: 4

## 2024-06-20 NOTE — PROGRESS NOTES
Subjective   Patient ID: Yung aCpellan is a 64 y.o. male.    HPI    Patient presents to urgent care with wife for complaints of bilateral leg pain and swelling.  Patient states that the leg pain has been going on for quite a few months however the swelling to started over the last couple days.  Patient's wife states that the patient does have a history of a stroke and has stopped taking his baby aspirin and is on no other blood thinners.  Patient states that when he begins to walk he is having pain in his calf however has not noted any redness.  Patient denies any fevers, chills, shortness of breath, difficulty breathing, chest pain, palpitations or any other systemic complaints.    Review of Systems   Constitutional:  Negative for chills, fatigue and fever.   Respiratory:  Negative for cough, chest tightness, shortness of breath and wheezing.    Cardiovascular:  Positive for leg swelling. Negative for chest pain and palpitations.   Gastrointestinal:  Negative for abdominal pain, diarrhea, nausea and vomiting.   Musculoskeletal:  Positive for gait problem (pain with walking.). Negative for joint swelling and myalgias.   Skin:  Negative for color change.   Neurological:  Positive for weakness.   Hematological:  Does not bruise/bleed easily.     Objective   Physical Exam  Constitutional:       Appearance: Normal appearance.   HENT:      Head: Normocephalic and atraumatic.      Mouth/Throat:      Mouth: Mucous membranes are moist.      Pharynx: Oropharynx is clear.   Eyes:      Extraocular Movements: Extraocular movements intact.      Conjunctiva/sclera: Conjunctivae normal.      Pupils: Pupils are equal, round, and reactive to light.   Cardiovascular:      Rate and Rhythm: Normal rate and regular rhythm.      Pulses: Normal pulses.      Heart sounds: Normal heart sounds.   Pulmonary:      Effort: Pulmonary effort is normal.      Breath sounds: Normal breath sounds.   Musculoskeletal:      Right ankle: Swelling (2+  pitting) present.      Left ankle: Swelling (2+ pitting) present.      Right foot: Swelling (1+) present.      Left foot: Swelling (1+) present.   Skin:     General: Skin is warm and dry.      Capillary Refill: Capillary refill takes less than 2 seconds.   Neurological:      General: No focal deficit present.      Mental Status: He is alert and oriented to person, place, and time.       I discussed extensively with patient and his wife that I feel that it is best they proceed to the ER for further evaluation to rule out a possible DVT/PE, or other cardiac related issues.  Patient and wife agree and states that they will go to [facility], I feel that it is safe for patient to drive via private car as vital signs are stable and patient is in no distress.    Assessment/Plan   Problem List Items Addressed This Visit             ICD-10-CM    Localized swelling of both lower legs - Primary R22.43       Patient disposition: ED

## 2024-07-29 NOTE — INTERDISCIPLINARY/THERAPY
05/27/20 0738   General   Chart Reviewed Yes   OT Treatment Duration (Min) 9 Minutes   Is this a Co-Treatment? No   Family/Caregiver Present No   Precautions   Reinforced Precautions Yes   Other Precautions L demi, fall   Subjective Comments   Subjective Comments RN ok'd tx. Pt. agreeable   Pain Assessment   Pain Assessment No/denies pain   Cognition   Arousal/Alertness WFL   Cognition Comment Pt. able to follow commands. Discussed various home safety concerns and potential fall hazards- recommending to remove all rugs in the home    Bed Mobility   Bed Mobility Not assessed   Transfers   Transfer Assessed   Transfer 1   Transfer From 1 Chair with arms   Transfer Type 1 To and from   Transfer to 1 Stand   Technique 1 Sit to stand;Stand to sit   Transfer Device 1 Front wheeled walker;Transfer belt   Level of Assistance 1 Standby assistance   Trials/Comments 1 SBA   Transfers 2   Transfer From 2 Toilet   Transfer Type 2 To and from   Transfer to 2 Stand   Technique 2 Sit to stand;Stand to sit   Transfer Device 2 Front wheeled walker;Transfer belt   Level of Assistance 2 Standby assistance   Trials/Comments 2 SBA   Ambulation   Ambulation Assessed   Ambulation 1   Surface 1 Level surface;Smooth   Device 1 Front wheeled walker;Gait belt   Assistance 1 Standby assistance   Quality of Gait 1 no LOB   Comments/Distance 1 15m   LE Dressing   LE Dressing Yes   Sock Level of Assistance Setup   LE Dressing Where Assessed Bedside chair   LE Dressing Comments (2/2)   Toileting   Toileting Level of Assistance Setup   Where Assessed Toilet   Toileting Comments (3/3)   Coordination   Fine Motor Pt. completed FMC exercises using TP   Assessment   Rehab Potential Good   Progress Discontinue OT   Recommendations for Therapy No further therapy needed;Home Health Therapy   Assessment Comment Pt. completing ADL's and mobility utilizing AE w/ improved dyn. balance and overall safety awareness- SBA for safety. Cont. to gain fxl. strength  throughout LUE- has HEP to cont. w/ at discharge. Recommending removing all rugs from home to decrease fall risk while using FWW. Would cont. to benefit from HH OT to maximize fxl. (I) and to ensure Pt. safety in home environment   Plan   Plan Comment Discharge OT      Products Recommended: Elta UV Clear for daily use recommended Detail Level: Detailed General Sunscreen Counseling: I recommended a broad spectrum sunscreen with a SPF of 30 or higher.  I explained that SPF 30 sunscreens block approximately 97 percent of the sun's harmful rays.  Sunscreens should be applied at least 15 minutes prior to expected sun exposure and then every 2 hours after that as long as sun exposure continues. If swimming or exercising sunscreen should be reapplied every 45 minutes to an hour after getting wet or sweating.  One ounce, or the equivalent of a shot glass full of sunscreen, is adequate to protect the skin not covered by a bathing suit. I also recommended a lip balm with a sunscreen as well. Sun protective clothing can be used in lieu of sunscreen but must be worn the entire time you are exposed to the sun's rays.

## (undated) DEVICE — SNARE EXACTO COLD 9MM

## (undated) DEVICE — CATH OPEN END 5F

## (undated) DEVICE — CONTAINER SPECIMEN 4OZ STL SCREW TOP BLISTER PACK

## (undated) DEVICE — TUBING SUCTION 3/16"X10'

## (undated) DEVICE — KIT ENDO PROCEDURE CARRY ON

## (undated) DEVICE — WIRE GUIDE ZIP .038X150 STR STD NITINOL CORE HYDRO COATED

## (undated) DEVICE — CANNULA NASAL ADULT 7 FOOT U/CONNECT-IT TUBING

## (undated) DEVICE — GLOVE BIOGEL PI ORTHOPRO 7.5

## (undated) DEVICE — GLOVE SENSICARE 6.0 SURG

## (undated) DEVICE — SYRINGE 12ML LUER LOCK TIP STL MONOJECT

## (undated) DEVICE — GLOVE SENSICARE 7.0 SURG

## (undated) DEVICE — TRAP POLYP E TRAP

## (undated) DEVICE — SPONGE GAUZE 4X4-12 STL 10'S

## (undated) DEVICE — TRAY CYSTO CUSTOM RCRH CUSTOM PACK

## (undated) DEVICE — SYSTEM ENDO IRRIGATION PATH PATHFINDER PLUS

## (undated) DEVICE — SUCTION YANKAUER BULB TIP W 1 PIECE HANDLE

## (undated) DEVICE — TOWEL DISP BLUE STL 4 PK